# Patient Record
Sex: FEMALE | Race: WHITE | NOT HISPANIC OR LATINO | Employment: UNEMPLOYED | URBAN - METROPOLITAN AREA
[De-identification: names, ages, dates, MRNs, and addresses within clinical notes are randomized per-mention and may not be internally consistent; named-entity substitution may affect disease eponyms.]

---

## 2022-01-05 PROBLEM — E11.9 TYPE 2 DIABETES MELLITUS: Status: ACTIVE | Noted: 2022-01-05

## 2022-01-05 PROBLEM — I10 HYPERTENSION: Status: ACTIVE | Noted: 2022-01-05

## 2022-01-05 PROBLEM — E78.5 HYPERLIPIDEMIA: Status: ACTIVE | Noted: 2022-01-05

## 2022-01-06 ENCOUNTER — OFFICE VISIT (OUTPATIENT)
Dept: PRIMARY CARE CLINIC | Facility: CLINIC | Age: 72
End: 2022-01-06
Payer: MEDICARE

## 2022-01-06 ENCOUNTER — HOSPITAL ENCOUNTER (OUTPATIENT)
Dept: CARDIOLOGY | Facility: HOSPITAL | Age: 72
Discharge: HOME | End: 2022-01-06
Payer: MEDICARE

## 2022-01-06 VITALS
DIASTOLIC BLOOD PRESSURE: 88 MMHG | WEIGHT: 167.75 LBS | OXYGEN SATURATION: 95 % | SYSTOLIC BLOOD PRESSURE: 178 MMHG | HEART RATE: 88 BPM | BODY MASS INDEX: 28.64 KG/M2 | HEIGHT: 64 IN | TEMPERATURE: 98 F

## 2022-01-06 DIAGNOSIS — Z78.0 ASYMPTOMATIC MENOPAUSAL STATE: ICD-10-CM

## 2022-01-06 DIAGNOSIS — Z00.00 ENCOUNTER FOR PREVENTIVE CARE: Primary | ICD-10-CM

## 2022-01-06 DIAGNOSIS — Z13.6 ENCOUNTER FOR SCREENING FOR CARDIOVASCULAR DISORDERS: ICD-10-CM

## 2022-01-06 DIAGNOSIS — E08.37X2: ICD-10-CM

## 2022-01-06 DIAGNOSIS — Z79.4 DIABETES MELLITUS DUE TO UNDERLYING CONDITION WITH RETINOPATHY AND MACULAR EDEMA, WITH LONG-TERM CURRENT USE OF INSULIN, UNSPECIFIED LATERALITY, UNSPECIFIED RETINOPATHY SEVERITY: ICD-10-CM

## 2022-01-06 DIAGNOSIS — H35.30 MACULAR DEGENERATION, UNSPECIFIED LATERALITY, UNSPECIFIED TYPE: ICD-10-CM

## 2022-01-06 DIAGNOSIS — F32.89 OTHER DEPRESSION: ICD-10-CM

## 2022-01-06 DIAGNOSIS — E08.37X3: ICD-10-CM

## 2022-01-06 DIAGNOSIS — F41.9 ANXIETY: ICD-10-CM

## 2022-01-06 DIAGNOSIS — R22.1 NECK MASS: ICD-10-CM

## 2022-01-06 DIAGNOSIS — Z12.31 ENCOUNTER FOR SCREENING MAMMOGRAM FOR MALIGNANT NEOPLASM OF BREAST: ICD-10-CM

## 2022-01-06 DIAGNOSIS — M54.9 ACUTE BILATERAL BACK PAIN, UNSPECIFIED BACK LOCATION: ICD-10-CM

## 2022-01-06 DIAGNOSIS — E08.311 DIABETES MELLITUS DUE TO UNDERLYING CONDITION WITH RETINOPATHY AND MACULAR EDEMA, WITH LONG-TERM CURRENT USE OF INSULIN, UNSPECIFIED LATERALITY, UNSPECIFIED RETINOPATHY SEVERITY: ICD-10-CM

## 2022-01-06 PROCEDURE — 3079F DIAST BP 80-89 MM HG: CPT | Mod: HCNC,CPTII,S$GLB, | Performed by: NURSE PRACTITIONER

## 2022-01-06 PROCEDURE — 3079F PR MOST RECENT DIASTOLIC BLOOD PRESSURE 80-89 MM HG: ICD-10-PCS | Mod: HCNC,CPTII,S$GLB, | Performed by: NURSE PRACTITIONER

## 2022-01-06 PROCEDURE — 93010 ELECTROCARDIOGRAM REPORT: CPT | Mod: HCNC,S$GLB,, | Performed by: INTERNAL MEDICINE

## 2022-01-06 PROCEDURE — 1160F PR REVIEW ALL MEDS BY PRESCRIBER/CLIN PHARMACIST DOCUMENTED: ICD-10-PCS | Mod: HCNC,CPTII,S$GLB, | Performed by: NURSE PRACTITIONER

## 2022-01-06 PROCEDURE — 93010 EKG 12-LEAD: ICD-10-PCS | Mod: HCNC,S$GLB,, | Performed by: INTERNAL MEDICINE

## 2022-01-06 PROCEDURE — 99499 RISK ADDL DX/OHS AUDIT: ICD-10-PCS | Mod: S$GLB,,, | Performed by: NURSE PRACTITIONER

## 2022-01-06 PROCEDURE — 99999 PR PBB SHADOW E&M-NEW PATIENT-LVL IV: CPT | Mod: PBBFAC,HCNC,, | Performed by: NURSE PRACTITIONER

## 2022-01-06 PROCEDURE — 1159F MED LIST DOCD IN RCRD: CPT | Mod: HCNC,CPTII,S$GLB, | Performed by: NURSE PRACTITIONER

## 2022-01-06 PROCEDURE — 1160F RVW MEDS BY RX/DR IN RCRD: CPT | Mod: HCNC,CPTII,S$GLB, | Performed by: NURSE PRACTITIONER

## 2022-01-06 PROCEDURE — 93005 ELECTROCARDIOGRAM TRACING: CPT | Mod: HCNC,S$GLB,, | Performed by: NURSE PRACTITIONER

## 2022-01-06 PROCEDURE — 3288F FALL RISK ASSESSMENT DOCD: CPT | Mod: HCNC,CPTII,S$GLB, | Performed by: NURSE PRACTITIONER

## 2022-01-06 PROCEDURE — 99205 OFFICE O/P NEW HI 60 MIN: CPT | Mod: HCNC,S$GLB,, | Performed by: NURSE PRACTITIONER

## 2022-01-06 PROCEDURE — 3077F PR MOST RECENT SYSTOLIC BLOOD PRESSURE >= 140 MM HG: ICD-10-PCS | Mod: HCNC,CPTII,S$GLB, | Performed by: NURSE PRACTITIONER

## 2022-01-06 PROCEDURE — 99205 PR OFFICE/OUTPT VISIT, NEW, LEVL V, 60-74 MIN: ICD-10-PCS | Mod: HCNC,S$GLB,, | Performed by: NURSE PRACTITIONER

## 2022-01-06 PROCEDURE — 99499 UNLISTED E&M SERVICE: CPT | Mod: S$GLB,,, | Performed by: NURSE PRACTITIONER

## 2022-01-06 PROCEDURE — 3077F SYST BP >= 140 MM HG: CPT | Mod: HCNC,CPTII,S$GLB, | Performed by: NURSE PRACTITIONER

## 2022-01-06 PROCEDURE — 3288F PR FALLS RISK ASSESSMENT DOCUMENTED: ICD-10-PCS | Mod: HCNC,CPTII,S$GLB, | Performed by: NURSE PRACTITIONER

## 2022-01-06 PROCEDURE — 93005 EKG 12-LEAD: ICD-10-PCS | Mod: HCNC,S$GLB,, | Performed by: NURSE PRACTITIONER

## 2022-01-06 PROCEDURE — 1126F PR PAIN SEVERITY QUANTIFIED, NO PAIN PRESENT: ICD-10-PCS | Mod: HCNC,CPTII,S$GLB, | Performed by: NURSE PRACTITIONER

## 2022-01-06 PROCEDURE — 3044F PR MOST RECENT HEMOGLOBIN A1C LEVEL <7.0%: ICD-10-PCS | Mod: HCNC,CPTII,S$GLB, | Performed by: NURSE PRACTITIONER

## 2022-01-06 PROCEDURE — 3008F BODY MASS INDEX DOCD: CPT | Mod: HCNC,CPTII,S$GLB, | Performed by: NURSE PRACTITIONER

## 2022-01-06 PROCEDURE — 1126F AMNT PAIN NOTED NONE PRSNT: CPT | Mod: HCNC,CPTII,S$GLB, | Performed by: NURSE PRACTITIONER

## 2022-01-06 PROCEDURE — 1159F PR MEDICATION LIST DOCUMENTED IN MEDICAL RECORD: ICD-10-PCS | Mod: HCNC,CPTII,S$GLB, | Performed by: NURSE PRACTITIONER

## 2022-01-06 PROCEDURE — 1101F PT FALLS ASSESS-DOCD LE1/YR: CPT | Mod: HCNC,CPTII,S$GLB, | Performed by: NURSE PRACTITIONER

## 2022-01-06 PROCEDURE — 99999 PR PBB SHADOW E&M-NEW PATIENT-LVL IV: ICD-10-PCS | Mod: PBBFAC,HCNC,, | Performed by: NURSE PRACTITIONER

## 2022-01-06 PROCEDURE — 3008F PR BODY MASS INDEX (BMI) DOCUMENTED: ICD-10-PCS | Mod: HCNC,CPTII,S$GLB, | Performed by: NURSE PRACTITIONER

## 2022-01-06 PROCEDURE — 3044F HG A1C LEVEL LT 7.0%: CPT | Mod: HCNC,CPTII,S$GLB, | Performed by: NURSE PRACTITIONER

## 2022-01-06 PROCEDURE — 1101F PR PT FALLS ASSESS DOC 0-1 FALLS W/OUT INJ PAST YR: ICD-10-PCS | Mod: HCNC,CPTII,S$GLB, | Performed by: NURSE PRACTITIONER

## 2022-01-06 RX ORDER — INSULIN GLARGINE 300 U/ML
55 INJECTION, SOLUTION SUBCUTANEOUS DAILY
Qty: 2 PEN | Refills: 11 | Status: SHIPPED | OUTPATIENT
Start: 2022-01-06 | End: 2022-09-22 | Stop reason: SDUPTHER

## 2022-01-06 RX ORDER — ESCITALOPRAM OXALATE 10 MG/1
10 TABLET ORAL DAILY
Qty: 30 TABLET | Refills: 11 | Status: SHIPPED | OUTPATIENT
Start: 2022-01-06 | End: 2022-07-11

## 2022-01-06 NOTE — PROGRESS NOTES
Answers for HPI/ROS submitted by the patient on 1/6/2022  Diabetes type: type 2  MedicAlert ID: No  Disease duration: 20 years  blurred vision: No  chest pain: No  fatigue: Yes  foot paresthesias: No  foot ulcerations: No  polydipsia: Yes  polyphagia: No  polyuria: No  visual change: Yes  weakness: Yes  weight loss: No  Symptom course: stable  confusion: No  dizziness: Yes  headaches: No  hunger: No  mood changes: No  nervous/anxious: Yes  pallor: No  seizures: No  sleepiness: Yes  speech difficulty: No  sweats: Yes  tremors: Yes  blackouts: No  hospitalization: No  nocturnal hypoglycemia: No  required assistance: No  required glucagon: Yes  CVA: No  heart disease: No  nephropathy: No  Rosana Aguiar  01/06/2022  [id number]    Anamaria Sawyer NP  Patient Care Team:  Anamaria Sawyer NP as PCP - General (Family Medicine)        [facility] Primary Care Note      Chief Complaint:  Chief Complaint   Patient presents with    Establish Care       History of Present Illness:  Diabetes  She has type 2 diabetes mellitus. No MedicAlert identification noted. The initial diagnosis of diabetes was made 20 years ago. Hypoglycemia symptoms include dizziness, nervousness/anxiousness, sleepiness, sweats and tremors. Pertinent negatives for hypoglycemia include no confusion, headaches, hunger, mood changes, pallor, seizures or speech difficulty. Associated symptoms include fatigue, polydipsia, visual change and weakness. Pertinent negatives for diabetes include no blurred vision, no chest pain, no foot paresthesias, no foot ulcerations, no polyphagia, no polyuria and no weight loss. Hypoglycemia complications include required glucagon injection. Pertinent negatives for hypoglycemia complications include no blackouts, no hospitalization, no nocturnal hypoglycemia and no required assistance. Symptoms are stable. Diabetic complications include retinopathy. Pertinent negatives for diabetic complications include no autonomic  "neuropathy, CVA, heart disease, nephropathy, peripheral neuropathy or PVD. Risk factors for coronary artery disease include dyslipidemia, hypertension, obesity, sedentary lifestyle, stress and tobacco exposure. Current diabetic treatment includes insulin injections. She is compliant with treatment most of the time. She is currently taking insulin pre-breakfast. Insulin injections are given by patient. Rotation sites for injection include the abdominal wall. Her weight is stable. She is following a generally healthy diet. When asked about meal planning, she reported none. She has not had a previous visit with a dietitian. She never participates in exercise. She monitors blood glucose at home 1-2 x per day. Blood glucose monitoring compliance is good. There is no change in her home blood glucose trend. She does not see a podiatrist.Eye exam is not current.       Est care. New to  and [facility]. Previous PCP Dr Kolby Sr  Very stressed, caring for spouse with stage IV colon cancer, mets to liver, colostomy.     Needs refill of insulin. BP typically low, has been elevated past 1-2 months.     Cardiology -[facility]?? Normal stress test. Advised to take ASA, tx HTN.     Dizziness upon rising in AM, onset 1 year ago. Uncertain of cause, describes as "off balance."    BLE pain "a long time."   Not associated with activity/sleep. Decreased with APAP.     Smokes 1 PPD.   Doesn't sleep at night. Cannot tolerate alprazolam.     Never had colonoscopy.  Due mammogram does not have time with spouse's illness.     Previous A1C 6.4 - June 21        [facility] Dr Kolby Sr - last 3 years progress notes, results, consult notes. [facility] - all notes.     Review of Systems   Constitutional: Positive for fatigue. Negative for weight loss.   Eyes: Negative for blurred vision.   Respiratory: Negative for cough, shortness of breath and wheezing.    Cardiovascular: Negative for chest pain, palpitations and leg swelling.   Musculoskeletal: Negative " for myalgias.   Skin: Negative for pallor.   Neurological: Positive for dizziness, tremors and weakness. Negative for seizures, speech difficulty and headaches.   Endo/Heme/Allergies: Positive for polydipsia. Negative for polyphagia.   Psychiatric/Behavioral: Negative for confusion. The patient is nervous/anxious and has insomnia.          The following were reviewed: Active problem list, medication list, allergies, family history, social history, and Health Maintenance.     History:  Past Medical History:   Diagnosis Date    Diabetes mellitus, type 2     High cholesterol     Hypertension      Past Surgical History:   Procedure Laterality Date    APPENDECTOMY      cyst removal from breast (Left)      HYSTERECTOMY      TONSILLECTOMY       Family History   Problem Relation Age of Onset    Diabetes Mother     Alcohol abuse Father     Diabetes Paternal Uncle      Social History     Socioeconomic History    Marital status:    Tobacco Use    Smoking status: Current Every Day Smoker     Types: Cigarettes    Smokeless tobacco: Never Used   Substance and Sexual Activity    Alcohol use: Not Currently    Drug use: Never    Sexual activity: Yes     Partners: Male     Patient Active Problem List   Diagnosis    Hyperlipidemia    Hypertension    Type 2 diabetes mellitus     Review of patient's allergies indicates:   Allergen Reactions    Neomycin-bacitracin-polymyxin Itching       Medications:  Current Outpatient Medications on File Prior to Visit   Medication Sig Dispense Refill    losartan-hydrochlorothiazide 100-12.5 mg (HYZAAR) 100-12.5 mg Tab losartan-hydrochlorothiazide Take tablet (oral) 1 time per day No date recorded tablet 1 time per day oral No set duration recorded No set duration amount recorded active 100-12.5 mg      [DISCONTINUED] aspirin (ECOTRIN) 325 MG EC tablet aspirin Take 1 tablet (oral) 1 time per day No date recorded tablet 1 time per day oral No set duration recorded No set  duration amount recorded active 325 MG      [DISCONTINUED] rosuvastatin calcium (ROSUVASTATIN ORAL) rosuvastatin Take (oral) 1 time per day No date recorded No form recorded 1 time per day oral No set duration recorded No set duration amount recorded active No dosage strength recorded No dosage strength units of measure recorded       No current facility-administered medications on file prior to visit.       Medications have been reviewed and reconciled with patient at visit today.    Barriers to medications present (no )    Adverse reactions to current medications (no)      Exam:  Vitals:    01/06/22 0848   BP: (!) 178/88   Pulse: 88   Temp: 98.2 °F (36.8 °C)     Weight: 76.1 kg (167 lb 12.3 oz)   Body mass index is 28.8 kg/m².      BP Readings from Last 3 Encounters:   01/06/22 (!) 178/88     Wt Readings from Last 3 Encounters:   01/06/22 0848 76.1 kg (167 lb 12.3 oz)            Physical Exam  Constitutional:       Appearance: She is ill-appearing.   HENT:      Nose: Nose normal.      Mouth/Throat:      Mouth: Mucous membranes are moist.      Pharynx: No oropharyngeal exudate or posterior oropharyngeal erythema.   Eyes:      General: No scleral icterus.  Cardiovascular:      Rate and Rhythm: Normal rate and regular rhythm.      Heart sounds: Normal heart sounds.   Pulmonary:      Effort: Pulmonary effort is normal.      Breath sounds: Normal breath sounds.   Abdominal:      General: Bowel sounds are normal. There is no distension.      Palpations: Abdomen is soft.      Tenderness: There is no abdominal tenderness.   Musculoskeletal:         General: No swelling.      Cervical back: Neck supple.   Skin:     General: Skin is warm and dry.   Neurological:      Mental Status: She is alert. Mental status is at baseline.   Psychiatric:         Mood and Affect: Mood normal.         Behavior: Behavior normal.         Laboratory Reviewed: (N/A)  No results found for: WBC, HGB, HCT, PLT, CHOL, TRIG, HDL, LDLDIRECT, ALT,  AST, NA, K, CL, CREATININE, BUN, CO2, TSH, PSA, INR, GLUF, HGBA1C, MICROALBUR        Health Maintenance  The patient has no Health Maintenance topics of status Not Due  Health Maintenance Due   Topic Date Due    Hepatitis C Screening  Never done    Lipid Panel  Never done    Pneumococcal Vaccines (Age 65+) (1 of 2 - PPSV23) Never done    TETANUS VACCINE  Never done    Mammogram  Never done    DEXA SCAN  Never done    Colorectal Cancer Screening  Never done    Shingles Vaccine (1 of 2) Never done    Influenza Vaccine (1) Never done    COVID-19 Vaccine (3 - Booster for Pfizer series) 10/03/2021       Needs booster for COVID.  Not interested in PPSV23 right now.   Low back pain - CVAT UA and CMP. If not revealing then renal CT??      Assessment:  Problem List Items Addressed This Visit    None     Visit Diagnoses     Encounter for preventive care    -  Primary    Relevant Orders    Lipid Panel    Hepatitis C Antibody    Mammo Digital Screening Bilat w/ Juan    DXA Bone Density Spine And Hip    (In Office Administered) Pneumococcal Polysaccharide Vaccine (23 Valent) (SQ/IM)    Case Request Endoscopy: COLONOSCOPY (Completed)    Fecal Immunochemical Test (iFOBT)    Encounter for screening for cardiovascular disorders         Relevant Orders    Lipid Panel    Encounter for screening mammogram for malignant neoplasm of breast         Relevant Orders    Mammo Digital Screening Bilat w/ Juan    Asymptomatic menopausal state         Relevant Orders    DXA Bone Density Spine And Hip    Diabetes mellitus due to underlying condition with retinopathy and macular edema, with long-term current use of insulin, unspecified laterality, unspecified retinopathy severity        Relevant Medications    insulin glargine U-300 conc (TOUJEO MAX U-300 SOLOSTAR) 300 unit/mL (3 mL) insulin pen    Other Relevant Orders    Hemoglobin A1C    CBC Auto Differential    Comprehensive Metabolic Panel    Neck mass        Relevant Orders    US  Soft Tissue Head Neck Thyroid    TSH    T4, Free    T3    Anxiety        Relevant Orders    IN OFFICE EKG 12-LEAD (to Muse)    Macular degeneration, unspecified laterality, unspecified type        Relevant Orders    Ambulatory referral/consult to Optometry    Diabetes mellitus due to underlying condition with diabetic macular edema, resolved following treatment, bilateral         Relevant Medications    insulin glargine U-300 conc (TOUJEO MAX U-300 SOLOSTAR) 300 unit/mL (3 mL) insulin pen    Other Relevant Orders    TSH    Diabetes mellitus due to underlying condition with diabetic macular edema, resolved following treatment, left eye         Relevant Medications    insulin glargine U-300 conc (TOUJEO MAX U-300 SOLOSTAR) 300 unit/mL (3 mL) insulin pen    Other Relevant Orders    T4, Free    Acute bilateral back pain, unspecified back location        Relevant Orders    Urinalysis, Reflex to Urine Culture Urine, Clean Catch            Plan:  Encounter for preventive care  -     Lipid Panel; Future; Expected date: 01/06/2022  -     Hepatitis C Antibody; Future; Expected date: 01/06/2022  -     Mammo Digital Screening Bilat w/ Juan; Future; Expected date: 01/06/2022  -     DXA Bone Density Spine And Hip; Future; Expected date: 01/06/2022  -     (In Office Administered) Pneumococcal Polysaccharide Vaccine (23 Valent) (SQ/IM)  -     Case Request Endoscopy: COLONOSCOPY  -     Fecal Immunochemical Test (iFOBT); Future; Expected date: 01/06/2022    Encounter for screening for cardiovascular disorders   -     Lipid Panel; Future; Expected date: 01/06/2022    Encounter for screening mammogram for malignant neoplasm of breast   -     Mammo Digital Screening Bilat w/ Juan; Future; Expected date: 01/06/2022    Asymptomatic menopausal state   -     DXA Bone Density Spine And Hip; Future; Expected date: 01/06/2022    Diabetes mellitus due to underlying condition with retinopathy and macular edema, with long-term current use of  insulin, unspecified laterality, unspecified retinopathy severity  -     Hemoglobin A1C; Future; Expected date: 01/06/2022  -     CBC Auto Differential; Future; Expected date: 01/06/2022  -     Comprehensive Metabolic Panel; Future; Expected date: 01/06/2022  -     Cancel: Lipid Panel; Future; Expected date: 01/06/2022    Neck mass  -     US Soft Tissue Head Neck Thyroid; Future; Expected date: 01/06/2022  -     TSH; Future; Expected date: 01/06/2022  -     T4, Free; Future; Expected date: 01/06/2022  -     T3; Future; Expected date: 01/06/2022    Anxiety  -     IN OFFICE EKG 12-LEAD (to Muse)    Macular degeneration, unspecified laterality, unspecified type  -     Ambulatory referral/consult to Optometry; Future; Expected date: 01/13/2022    Diabetes mellitus due to underlying condition with diabetic macular edema, resolved following treatment, bilateral   -     TSH; Future; Expected date: 01/06/2022    Diabetes mellitus due to underlying condition with diabetic macular edema, resolved following treatment, left eye   -     T4, Free; Future; Expected date: 01/06/2022    Acute bilateral back pain, unspecified back location  -     Urinalysis, Reflex to Urine Culture Urine, Clean Catch; Future; Expected date: 01/06/2022    Other orders  -     insulin glargine U-300 conc (TOUJEO MAX U-300 SOLOSTAR) 300 unit/mL (3 mL) insulin pen; Inject 56 Units into the skin once daily.  Dispense: 2 pen; Refill: 11      -Patient's lab results were reviewed and discussed with patient  -Treatment options and alternatives were discussed with the patient. Patient expressed understanding. Patient was given the opportunity to ask questions and be an active participant in their medical care. Patient had no further questions or concerns at this time.   -Documentation of patient's health and condition was obtained from family member who was present during visit.  -Patient is an overall moderate risk for health complications from their medical  conditions.       Follow up: Follow up for HTN management, Diabetes management, Medication Change.      After visit summary printed and given to patient upon discharge.  Patient goals and care plan are included in After visit summary.    Total medical decision making time was 62 min.  The following issues were discussed: The primary encounter diagnosis was Encounter for preventive care. Diagnoses of Encounter for screening for cardiovascular disorders , Encounter for screening mammogram for malignant neoplasm of breast , Asymptomatic menopausal state , Diabetes mellitus due to underlying condition with retinopathy and macular edema, with long-term current use of insulin, unspecified laterality, unspecified retinopathy severity, Neck mass, Anxiety, Macular degeneration, unspecified laterality, unspecified type, Diabetes mellitus due to underlying condition with diabetic macular edema, resolved following treatment, bilateral , Diabetes mellitus due to underlying condition with diabetic macular edema, resolved following treatment, left eye , and Acute bilateral back pain, unspecified back location were also pertinent to this visit.    Health maintenance needs, recent test results and goals of care discussed with pt and questions answered.     peripheral neuropathy: No  PVD: No  retinopathy: Yes  autonomic neuropathy: No  CAD risks: dyslipidemia, hypertension, obesity, sedentary lifestyle, stress, tobacco exposure  Current treatments: insulin injections  Treatment compliance: most of the time  Dose schedule: pre-breakfast  Given by: patient  Injection sites: abdominal wall  Home blood tests: 1-2 x per day  Monitoring compliance: good  Blood glucose trend: no change  Weight trend: stable  Current diet: generally healthy  Meal planning: none  Exercise: never  Dietitian visit: No  Eye exam current: No  Sees podiatrist: No

## 2022-01-07 ENCOUNTER — PATIENT MESSAGE (OUTPATIENT)
Dept: PRIMARY CARE CLINIC | Facility: CLINIC | Age: 72
End: 2022-01-07
Payer: MEDICARE

## 2022-01-11 ENCOUNTER — HOSPITAL ENCOUNTER (OUTPATIENT)
Dept: RADIOLOGY | Facility: HOSPITAL | Age: 72
Discharge: HOME | End: 2022-01-11
Attending: NURSE PRACTITIONER
Payer: MEDICARE

## 2022-01-11 DIAGNOSIS — R22.1 NECK MASS: ICD-10-CM

## 2022-01-11 PROCEDURE — 76536 US EXAM OF HEAD AND NECK: CPT | Mod: TC,HCNC

## 2022-01-11 PROCEDURE — 76536 US SOFT TISSUE HEAD NECK THYROID: ICD-10-PCS | Mod: 26,HCNC,, | Performed by: RADIOLOGY

## 2022-01-11 PROCEDURE — 76536 US EXAM OF HEAD AND NECK: CPT | Mod: 26,HCNC,, | Performed by: RADIOLOGY

## 2022-01-13 ENCOUNTER — TELEPHONE (OUTPATIENT)
Dept: PRIMARY CARE CLINIC | Facility: CLINIC | Age: 72
End: 2022-01-13
Payer: MEDICARE

## 2022-01-13 DIAGNOSIS — R31.29 OTHER MICROSCOPIC HEMATURIA: Primary | ICD-10-CM

## 2022-01-13 DIAGNOSIS — R10.9 ABDOMINAL PAIN, UNSPECIFIED ABDOMINAL LOCATION: ICD-10-CM

## 2022-01-13 DIAGNOSIS — M54.40 ACUTE BILATERAL LOW BACK PAIN WITH SCIATICA, SCIATICA LATERALITY UNSPECIFIED: ICD-10-CM

## 2022-01-13 NOTE — TELEPHONE ENCOUNTER
Patient was contacted in regards to scheduling her CT. First available wasn't until 01/25/22 @ 5pm. Patient stated that she will call back to schedule because her  has a few appointments that's coming up. They were here earlier this morning, by the time I called, they were already back home.

## 2022-01-13 NOTE — PROGRESS NOTES
Called pt to discuss urinalysis. She reports having increased LBP the past few days. Will get CT .

## 2022-03-25 ENCOUNTER — TELEPHONE (OUTPATIENT)
Dept: PRIMARY CARE CLINIC | Facility: CLINIC | Age: 72
End: 2022-03-25
Payer: MEDICARE

## 2022-03-25 NOTE — TELEPHONE ENCOUNTER
Patient was contacted regarding her needing to schedule an OV. She says that she will have to call back to schedule because her  is now on hospice (at home) and it's just a real difficult time. She says she will call back when she can schedule.

## 2022-03-31 ENCOUNTER — TELEPHONE (OUTPATIENT)
Dept: ADMINISTRATIVE | Facility: HOSPITAL | Age: 72
End: 2022-03-31
Payer: MEDICARE

## 2022-03-31 DIAGNOSIS — Z00.00 ENCOUNTER FOR PREVENTIVE CARE: ICD-10-CM

## 2022-03-31 NOTE — TELEPHONE ENCOUNTER
Case Request for Endo has been canceled and new Referral for Endo Procedure  has been entered for Screening Colonoscopy

## 2022-04-21 ENCOUNTER — TELEPHONE (OUTPATIENT)
Dept: PRIMARY CARE CLINIC | Facility: CLINIC | Age: 72
End: 2022-04-21
Payer: MEDICARE

## 2022-04-21 DIAGNOSIS — E11.9 TYPE 2 DIABETES MELLITUS WITHOUT COMPLICATION, WITH LONG-TERM CURRENT USE OF INSULIN: Primary | ICD-10-CM

## 2022-04-21 DIAGNOSIS — Z79.4 TYPE 2 DIABETES MELLITUS WITHOUT COMPLICATION, WITH LONG-TERM CURRENT USE OF INSULIN: Primary | ICD-10-CM

## 2022-04-21 RX ORDER — PEN NEEDLE, DIABETIC 30 GX3/16"
1 NEEDLE, DISPOSABLE MISCELLANEOUS DAILY
Qty: 100 EACH | Refills: 1 | Status: SHIPPED | OUTPATIENT
Start: 2022-04-21 | End: 2022-11-17 | Stop reason: SDUPTHER

## 2022-04-21 NOTE — TELEPHONE ENCOUNTER
----- Message from Elehanna Helm sent at 4/20/2022  3:37 PM CDT -----  Contact: PT  .Type:  RX Refill Request    Who Called:  Rosana    Refill or New Rx:  refill   RX Name and Strength: diabetic needles  BD Mirna 2 Gen Pen   How is the patient currently taking it? (ex. 1XDay):   Is this a 30 day or 90 day RX: 90   Preferred Pharmacy with phone number: .  [facility]  [id number] [place] [address] Dr Kaplan [address] [place] [id number]  Phone: [phone] Fax: [phone]       Local or Mail Order:  local   Ordering Provider:  Digna     Would the patient rather a call back or a response via [facility]?  Callback    Best Call Back Number:.[phone] (home)        Additional Information:

## 2022-04-21 NOTE — TELEPHONE ENCOUNTER
Patient requesting a refill on: diabetic needles  BD Mirna 2 Gen Pen. I do not see this prescription on her medication list.

## 2022-07-11 ENCOUNTER — HOSPITAL ENCOUNTER (OUTPATIENT)
Dept: RADIOLOGY | Facility: HOSPITAL | Age: 72
Discharge: HOME | End: 2022-07-11
Attending: NURSE PRACTITIONER
Payer: MEDICARE

## 2022-07-11 ENCOUNTER — OFFICE VISIT (OUTPATIENT)
Dept: PRIMARY CARE CLINIC | Facility: CLINIC | Age: 72
End: 2022-07-11
Payer: MEDICARE

## 2022-07-11 ENCOUNTER — HOSPITAL ENCOUNTER (OUTPATIENT)
Dept: CARDIOLOGY | Facility: HOSPITAL | Age: 72
Discharge: HOME | End: 2022-07-11
Payer: MEDICARE

## 2022-07-11 VITALS
HEART RATE: 83 BPM | TEMPERATURE: 98 F | WEIGHT: 168.88 LBS | OXYGEN SATURATION: 98 % | DIASTOLIC BLOOD PRESSURE: 80 MMHG | BODY MASS INDEX: 28.83 KG/M2 | SYSTOLIC BLOOD PRESSURE: 160 MMHG | HEIGHT: 64 IN

## 2022-07-11 DIAGNOSIS — R53.1 WEAKNESS: ICD-10-CM

## 2022-07-11 DIAGNOSIS — R09.89 BRUIT OF LEFT CAROTID ARTERY: ICD-10-CM

## 2022-07-11 DIAGNOSIS — R31.21 ASYMPTOMATIC MICROSCOPIC HEMATURIA: ICD-10-CM

## 2022-07-11 DIAGNOSIS — E78.5 HYPERLIPIDEMIA, UNSPECIFIED HYPERLIPIDEMIA TYPE: ICD-10-CM

## 2022-07-11 DIAGNOSIS — G72.0 STATIN MYOPATHY: ICD-10-CM

## 2022-07-11 DIAGNOSIS — F41.9 ANXIETY: ICD-10-CM

## 2022-07-11 DIAGNOSIS — T46.6X5A STATIN MYOPATHY: ICD-10-CM

## 2022-07-11 DIAGNOSIS — H35.30 MACULAR DEGENERATION, UNSPECIFIED LATERALITY, UNSPECIFIED TYPE: ICD-10-CM

## 2022-07-11 DIAGNOSIS — I70.0 ATHEROSCLEROSIS OF ABDOMINAL AORTA: Primary | ICD-10-CM

## 2022-07-11 DIAGNOSIS — E11.9 TYPE 2 DIABETES MELLITUS WITHOUT COMPLICATION, WITH LONG-TERM CURRENT USE OF INSULIN: ICD-10-CM

## 2022-07-11 DIAGNOSIS — Z79.4 TYPE 2 DIABETES MELLITUS WITH DIABETIC PERIPHERAL ANGIOPATHY WITHOUT GANGRENE, WITH LONG-TERM CURRENT USE OF INSULIN: ICD-10-CM

## 2022-07-11 DIAGNOSIS — Z79.4 TYPE 2 DIABETES MELLITUS WITHOUT COMPLICATION, WITH LONG-TERM CURRENT USE OF INSULIN: ICD-10-CM

## 2022-07-11 DIAGNOSIS — I70.0 ABDOMINAL AORTIC ATHEROSCLEROSIS: ICD-10-CM

## 2022-07-11 DIAGNOSIS — I10 HYPERTENSION, UNSPECIFIED TYPE: ICD-10-CM

## 2022-07-11 DIAGNOSIS — F43.21 GRIEF: ICD-10-CM

## 2022-07-11 DIAGNOSIS — E11.51 TYPE 2 DIABETES MELLITUS WITH DIABETIC PERIPHERAL ANGIOPATHY WITHOUT GANGRENE, WITH LONG-TERM CURRENT USE OF INSULIN: ICD-10-CM

## 2022-07-11 DIAGNOSIS — E04.1 THYROID NODULE: Primary | ICD-10-CM

## 2022-07-11 PROCEDURE — 1101F PR PT FALLS ASSESS DOC 0-1 FALLS W/OUT INJ PAST YR: ICD-10-PCS | Mod: CPTII,S$GLB,, | Performed by: NURSE PRACTITIONER

## 2022-07-11 PROCEDURE — 93010 ELECTROCARDIOGRAM REPORT: CPT | Mod: S$GLB,,, | Performed by: INTERNAL MEDICINE

## 2022-07-11 PROCEDURE — 3288F FALL RISK ASSESSMENT DOCD: CPT | Mod: CPTII,S$GLB,, | Performed by: NURSE PRACTITIONER

## 2022-07-11 PROCEDURE — 1126F AMNT PAIN NOTED NONE PRSNT: CPT | Mod: CPTII,S$GLB,, | Performed by: NURSE PRACTITIONER

## 2022-07-11 PROCEDURE — 3008F BODY MASS INDEX DOCD: CPT | Mod: CPTII,S$GLB,, | Performed by: NURSE PRACTITIONER

## 2022-07-11 PROCEDURE — 73502 X-RAY EXAM HIP UNI 2-3 VIEWS: CPT | Mod: 26,RT,, | Performed by: RADIOLOGY

## 2022-07-11 PROCEDURE — 3077F PR MOST RECENT SYSTOLIC BLOOD PRESSURE >= 140 MM HG: ICD-10-PCS | Mod: CPTII,S$GLB,, | Performed by: NURSE PRACTITIONER

## 2022-07-11 PROCEDURE — 72100 X-RAY EXAM L-S SPINE 2/3 VWS: CPT | Mod: 26,,, | Performed by: RADIOLOGY

## 2022-07-11 PROCEDURE — 1160F RVW MEDS BY RX/DR IN RCRD: CPT | Mod: CPTII,S$GLB,, | Performed by: NURSE PRACTITIONER

## 2022-07-11 PROCEDURE — 72100 XR LUMBAR SPINE AP AND LATERAL: ICD-10-PCS | Mod: 26,,, | Performed by: RADIOLOGY

## 2022-07-11 PROCEDURE — 1126F PR PAIN SEVERITY QUANTIFIED, NO PAIN PRESENT: ICD-10-PCS | Mod: CPTII,S$GLB,, | Performed by: NURSE PRACTITIONER

## 2022-07-11 PROCEDURE — 1159F PR MEDICATION LIST DOCUMENTED IN MEDICAL RECORD: ICD-10-PCS | Mod: CPTII,S$GLB,, | Performed by: NURSE PRACTITIONER

## 2022-07-11 PROCEDURE — 3077F SYST BP >= 140 MM HG: CPT | Mod: CPTII,S$GLB,, | Performed by: NURSE PRACTITIONER

## 2022-07-11 PROCEDURE — 72100 X-RAY EXAM L-S SPINE 2/3 VWS: CPT | Mod: TC

## 2022-07-11 PROCEDURE — 3079F DIAST BP 80-89 MM HG: CPT | Mod: CPTII,S$GLB,, | Performed by: NURSE PRACTITIONER

## 2022-07-11 PROCEDURE — 3288F PR FALLS RISK ASSESSMENT DOCUMENTED: ICD-10-PCS | Mod: CPTII,S$GLB,, | Performed by: NURSE PRACTITIONER

## 2022-07-11 PROCEDURE — 3079F PR MOST RECENT DIASTOLIC BLOOD PRESSURE 80-89 MM HG: ICD-10-PCS | Mod: CPTII,S$GLB,, | Performed by: NURSE PRACTITIONER

## 2022-07-11 PROCEDURE — 73502 X-RAY EXAM HIP UNI 2-3 VIEWS: CPT | Mod: TC,RT

## 2022-07-11 PROCEDURE — 93005 ELECTROCARDIOGRAM TRACING: CPT | Mod: S$GLB,,, | Performed by: NURSE PRACTITIONER

## 2022-07-11 PROCEDURE — 73502 XR HIP WITH PELVIS WHEN PERFORMED, 2 OR 3  VIEWS RIGHT: ICD-10-PCS | Mod: 26,RT,, | Performed by: RADIOLOGY

## 2022-07-11 PROCEDURE — 1159F MED LIST DOCD IN RCRD: CPT | Mod: CPTII,S$GLB,, | Performed by: NURSE PRACTITIONER

## 2022-07-11 PROCEDURE — 99215 PR OFFICE/OUTPT VISIT, EST, LEVL V, 40-54 MIN: ICD-10-PCS | Mod: S$GLB,,, | Performed by: NURSE PRACTITIONER

## 2022-07-11 PROCEDURE — 93010 EKG 12-LEAD: ICD-10-PCS | Mod: S$GLB,,, | Performed by: INTERNAL MEDICINE

## 2022-07-11 PROCEDURE — 3044F PR MOST RECENT HEMOGLOBIN A1C LEVEL <7.0%: ICD-10-PCS | Mod: CPTII,S$GLB,, | Performed by: NURSE PRACTITIONER

## 2022-07-11 PROCEDURE — 99215 OFFICE O/P EST HI 40 MIN: CPT | Mod: S$GLB,,, | Performed by: NURSE PRACTITIONER

## 2022-07-11 PROCEDURE — 3008F PR BODY MASS INDEX (BMI) DOCUMENTED: ICD-10-PCS | Mod: CPTII,S$GLB,, | Performed by: NURSE PRACTITIONER

## 2022-07-11 PROCEDURE — 99999 PR PBB SHADOW E&M-EST. PATIENT-LVL IV: ICD-10-PCS | Mod: PBBFAC,,, | Performed by: NURSE PRACTITIONER

## 2022-07-11 PROCEDURE — 99999 PR PBB SHADOW E&M-EST. PATIENT-LVL IV: CPT | Mod: PBBFAC,,, | Performed by: NURSE PRACTITIONER

## 2022-07-11 PROCEDURE — 93005 EKG 12-LEAD: ICD-10-PCS | Mod: S$GLB,,, | Performed by: NURSE PRACTITIONER

## 2022-07-11 PROCEDURE — 1160F PR REVIEW ALL MEDS BY PRESCRIBER/CLIN PHARMACIST DOCUMENTED: ICD-10-PCS | Mod: CPTII,S$GLB,, | Performed by: NURSE PRACTITIONER

## 2022-07-11 PROCEDURE — 1101F PT FALLS ASSESS-DOCD LE1/YR: CPT | Mod: CPTII,S$GLB,, | Performed by: NURSE PRACTITIONER

## 2022-07-11 PROCEDURE — 3044F HG A1C LEVEL LT 7.0%: CPT | Mod: CPTII,S$GLB,, | Performed by: NURSE PRACTITIONER

## 2022-07-11 RX ORDER — AMLODIPINE BESYLATE 5 MG/1
5 TABLET ORAL DAILY
Qty: 90 TABLET | Refills: 3 | Status: SHIPPED | OUTPATIENT
Start: 2022-07-11 | End: 2022-08-25 | Stop reason: SDUPTHER

## 2022-07-11 RX ORDER — LOSARTAN POTASSIUM AND HYDROCHLOROTHIAZIDE 25; 100 MG/1; MG/1
1 TABLET ORAL DAILY
Qty: 90 TABLET | Refills: 3 | Status: SHIPPED | OUTPATIENT
Start: 2022-07-11 | End: 2023-06-26

## 2022-07-11 RX ORDER — ALPRAZOLAM 0.25 MG/1
0.25 TABLET ORAL 2 TIMES DAILY PRN
Qty: 30 TABLET | Refills: 0 | Status: SHIPPED | OUTPATIENT
Start: 2022-07-11 | End: 2023-01-19

## 2022-07-11 NOTE — PROGRESS NOTES
"Rosana Aguiar  07/12/2022  [id number]    Anamaria Sawyer NP  Patient Care Team:  Anamaria Sawyer NP as PCP - General (Family Medicine)        [facility] Primary Care Note      Chief Complaint:  Chief Complaint   Patient presents with    Diabetes check     HTN Check        History of Present Illness:  HPI    F/U HTN, DMII.   passed in March.  Started escitalopram in January.   Crying daily.     Rt side problems. RUE pain, RLE swelling, Rt hip pain intermittently. Difficulty climbing stairs. Legs "wobble." Difficulty stepping down this weekend. Onset 2 months ago. Going to beach in 2 weeks. Intermittent but difficulty climbing stairs constant. Onset of Right hip pain Saturday. Decreased with ibuprofen.    Right upper arm pain with shoulder abduction.     Continues lantus for DMII. Hasn't been checking CBGs.   Thyroid U/S for neck mass showed multiple thyroid nodules not meeting FNA criteria.     Needs eye exam, mammo and DXA.      BP Readings from Last 3 Encounters:   07/11/22 (!) 160/80   01/06/22 (!) 178/88     Lab Results   Component Value Date    HGBA1C 6.0 (H) 07/11/2022     BLE pain - resolved after stopping statin.     Took escitalopram earlier for one month.   Did not get any relief so she did not refill.          Review of Systems   Constitutional: Negative for chills, fever, malaise/fatigue and weight loss.   HENT: Negative for congestion.    Respiratory: Negative for cough, shortness of breath and wheezing.    Cardiovascular: Negative for chest pain and leg swelling.   Gastrointestinal: Negative for abdominal pain, constipation, diarrhea, heartburn, nausea and vomiting.   Genitourinary: Negative for dysuria.   Musculoskeletal: Positive for joint pain (right hip) and myalgias. Negative for falls.   Neurological: Positive for focal weakness. Negative for dizziness and headaches.   Psychiatric/Behavioral: Positive for depression. Negative for substance abuse and suicidal ideas. The patient is " "nervous/anxious and has insomnia.          The following were reviewed: Active problem list, medication list, allergies, family history, social history, and Health Maintenance.     History:  Past Medical History:   Diagnosis Date    Diabetes mellitus, type 2     High cholesterol     Hypertension      Past Surgical History:   Procedure Laterality Date    APPENDECTOMY      cyst removal from breast (Left)      HYSTERECTOMY      TONSILLECTOMY       Family History   Problem Relation Age of Onset    Diabetes Mother     Alcohol abuse Father     Diabetes Paternal Uncle      Social History     Socioeconomic History    Marital status:    Tobacco Use    Smoking status: Current Every Day Smoker     Types: Cigarettes    Smokeless tobacco: Never Used   Substance and Sexual Activity    Alcohol use: Not Currently    Drug use: Never    Sexual activity: Yes     Partners: Male     Patient Active Problem List   Diagnosis    Hyperlipidemia    Hypertension    Type 2 diabetes mellitus with diabetic peripheral angiopathy without gangrene, with long-term current use of insulin    Thyroid nodule    Asymptomatic microscopic hematuria    Statin myopathy    Bruit of left carotid artery    Abdominal aortic atherosclerosis    Grief     Review of patient's allergies indicates:   Allergen Reactions    Neomycin-bacitracin-polymyxin Itching       Medications:  Current Outpatient Medications on File Prior to Visit   Medication Sig Dispense Refill    insulin glargine U-300 conc (TOUJEO MAX U-300 SOLOSTAR) 300 unit/mL (3 mL) insulin pen Inject 56 Units into the skin once daily. 2 pen 11    pen needle, diabetic (BD CAROL 2ND GEN PEN NEEDLE) 32 gauge x 5/32" Ndle 1 each by Misc.(Non-Drug; Combo Route) route once daily at 6am. 100 each 1     No current facility-administered medications on file prior to visit.       Medications have been reviewed and reconciled with patient at visit today.    Barriers to medications present " (no )    Adverse reactions to current medications (no)      Exam:  Vitals:    07/11/22 1027   BP: (!) 160/80   Pulse: 83   Temp: 97.8 °F (36.6 °C)     Weight: 76.6 kg (168 lb 14 oz)   Body mass index is 28.99 kg/m².      BP Readings from Last 3 Encounters:   07/11/22 (!) 160/80   01/06/22 (!) 178/88     Wt Readings from Last 3 Encounters:   07/11/22 1027 76.6 kg (168 lb 14 oz)   01/06/22 0848 76.1 kg (167 lb 12.3 oz)            Physical Exam  Neck:      Vascular: Carotid bruit (left) present.         Laboratory Reviewed: (Yes)  Lab Results   Component Value Date    WBC 7.26 07/11/2022    HGB 13.1 07/11/2022    HCT 41.3 07/11/2022     07/11/2022    CHOL 258 (H) 07/11/2022    TRIG 222 (H) 07/11/2022    HDL 45 07/11/2022    ALT 16 07/11/2022    AST 17 07/11/2022     07/11/2022    K 3.8 07/11/2022    CL 98 07/11/2022    CREATININE 0.9 07/11/2022    BUN 12 07/11/2022    CO2 25 07/11/2022    TSH 1.285 01/06/2022    HGBA1C 6.0 (H) 07/11/2022           Health Maintenance  Health Maintenance Topics with due status: Not Due       Topic Last Completion Date    Lipid Panel 07/11/2022    Influenza Vaccine Not Due     Health Maintenance Due   Topic Date Due    Pneumococcal Vaccines (Age 65+) (1 - PCV) Never done    TETANUS VACCINE  Never done    Mammogram  Never done    DEXA Scan  Never done    Colorectal Cancer Screening  Never done    Shingles Vaccine (1 of 2) Never done    COVID-19 Vaccine (3 - Booster for Pfizer series) 09/03/2021       Assessment:  Problem List Items Addressed This Visit        Psychiatric    Grief     Has strong support system. Doesn't want antidepressant or LCSW referral.               Cardiac/Vascular    Hypertension     Need improved control. Add amlodipine, increase HCTZ dose.            Relevant Medications    amLODIPine (NORVASC) 5 MG tablet    losartan-hydrochlorothiazide 100-25 mg (HYZAAR) 100-25 mg per tablet    Other Relevant Orders    IN OFFICE EKG 12-LEAD (to [place])  (Completed)    Bruit of left carotid artery     Vascular U/S. Improve BP control.   Would benefit from resuming statin, likely pravastatin d/t myalgias with rosuvastatin.            Relevant Orders    US Carotid Bilateral    Abdominal aortic atherosclerosis     Abd aortic ectasia on x-ray. Get abd aorta U/S.  Resume statin, improve BP control.            Hyperlipidemia       Renal/    Asymptomatic microscopic hematuria     Repeat UA.            Relevant Orders    Urinalysis, Reflex to Urine Culture Urine, Clean Catch       Endocrine    Type 2 diabetes mellitus with diabetic peripheral angiopathy without gangrene, with long-term current use of insulin     On insulin. Repeat A1C. Check CBGs.            Relevant Medications    losartan-hydrochlorothiazide 100-25 mg (HYZAAR) 100-25 mg per tablet    Thyroid nodule - Primary     Repeat U/S in January.           Relevant Orders    US Soft Tissue Head Neck Thyroid       Orthopedic    Statin myopathy     Consider pravastatin.              Other Visit Diagnoses     Weakness        Relevant Orders    Ambulatory referral/consult to Neurology    Sedimentation rate (Completed)    C-REACTIVE PROTEIN (Completed)    CK (Completed)    X-Ray Hip 2 or 3 views Right (with Pelvis when performed) (Completed)    X-Ray Lumbar Spine AP And Lateral (Completed)    Macular degeneration, unspecified laterality, unspecified type        Relevant Orders    Ambulatory referral/consult to Ophthalmology    Anxiety        Relevant Medications    ALPRAZolam (XANAX) 0.25 MG tablet            Plan:  Thyroid nodule  -     US Soft Tissue Head Neck Thyroid; Future; Expected date: 01/11/2023    Asymptomatic microscopic hematuria  -     Urinalysis, Reflex to Urine Culture Urine, Clean Catch; Future; Expected date: 07/11/2022    Hyperlipidemia, unspecified hyperlipidemia type    Type 2 diabetes mellitus without complication, with long-term current use of insulin  -     CBC Auto Differential; Future; Expected  date: 07/11/2022  -     Comprehensive Metabolic Panel; Future; Expected date: 07/11/2022  -     Lipid Panel; Future; Expected date: 07/11/2022  -     Hemoglobin A1C; Future; Expected date: 07/11/2022  -     losartan-hydrochlorothiazide 100-25 mg (HYZAAR) 100-25 mg per tablet; Take 1 tablet by mouth once daily.  Dispense: 90 tablet; Refill: 3    Weakness  -     Ambulatory referral/consult to Neurology; Future; Expected date: 07/12/2022  -     Sedimentation rate; Future; Expected date: 07/11/2022  -     C-REACTIVE PROTEIN; Future; Expected date: 07/11/2022  -     CK; Future; Expected date: 07/11/2022  -     X-Ray Hip 2 or 3 views Right (with Pelvis when performed); Future; Expected date: 07/11/2022  -     X-Ray Lumbar Spine AP And Lateral; Future; Expected date: 07/11/2022    Statin myopathy    Macular degeneration, unspecified laterality, unspecified type  -     Ambulatory referral/consult to Ophthalmology; Future; Expected date: 07/12/2022    Hypertension, unspecified type  -     IN OFFICE EKG 12-LEAD (to [place])  -     amLODIPine (NORVASC) 5 MG tablet; Take 1 tablet (5 mg total) by mouth once daily.  Dispense: 90 tablet; Refill: 3  -     losartan-hydrochlorothiazide 100-25 mg (HYZAAR) 100-25 mg per tablet; Take 1 tablet by mouth once daily.  Dispense: 90 tablet; Refill: 3    Anxiety  -     ALPRAZolam (XANAX) 0.25 MG tablet; Take 1 tablet (0.25 mg total) by mouth 2 (two) times daily as needed for Anxiety.  Dispense: 30 tablet; Refill: 0    Bruit of left carotid artery  -     US Carotid Bilateral; Future; Expected date: 07/12/2022    Abdominal aortic atherosclerosis    Type 2 diabetes mellitus with diabetic peripheral angiopathy without gangrene, with long-term current use of insulin    Grief      -Patient's lab results were reviewed and discussed with patient  -Treatment options and alternatives were discussed with the patient. Patient expressed understanding. Patient was given the opportunity to ask questions and be  an active participant in their medical care. Patient had no further questions or concerns at this time.   -Documentation of patient's health and condition was obtained from family member who was present during visit.  -Patient is an overall moderate risk for health complications from their medical conditions.       Follow up: Follow up in about 1 month (around 8/11/2022).      After visit summary printed and given to patient upon discharge.  Patient goals and care plan are included in After visit summary.    Total medical decision making time was 59 min.  The following issues were discussed: The primary encounter diagnosis was Thyroid nodule. Diagnoses of Asymptomatic microscopic hematuria, Hyperlipidemia, unspecified hyperlipidemia type, Type 2 diabetes mellitus without complication, with long-term current use of insulin, Weakness, Statin myopathy, Macular degeneration, unspecified laterality, unspecified type, Hypertension, unspecified type, Anxiety, Bruit of left carotid artery, Abdominal aortic atherosclerosis, Type 2 diabetes mellitus with diabetic peripheral angiopathy without gangrene, with long-term current use of insulin, and Grief were also pertinent to this visit.    Health maintenance needs, recent test results and goals of care discussed with pt and questions answered.

## 2022-07-11 NOTE — Clinical Note
Ms Aguiar doesn't need a thyroid u/s until January.  Also, she needs abd U/S vascular U/S and carotid U/S now d/t abnormal x-ray. Thanks

## 2022-07-12 ENCOUNTER — TELEPHONE (OUTPATIENT)
Dept: PRIMARY CARE CLINIC | Facility: CLINIC | Age: 72
End: 2022-07-12

## 2022-07-12 ENCOUNTER — PATIENT MESSAGE (OUTPATIENT)
Dept: PRIMARY CARE CLINIC | Facility: CLINIC | Age: 72
End: 2022-07-12
Payer: MEDICARE

## 2022-07-12 DIAGNOSIS — E78.5 HYPERLIPIDEMIA, UNSPECIFIED HYPERLIPIDEMIA TYPE: Primary | ICD-10-CM

## 2022-07-12 PROBLEM — R09.89 BRUIT OF LEFT CAROTID ARTERY: Status: ACTIVE | Noted: 2022-07-12

## 2022-07-12 PROBLEM — E11.51 TYPE 2 DIABETES MELLITUS WITH DIABETIC PERIPHERAL ANGIOPATHY WITHOUT GANGRENE, WITH LONG-TERM CURRENT USE OF INSULIN: Status: ACTIVE | Noted: 2022-01-05

## 2022-07-12 PROBLEM — Z79.4 TYPE 2 DIABETES MELLITUS WITH DIABETIC PERIPHERAL ANGIOPATHY WITHOUT GANGRENE, WITH LONG-TERM CURRENT USE OF INSULIN: Status: ACTIVE | Noted: 2022-01-05

## 2022-07-12 PROBLEM — I70.0 ABDOMINAL AORTIC ATHEROSCLEROSIS: Status: ACTIVE | Noted: 2022-07-12

## 2022-07-12 PROBLEM — F43.21 GRIEF: Status: ACTIVE | Noted: 2022-07-12

## 2022-07-12 RX ORDER — PRAVASTATIN SODIUM 40 MG/1
40 TABLET ORAL DAILY
Qty: 90 TABLET | Refills: 3 | Status: SHIPPED | OUTPATIENT
Start: 2022-07-12 | End: 2022-10-19 | Stop reason: ALTCHOICE

## 2022-07-12 NOTE — ASSESSMENT & PLAN NOTE
Vascular U/S. Improve BP control.   Would benefit from resuming statin, likely pravastatin d/t myalgias with rosuvastatin.

## 2022-07-12 NOTE — TELEPHONE ENCOUNTER
----- Message from Anamaria Sawyer NP sent at 7/12/2022  3:02 PM CDT -----  Ms Aguiar doesn't need a thyroid u/s until January.   Also, she needs abd U/S vascular U/S and carotid U/S now d/t abnormal x-ray. Thanks

## 2022-07-18 ENCOUNTER — HOSPITAL ENCOUNTER (OUTPATIENT)
Dept: RADIOLOGY | Facility: HOSPITAL | Age: 72
Discharge: HOME | End: 2022-07-18
Attending: NURSE PRACTITIONER
Payer: MEDICARE

## 2022-07-18 ENCOUNTER — TELEPHONE (OUTPATIENT)
Dept: PRIMARY CARE CLINIC | Facility: CLINIC | Age: 72
End: 2022-07-18
Payer: MEDICARE

## 2022-07-18 DIAGNOSIS — R09.89 BRUIT OF LEFT CAROTID ARTERY: ICD-10-CM

## 2022-07-18 DIAGNOSIS — I70.0 ABDOMINAL AORTIC ATHEROSCLEROSIS: Primary | ICD-10-CM

## 2022-07-18 PROCEDURE — 93880 EXTRACRANIAL BILAT STUDY: CPT | Mod: 26,,, | Performed by: RADIOLOGY

## 2022-07-18 PROCEDURE — 93880 EXTRACRANIAL BILAT STUDY: CPT | Mod: TC

## 2022-07-18 PROCEDURE — 93880 US CAROTID BILATERAL: ICD-10-PCS | Mod: 26,,, | Performed by: RADIOLOGY

## 2022-07-19 ENCOUNTER — TELEPHONE (OUTPATIENT)
Dept: PRIMARY CARE CLINIC | Facility: CLINIC | Age: 72
End: 2022-07-19
Payer: MEDICARE

## 2022-07-19 DIAGNOSIS — R10.9 ABDOMINAL PAIN, UNSPECIFIED ABDOMINAL LOCATION: ICD-10-CM

## 2022-07-19 DIAGNOSIS — I70.0 ABDOMINAL AORTIC ATHEROSCLEROSIS: Primary | ICD-10-CM

## 2022-07-19 NOTE — TELEPHONE ENCOUNTER
----- Message from Linda Johnson MD sent at 7/19/2022  2:59 PM CDT -----  Regarding: US AAA  Ok so for future - should be ordered to radiology, not CV    But not sure if should be US AAA screening (IMG 3155) or UA Abdominal aorta (TIM3730)    I've ordered both - can you cancel whichever is incorrect or let me know which one I should cancel?

## 2022-07-25 DIAGNOSIS — I65.29 STENOSIS OF CAROTID ARTERY, UNSPECIFIED LATERALITY: ICD-10-CM

## 2022-07-25 DIAGNOSIS — I65.29 STENOSIS OF CAROTID ARTERY, UNSPECIFIED LATERALITY: Primary | ICD-10-CM

## 2022-07-25 DIAGNOSIS — I65.22 CAROTID STENOSIS, LEFT: ICD-10-CM

## 2022-07-25 DIAGNOSIS — I70.0 ABDOMINAL AORTIC ATHEROSCLEROSIS: Primary | ICD-10-CM

## 2022-07-25 RX ORDER — ASPIRIN 81 MG/1
81 TABLET ORAL DAILY
Qty: 90 TABLET | Refills: 3 | Status: SHIPPED | OUTPATIENT
Start: 2022-07-25 | End: 2024-01-22 | Stop reason: SDUPTHER

## 2022-07-26 ENCOUNTER — HOSPITAL ENCOUNTER (OUTPATIENT)
Dept: RADIOLOGY | Facility: HOSPITAL | Age: 72
Discharge: HOME | End: 2022-07-26
Attending: INTERNAL MEDICINE
Payer: MEDICARE

## 2022-07-26 ENCOUNTER — OFFICE VISIT (OUTPATIENT)
Dept: OPHTHALMOLOGY | Facility: CLINIC | Age: 72
End: 2022-07-26
Payer: MEDICARE

## 2022-07-26 DIAGNOSIS — H35.30 MACULAR DEGENERATION, UNSPECIFIED LATERALITY, UNSPECIFIED TYPE: ICD-10-CM

## 2022-07-26 DIAGNOSIS — R10.9 ABDOMINAL PAIN, UNSPECIFIED ABDOMINAL LOCATION: ICD-10-CM

## 2022-07-26 DIAGNOSIS — Z96.1 PSEUDOPHAKIA OF BOTH EYES: ICD-10-CM

## 2022-07-26 DIAGNOSIS — H35.3131 NONEXUDATIVE AGE-RELATED MACULAR DEGENERATION, BILATERAL, EARLY DRY STAGE: ICD-10-CM

## 2022-07-26 DIAGNOSIS — I70.0 ABDOMINAL AORTIC ATHEROSCLEROSIS: ICD-10-CM

## 2022-07-26 DIAGNOSIS — E11.9 DIABETES MELLITUS TYPE 2 WITHOUT RETINOPATHY: Primary | ICD-10-CM

## 2022-07-26 DIAGNOSIS — H52.7 REFRACTIVE ERRORS: ICD-10-CM

## 2022-07-26 PROCEDURE — 92015 PR REFRACTION: ICD-10-PCS | Mod: S$GLB,,, | Performed by: OPTOMETRIST

## 2022-07-26 PROCEDURE — 99999 PR PBB SHADOW E&M-EST. PATIENT-LVL II: CPT | Mod: PBBFAC,,, | Performed by: OPTOMETRIST

## 2022-07-26 PROCEDURE — 2023F PR DILATED RETINAL EXAM W/O EVID OF RETINOPATHY: ICD-10-PCS | Mod: CPTII,S$GLB,, | Performed by: OPTOMETRIST

## 2022-07-26 PROCEDURE — 2023F DILAT RTA XM W/O RTNOPTHY: CPT | Mod: CPTII,S$GLB,, | Performed by: OPTOMETRIST

## 2022-07-26 PROCEDURE — 92134 CPTRZ OPH DX IMG PST SGM RTA: CPT | Mod: S$GLB,,, | Performed by: OPTOMETRIST

## 2022-07-26 PROCEDURE — 1159F PR MEDICATION LIST DOCUMENTED IN MEDICAL RECORD: ICD-10-PCS | Mod: CPTII,S$GLB,, | Performed by: OPTOMETRIST

## 2022-07-26 PROCEDURE — 1159F MED LIST DOCD IN RCRD: CPT | Mod: CPTII,S$GLB,, | Performed by: OPTOMETRIST

## 2022-07-26 PROCEDURE — 92004 PR EYE EXAM, NEW PATIENT,COMPREHESV: ICD-10-PCS | Mod: S$GLB,,, | Performed by: OPTOMETRIST

## 2022-07-26 PROCEDURE — 3044F PR MOST RECENT HEMOGLOBIN A1C LEVEL <7.0%: ICD-10-PCS | Mod: CPTII,S$GLB,, | Performed by: OPTOMETRIST

## 2022-07-26 PROCEDURE — 92134 POSTERIOR SEGMENT OCT RETINA (OCULAR COHERENCE TOMOGRAPHY)-BOTH EYES: ICD-10-PCS | Mod: S$GLB,,, | Performed by: OPTOMETRIST

## 2022-07-26 PROCEDURE — 92004 COMPRE OPH EXAM NEW PT 1/>: CPT | Mod: S$GLB,,, | Performed by: OPTOMETRIST

## 2022-07-26 PROCEDURE — 3044F HG A1C LEVEL LT 7.0%: CPT | Mod: CPTII,S$GLB,, | Performed by: OPTOMETRIST

## 2022-07-26 PROCEDURE — 99999 PR PBB SHADOW E&M-EST. PATIENT-LVL II: ICD-10-PCS | Mod: PBBFAC,,, | Performed by: OPTOMETRIST

## 2022-07-26 PROCEDURE — 92015 DETERMINE REFRACTIVE STATE: CPT | Mod: S$GLB,,, | Performed by: OPTOMETRIST

## 2022-07-26 PROCEDURE — 76775 US EXAM ABDO BACK WALL LIM: CPT | Mod: TC

## 2022-07-26 NOTE — PROGRESS NOTES
HPI     NIDDM exam  Blurred vision at near and distance  New patient last eye exam 3 years.  Update glasses and contact lenses RX.  Patient wear monovision contact lenses.  Lab Results       Component                Value               Date                       HGBA1C                   6.0 (H)             07/11/2022                Last edited by Hortencia Kendrick MA on 7/26/2022  1:13 PM. (History)            Assessment /Plan     For exam results, see Encounter Report.    Diabetes mellitus type 2 without retinopathy    Macular degeneration, unspecified laterality, unspecified type  -     Ambulatory referral/consult to Ophthalmology    Nonexudative age-related macular degeneration, bilateral, early dry stage  -     Posterior Segment OCT Retina-Both eyes    Pseudophakia of both eyes    Refractive errors      No Background Diabetic Retinopathy    Stable IOL OU.    Dispense Final Rx for glasses.  RTC 1 year  Discussed above and answered questions.

## 2022-08-03 ENCOUNTER — OFFICE VISIT (OUTPATIENT)
Dept: OPHTHALMOLOGY | Facility: CLINIC | Age: 72
End: 2022-08-03
Payer: MEDICARE

## 2022-08-03 ENCOUNTER — PATIENT MESSAGE (OUTPATIENT)
Dept: OPHTHALMOLOGY | Facility: CLINIC | Age: 72
End: 2022-08-03

## 2022-08-03 DIAGNOSIS — Z46.0 ENCOUNTER FOR FITTING OR ADJUSTMENT OF SPECTACLES OR CONTACT LENSES: Primary | ICD-10-CM

## 2022-08-03 DIAGNOSIS — E11.9 TYPE 2 DIABETES MELLITUS WITHOUT COMPLICATION: ICD-10-CM

## 2022-08-03 PROCEDURE — 1159F PR MEDICATION LIST DOCUMENTED IN MEDICAL RECORD: ICD-10-PCS | Mod: CPTII,S$GLB,, | Performed by: OPTOMETRIST

## 2022-08-03 PROCEDURE — 99499 NO LOS: ICD-10-PCS | Mod: S$GLB,,, | Performed by: OPTOMETRIST

## 2022-08-03 PROCEDURE — 1159F MED LIST DOCD IN RCRD: CPT | Mod: CPTII,S$GLB,, | Performed by: OPTOMETRIST

## 2022-08-03 PROCEDURE — 3044F HG A1C LEVEL LT 7.0%: CPT | Mod: CPTII,S$GLB,, | Performed by: OPTOMETRIST

## 2022-08-03 PROCEDURE — 92310 CONTACT LENS FITTING OU: CPT | Mod: CSM,,, | Performed by: OPTOMETRIST

## 2022-08-03 PROCEDURE — 3044F PR MOST RECENT HEMOGLOBIN A1C LEVEL <7.0%: ICD-10-PCS | Mod: CPTII,S$GLB,, | Performed by: OPTOMETRIST

## 2022-08-03 PROCEDURE — 92310 PR CONTACT LENS FITTING (NO CHANGE): ICD-10-PCS | Mod: CSM,,, | Performed by: OPTOMETRIST

## 2022-08-03 PROCEDURE — 99499 UNLISTED E&M SERVICE: CPT | Mod: S$GLB,,, | Performed by: OPTOMETRIST

## 2022-08-03 NOTE — PROGRESS NOTES
HPI     See at dispense of contact lenses.  Contact lenses are comfortable with good fit.  Patient not wearing contact lenses took them out last night.  Last eye visit 07/26/2022 TRF.    Last edited by Hortencia Kendrick MA on 8/3/2022  2:06 PM. (History)            Assessment /Plan     For exam results, see Encounter Report.    Encounter for fitting or adjustment of spectacles or contact lenses      Great comfort and VA    Slight sensation OS, should improve with less EW and proper disposable of lenses.    RTC 1 year

## 2022-08-25 ENCOUNTER — OFFICE VISIT (OUTPATIENT)
Dept: PRIMARY CARE CLINIC | Facility: CLINIC | Age: 72
End: 2022-08-25
Payer: MEDICARE

## 2022-08-25 ENCOUNTER — HOSPITAL ENCOUNTER (OUTPATIENT)
Dept: RADIOLOGY | Facility: HOSPITAL | Age: 72
Discharge: HOME | End: 2022-08-25
Attending: NURSE PRACTITIONER
Payer: MEDICARE

## 2022-08-25 VITALS
HEART RATE: 90 BPM | WEIGHT: 167.31 LBS | OXYGEN SATURATION: 97 % | HEIGHT: 64 IN | BODY MASS INDEX: 28.56 KG/M2 | TEMPERATURE: 98 F | SYSTOLIC BLOOD PRESSURE: 160 MMHG | DIASTOLIC BLOOD PRESSURE: 62 MMHG

## 2022-08-25 DIAGNOSIS — T46.6X5A STATIN MYOPATHY: ICD-10-CM

## 2022-08-25 DIAGNOSIS — Z12.31 ENCOUNTER FOR SCREENING MAMMOGRAM FOR MALIGNANT NEOPLASM OF BREAST: ICD-10-CM

## 2022-08-25 DIAGNOSIS — M25.511 ACUTE PAIN OF RIGHT SHOULDER: ICD-10-CM

## 2022-08-25 DIAGNOSIS — Z79.4 TYPE 2 DIABETES MELLITUS WITH DIABETIC PERIPHERAL ANGIOPATHY WITHOUT GANGRENE, WITH LONG-TERM CURRENT USE OF INSULIN: ICD-10-CM

## 2022-08-25 DIAGNOSIS — G72.0 STATIN MYOPATHY: ICD-10-CM

## 2022-08-25 DIAGNOSIS — E78.5 HYPERLIPIDEMIA, UNSPECIFIED HYPERLIPIDEMIA TYPE: ICD-10-CM

## 2022-08-25 DIAGNOSIS — Z00.00 ENCOUNTER FOR PREVENTIVE CARE: ICD-10-CM

## 2022-08-25 DIAGNOSIS — Z12.11 SCREEN FOR COLON CANCER: ICD-10-CM

## 2022-08-25 DIAGNOSIS — F43.21 GRIEF: ICD-10-CM

## 2022-08-25 DIAGNOSIS — E11.51 TYPE 2 DIABETES MELLITUS WITH DIABETIC PERIPHERAL ANGIOPATHY WITHOUT GANGRENE, WITH LONG-TERM CURRENT USE OF INSULIN: ICD-10-CM

## 2022-08-25 DIAGNOSIS — I70.0 ABDOMINAL AORTIC ATHEROSCLEROSIS: ICD-10-CM

## 2022-08-25 DIAGNOSIS — I10 HYPERTENSION, UNSPECIFIED TYPE: Primary | ICD-10-CM

## 2022-08-25 DIAGNOSIS — Z23 ENCOUNTER FOR IMMUNIZATION: ICD-10-CM

## 2022-08-25 PROCEDURE — 3078F PR MOST RECENT DIASTOLIC BLOOD PRESSURE < 80 MM HG: ICD-10-PCS | Mod: CPTII,S$GLB,, | Performed by: NURSE PRACTITIONER

## 2022-08-25 PROCEDURE — 77067 SCR MAMMO BI INCL CAD: CPT | Mod: 26,,, | Performed by: RADIOLOGY

## 2022-08-25 PROCEDURE — 1126F AMNT PAIN NOTED NONE PRSNT: CPT | Mod: CPTII,S$GLB,, | Performed by: NURSE PRACTITIONER

## 2022-08-25 PROCEDURE — 73030 X-RAY EXAM OF SHOULDER: CPT | Mod: TC,RT

## 2022-08-25 PROCEDURE — 99215 OFFICE O/P EST HI 40 MIN: CPT | Mod: 25,S$GLB,, | Performed by: NURSE PRACTITIONER

## 2022-08-25 PROCEDURE — 3008F BODY MASS INDEX DOCD: CPT | Mod: CPTII,S$GLB,, | Performed by: NURSE PRACTITIONER

## 2022-08-25 PROCEDURE — 3077F SYST BP >= 140 MM HG: CPT | Mod: CPTII,S$GLB,, | Performed by: NURSE PRACTITIONER

## 2022-08-25 PROCEDURE — 3077F PR MOST RECENT SYSTOLIC BLOOD PRESSURE >= 140 MM HG: ICD-10-PCS | Mod: CPTII,S$GLB,, | Performed by: NURSE PRACTITIONER

## 2022-08-25 PROCEDURE — 77063 MAMMO DIGITAL SCREENING BILAT WITH TOMO: ICD-10-PCS | Mod: 26,,, | Performed by: RADIOLOGY

## 2022-08-25 PROCEDURE — 1159F MED LIST DOCD IN RCRD: CPT | Mod: CPTII,S$GLB,, | Performed by: NURSE PRACTITIONER

## 2022-08-25 PROCEDURE — 1101F PR PT FALLS ASSESS DOC 0-1 FALLS W/OUT INJ PAST YR: ICD-10-PCS | Mod: CPTII,S$GLB,, | Performed by: NURSE PRACTITIONER

## 2022-08-25 PROCEDURE — G0009 ADMIN PNEUMOCOCCAL VACCINE: HCPCS | Mod: S$GLB,,, | Performed by: NURSE PRACTITIONER

## 2022-08-25 PROCEDURE — G0009 PNEUMOCOCCAL CONJUGATE VACCINE 20-VALENT: ICD-10-PCS | Mod: S$GLB,,, | Performed by: NURSE PRACTITIONER

## 2022-08-25 PROCEDURE — 3044F HG A1C LEVEL LT 7.0%: CPT | Mod: CPTII,S$GLB,, | Performed by: NURSE PRACTITIONER

## 2022-08-25 PROCEDURE — 3044F PR MOST RECENT HEMOGLOBIN A1C LEVEL <7.0%: ICD-10-PCS | Mod: CPTII,S$GLB,, | Performed by: NURSE PRACTITIONER

## 2022-08-25 PROCEDURE — 3288F FALL RISK ASSESSMENT DOCD: CPT | Mod: CPTII,S$GLB,, | Performed by: NURSE PRACTITIONER

## 2022-08-25 PROCEDURE — 77063 BREAST TOMOSYNTHESIS BI: CPT | Mod: TC

## 2022-08-25 PROCEDURE — 99999 PR PBB SHADOW E&M-EST. PATIENT-LVL IV: CPT | Mod: PBBFAC,,, | Performed by: NURSE PRACTITIONER

## 2022-08-25 PROCEDURE — 90677 PNEUMOCOCCAL CONJUGATE VACCINE 20-VALENT: ICD-10-PCS | Mod: S$GLB,,, | Performed by: NURSE PRACTITIONER

## 2022-08-25 PROCEDURE — 73030 X-RAY EXAM OF SHOULDER: CPT | Mod: 26,RT,, | Performed by: RADIOLOGY

## 2022-08-25 PROCEDURE — 3008F PR BODY MASS INDEX (BMI) DOCUMENTED: ICD-10-PCS | Mod: CPTII,S$GLB,, | Performed by: NURSE PRACTITIONER

## 2022-08-25 PROCEDURE — 99999 PR PBB SHADOW E&M-EST. PATIENT-LVL IV: ICD-10-PCS | Mod: PBBFAC,,, | Performed by: NURSE PRACTITIONER

## 2022-08-25 PROCEDURE — 1160F PR REVIEW ALL MEDS BY PRESCRIBER/CLIN PHARMACIST DOCUMENTED: ICD-10-PCS | Mod: CPTII,S$GLB,, | Performed by: NURSE PRACTITIONER

## 2022-08-25 PROCEDURE — 3078F DIAST BP <80 MM HG: CPT | Mod: CPTII,S$GLB,, | Performed by: NURSE PRACTITIONER

## 2022-08-25 PROCEDURE — 77067 MAMMO DIGITAL SCREENING BILAT WITH TOMO: ICD-10-PCS | Mod: 26,,, | Performed by: RADIOLOGY

## 2022-08-25 PROCEDURE — 1126F PR PAIN SEVERITY QUANTIFIED, NO PAIN PRESENT: ICD-10-PCS | Mod: CPTII,S$GLB,, | Performed by: NURSE PRACTITIONER

## 2022-08-25 PROCEDURE — 99215 PR OFFICE/OUTPT VISIT, EST, LEVL V, 40-54 MIN: ICD-10-PCS | Mod: 25,S$GLB,, | Performed by: NURSE PRACTITIONER

## 2022-08-25 PROCEDURE — 73030 XR SHOULDER COMPLETE 2 OR MORE VIEWS RIGHT: ICD-10-PCS | Mod: 26,RT,, | Performed by: RADIOLOGY

## 2022-08-25 PROCEDURE — 1160F RVW MEDS BY RX/DR IN RCRD: CPT | Mod: CPTII,S$GLB,, | Performed by: NURSE PRACTITIONER

## 2022-08-25 PROCEDURE — 77063 BREAST TOMOSYNTHESIS BI: CPT | Mod: 26,,, | Performed by: RADIOLOGY

## 2022-08-25 PROCEDURE — 3288F PR FALLS RISK ASSESSMENT DOCUMENTED: ICD-10-PCS | Mod: CPTII,S$GLB,, | Performed by: NURSE PRACTITIONER

## 2022-08-25 PROCEDURE — 1159F PR MEDICATION LIST DOCUMENTED IN MEDICAL RECORD: ICD-10-PCS | Mod: CPTII,S$GLB,, | Performed by: NURSE PRACTITIONER

## 2022-08-25 PROCEDURE — 1101F PT FALLS ASSESS-DOCD LE1/YR: CPT | Mod: CPTII,S$GLB,, | Performed by: NURSE PRACTITIONER

## 2022-08-25 PROCEDURE — 90677 PCV20 VACCINE IM: CPT | Mod: S$GLB,,, | Performed by: NURSE PRACTITIONER

## 2022-08-25 RX ORDER — AMLODIPINE BESYLATE 10 MG/1
10 TABLET ORAL DAILY
Qty: 90 TABLET | Refills: 3 | Status: SHIPPED | OUTPATIENT
Start: 2022-08-25 | End: 2023-06-14

## 2022-08-25 RX ORDER — AMLODIPINE BESYLATE 5 MG/1
10 TABLET ORAL DAILY
Qty: 180 TABLET | Refills: 3 | Status: SHIPPED | OUTPATIENT
Start: 2022-08-25 | End: 2022-08-25

## 2022-08-25 RX ORDER — AMLODIPINE BESYLATE 10 MG/1
10 TABLET ORAL DAILY
Qty: 30 TABLET | Refills: 11 | Status: SHIPPED | OUTPATIENT
Start: 2022-08-25 | End: 2022-08-25

## 2022-08-25 NOTE — ASSESSMENT & PLAN NOTE
Has multiple family members nearby.  Socializes. Not interested in LCSW referral.  Takes alprazolam sparingly.   Doesn't want other med at this time.

## 2022-08-25 NOTE — PROGRESS NOTES
Rosana Aguiar  08/25/2022  [id number]    Anamaria Sawyer NP  Patient Care Team:  Anamaria Sawyer NP as PCP - General (Family Medicine)        [facility] Primary Care Note      Chief Complaint:  Chief Complaint   Patient presents with    HTN f/u        History of Present Illness:  HPI    F/U HTN, DMII.    Schedule DXA, needs FIT    Doesn't want to do digital medicine at this time. May consider.    CBGs at home typically in 90s, this .  Mammo scheduled today.   HCTZ dose increased in July. Amlodipine added.    Balance problems, walking down steps, improving some.   Has neuro appt in February.  Remote onset onset, about a year ago.     Becomes dizzy occasionally, when standing on toes to look out of window.     Xanax causes drowsiness.     Foot exam done        BP Readings from Last 3 Encounters:   08/25/22 (!) 160/62   07/11/22 (!) 160/80   01/06/22 (!) 178/88     Lab Results   Component Value Date    HGBA1C 6.0 (H) 07/11/2022           Review of Systems   Constitutional: Negative for fever and malaise/fatigue.   Respiratory: Negative for cough and shortness of breath.    Cardiovascular: Positive for leg swelling. Negative for chest pain and palpitations.   Gastrointestinal: Negative for nausea and vomiting.   Genitourinary: Negative for dysuria.   Musculoskeletal: Negative for myalgias.         The following were reviewed: Active problem list, medication list, allergies, family history, social history, and Health Maintenance.     History:  Past Medical History:   Diagnosis Date    Diabetes mellitus, type 2 20 years    BS didn't check 07/26/2022    High cholesterol     Hypertension     Macular degeneration      Past Surgical History:   Procedure Laterality Date    APPENDECTOMY      CATARACT EXTRACTION Bilateral     cyst removal from breast (Left)      HYSTERECTOMY      TONSILLECTOMY       Family History   Problem Relation Age of Onset    Diabetes Mother     Macular degeneration Mother      "Alcohol abuse Father     Diabetes Paternal Uncle      Social History     Socioeconomic History    Marital status:    Tobacco Use    Smoking status: Current Every Day Smoker     Types: Cigarettes    Smokeless tobacco: Never Used   Substance and Sexual Activity    Alcohol use: Not Currently    Drug use: Never    Sexual activity: Yes     Partners: Male     Patient Active Problem List   Diagnosis    Hyperlipidemia    Hypertension    Type 2 diabetes mellitus with diabetic peripheral angiopathy without gangrene, with long-term current use of insulin    Thyroid nodule    Asymptomatic microscopic hematuria    Statin myopathy    Bruit of left carotid artery    Abdominal aortic atherosclerosis    Grief     Review of patient's allergies indicates:   Allergen Reactions    Neomycin-bacitracin-polymyxin Itching       Medications:  Current Outpatient Medications on File Prior to Visit   Medication Sig Dispense Refill    ALPRAZolam (XANAX) 0.25 MG tablet Take 1 tablet (0.25 mg total) by mouth 2 (two) times daily as needed for Anxiety. 30 tablet 0    aspirin (ECOTRIN) 81 MG EC tablet Take 1 tablet (81 mg total) by mouth once daily. 90 tablet 3    insulin glargine U-300 conc (TOUJEO MAX U-300 SOLOSTAR) 300 unit/mL (3 mL) insulin pen Inject 56 Units into the skin once daily. 2 pen 11    losartan-hydrochlorothiazide 100-25 mg (HYZAAR) 100-25 mg per tablet Take 1 tablet by mouth once daily. 90 tablet 3    pen needle, diabetic (BD CAROL 2ND GEN PEN NEEDLE) 32 gauge x 5/32" Ndle 1 each by Misc.(Non-Drug; Combo Route) route once daily at 6am. 100 each 1    pravastatin (PRAVACHOL) 40 MG tablet Take 1 tablet (40 mg total) by mouth once daily. 90 tablet 3    [DISCONTINUED] amLODIPine (NORVASC) 5 MG tablet Take 1 tablet (5 mg total) by mouth once daily. 90 tablet 3     No current facility-administered medications on file prior to visit.       Medications have been reviewed and reconciled with patient at visit " today.    Barriers to medications present (no )    Adverse reactions to current medications (no)      Exam:  Vitals:    08/25/22 1021   BP: (!) 160/62   Pulse: 90   Temp: 98.2 °F (36.8 °C)     Weight: 75.9 kg (167 lb 5.3 oz)   Body mass index is 28.72 kg/m².      BP Readings from Last 3 Encounters:   08/25/22 (!) 160/62   07/11/22 (!) 160/80   01/06/22 (!) 178/88     Wt Readings from Last 3 Encounters:   08/25/22 1021 75.9 kg (167 lb 5.3 oz)   07/11/22 1027 76.6 kg (168 lb 14 oz)   01/06/22 0848 76.1 kg (167 lb 12.3 oz)            Physical Exam  Constitutional:       General: She is not in acute distress.     Appearance: Normal appearance.   HENT:      Mouth/Throat:      Mouth: Mucous membranes are moist.   Eyes:      General: No scleral icterus.  Cardiovascular:      Rate and Rhythm: Normal rate and regular rhythm.      Pulses:           Dorsalis pedis pulses are 2+ on the right side and 2+ on the left side.        Posterior tibial pulses are 1+ on the right side and 1+ on the left side.      Heart sounds: Normal heart sounds.   Pulmonary:      Effort: No respiratory distress.      Breath sounds: Normal breath sounds.   Abdominal:      Palpations: Abdomen is soft.      Tenderness: There is no abdominal tenderness.   Musculoskeletal:         General: No swelling.      Cervical back: Neck supple.      Right foot: Normal range of motion. No deformity.      Left foot: Normal range of motion. No deformity.   Feet:      Right foot:      Protective Sensation: 10 sites tested. 10 sites sensed.      Skin integrity: Skin integrity normal.      Toenail Condition: Right toenails are normal.      Left foot:      Protective Sensation: 10 sites tested. 9 sites sensed.      Skin integrity: Skin integrity normal.      Toenail Condition: Left toenails are normal.   Skin:     General: Skin is warm and dry.   Neurological:      Mental Status: She is alert. Mental status is at baseline.   Psychiatric:         Mood and Affect: Mood  normal.         Behavior: Behavior normal.         Thought Content: Thought content normal.         Laboratory Reviewed: (Yes)  Lab Results   Component Value Date    WBC 7.26 07/11/2022    HGB 13.1 07/11/2022    HCT 41.3 07/11/2022     07/11/2022    CHOL 258 (H) 07/11/2022    TRIG 222 (H) 07/11/2022    HDL 45 07/11/2022    ALT 16 07/11/2022    AST 17 07/11/2022     07/11/2022    K 3.8 07/11/2022    CL 98 07/11/2022    CREATININE 0.9 07/11/2022    BUN 12 07/11/2022    CO2 25 07/11/2022    TSH 1.285 01/06/2022    HGBA1C 6.0 (H) 07/11/2022           Health Maintenance  Health Maintenance Topics with due status: Not Due       Topic Last Completion Date    Lipid Panel 07/11/2022    Hemoglobin A1c 07/11/2022    Eye Exam 07/26/2022    Influenza Vaccine Not Due     Health Maintenance Due   Topic Date Due    Diabetes Urine Screening  Never done    Pneumococcal Vaccines (Age 65+) (1 - PCV) Never done    Foot Exam  Never done    TETANUS VACCINE  Never done    Mammogram  Never done    DEXA Scan  Never done    Colorectal Cancer Screening  Never done    Shingles Vaccine (1 of 2) Never done    COVID-19 Vaccine (3 - Booster for Pfizer series) 09/03/2021       Assessment:  Problem List Items Addressed This Visit        Cardiac/Vascular    Hypertension - Primary    Relevant Medications    amLODIPine (NORVASC) 5 MG tablet    Hyperlipidemia       Endocrine    Type 2 diabetes mellitus with diabetic peripheral angiopathy without gangrene, with long-term current use of insulin      Other Visit Diagnoses     Encounter for immunization        Screen for colon cancer        Relevant Orders    Fecal Immunochemical Test (iFOBT)    Acute pain of right shoulder        Relevant Orders    X-ray Shoulder 2 or More Views Right            Plan:  Hypertension, unspecified type  -     Cancel: Hypertension Digital Medicine (HDMP) Enrollment Order  -     Cancel: Hypertension Digital Medicine (HDMP): Assign Onboarding  Questionnaires  -     amLODIPine (NORVASC) 5 MG tablet; Take 2 tablets (10 mg total) by mouth once daily.  Dispense: 180 tablet; Refill: 3    Hyperlipidemia, unspecified hyperlipidemia type  -     Cancel: Lipids Digital Medicine (LDMP) Enrollment Order  -     Cancel: Lipids Digital Medicine (LDMP): Assign Onboarding Questionnaires    Type 2 diabetes mellitus with diabetic peripheral angiopathy without gangrene, with long-term current use of insulin  -     Cancel: Diabetes Digital Medicine (DDMP) Enrollment Order  -     Cancel: Diabetes Digital Medicine (DDMP): Assign Onboarding Questionnaires    Encounter for immunization    Screen for colon cancer  -     Fecal Immunochemical Test (iFOBT); Future; Expected date: 08/25/2022    Acute pain of right shoulder  -     X-ray Shoulder 2 or More Views Right; Future; Expected date: 08/25/2022      -Patient's lab results were reviewed and discussed with patient  -Treatment options and alternatives were discussed with the patient. Patient expressed understanding. Patient was given the opportunity to ask questions and be an active participant in their medical care. Patient had no further questions or concerns at this time.   -Documentation of patient's health and condition was obtained from family member who was present during visit.  -Patient is an overall moderate risk for health complications from their medical conditions.       Follow up: Follow up in about 4 weeks (around 9/22/2022) for HTN management, Medication Change.      After visit summary printed and given to patient upon discharge.  Patient goals and care plan are included in After visit summary.    Total medical decision making time was 63 min.  The following issues were discussed: The primary encounter diagnosis was Hypertension, unspecified type. Diagnoses of Hyperlipidemia, unspecified hyperlipidemia type, Type 2 diabetes mellitus with diabetic peripheral angiopathy without gangrene, with long-term current use of  insulin, Encounter for immunization, Screen for colon cancer, and Acute pain of right shoulder were also pertinent to this visit.    Health maintenance needs, recent test results and goals of care discussed with pt and questions answered.

## 2022-08-25 NOTE — PATIENT INSTRUCTIONS
Increase amlodipine to 10 mg daily - take two 5 mg tablets until your new supply comes in.

## 2022-08-29 ENCOUNTER — PATIENT MESSAGE (OUTPATIENT)
Dept: PRIMARY CARE CLINIC | Facility: CLINIC | Age: 72
End: 2022-08-29
Payer: MEDICARE

## 2022-08-29 ENCOUNTER — TELEPHONE (OUTPATIENT)
Dept: PRIMARY CARE CLINIC | Facility: CLINIC | Age: 72
End: 2022-08-29

## 2022-08-29 DIAGNOSIS — R92.8 ABNORMAL MAMMOGRAM: Primary | ICD-10-CM

## 2022-08-29 NOTE — TELEPHONE ENCOUNTER
----- Message from Maite Nair sent at 8/29/2022 10:36 AM CDT -----  Regarding: appt  Contact: patioent  Patient needs to speak to Amparo about an appt, please call her back at [phone]

## 2022-08-31 DIAGNOSIS — G89.29 CHRONIC RIGHT SHOULDER PAIN: Primary | ICD-10-CM

## 2022-08-31 DIAGNOSIS — M25.511 CHRONIC RIGHT SHOULDER PAIN: Primary | ICD-10-CM

## 2022-09-02 ENCOUNTER — HOSPITAL ENCOUNTER (OUTPATIENT)
Dept: RADIOLOGY | Facility: HOSPITAL | Age: 72
Discharge: HOME | End: 2022-09-02
Attending: NURSE PRACTITIONER
Payer: MEDICARE

## 2022-09-02 DIAGNOSIS — R92.8 ABNORMAL MAMMOGRAM: ICD-10-CM

## 2022-09-02 PROCEDURE — 77065 DX MAMMO INCL CAD UNI: CPT | Mod: TC,RT

## 2022-09-02 PROCEDURE — 76642 US BREAST RIGHT LIMITED: ICD-10-PCS | Mod: 26,RT,, | Performed by: RADIOLOGY

## 2022-09-02 PROCEDURE — 77061 BREAST TOMOSYNTHESIS UNI: CPT | Mod: 26,RT,, | Performed by: RADIOLOGY

## 2022-09-02 PROCEDURE — 76642 ULTRASOUND BREAST LIMITED: CPT | Mod: TC,RT

## 2022-09-02 PROCEDURE — 77065 DX MAMMO INCL CAD UNI: CPT | Mod: 26,RT,, | Performed by: RADIOLOGY

## 2022-09-02 PROCEDURE — 76642 ULTRASOUND BREAST LIMITED: CPT | Mod: 26,RT,, | Performed by: RADIOLOGY

## 2022-09-02 PROCEDURE — 77061 MAMMO DIGITAL DIAGNOSTIC RIGHT WITH TOMO: ICD-10-PCS | Mod: 26,RT,, | Performed by: RADIOLOGY

## 2022-09-02 PROCEDURE — 77065 MAMMO DIGITAL DIAGNOSTIC RIGHT WITH TOMO: ICD-10-PCS | Mod: 26,RT,, | Performed by: RADIOLOGY

## 2022-09-13 ENCOUNTER — TELEPHONE (OUTPATIENT)
Dept: FAMILY MEDICINE | Facility: CLINIC | Age: 72
End: 2022-09-13
Payer: MEDICARE

## 2022-09-15 ENCOUNTER — OFFICE VISIT (OUTPATIENT)
Dept: SPORTS MEDICINE | Facility: CLINIC | Age: 72
End: 2022-09-15
Payer: MEDICARE

## 2022-09-15 DIAGNOSIS — G89.29 CHRONIC RIGHT SHOULDER PAIN: ICD-10-CM

## 2022-09-15 DIAGNOSIS — M75.31 CALCIFIC TENDINITIS OF RIGHT SHOULDER: Primary | ICD-10-CM

## 2022-09-15 DIAGNOSIS — M25.511 CHRONIC RIGHT SHOULDER PAIN: ICD-10-CM

## 2022-09-15 PROCEDURE — 3288F PR FALLS RISK ASSESSMENT DOCUMENTED: ICD-10-PCS | Mod: CPTII,S$GLB,, | Performed by: STUDENT IN AN ORGANIZED HEALTH CARE EDUCATION/TRAINING PROGRAM

## 2022-09-15 PROCEDURE — 20611 DRAIN/INJ JOINT/BURSA W/US: CPT | Mod: RT,S$GLB,, | Performed by: STUDENT IN AN ORGANIZED HEALTH CARE EDUCATION/TRAINING PROGRAM

## 2022-09-15 PROCEDURE — 3044F PR MOST RECENT HEMOGLOBIN A1C LEVEL <7.0%: ICD-10-PCS | Mod: CPTII,S$GLB,, | Performed by: STUDENT IN AN ORGANIZED HEALTH CARE EDUCATION/TRAINING PROGRAM

## 2022-09-15 PROCEDURE — 1160F RVW MEDS BY RX/DR IN RCRD: CPT | Mod: CPTII,S$GLB,, | Performed by: STUDENT IN AN ORGANIZED HEALTH CARE EDUCATION/TRAINING PROGRAM

## 2022-09-15 PROCEDURE — 3066F PR DOCUMENTATION OF TREATMENT FOR NEPHROPATHY: ICD-10-PCS | Mod: CPTII,S$GLB,, | Performed by: STUDENT IN AN ORGANIZED HEALTH CARE EDUCATION/TRAINING PROGRAM

## 2022-09-15 PROCEDURE — 1159F PR MEDICATION LIST DOCUMENTED IN MEDICAL RECORD: ICD-10-PCS | Mod: CPTII,S$GLB,, | Performed by: STUDENT IN AN ORGANIZED HEALTH CARE EDUCATION/TRAINING PROGRAM

## 2022-09-15 PROCEDURE — 1125F PR PAIN SEVERITY QUANTIFIED, PAIN PRESENT: ICD-10-PCS | Mod: CPTII,S$GLB,, | Performed by: STUDENT IN AN ORGANIZED HEALTH CARE EDUCATION/TRAINING PROGRAM

## 2022-09-15 PROCEDURE — 99213 OFFICE O/P EST LOW 20 MIN: CPT | Mod: 25,S$GLB,, | Performed by: STUDENT IN AN ORGANIZED HEALTH CARE EDUCATION/TRAINING PROGRAM

## 2022-09-15 PROCEDURE — 1160F PR REVIEW ALL MEDS BY PRESCRIBER/CLIN PHARMACIST DOCUMENTED: ICD-10-PCS | Mod: CPTII,S$GLB,, | Performed by: STUDENT IN AN ORGANIZED HEALTH CARE EDUCATION/TRAINING PROGRAM

## 2022-09-15 PROCEDURE — 1101F PR PT FALLS ASSESS DOC 0-1 FALLS W/OUT INJ PAST YR: ICD-10-PCS | Mod: CPTII,S$GLB,, | Performed by: STUDENT IN AN ORGANIZED HEALTH CARE EDUCATION/TRAINING PROGRAM

## 2022-09-15 PROCEDURE — 3061F NEG MICROALBUMINURIA REV: CPT | Mod: CPTII,S$GLB,, | Performed by: STUDENT IN AN ORGANIZED HEALTH CARE EDUCATION/TRAINING PROGRAM

## 2022-09-15 PROCEDURE — 1125F AMNT PAIN NOTED PAIN PRSNT: CPT | Mod: CPTII,S$GLB,, | Performed by: STUDENT IN AN ORGANIZED HEALTH CARE EDUCATION/TRAINING PROGRAM

## 2022-09-15 PROCEDURE — 1101F PT FALLS ASSESS-DOCD LE1/YR: CPT | Mod: CPTII,S$GLB,, | Performed by: STUDENT IN AN ORGANIZED HEALTH CARE EDUCATION/TRAINING PROGRAM

## 2022-09-15 PROCEDURE — 3066F NEPHROPATHY DOC TX: CPT | Mod: CPTII,S$GLB,, | Performed by: STUDENT IN AN ORGANIZED HEALTH CARE EDUCATION/TRAINING PROGRAM

## 2022-09-15 PROCEDURE — 99999 PR PBB SHADOW E&M-EST. PATIENT-LVL IV: CPT | Mod: PBBFAC,,, | Performed by: STUDENT IN AN ORGANIZED HEALTH CARE EDUCATION/TRAINING PROGRAM

## 2022-09-15 PROCEDURE — 99213 PR OFFICE/OUTPT VISIT, EST, LEVL III, 20-29 MIN: ICD-10-PCS | Mod: 25,S$GLB,, | Performed by: STUDENT IN AN ORGANIZED HEALTH CARE EDUCATION/TRAINING PROGRAM

## 2022-09-15 PROCEDURE — 99999 PR PBB SHADOW E&M-EST. PATIENT-LVL IV: ICD-10-PCS | Mod: PBBFAC,,, | Performed by: STUDENT IN AN ORGANIZED HEALTH CARE EDUCATION/TRAINING PROGRAM

## 2022-09-15 PROCEDURE — 3288F FALL RISK ASSESSMENT DOCD: CPT | Mod: CPTII,S$GLB,, | Performed by: STUDENT IN AN ORGANIZED HEALTH CARE EDUCATION/TRAINING PROGRAM

## 2022-09-15 PROCEDURE — 3044F HG A1C LEVEL LT 7.0%: CPT | Mod: CPTII,S$GLB,, | Performed by: STUDENT IN AN ORGANIZED HEALTH CARE EDUCATION/TRAINING PROGRAM

## 2022-09-15 PROCEDURE — 3061F PR NEG MICROALBUMINURIA RESULT DOCUMENTED/REVIEW: ICD-10-PCS | Mod: CPTII,S$GLB,, | Performed by: STUDENT IN AN ORGANIZED HEALTH CARE EDUCATION/TRAINING PROGRAM

## 2022-09-15 PROCEDURE — 1159F MED LIST DOCD IN RCRD: CPT | Mod: CPTII,S$GLB,, | Performed by: STUDENT IN AN ORGANIZED HEALTH CARE EDUCATION/TRAINING PROGRAM

## 2022-09-15 PROCEDURE — 20611 LARGE JOINT ASPIRATION/INJECTION: R SUBACROMIAL BURSA: ICD-10-PCS | Mod: RT,S$GLB,, | Performed by: STUDENT IN AN ORGANIZED HEALTH CARE EDUCATION/TRAINING PROGRAM

## 2022-09-15 RX ORDER — TRIAMCINOLONE ACETONIDE 40 MG/ML
40 INJECTION, SUSPENSION INTRA-ARTICULAR; INTRAMUSCULAR
Status: DISCONTINUED | OUTPATIENT
Start: 2022-09-15 | End: 2022-09-15 | Stop reason: HOSPADM

## 2022-09-15 RX ADMIN — TRIAMCINOLONE ACETONIDE 40 MG: 40 INJECTION, SUSPENSION INTRA-ARTICULAR; INTRAMUSCULAR at 10:09

## 2022-09-15 NOTE — PROGRESS NOTES
Patient ID: Rosana Aguiar  YOB: 1950  MRN: [id number]    Chief Complaint: Pain of the Right Shoulder      Referred By: Self    Occupation: Retired      History of Present Illness: Rosana Aguiar is a left-hand dominant 72 y.o. female who presents today with 7/10 pain c/o right shoulder pain. Patient states that it began hurting last year in 2021 when supporting  when walking, lifting, transferring, etc. She describes the pain as and intermittent pain with soreness. She uses ibuprofen for relief. She states movement and lifting makes the pain worse. Her  passed away 6 months ago, and it has been a difficult period for her. Her pain is now significantly limiting her ADLs and performing simple self care & housework. She is apprehensive about PT, as she witnessed the amount of PT her  had to do and the pain/discomfort he experienced. She would like to avoid surgery.      The patient is active in none.        Past Medical History:   Past Medical History:   Diagnosis Date    Diabetes mellitus, type 2 20 years    BS didn't check 07/26/2022    High cholesterol     Hypertension     Macular degeneration      Past Surgical History:   Procedure Laterality Date    APPENDECTOMY      CATARACT EXTRACTION Bilateral     cyst removal from breast (Left)      HYSTERECTOMY      TONSILLECTOMY       Family History   Problem Relation Age of Onset    Diabetes Mother     Macular degeneration Mother     Alcohol abuse Father     Diabetes Paternal Uncle      Social History     Socioeconomic History    Marital status:    Tobacco Use    Smoking status: Every Day     Types: Cigarettes    Smokeless tobacco: Never   Substance and Sexual Activity    Alcohol use: Not Currently    Drug use: Never    Sexual activity: Yes     Partners: Male     Medication List with Changes/Refills   Current Medications    ALPRAZOLAM (XANAX) 0.25 MG TABLET    Take 1 tablet (0.25 mg total) by mouth 2 (two) times daily as  "needed for Anxiety.    AMLODIPINE (NORVASC) 10 MG TABLET    Take 1 tablet (10 mg total) by mouth once daily.    ASPIRIN (ECOTRIN) 81 MG EC TABLET    Take 1 tablet (81 mg total) by mouth once daily.    INSULIN GLARGINE U-300 CONC (TOUJEO MAX U-300 SOLOSTAR) 300 UNIT/ML (3 ML) INSULIN PEN    Inject 56 Units into the skin once daily.    LOSARTAN-HYDROCHLOROTHIAZIDE 100-25 MG (HYZAAR) 100-25 MG PER TABLET    Take 1 tablet by mouth once daily.    PEN NEEDLE, DIABETIC (BD CAROL 2ND GEN PEN NEEDLE) 32 GAUGE X 5/32" NDLE    1 each by Misc.(Non-Drug; Combo Route) route once daily at 6am.    PRAVASTATIN (PRAVACHOL) 40 MG TABLET    Take 1 tablet (40 mg total) by mouth once daily.     Review of patient's allergies indicates:   Allergen Reactions    Neomycin-bacitracin-polymyxin Itching       Physical Exam:   There is no height or weight on file to calculate BMI.    Physical Exam  Constitutional:       General: She is not in acute distress.     Appearance: Normal appearance.   HENT:      Head: Normocephalic and atraumatic.   Eyes:      Extraocular Movements: Extraocular movements intact.      Conjunctiva/sclera: Conjunctivae normal.   Pulmonary:      Effort: Pulmonary effort is normal. No respiratory distress.   Musculoskeletal:      Right shoulder: No swelling or deformity.   Skin:     General: Skin is warm and dry.   Neurological:      General: No focal deficit present.      Mental Status: She is alert and oriented to person, place, and time.   Psychiatric:         Mood and Affect: Mood normal.         Detailed MSK exam:   General    Constitutional: She is oriented to person, place, and time. No distress.   HENT:   Head: Normocephalic and atraumatic.   Eyes: Conjunctivae are normal.   Pulmonary/Chest: Effort normal. No respiratory distress.   Abdominal: Normal appearance.   Neurological: She is alert and oriented to person, place, and time.   Psychiatric: Mood normal.         Back (L-Spine & T-Spine) / Neck (C-Spine) Exam "     Tenderness   The patient is tender to palpation of the right trapezial.   Right Shoulder Exam     Inspection/Observation   Swelling: absent  Bruising: absent  Deformity: absent    Tenderness   The patient is tender to palpation of the acromion and supraspinatus.    Range of Motion   Active abduction:  40 abnormal   Forward Flexion:  60 abnormal   External Rotation 0 degrees:  40   Right shoulder internal rotation 0 degrees: sacrum.     Tests & Signs   Lisa test: positive  Impingement: positive  Rotator Cuff Painful Arc/Range: moderate    Other   Sensation: normal    Muscle Strength   Right Upper Extremity   Shoulder Abduction: 4/5 (limted by pain)   Shoulder Internal Rotation: 4/5 (limted w pain)   Shoulder External Rotation: 4/5 (limted w pain)   Supraspinatus: 4/5   Right shoulder subscapularis strength: limted w pain.        Imaging:    XR Results:  Results for orders placed during the hospital encounter of 08/25/22    X-ray Shoulder 2 or More Views Right    Narrative  EXAMINATION:  XR SHOULDER COMPLETE 2 OR MORE VIEWS RIGHT    CLINICAL HISTORY:  Pain in right shoulder    TECHNIQUE:  Two or three views of the right shoulder were preformed.    COMPARISON:  None    FINDINGS:  8 mm calcific density seen adjacent to the margins of the greater tuberosity which is highly concerning for calcific tendinosis.  No acute fractures or dislocations visualized.  Joint spaces are preserved.    Impression  As above      Electronically signed by: Carlos Hill MD  Date:    08/25/2022  Time:    12:30        Relevant imaging results were reviewed and interpreted by me and per my read: there is an 8 mm calcification of the distal supraspinatus tendon near its insertion in the greater tuberosity. Normal relation of the humeral head w the glenoid. No fractures or other abnormalities noted.    This was discussed with the patient and / or family today.       Patient Instructions   Assessment:  Rosana Aguiar is a 72 y.o.  female with a chief complaint of Pain of the Right Shoulder      Encounter Diagnoses   Name Primary?    Calcific tendinitis of right shoulder Yes    Chronic right shoulder pain       Plan:  The patient's history, clinical exam, and imaging findings are consistent with calcific tendinopathy.  POC Diagnostic US performed today to confirm diagnosis.  US guided eden performed, calcific deposit has hardened and not amenable to aspiration  Cortisone injection administered under US guidance to subacromial space  Order PT at [facility] - 2-3x per week for 6-8 weeks   We discussed the proper protocols after the injection such as no submerging pools, baths tubs, or hot tubs for 48 hr.  Showering is okay today.  We also discussed that blood sugars can be elevated after an injection and asked patient to properly checked her sugars over the next few days and contact their PCP if there are any concerns.  We discussed red flags such as fevers, chills, red, warm, tender joint at the area of injection to please seek medical care immediately.        Follow-up: 6 weeks or sooner if there are any problems between now and then.    Thank you for choosing [facility] and Dr. Reginaldo Conde for your orthopedic & sports medicine care. It is our goal to provide you with exceptional care that will help keep you healthy, active, and get you back in the game.    Please do not hesitate to reach out to us via email, phone, or MyChart with any questions, concerns, or feedback.    If you are experiencing pain/discomfort ,or have questions after 5pm and would like to be connected to the [facility] on-call team, please call this number and specify which Sports Medicine provider is treating you: [phone]          A copy of today's visit note has been sent to the referring provider.       Reginaldo Conde MD  Primary Care Sports Medicine        Disclaimer: This note was prepared  using a voice recognition system and is likely to have sound alike errors within the text.     I spent a total of 30 minutes on the day of the visit.This includes face to face time and non-face to face time preparing to see the patient (eg, review of tests), obtaining and/or reviewing separately obtained history, documenting clinical information in the electronic or other health record, independently interpreting results and communicating results to the patient/family/caregiver, or care coordinator.

## 2022-09-15 NOTE — PROCEDURES
Large Joint Aspiration/Injection: R subacromial bursa    Date/Time: 9/15/2022 10:00 AM  Performed by: Reginaldo Conde MD  Authorized by: Reginaldo Conde MD     Consent Done?:  Yes (Verbal)  Indications:  Pain and diagnostic evaluation  Site marked: the procedure site was marked    Timeout: prior to procedure the correct patient, procedure, and site was verified    Prep: patient was prepped and draped in usual sterile fashion      Local anesthesia used?: Yes    Anesthesia:  Local infiltration  Local anesthetic:  Topical anesthetic, bupivacaine 0.5% without epinephrine and lidocaine 1% without epinephrine  Anesthetic total (ml):  4      Details:  Needle Size:  21 G  Ultrasonic Guidance for needle placement?: Yes    Images are saved and documented.  Approach:  Lateral  Location:  Shoulder  Site:  R subacromial bursa  Medications:  40 mg triamcinolone acetonide 40 mg/mL  Patient tolerance:  Patient tolerated the procedure well with no immediate complications     Ultrasound guidance was used for needle localization. Images were saved and stored for documentation. The appropriate structures were visualized. Dynamic visualization of the needle was continuous throughout the procedures and maintained good position.     We discussed the proper protocols after the injection such as no submerging pools, baths tubs, or hot tubs for 48 hr.  Showering is okay today.  We also discussed that blood sugars can be elevated after an injection and asked patient to properly checked her sugars over the next few days and contact their PCP if there are any concerns.  We discussed red flags such as fevers, chills, red, warm, tender joint at the area of injection to please seek medical care immediately.

## 2022-09-15 NOTE — PATIENT INSTRUCTIONS
Assessment:  Rosana Aguiar is a 72 y.o. female with a chief complaint of Pain of the Right Shoulder      Encounter Diagnoses   Name Primary?    Calcific tendinitis of right shoulder Yes    Chronic right shoulder pain       Plan:  The patient's history, clinical exam, and imaging findings are consistent with calcific tendinopathy.  POC Diagnostic US performed today to confirm diagnosis.  US guided barbitage performed, calcific deposit has hardened and not amenable to aspiration  Cortisone injection administered under US guidance to subacromial space  Order PT at [facility] - 2-3x per week for 6-8 weeks   We discussed the proper protocols after the injection such as no submerging pools, baths tubs, or hot tubs for 48 hr.  Showering is okay today.  We also discussed that blood sugars can be elevated after an injection and asked patient to properly checked her sugars over the next few days and contact their PCP if there are any concerns.  We discussed red flags such as fevers, chills, red, warm, tender joint at the area of injection to please seek medical care immediately.        Follow-up: 6 weeks or sooner if there are any problems between now and then.    Thank you for choosing [facility] and Dr. Reginaldo Conde for your orthopedic & sports medicine care. It is our goal to provide you with exceptional care that will help keep you healthy, active, and get you back in the game.    Please do not hesitate to reach out to us via email, phone, or MyChart with any questions, concerns, or feedback.    If you are experiencing pain/discomfort ,or have questions after 5pm and would like to be connected to the [facility] on-call team, please call this number and specify which Sports Medicine provider is treating you: [phone]

## 2022-09-15 NOTE — PROGRESS NOTES
See plan of care for initial evaluation.        Ascencion Ordonez, PT, DPT  Physical Therapist  Board-Certified Specialist in Orthopaedic and Sports Physical Therapy  9/21/2022

## 2022-09-21 ENCOUNTER — CLINICAL SUPPORT (OUTPATIENT)
Dept: REHABILITATION | Facility: HOSPITAL | Age: 72
End: 2022-09-21
Payer: MEDICARE

## 2022-09-21 DIAGNOSIS — M75.31 CALCIFIC TENDINITIS OF RIGHT SHOULDER: ICD-10-CM

## 2022-09-21 DIAGNOSIS — R29.898 SHOULDER WEAKNESS: ICD-10-CM

## 2022-09-21 DIAGNOSIS — M25.611 DECREASED ROM OF RIGHT SHOULDER: ICD-10-CM

## 2022-09-21 DIAGNOSIS — M25.511 CHRONIC RIGHT SHOULDER PAIN: ICD-10-CM

## 2022-09-21 DIAGNOSIS — G89.29 CHRONIC RIGHT SHOULDER PAIN: ICD-10-CM

## 2022-09-21 PROCEDURE — 97110 THERAPEUTIC EXERCISES: CPT

## 2022-09-21 PROCEDURE — 97161 PT EVAL LOW COMPLEX 20 MIN: CPT

## 2022-09-21 NOTE — PLAN OF CARE
[facility]  Physical Therapy Initial Evaluation    Name: Rosana Aguiar  Clinic Number: [id number]    Therapy Diagnosis:   Encounter Diagnoses   Name Primary?    Calcific tendinitis of right shoulder     Chronic right shoulder pain     Decreased ROM of right shoulder      Physician: Reginaldo Conde MD    Physician Orders: PT Eval and Treat  Medical Diagnosis from Referral: Calcific tendinitis of right shoulder [M75.31], Chronic right shoulder pain [M25.511, G89.29]  Evaluation Date: 9/21/2022  Authorization Period Expiration: 9/15/23  Plan of Care Expiration: 11/21/22  Progress Note Due: 10/21/22  Visit # / Visits authorized: 1/ 1   FOTO: 1/3    Time In: 10:50 AM  Time Out: 11:35 AM  Total Billable Time: 45 minutes    Precautions: Standard    Subjective   Date of onset: been occurring for about 1 year  History of current condition - Rosana reports: she had been caring for her late  until he passed about 6 months ago. She reports she had to use her shoulder quite a bit to help him. She reports she has been having pain for the past year until her recent appointment with Dr. Conde. She reports the injection received has almost completely eliminated her shoulder pain. She also reports some posterior shoulder pain.     Medical History:   Past Medical History:   Diagnosis Date    Diabetes mellitus, type 2 20 years    BS didn't check 07/26/2022    High cholesterol     Hypertension     Macular degeneration        Surgical History:   Rosana Aguiar  has a past surgical history that includes Hysterectomy; Appendectomy; Tonsillectomy; cyst removal from breast (Left); and Cataract extraction (Bilateral).    Medications:   Rosana has a current medication list which includes the following prescription(s): alprazolam, amlodipine, aspirin, toujeo max u-300 solostar, losartan-hydrochlorothiazide 100-25 mg, pen needle, diabetic, and pravastatin.    Allergies:   Review of patient's allergies  indicates:   Allergen Reactions    Neomycin-bacitracin-polymyxin Itching        Prior Therapy: None for this issue  Social History: lives with her daughter  Occupation: retired from graphic designer  Prior Level of Function: no limitations  Current Level of Function: prior to injection, very limited ; since injection, minimal limitation    Pain:  Current 0/10, worst 2/10, best 0/10   Location: right shoulder, R upper trap  Description: Aching  Aggravating Factors: lifting, carrying  Easing Factors: rest, ibuprofen    Pts goals: Be able to use arm for ADLs without pain or concern for pain.    Objective     Active Range of Motion: Measured in degrees    Elbow range of motion WNL    Shoulder Left Right   Flexion 165* 155*   Abduction 165* 155*   ER at 0 50* 45*   IR NT NT   *= pain at end range of motion           Upper Extremity Strength  Elbow Left Right   Biceps 4+/5 4+/5   Triceps 4+/5 4+/5     Shoulder Left Right   Shoulder flexion: 4+/5 4/5*   Shoulder Abduction: 4+/5 4/5*   Shoulder ER 4+/5 4/5*   Shoulder IR 4+/5 4/5   *= pain with movement        Special Tests:     Impingement Left Right   Neer Positive Positive   Empty Can Test Negative Positive for pain, not weakness   Lisa Milad Positive Positive       Palpation Elbow:  No elbow TTP    Palpation Shoulder:  TTP at R upper trap, TTP at R greater tuberosity      CMS Impairment/Limitation/Restriction for FOTO Shoulder Survey    Therapist reviewed FOTO scores for Rosana Aguiar on 9/21/2022.   FOTO documents entered into [employer] - see Media section.    Limitation Score: 46%       TREATMENT   Treatment Time In: 11:20  Treatment Time Out: 11:35  Total Treatment time separate from Evaluation: 15 minutes    Rosana received therapeutic exercises to develop strength, endurance, ROM, flexibility, and posture for 15 minutes including:   - Wand Flexion   - Isometrics R:    - Flexion    - ER    - IR   - HEP instruction of all of the above    Rosana received the  following manual therapy techniques:  for 0 minutes, including:   - none performed today    Rosana participated in neuromuscular re-education activities to improve: Coordination, Kinesthetic, Sense, Proprioception, and Posture for 0 minutes. The following activities were included:   - none performed today    Rosana participated in dynamic functional therapeutic activities to improve functional performance for 0  minutes, including:   - none performed today    Home Exercises and Patient Education Provided    Education provided:   - Role of PT, PT POC    Written Home Exercises Provided: yes.  Exercises were reviewed and Rosana was able to demonstrate them prior to the end of the session.  Rosana demonstrated good  understanding of the education provided.     See EMR under Patient Instructions for exercises provided 9/21/2022.    Assessment   Patient presents with signs and symptoms consistent with a diagnosis of R shoulder RC tendinopathy including all above listed objective impairments. It appears that the patients most significant impairments contributing to their diagnosis are R shoulder range of motion and strength deficits. This pt is a good candidate for skilled PT treatment and stands to benefit from a combination of manual therapy, therapeutic exercise/actvities, neuromuscular re-education, and modalities Prn. The pt has been educated on their dx/POC and consents to further PT treatment.    Pt prognosis is Good.   Pt will benefit from skilled outpatient Physical Therapy to address the deficits stated above and in the chart below, provide pt/family education, and to maximize pt's level of independence.     Plan of care discussed with patient: Yes  Pt's spiritual, cultural and educational needs considered and patient is agreeable to the plan of care and goals as stated below:     Anticipated Barriers for therapy: Chronicity of condition    Medical Necessity is demonstrated by the  following  History  Co-morbidities and personal factors that may impact the plan of care Co-morbidities:   See medical history    Personal Factors:   age     moderate   Examination  Body Structures and Functions, activity limitations and participation restrictions that may impact the plan of care Body Regions:   upper extremities    Body Systems:    gross symmetry  ROM  strength  motor control    Participation Restrictions:   none    Activity limitations:   Learning and applying knowledge  no deficits    General Tasks and Commands  no deficits    Communication  no deficits    Mobility  lifting and carrying objects    Self care  no deficits    Domestic Life  doing house work (cleaning house, washing dishes, laundry)    Interactions/Relationships  no deficits    Life Areas  no deficits    Community and Social Life  no deficits         low   Clinical Presentation stable and uncomplicated low   Decision Making/ Complexity Score: low     Goals:  Short Term Goals (4 Weeks):   1. Pt to increase strength to at least 4+/5 of muscles tested to allow for improvement in functional activities and remediate dysfunctional movement patterns.  2. Pt to improve gross shoulder motion by by 25% to allow for improved functional mobility.     Long Term Goals (6 Weeks):   1. Pt to achieve <40% disability as measured by the FOTO to demonstrate decreased disability with functional activities.   2. Pt to increase strength to at least 5/5 of muscles tested to allow for improvement in functional activities including.   3. Pt to improve gross shoulder motion to <10% limitations to allow for improved functional mobility with performing ADL's   4. Pt to report compliance with HEP 3x/week and demonstrate proper exercise technique to PT to show independence with self management of condition.      Plan   Plan of care Certification: 9/21/2022 to 11/21/22.    Outpatient Physical Therapy 2 times weekly for 6 weeks to include the following  interventions: Electrical Stimulation as needed, Manual Therapy, Moist Heat/ Ice, Neuromuscular Re-ed, Patient Education, Self Care, Therapeutic Activities, and Therapeutic Exercise.       Ascencion Ordonez, PT, DPT  Physical Therapist  Board-Certified Specialist in Orthopaedic and Sports Physical Therapy  9/21/2022      I CERTIFY THE NEED FOR THESE SERVICES FURNISHED UNDER THIS PLAN OF TREATMENT AND WHILE UNDER MY CARE   Physician's comments:     Physician's Signature: ___________________________________________________

## 2022-09-22 ENCOUNTER — OFFICE VISIT (OUTPATIENT)
Dept: INTERNAL MEDICINE | Facility: CLINIC | Age: 72
End: 2022-09-22
Payer: MEDICARE

## 2022-09-22 VITALS
DIASTOLIC BLOOD PRESSURE: 88 MMHG | RESPIRATION RATE: 18 BRPM | TEMPERATURE: 98 F | HEIGHT: 64 IN | SYSTOLIC BLOOD PRESSURE: 150 MMHG | BODY MASS INDEX: 27.95 KG/M2 | WEIGHT: 163.69 LBS | OXYGEN SATURATION: 96 % | HEART RATE: 95 BPM

## 2022-09-22 DIAGNOSIS — I70.0 ABDOMINAL AORTIC ATHEROSCLEROSIS: ICD-10-CM

## 2022-09-22 DIAGNOSIS — Z79.4 TYPE 2 DIABETES MELLITUS WITH DIABETIC PERIPHERAL ANGIOPATHY WITHOUT GANGRENE, WITH LONG-TERM CURRENT USE OF INSULIN: ICD-10-CM

## 2022-09-22 DIAGNOSIS — T46.6X5A STATIN MYOPATHY: ICD-10-CM

## 2022-09-22 DIAGNOSIS — E78.5 HYPERLIPIDEMIA, UNSPECIFIED HYPERLIPIDEMIA TYPE: Primary | ICD-10-CM

## 2022-09-22 DIAGNOSIS — E11.51 TYPE 2 DIABETES MELLITUS WITH DIABETIC PERIPHERAL ANGIOPATHY WITHOUT GANGRENE, WITH LONG-TERM CURRENT USE OF INSULIN: ICD-10-CM

## 2022-09-22 DIAGNOSIS — Z12.11 SCREEN FOR COLON CANCER: ICD-10-CM

## 2022-09-22 DIAGNOSIS — G72.0 STATIN MYOPATHY: ICD-10-CM

## 2022-09-22 DIAGNOSIS — I10 HYPERTENSION, UNSPECIFIED TYPE: ICD-10-CM

## 2022-09-22 DIAGNOSIS — Z78.0 POSTMENOPAUSAL: ICD-10-CM

## 2022-09-22 DIAGNOSIS — R09.89 BRUIT OF LEFT CAROTID ARTERY: ICD-10-CM

## 2022-09-22 DIAGNOSIS — M25.611 DECREASED ROM OF RIGHT SHOULDER: ICD-10-CM

## 2022-09-22 PROCEDURE — 1101F PR PT FALLS ASSESS DOC 0-1 FALLS W/OUT INJ PAST YR: ICD-10-PCS | Mod: CPTII,S$GLB,, | Performed by: FAMILY MEDICINE

## 2022-09-22 PROCEDURE — 1159F MED LIST DOCD IN RCRD: CPT | Mod: CPTII,S$GLB,, | Performed by: FAMILY MEDICINE

## 2022-09-22 PROCEDURE — 3066F PR DOCUMENTATION OF TREATMENT FOR NEPHROPATHY: ICD-10-PCS | Mod: CPTII,S$GLB,, | Performed by: FAMILY MEDICINE

## 2022-09-22 PROCEDURE — 3288F FALL RISK ASSESSMENT DOCD: CPT | Mod: CPTII,S$GLB,, | Performed by: FAMILY MEDICINE

## 2022-09-22 PROCEDURE — 3044F PR MOST RECENT HEMOGLOBIN A1C LEVEL <7.0%: ICD-10-PCS | Mod: CPTII,S$GLB,, | Performed by: FAMILY MEDICINE

## 2022-09-22 PROCEDURE — 1125F AMNT PAIN NOTED PAIN PRSNT: CPT | Mod: CPTII,S$GLB,, | Performed by: FAMILY MEDICINE

## 2022-09-22 PROCEDURE — 99214 OFFICE O/P EST MOD 30 MIN: CPT | Mod: S$GLB,,, | Performed by: FAMILY MEDICINE

## 2022-09-22 PROCEDURE — 3079F PR MOST RECENT DIASTOLIC BLOOD PRESSURE 80-89 MM HG: ICD-10-PCS | Mod: CPTII,S$GLB,, | Performed by: FAMILY MEDICINE

## 2022-09-22 PROCEDURE — 3079F DIAST BP 80-89 MM HG: CPT | Mod: CPTII,S$GLB,, | Performed by: FAMILY MEDICINE

## 2022-09-22 PROCEDURE — 3077F SYST BP >= 140 MM HG: CPT | Mod: CPTII,S$GLB,, | Performed by: FAMILY MEDICINE

## 2022-09-22 PROCEDURE — 99499 RISK ADDL DX/OHS AUDIT: ICD-10-PCS | Mod: HCNC,S$GLB,, | Performed by: FAMILY MEDICINE

## 2022-09-22 PROCEDURE — 3008F BODY MASS INDEX DOCD: CPT | Mod: CPTII,S$GLB,, | Performed by: FAMILY MEDICINE

## 2022-09-22 PROCEDURE — 99499 UNLISTED E&M SERVICE: CPT | Mod: HCNC,S$GLB,, | Performed by: FAMILY MEDICINE

## 2022-09-22 PROCEDURE — 3288F PR FALLS RISK ASSESSMENT DOCUMENTED: ICD-10-PCS | Mod: CPTII,S$GLB,, | Performed by: FAMILY MEDICINE

## 2022-09-22 PROCEDURE — 3044F HG A1C LEVEL LT 7.0%: CPT | Mod: CPTII,S$GLB,, | Performed by: FAMILY MEDICINE

## 2022-09-22 PROCEDURE — 99214 PR OFFICE/OUTPT VISIT, EST, LEVL IV, 30-39 MIN: ICD-10-PCS | Mod: S$GLB,,, | Performed by: FAMILY MEDICINE

## 2022-09-22 PROCEDURE — 3008F PR BODY MASS INDEX (BMI) DOCUMENTED: ICD-10-PCS | Mod: CPTII,S$GLB,, | Performed by: FAMILY MEDICINE

## 2022-09-22 PROCEDURE — 1101F PT FALLS ASSESS-DOCD LE1/YR: CPT | Mod: CPTII,S$GLB,, | Performed by: FAMILY MEDICINE

## 2022-09-22 PROCEDURE — 3066F NEPHROPATHY DOC TX: CPT | Mod: CPTII,S$GLB,, | Performed by: FAMILY MEDICINE

## 2022-09-22 PROCEDURE — 99999 PR PBB SHADOW E&M-EST. PATIENT-LVL IV: ICD-10-PCS | Mod: PBBFAC,,, | Performed by: FAMILY MEDICINE

## 2022-09-22 PROCEDURE — 1125F PR PAIN SEVERITY QUANTIFIED, PAIN PRESENT: ICD-10-PCS | Mod: CPTII,S$GLB,, | Performed by: FAMILY MEDICINE

## 2022-09-22 PROCEDURE — 3077F PR MOST RECENT SYSTOLIC BLOOD PRESSURE >= 140 MM HG: ICD-10-PCS | Mod: CPTII,S$GLB,, | Performed by: FAMILY MEDICINE

## 2022-09-22 PROCEDURE — 3061F NEG MICROALBUMINURIA REV: CPT | Mod: CPTII,S$GLB,, | Performed by: FAMILY MEDICINE

## 2022-09-22 PROCEDURE — 3061F PR NEG MICROALBUMINURIA RESULT DOCUMENTED/REVIEW: ICD-10-PCS | Mod: CPTII,S$GLB,, | Performed by: FAMILY MEDICINE

## 2022-09-22 PROCEDURE — 99999 PR PBB SHADOW E&M-EST. PATIENT-LVL IV: CPT | Mod: PBBFAC,,, | Performed by: FAMILY MEDICINE

## 2022-09-22 PROCEDURE — 1159F PR MEDICATION LIST DOCUMENTED IN MEDICAL RECORD: ICD-10-PCS | Mod: CPTII,S$GLB,, | Performed by: FAMILY MEDICINE

## 2022-09-22 RX ORDER — CARVEDILOL 6.25 MG/1
6.25 TABLET ORAL 2 TIMES DAILY WITH MEALS
Qty: 60 TABLET | Refills: 11 | Status: SHIPPED | OUTPATIENT
Start: 2022-09-22 | End: 2023-04-10 | Stop reason: SDUPTHER

## 2022-09-22 RX ORDER — INSULIN GLARGINE 300 U/ML
60 INJECTION, SOLUTION SUBCUTANEOUS DAILY
Qty: 3 PEN | Refills: 11 | Status: SHIPPED | OUTPATIENT
Start: 2022-09-22 | End: 2023-07-20 | Stop reason: SDUPTHER

## 2022-09-22 NOTE — PROGRESS NOTES
Rosana Aguiar  09/22/2022  [id number]    Anamaria Sawyer NP  Patient Care Team:  Anamaria Sawyer NP as PCP - General (Family Medicine)          Visit Type:a scheduled routine follow-up visit    Chief Complaint:  Chief Complaint   Patient presents with    Follow-up       History of Present Illness:  72 year old  Saw RACHAEL Anamaria Sawyer    Here for followup  DM, HTN, HLD  Lab Results   Component Value Date    HGBA1C 6.0 (H) 07/11/2022       Health Maintenance Due   Topic Date Due    TETANUS VACCINE  Never done    DEXA Scan  Never done    Colorectal Cancer Screening  Never done    Shingles Vaccine (1 of 2) Never done    COVID-19 Vaccine (3 - Booster for Pfizer series) 09/03/2021       Started PT for Right Shoulder, calcific tendonitis    CBGs at home typically in 90s, this .    Balance problems, walking down steps, improving some.   Has neuro appt in February.  Remote onset onset, about a year ago.     Carotid Aa stenosis, on left  50-69%  Started Statin, ASA  Counsleing on smoking cessation    She was stopped on Crestor due to muscle aches, and then tried Pravastatin, and that caused aches again.  She reports that off chol meds her legs doing hurt.      History:  Past Medical History:   Diagnosis Date    Diabetes mellitus, type 2 20 years    BS didn't check 07/26/2022    High cholesterol     Hypertension     Macular degeneration      Past Surgical History:   Procedure Laterality Date    APPENDECTOMY      CATARACT EXTRACTION Bilateral     cyst removal from breast (Left)      HYSTERECTOMY      TONSILLECTOMY       Family History   Problem Relation Age of Onset    Diabetes Mother     Macular degeneration Mother     Alcohol abuse Father     Diabetes Paternal Uncle      Social History     Socioeconomic History    Marital status:    Tobacco Use    Smoking status: Every Day     Types: Cigarettes    Smokeless tobacco: Never   Substance and Sexual Activity    Alcohol use: Not Currently    Drug use: Never     "Sexual activity: Yes     Partners: Male     Patient Active Problem List   Diagnosis    Hyperlipidemia    Hypertension    Type 2 diabetes mellitus with diabetic peripheral angiopathy without gangrene, with long-term current use of insulin    Thyroid nodule    Asymptomatic microscopic hematuria    Statin myopathy    Bruit of left carotid artery    Abdominal aortic atherosclerosis    Grief    Decreased ROM of right shoulder    Shoulder weakness     Review of patient's allergies indicates:   Allergen Reactions    Neomycin-bacitracin-polymyxin Itching       The following were reviewed at this visit: active problem list, medication list, allergies, family history, social history, and health maintenance.    Medications:  Current Outpatient Medications on File Prior to Visit   Medication Sig Dispense Refill    amLODIPine (NORVASC) 10 MG tablet Take 1 tablet (10 mg total) by mouth once daily. 90 tablet 3    aspirin (ECOTRIN) 81 MG EC tablet Take 1 tablet (81 mg total) by mouth once daily. 90 tablet 3    losartan-hydrochlorothiazide 100-25 mg (HYZAAR) 100-25 mg per tablet Take 1 tablet by mouth once daily. 90 tablet 3    pen needle, diabetic (BD CAROL 2ND GEN PEN NEEDLE) 32 gauge x 5/32" Ndle 1 each by Misc.(Non-Drug; Combo Route) route once daily at 6am. 100 each 1    pravastatin (PRAVACHOL) 40 MG tablet Take 1 tablet (40 mg total) by mouth once daily. 90 tablet 3    [DISCONTINUED] insulin glargine U-300 conc (TOUJEO MAX U-300 SOLOSTAR) 300 unit/mL (3 mL) insulin pen Inject 56 Units into the skin once daily. 2 pen 11    ALPRAZolam (XANAX) 0.25 MG tablet Take 1 tablet (0.25 mg total) by mouth 2 (two) times daily as needed for Anxiety. 30 tablet 0     No current facility-administered medications on file prior to visit.       Medications have been reviewed and reconciled with patient at this visit.  Barriers to medications reviewed with patient.    Adverse reactions to current medications reviewed with patient..    Over the " counter medications reviewed and reconciled with patient.    Exam:  Wt Readings from Last 3 Encounters:   09/22/22 74.2 kg (163 lb 11.1 oz)   08/25/22 75.9 kg (167 lb 5.3 oz)   07/11/22 76.6 kg (168 lb 14 oz)     Temp Readings from Last 3 Encounters:   09/22/22 98.1 °F (36.7 °C) (Tympanic)   08/25/22 98.2 °F (36.8 °C) (Tympanic)   07/11/22 97.8 °F (36.6 °C) (Tympanic)     BP Readings from Last 3 Encounters:   09/22/22 (!) 150/88   08/25/22 (!) 160/62   07/11/22 (!) 160/80     Pulse Readings from Last 3 Encounters:   09/22/22 95   08/25/22 90   07/11/22 83     Body mass index is 28.1 kg/m².      Review of Systems   Constitutional: Negative.  Negative for chills and fever.   HENT: Negative.  Negative for congestion, sinus pain and sore throat.    Eyes:  Negative for blurred vision and double vision.   Respiratory:  Negative for cough, sputum production, shortness of breath and wheezing.    Cardiovascular:  Negative for chest pain, palpitations and leg swelling.   Gastrointestinal:  Negative for abdominal pain, constipation, diarrhea, heartburn, nausea and vomiting.   Genitourinary: Negative.    Musculoskeletal:  Positive for joint pain.   Skin: Negative.  Negative for rash.   Neurological: Negative.    Endo/Heme/Allergies: Negative.  Negative for polydipsia. Does not bruise/bleed easily.   Psychiatric/Behavioral:  Negative for depression and substance abuse.    Physical Exam  Nursing note reviewed.   Cardiovascular:      Rate and Rhythm: Normal rate and regular rhythm.   Pulmonary:      Effort: Pulmonary effort is normal. No respiratory distress.   Neurological:      Mental Status: She is alert and oriented to person, place, and time.   Psychiatric:         Mood and Affect: Mood normal.         Behavior: Behavior normal.         Thought Content: Thought content normal.         Judgment: Judgment normal.       Laboratory Reviewed ({Yes)  Lab Results   Component Value Date    WBC 7.26 07/11/2022    HGB 13.1 07/11/2022     HCT 41.3 07/11/2022     07/11/2022    CHOL 258 (H) 07/11/2022    TRIG 222 (H) 07/11/2022    HDL 45 07/11/2022    ALT 16 07/11/2022    AST 17 07/11/2022     07/11/2022    K 3.8 07/11/2022    CL 98 07/11/2022    CREATININE 0.9 07/11/2022    BUN 12 07/11/2022    CO2 25 07/11/2022    TSH 1.285 01/06/2022    HGBA1C 6.0 (H) 07/11/2022       Rosana was seen today for follow-up.    Diagnoses and all orders for this visit:    Hyperlipidemia, unspecified hyperlipidemia type  Trial of Reptha if covered    Hypertension, unspecified type  BP not at goal  Coreg .25 BID with other meds.    Type 2 diabetes mellitus with diabetic peripheral angiopathy without gangrene, with long-term current use of insulin  Nov HgA1c  Stay on meds    Statin myopathy  Holding pravachol.    Decreased ROM of right shoulder  PT    Screen for colon cancer  -     Ambulatory referral/consult to Endo Procedure ; Future    Postmenopausal  -     DXA Bone Density Spine And Hip; Future        Stop the Pravachol  Seems to be giving same side effects.  We can try repatha  If not covered, can try the Pravachol every other day    Shot in the shoulder helped.        Care Plan/Goals: Reviewed    Goals    None         Follow up: No follow-ups on file.    After visit summary was printed and given to patient upon discharge today.  Patient goals and care plan are included in After Visit Summary.

## 2022-09-23 ENCOUNTER — TELEPHONE (OUTPATIENT)
Dept: PREADMISSION TESTING | Facility: HOSPITAL | Age: 72
End: 2022-09-23
Payer: MEDICARE

## 2022-09-23 ENCOUNTER — PATIENT MESSAGE (OUTPATIENT)
Dept: PREADMISSION TESTING | Facility: HOSPITAL | Age: 72
End: 2022-09-23

## 2022-09-23 ENCOUNTER — HOSPITAL ENCOUNTER (OUTPATIENT)
Dept: PREADMISSION TESTING | Facility: HOSPITAL | Age: 72
Discharge: HOME | End: 2022-09-23
Attending: FAMILY MEDICINE
Payer: MEDICARE

## 2022-09-23 DIAGNOSIS — Z12.11 SCREEN FOR COLON CANCER: ICD-10-CM

## 2022-09-23 NOTE — TELEPHONE ENCOUNTER
Called patient at PAT appointment to schedule colonoscopy, she declined.  Stated she was not ready to schedule procedure at this time and would let her PCP know when she is ready.  Portal reminder sent to reschedule PAT appointment when ready.

## 2022-09-27 ENCOUNTER — SPECIALTY PHARMACY (OUTPATIENT)
Dept: PHARMACY | Facility: CLINIC | Age: 72
End: 2022-09-27

## 2022-09-27 NOTE — TELEPHONE ENCOUNTER
Bishnu, this is Abida Cottrell with [facility].  We are working on your prescription that your doctor has sent us. We will be working with your insurance to get this approved for you. We will be calling you along the way with updates on your medication.  If you have any questions, you can reach us at [phone].    Welcome call outcome: Patient/caregiver reached

## 2022-09-27 NOTE — TELEPHONE ENCOUNTER
Amy GARZA approved from 9/27/22 - 3/26/23. PA Case: [id number]    [employer]  Copay $139.13  Coverage gap  No LIS  In network    Forward to FA

## 2022-09-30 NOTE — TELEPHONE ENCOUNTER
Patient wants to proceed with Repatha PAP. Patient portion mailed to address on file. Provider portion faxed to [phone].

## 2022-10-06 ENCOUNTER — TELEPHONE (OUTPATIENT)
Dept: CARDIOLOGY | Facility: HOSPITAL | Age: 72
End: 2022-10-06
Payer: MEDICARE

## 2022-10-06 NOTE — TELEPHONE ENCOUNTER
----- Message from Clara Orozco sent at 10/6/2022  2:11 PM CDT -----  Contact: Rosana Wayne is calling to cancel her appointment and will call back to reschedule . Please call her at [phone].        Thanks  CF

## 2022-10-06 NOTE — TELEPHONE ENCOUNTER
I returned call. Patient requested to cancel appt scheduled for10/14/2022, due to ill daughter. Patient stated she will call to rescheduled after she knows how her daughter is doing.

## 2022-10-12 ENCOUNTER — TELEPHONE (OUTPATIENT)
Dept: INTERNAL MEDICINE | Facility: CLINIC | Age: 72
End: 2022-10-12
Payer: MEDICARE

## 2022-10-12 NOTE — TELEPHONE ENCOUNTER
----- Message from Alida Varela sent at 10/12/2022  9:13 AM CDT -----  Patient is calling in regards to having CV US ANKLE BRACHIAL REST scheduled,Please call patient back at [phone].Thanks

## 2022-10-13 ENCOUNTER — HOSPITAL ENCOUNTER (OUTPATIENT)
Dept: CARDIOLOGY | Facility: HOSPITAL | Age: 72
Discharge: HOME | End: 2022-10-13
Attending: FAMILY MEDICINE
Payer: MEDICARE

## 2022-10-13 VITALS
SYSTOLIC BLOOD PRESSURE: 139 MMHG | BODY MASS INDEX: 27.83 KG/M2 | HEIGHT: 64 IN | DIASTOLIC BLOOD PRESSURE: 73 MMHG | WEIGHT: 163 LBS

## 2022-10-13 DIAGNOSIS — E11.51 TYPE 2 DIABETES MELLITUS WITH DIABETIC PERIPHERAL ANGIOPATHY WITHOUT GANGRENE, WITH LONG-TERM CURRENT USE OF INSULIN: ICD-10-CM

## 2022-10-13 DIAGNOSIS — G72.0 STATIN MYOPATHY: ICD-10-CM

## 2022-10-13 DIAGNOSIS — I70.0 ABDOMINAL AORTIC ATHEROSCLEROSIS: ICD-10-CM

## 2022-10-13 DIAGNOSIS — Z79.4 TYPE 2 DIABETES MELLITUS WITH DIABETIC PERIPHERAL ANGIOPATHY WITHOUT GANGRENE, WITH LONG-TERM CURRENT USE OF INSULIN: Primary | ICD-10-CM

## 2022-10-13 DIAGNOSIS — I10 HYPERTENSION, UNSPECIFIED TYPE: ICD-10-CM

## 2022-10-13 DIAGNOSIS — I73.9 PVD (PERIPHERAL VASCULAR DISEASE): ICD-10-CM

## 2022-10-13 DIAGNOSIS — E11.51 TYPE 2 DIABETES MELLITUS WITH DIABETIC PERIPHERAL ANGIOPATHY WITHOUT GANGRENE, WITH LONG-TERM CURRENT USE OF INSULIN: Primary | ICD-10-CM

## 2022-10-13 DIAGNOSIS — T46.6X5A STATIN MYOPATHY: ICD-10-CM

## 2022-10-13 DIAGNOSIS — Z79.4 TYPE 2 DIABETES MELLITUS WITH DIABETIC PERIPHERAL ANGIOPATHY WITHOUT GANGRENE, WITH LONG-TERM CURRENT USE OF INSULIN: ICD-10-CM

## 2022-10-13 LAB
LEFT ABI: 0.68
LEFT ARM BP: 145 MMHG
LEFT DORSALIS PEDIS: 93 MMHG
LEFT POSTERIOR TIBIAL: 98 MMHG
LEFT TBI: 0.41
LEFT TOE PRESSURE: 60 MMHG
RIGHT ABI: 0.6
RIGHT ARM BP: 139 MMHG
RIGHT DORSALIS PEDIS: 79 MMHG
RIGHT POSTERIOR TIBIAL: 87 MMHG
RIGHT TBI: 0.39
RIGHT TOE PRESSURE: 57 MMHG

## 2022-10-13 PROCEDURE — 93922 UPR/L XTREMITY ART 2 LEVELS: CPT | Mod: 26,,, | Performed by: INTERNAL MEDICINE

## 2022-10-13 PROCEDURE — 93922 ANKLE BRACHIAL INDICES (ABI): ICD-10-PCS | Mod: 26,,, | Performed by: INTERNAL MEDICINE

## 2022-10-13 PROCEDURE — 93922 UPR/L XTREMITY ART 2 LEVELS: CPT

## 2022-10-14 ENCOUNTER — HOSPITAL ENCOUNTER (OUTPATIENT)
Dept: CARDIOLOGY | Facility: HOSPITAL | Age: 72
Discharge: HOME | End: 2022-10-14
Attending: INTERNAL MEDICINE
Payer: MEDICARE

## 2022-10-14 VITALS
HEART RATE: 80 BPM | WEIGHT: 163 LBS | DIASTOLIC BLOOD PRESSURE: 73 MMHG | BODY MASS INDEX: 27.83 KG/M2 | HEIGHT: 64 IN | SYSTOLIC BLOOD PRESSURE: 139 MMHG

## 2022-10-14 DIAGNOSIS — I10 HYPERTENSION, UNSPECIFIED TYPE: ICD-10-CM

## 2022-10-14 DIAGNOSIS — I73.9 PVD (PERIPHERAL VASCULAR DISEASE): ICD-10-CM

## 2022-10-14 DIAGNOSIS — Z79.4 TYPE 2 DIABETES MELLITUS WITH DIABETIC PERIPHERAL ANGIOPATHY WITHOUT GANGRENE, WITH LONG-TERM CURRENT USE OF INSULIN: ICD-10-CM

## 2022-10-14 DIAGNOSIS — E11.51 TYPE 2 DIABETES MELLITUS WITH DIABETIC PERIPHERAL ANGIOPATHY WITHOUT GANGRENE, WITH LONG-TERM CURRENT USE OF INSULIN: ICD-10-CM

## 2022-10-14 DIAGNOSIS — I70.0 ABDOMINAL AORTIC ATHEROSCLEROSIS: ICD-10-CM

## 2022-10-14 LAB
LEFT ANT TIBIAL SYS PSV: 65 CM/S
LEFT CFA PSV: 217 CM/S
LEFT PERONEAL SYS PSV: 40 CM/S
LEFT POPLITEAL PSV: 48 CM/S
LEFT POST TIBIAL SYS PSV: 95 CM/S
LEFT PROFUNDA SYS PSV: 159 CM/S
LEFT SUPER FEMORAL DIST SYS PSV: 124 CM/S
LEFT SUPER FEMORAL MID SYS PSV: 0 CM/S
LEFT SUPER FEMORAL OSTIAL SYS PSV: 297 CM/S
LEFT SUPER FEMORAL PROX SYS PSV: 0 CM/S
LEFT TIB/PER TRUNK SYS PSV: 55 CM/S
OHS CV LEFT LOWER EXTREMITY ABI (NO CALC): 0.68
OHS CV RIGHT ABI LOWER EXTREMITY (NO CALC): 0.6
RIGHT ANT TIBIAL SYS PSV: 83 CM/S
RIGHT CFA PSV: 179 CM/S
RIGHT PERONEAL SYS PSV: 65 CM/S
RIGHT POPLITEAL PSV: 45 CM/S
RIGHT POST TIBIAL SYS PSV: 32 CM/S
RIGHT PROFUNDA SYS PSV: 296 CM/S
RIGHT SUPER FEMORAL DIST SYS PSV: 50 CM/S
RIGHT SUPER FEMORAL MID SYS PSV: 21 CM/S
RIGHT SUPER FEMORAL OSTIAL SYS PSV: 202 CM/S
RIGHT SUPER FEMORAL PROX SYS PSV: 28 CM/S
RIGHT TIB/PER TRUNK SYS PSV: 75 CM/S

## 2022-10-14 PROCEDURE — 93925 LOWER EXTREMITY STUDY: CPT | Mod: 26,,, | Performed by: INTERNAL MEDICINE

## 2022-10-14 PROCEDURE — 93925 CV US DOPPLER ARTERIAL LEGS BILATERAL (CUPID ONLY): ICD-10-PCS | Mod: 26,,, | Performed by: INTERNAL MEDICINE

## 2022-10-14 PROCEDURE — 93925 LOWER EXTREMITY STUDY: CPT

## 2022-10-19 ENCOUNTER — OFFICE VISIT (OUTPATIENT)
Dept: CARDIOLOGY | Facility: CLINIC | Age: 72
End: 2022-10-19
Payer: MEDICARE

## 2022-10-19 ENCOUNTER — HOSPITAL ENCOUNTER (OUTPATIENT)
Dept: RADIOLOGY | Facility: HOSPITAL | Age: 72
Discharge: HOME | End: 2022-10-19
Attending: INTERNAL MEDICINE
Payer: MEDICARE

## 2022-10-19 VITALS
BODY MASS INDEX: 28 KG/M2 | DIASTOLIC BLOOD PRESSURE: 50 MMHG | HEIGHT: 64 IN | HEART RATE: 60 BPM | WEIGHT: 164 LBS | SYSTOLIC BLOOD PRESSURE: 130 MMHG

## 2022-10-19 DIAGNOSIS — E78.5 HYPERLIPIDEMIA, UNSPECIFIED HYPERLIPIDEMIA TYPE: ICD-10-CM

## 2022-10-19 DIAGNOSIS — Z72.0 TOBACCO USE: ICD-10-CM

## 2022-10-19 DIAGNOSIS — I10 HYPERTENSION, UNSPECIFIED TYPE: ICD-10-CM

## 2022-10-19 DIAGNOSIS — R06.02 SHORTNESS OF BREATH: ICD-10-CM

## 2022-10-19 DIAGNOSIS — I70.0 ABDOMINAL AORTIC ATHEROSCLEROSIS: ICD-10-CM

## 2022-10-19 DIAGNOSIS — G72.0 STATIN MYOPATHY: ICD-10-CM

## 2022-10-19 DIAGNOSIS — R31.21 ASYMPTOMATIC MICROSCOPIC HEMATURIA: ICD-10-CM

## 2022-10-19 DIAGNOSIS — Z79.4 TYPE 2 DIABETES MELLITUS WITH DIABETIC PERIPHERAL ANGIOPATHY WITHOUT GANGRENE, WITH LONG-TERM CURRENT USE OF INSULIN: ICD-10-CM

## 2022-10-19 DIAGNOSIS — E11.51 TYPE 2 DIABETES MELLITUS WITH DIABETIC PERIPHERAL ANGIOPATHY WITHOUT GANGRENE, WITH LONG-TERM CURRENT USE OF INSULIN: ICD-10-CM

## 2022-10-19 DIAGNOSIS — T46.6X5A STATIN MYOPATHY: ICD-10-CM

## 2022-10-19 DIAGNOSIS — R06.83 SNORING: ICD-10-CM

## 2022-10-19 DIAGNOSIS — E04.1 THYROID NODULE: ICD-10-CM

## 2022-10-19 DIAGNOSIS — R09.89 BRUIT OF LEFT CAROTID ARTERY: ICD-10-CM

## 2022-10-19 DIAGNOSIS — I70.219 ATHEROSCLEROTIC PVD WITH INTERMITTENT CLAUDICATION: Primary | ICD-10-CM

## 2022-10-19 PROCEDURE — 99499 UNLISTED E&M SERVICE: CPT | Mod: HCNC,S$GLB,, | Performed by: INTERNAL MEDICINE

## 2022-10-19 PROCEDURE — 99999 PR PBB SHADOW E&M-EST. PATIENT-LVL III: ICD-10-PCS | Mod: PBBFAC,,, | Performed by: INTERNAL MEDICINE

## 2022-10-19 PROCEDURE — 71046 X-RAY EXAM CHEST 2 VIEWS: CPT | Mod: 26,,, | Performed by: RADIOLOGY

## 2022-10-19 PROCEDURE — 99205 OFFICE O/P NEW HI 60 MIN: CPT | Mod: S$GLB,,, | Performed by: INTERNAL MEDICINE

## 2022-10-19 PROCEDURE — 1125F AMNT PAIN NOTED PAIN PRSNT: CPT | Mod: CPTII,S$GLB,, | Performed by: INTERNAL MEDICINE

## 2022-10-19 PROCEDURE — 1159F PR MEDICATION LIST DOCUMENTED IN MEDICAL RECORD: ICD-10-PCS | Mod: CPTII,S$GLB,, | Performed by: INTERNAL MEDICINE

## 2022-10-19 PROCEDURE — 71046 X-RAY EXAM CHEST 2 VIEWS: CPT | Mod: TC

## 2022-10-19 PROCEDURE — 3061F PR NEG MICROALBUMINURIA RESULT DOCUMENTED/REVIEW: ICD-10-PCS | Mod: CPTII,S$GLB,, | Performed by: INTERNAL MEDICINE

## 2022-10-19 PROCEDURE — 3066F NEPHROPATHY DOC TX: CPT | Mod: CPTII,S$GLB,, | Performed by: INTERNAL MEDICINE

## 2022-10-19 PROCEDURE — 3288F PR FALLS RISK ASSESSMENT DOCUMENTED: ICD-10-PCS | Mod: CPTII,S$GLB,, | Performed by: INTERNAL MEDICINE

## 2022-10-19 PROCEDURE — 3066F PR DOCUMENTATION OF TREATMENT FOR NEPHROPATHY: ICD-10-PCS | Mod: CPTII,S$GLB,, | Performed by: INTERNAL MEDICINE

## 2022-10-19 PROCEDURE — 1101F PT FALLS ASSESS-DOCD LE1/YR: CPT | Mod: CPTII,S$GLB,, | Performed by: INTERNAL MEDICINE

## 2022-10-19 PROCEDURE — 1101F PR PT FALLS ASSESS DOC 0-1 FALLS W/OUT INJ PAST YR: ICD-10-PCS | Mod: CPTII,S$GLB,, | Performed by: INTERNAL MEDICINE

## 2022-10-19 PROCEDURE — 3061F NEG MICROALBUMINURIA REV: CPT | Mod: CPTII,S$GLB,, | Performed by: INTERNAL MEDICINE

## 2022-10-19 PROCEDURE — 1125F PR PAIN SEVERITY QUANTIFIED, PAIN PRESENT: ICD-10-PCS | Mod: CPTII,S$GLB,, | Performed by: INTERNAL MEDICINE

## 2022-10-19 PROCEDURE — 3044F PR MOST RECENT HEMOGLOBIN A1C LEVEL <7.0%: ICD-10-PCS | Mod: CPTII,S$GLB,, | Performed by: INTERNAL MEDICINE

## 2022-10-19 PROCEDURE — 3288F FALL RISK ASSESSMENT DOCD: CPT | Mod: CPTII,S$GLB,, | Performed by: INTERNAL MEDICINE

## 2022-10-19 PROCEDURE — 99999 PR PBB SHADOW E&M-EST. PATIENT-LVL III: CPT | Mod: PBBFAC,,, | Performed by: INTERNAL MEDICINE

## 2022-10-19 PROCEDURE — 99499 RISK ADDL DX/OHS AUDIT: ICD-10-PCS | Mod: HCNC,S$GLB,, | Performed by: INTERNAL MEDICINE

## 2022-10-19 PROCEDURE — 3075F PR MOST RECENT SYSTOLIC BLOOD PRESS GE 130-139MM HG: ICD-10-PCS | Mod: CPTII,S$GLB,, | Performed by: INTERNAL MEDICINE

## 2022-10-19 PROCEDURE — 3075F SYST BP GE 130 - 139MM HG: CPT | Mod: CPTII,S$GLB,, | Performed by: INTERNAL MEDICINE

## 2022-10-19 PROCEDURE — 3044F HG A1C LEVEL LT 7.0%: CPT | Mod: CPTII,S$GLB,, | Performed by: INTERNAL MEDICINE

## 2022-10-19 PROCEDURE — 71046 XR CHEST PA AND LATERAL: ICD-10-PCS | Mod: 26,,, | Performed by: RADIOLOGY

## 2022-10-19 PROCEDURE — 99205 PR OFFICE/OUTPT VISIT, NEW, LEVL V, 60-74 MIN: ICD-10-PCS | Mod: S$GLB,,, | Performed by: INTERNAL MEDICINE

## 2022-10-19 PROCEDURE — 1159F MED LIST DOCD IN RCRD: CPT | Mod: CPTII,S$GLB,, | Performed by: INTERNAL MEDICINE

## 2022-10-19 PROCEDURE — 3078F DIAST BP <80 MM HG: CPT | Mod: CPTII,S$GLB,, | Performed by: INTERNAL MEDICINE

## 2022-10-19 PROCEDURE — 3078F PR MOST RECENT DIASTOLIC BLOOD PRESSURE < 80 MM HG: ICD-10-PCS | Mod: CPTII,S$GLB,, | Performed by: INTERNAL MEDICINE

## 2022-10-19 RX ORDER — ROSUVASTATIN CALCIUM 20 MG/1
20 TABLET, COATED ORAL DAILY
Qty: 30 TABLET | Refills: 6 | Status: SHIPPED | OUTPATIENT
Start: 2022-10-19 | End: 2023-05-18 | Stop reason: SDUPTHER

## 2022-10-19 RX ORDER — CILOSTAZOL 50 MG/1
50 TABLET ORAL 2 TIMES DAILY
Qty: 60 TABLET | Refills: 11 | Status: SHIPPED | OUTPATIENT
Start: 2022-10-19 | End: 2023-10-17 | Stop reason: SDUPTHER

## 2022-10-19 NOTE — PROGRESS NOTES
Subjective:   Patient ID:  Rosana Aguiar is a 72 y.o. female who presents for evaluation of pvd    HPI  A 71 yo female with htn hlp diabetes ? Allergy to statins is here for evaluation of pvd. She is compliant with salt her diabetes is controlled . Has known h/o carotid disease aSYMPTOMATIC. SHE IS HERE FRO EVALUATION OF PVD SHE HAS LIMITING CLAUDICATION OF BOTH LOWER EXTREMITIES WORSE ON THE RIGHT SIDE. SHE IS AVOIDING WALKING TO AVOID PAIN. HAS NO NOCTURNAL SYMPTOMS. SHE CONTINUES TO SMOKE HAS NO ULCERATION OR DISCOLORATION IN FEET. HAS NO ULCERATION. HAS NO NUMBNESS OR TINGLING./ HE RLEG PAIN DID NOT IMPROVE WITH STOPPING STATINS.  HAS BALANCE ISSUE AND FEELS WOBBLY AT TIMES ? NEUROPATHY. HAS NO FALLS. DISCUSSED SMOKING CESSATION NOT INTERESTED IN GETTING HELP FROM OUR TEAm. Not sure she wants to quit smoking still dealing with death of  few months back.  She also was offered pcsk9 inhibitors was too costly for her to try.  Past Medical History:   Diagnosis Date    Atherosclerotic PVD with intermittent claudication 10/19/2022    Diabetes mellitus, type 2 20 years    BS didn't check 07/26/2022    High cholesterol     Hypertension     Macular degeneration        Past Surgical History:   Procedure Laterality Date    APPENDECTOMY      CATARACT EXTRACTION Bilateral     cyst removal from breast (Left)      HYSTERECTOMY      TONSILLECTOMY         Social History     Tobacco Use    Smoking status: Every Day     Types: Cigarettes    Smokeless tobacco: Never   Substance Use Topics    Alcohol use: Not Currently    Drug use: Never       Family History   Problem Relation Age of Onset    Diabetes Mother     Macular degeneration Mother     Alcohol abuse Father     Diabetes Paternal Uncle        Current Outpatient Medications   Medication Sig    amLODIPine (NORVASC) 10 MG tablet Take 1 tablet (10 mg total) by mouth once daily.    aspirin (ECOTRIN) 81 MG EC tablet Take 1 tablet (81 mg total) by mouth once daily.     "carvediloL (COREG) 6.25 MG tablet Take 1 tablet (6.25 mg total) by mouth 2 (two) times daily with meals.    insulin glargine U-300 conc (TOUJEO MAX U-300 SOLOSTAR) 300 unit/mL (3 mL) insulin pen Inject 60 Units into the skin once daily.    losartan-hydrochlorothiazide 100-25 mg (HYZAAR) 100-25 mg per tablet Take 1 tablet by mouth once daily.    ALPRAZolam (XANAX) 0.25 MG tablet Take 1 tablet (0.25 mg total) by mouth 2 (two) times daily as needed for Anxiety.    evolocumab (REPATHA SURECLICK) 140 mg/mL PnIj Inject 1 mL (140 mg total) into the skin every 14 (fourteen) days. (Patient not taking: Reported on 10/19/2022)    pen needle, diabetic (BD CAROL 2ND GEN PEN NEEDLE) 32 gauge x 5/32" Ndle 1 each by Misc.(Non-Drug; Combo Route) route once daily at 6am.    pravastatin (PRAVACHOL) 40 MG tablet Take 1 tablet (40 mg total) by mouth once daily. (Patient not taking: Reported on 10/19/2022)     No current facility-administered medications for this visit.     Current Outpatient Medications on File Prior to Visit   Medication Sig    amLODIPine (NORVASC) 10 MG tablet Take 1 tablet (10 mg total) by mouth once daily.    aspirin (ECOTRIN) 81 MG EC tablet Take 1 tablet (81 mg total) by mouth once daily.    carvediloL (COREG) 6.25 MG tablet Take 1 tablet (6.25 mg total) by mouth 2 (two) times daily with meals.    insulin glargine U-300 conc (TOUJEO MAX U-300 SOLOSTAR) 300 unit/mL (3 mL) insulin pen Inject 60 Units into the skin once daily.    losartan-hydrochlorothiazide 100-25 mg (HYZAAR) 100-25 mg per tablet Take 1 tablet by mouth once daily.    ALPRAZolam (XANAX) 0.25 MG tablet Take 1 tablet (0.25 mg total) by mouth 2 (two) times daily as needed for Anxiety.    evolocumab (REPATHA SURECLICK) 140 mg/mL PnIj Inject 1 mL (140 mg total) into the skin every 14 (fourteen) days. (Patient not taking: Reported on 10/19/2022)    pen needle, diabetic (BD CAROL 2ND GEN PEN NEEDLE) 32 gauge x 5/32" Ndle 1 each by Misc.(Non-Drug; Combo Route) " route once daily at 6am.    pravastatin (PRAVACHOL) 40 MG tablet Take 1 tablet (40 mg total) by mouth once daily. (Patient not taking: Reported on 10/19/2022)     No current facility-administered medications on file prior to visit.       Review of patient's allergies indicates:   Allergen Reactions    Neomycin-bacitracin-polymyxin Itching       Review of Systems   Constitutional: Negative for diaphoresis, malaise/fatigue and weight gain.   HENT:  Negative for hoarse voice.    Eyes:  Negative for double vision and visual disturbance.   Cardiovascular:  Positive for claudication. Negative for chest pain, cyanosis, dyspnea on exertion, irregular heartbeat, leg swelling, near-syncope, orthopnea, palpitations, paroxysmal nocturnal dyspnea and syncope.   Respiratory:  Positive for snoring. Negative for cough, hemoptysis and shortness of breath.    Hematologic/Lymphatic: Negative for bleeding problem. Does not bruise/bleed easily.   Skin:  Negative for color change and poor wound healing.   Musculoskeletal:  Positive for arthritis, joint pain, myalgias and stiffness. Negative for muscle cramps and muscle weakness.   Gastrointestinal:  Negative for bloating, abdominal pain, change in bowel habit, diarrhea, heartburn, hematemesis, hematochezia, melena and nausea.   Neurological:  Positive for loss of balance. Negative for excessive daytime sleepiness, dizziness, headaches, light-headedness, numbness and weakness.   Psychiatric/Behavioral:  Negative for memory loss. The patient does not have insomnia.    Allergic/Immunologic: Negative for hives.     Objective:   Physical Exam  Vitals and nursing note reviewed.   Constitutional:       General: She is not in acute distress.     Appearance: Normal appearance. She is well-developed. She is not ill-appearing.   HENT:      Head: Normocephalic and atraumatic.   Eyes:      General: No scleral icterus.     Pupils: Pupils are equal, round, and reactive to light.   Neck:      Thyroid:  No thyromegaly.      Vascular: Normal carotid pulses. Carotid bruit present. No hepatojugular reflux or JVD.      Trachea: No tracheal deviation.   Cardiovascular:      Rate and Rhythm: Normal rate and regular rhythm.      Pulses:           Carotid pulses are 2+ on the right side and 2+ on the left side with bruit.       Radial pulses are 2+ on the right side.        Femoral pulses are 2+ on the right side and 2+ on the left side.       Popliteal pulses are 1+ on the right side and 1+ on the left side.        Dorsalis pedis pulses are 1+ on the right side and 1+ on the left side.        Posterior tibial pulses are 0 on the right side and 0 on the left side.      Heart sounds: Normal heart sounds. No murmur heard.    No friction rub. No gallop.      Comments: No subclavian femoral or abdominal bruits.   Pulmonary:      Effort: Pulmonary effort is normal. No respiratory distress.      Breath sounds: Normal breath sounds. No wheezing, rhonchi or rales.   Chest:      Chest wall: No tenderness.   Abdominal:      General: Bowel sounds are normal. There is no abdominal bruit.      Palpations: Abdomen is soft. There is no hepatomegaly or pulsatile mass.      Tenderness: There is no abdominal tenderness.   Musculoskeletal:      Right shoulder: No deformity.      Cervical back: Normal range of motion and neck supple.      Right lower leg: No edema.      Left lower leg: No edema.      Comments: Spider veins noted.   Skin:     General: Skin is warm and dry.      Findings: No erythema or rash.      Nails: There is no clubbing.   Neurological:      Mental Status: She is alert and oriented to person, place, and time.      Cranial Nerves: No cranial nerve deficit.      Coordination: Coordination normal.   Psychiatric:         Speech: Speech normal.         Behavior: Behavior normal.         Thought Content: Thought content normal.     Vitals:    10/19/22 0803   BP: (!) 130/50   BP Location: Left arm   Patient Position: Sitting  "  Pulse: 60   Weight: 74.4 kg (164 lb 0.4 oz)   Height: 5' 4" (1.626 m)     Lab Results   Component Value Date    CHOL 258 (H) 07/11/2022    CHOL 141 01/06/2022     Lab Results   Component Value Date    HDL 45 07/11/2022    HDL 54 01/06/2022     Lab Results   Component Value Date    LDLCALC 168.6 (H) 07/11/2022    LDLCALC 64.8 01/06/2022     Lab Results   Component Value Date    TRIG 222 (H) 07/11/2022    TRIG 111 01/06/2022     Lab Results   Component Value Date    CHOLHDL 17.4 (L) 07/11/2022    CHOLHDL 38.3 01/06/2022       Chemistry        Component Value Date/Time     07/11/2022 1201    K 3.8 07/11/2022 1201    CL 98 07/11/2022 1201    CO2 25 07/11/2022 1201    BUN 12 07/11/2022 1201    CREATININE 0.9 07/11/2022 1201     07/11/2022 1201        Component Value Date/Time    CALCIUM 9.7 07/11/2022 1201    ALKPHOS 83 07/11/2022 1201    AST 17 07/11/2022 1201    ALT 16 07/11/2022 1201    BILITOT 0.3 07/11/2022 1201    ESTGFRAFRICA >60.0 07/11/2022 1201    EGFRNONAA >60.0 07/11/2022 1201          Lab Results   Component Value Date    TSH 1.285 01/06/2022     No results found for: INR, PROTIME  Lab Results   Component Value Date    WBC 7.26 07/11/2022    HGB 13.1 07/11/2022    HCT 41.3 07/11/2022    MCV 99 (H) 07/11/2022     07/11/2022     BNP  @LABRCNTIP(BNP,BNPTRIAGEBLO)@  CrCl cannot be calculated (Patient's most recent lab result is older than the maximum 7 days allowed.).    Lab Results   Component Value Date    HGBA1C 6.0 (H) 07/11/2022     EXAMINATION:  US ABDOMINAL AORTA     CLINICAL HISTORY:  Atherosclerosis of aorta     COMPARISON:  None     FINDINGS:  Findings: Ultrasound of the abdominal aorta obtained. The proximal abdominal aorta measures 1.9 x 2 cm in transverse dimensions.  The mid abdominal aorta measures 2 x 2.3 cm in transverse dimensions.  The distal abdominal aorta measures 2.2 x 2.5 cm in transverse dimensions.  Mild wall irregularity noted, consistent with atherosclerotic " change.  The aortic bifurcation is unremarkable.     Impression:     No evidence of abdominal aortic aneurysm.        Electronically signed by: Danish Kingsley MD  Date:                                            07/26/2022  Time:                                           10:35    Narrative & Impression  EXAMINATION:  US CAROTID BILATERAL     CLINICAL HISTORY:  Other specified symptoms and signs involving the circulatory and respiratory systems     TECHNIQUE:  Grayscale and color Doppler ultrasound examination of the carotid and vertebral artery systems bilaterally.  Stenosis estimates are per NASCET measurement criteria.     COMPARISON:  None.     FINDINGS:  Right:     Peak systolic velocity ICA: 124 cm/sec     ICA/CCA velocity ratio: 1.8.     Plaque formation: Mild-to-moderate plaque at bifurcation.     Vertebral artery: Antegrade flow and normal waveform.     Left:     Peak systolic velocity ICA: 205 cm/sec     ICA/CCA velocity ratio: 3.2.     Plaque formation: Mild-to-moderate plaque at bifurcation.  Visible luminal narrowing on grayscale imaging of the carotid bulb.     Vertebral artery: Antegrade flow and normal waveform.     Impression:     Elevated velocity in the left ICA consistent with 50-69% stenosis.        Electronically signed by: JF Corbin MD  Date:                                            07/18/2022  Time:                                           15:45    Findings    ROBINA The right resting ROBINA reduction is decreased.   The right ankle [DT] artery has monophasic flow.   The right ankle [DP] artery has monophasic flow.   The left resting ROBINA reduction is moderate decreased.     Medication Changes      None     Medication List     US Measurements - ROBINA    Left   Left arm  mmHg         Left posterior tibial 98 mmHg         Left dorsalis pedis 93 mmHg         Left ROBINA 0.68          Left toe pressure 60 mmHg         Left TBI 0.41           Right   Right arm  mmHg         Right  posterior tibial 87 mmHg         Right dorsalis pedis 79 mmHg         Right ROBINA 0.6          Right toe pressure 57 mmHg         Right TBI 0.39            Conclusion    Moderate to severe bilateral PAD, L >R  50-99% stenosis R profunda, SFA with biphasic and monophasic waveforms with moderate to severe plaque  Occluded L SFA with biphasic and monophasic waveforms distally    Right Lower Arterial CFA has biphasic flow.     PFA has biphasic flow.     SFAo has biphasic flow.     SFAp has biphasic flow.     SFAm has monophasic flow.     SFAd has monophasic flow.     POP has monophasic flow.     AT has monophasic flow.     TIB TRNK has monophasic flow.     PT has monophasic flow.     PER has monophasic flow.   Left Lower Arterial CFA has biphasic flow.     PFA has biphasic flow.     SFAo has biphasic flow.   SFAp is occluded.   SFAm is occluded.     SFAd has biphasic flow.     POP has biphasic flow.     AT has monophasic flow.     TIB TRNK has biphasic flow.     PT has monophasic flow.     PER has biphasic flow.   Ck normal   Esr elevated at 33.  Assessment:     1. Atherosclerotic PVD with intermittent claudication    2. Abdominal aortic atherosclerosis    3. Bruit of left carotid artery    4. Hyperlipidemia, unspecified hyperlipidemia type    5. Hypertension, unspecified type    6. Asymptomatic microscopic hematuria    7. Type 2 diabetes mellitus with diabetic peripheral angiopathy without gangrene, with long-term current use of insulin    8. Thyroid nodule    9. Statin myopathy    Has pvd involving the sfa and infrapopliteal vessels with limiting claudication. Discussed with the patient rf modification diet exercise smoking cessation. She is under a lot of stress in her family and sickness that she feels she can coop with and quit smoking . We had long discussion she understands will try her best. Not interested in smoking cessation referral.will continue antiplatelet add pletal walk exercise program and reassess  progress.  Diabetes well controlled continue same.  Hlp not on target cannot afford pcsk9 inhibitors her generalized symptoms did not resolve with stopping statins had good control with crestor will resume.   Carotid stenosis asymptomatic continue current therapy serial ultrasound.   Htn controlled low salt diet discussed.  Abdominal atherosclerosis asymptomatic no aneurysm continue current therapy and risk factor modification.  Snoring at nite with smoking and has some shortness of breath her ekg is normal. No previous pulmonary eval. Will get cxr and echo and get pulmonary consult. Since she has high pretest probability of atherosclerosis of the coronary tree will get cardiolite       Plan:   Echo    Asa    Pletal    Crestor    Cxr   Pulmonary eval  Cardiolite.   F/u in 3 month assess progress    Phone review results.

## 2022-10-20 ENCOUNTER — CLINICAL SUPPORT (OUTPATIENT)
Dept: INTERNAL MEDICINE | Facility: CLINIC | Age: 72
End: 2022-10-20
Payer: MEDICARE

## 2022-10-20 ENCOUNTER — CLINICAL SUPPORT (OUTPATIENT)
Dept: REHABILITATION | Facility: HOSPITAL | Age: 72
End: 2022-10-20
Payer: MEDICARE

## 2022-10-20 VITALS — DIASTOLIC BLOOD PRESSURE: 50 MMHG | SYSTOLIC BLOOD PRESSURE: 130 MMHG

## 2022-10-20 DIAGNOSIS — R29.898 SHOULDER WEAKNESS: ICD-10-CM

## 2022-10-20 DIAGNOSIS — M25.611 DECREASED ROM OF RIGHT SHOULDER: Primary | ICD-10-CM

## 2022-10-20 PROCEDURE — 97140 MANUAL THERAPY 1/> REGIONS: CPT

## 2022-10-20 PROCEDURE — 97110 THERAPEUTIC EXERCISES: CPT

## 2022-10-20 NOTE — PROGRESS NOTES
Patient came in for nurse visit today.  First check 142/50  Second 130/50.    Information sent to pcp

## 2022-10-20 NOTE — PROGRESS NOTES
[facility]   Physical Therapy Treatment Note     Name: Rosana Aguiar  Clinic Number: [id number]    Therapy Diagnosis:   Encounter Diagnoses   Name Primary?    Decreased ROM of right shoulder Yes    Shoulder weakness      Physician: Reginaldo Conde MD    Visit Date: 10/20/2022    Physician Orders: PT Eval and Treat  Medical Diagnosis from Referral: Calcific tendinitis of right shoulder [M75.31], Chronic right shoulder pain [M25.511, G89.29]  Evaluation Date: 9/21/2022  Authorization Period Expiration: 9/15/23  Plan of Care Expiration: 11/21/22  Progress Note Due: 10/21/22  Visit # / Visits authorized: 1/ 12  FOTO: 1/3    PTA Visit #: -/5     Time In: 11:45  Time Out: 12:30  Total Billable Time: 38 minutes    SUBJECTIVE     Pt reports: having lots of pain in R shoulder.  Pt. Reports not attending PT secondary her daughter being in hospital.    She was compliant with home exercise program.  Response to previous treatment: no adverse affects  Functional change: decreased R shoulder AROM    Pain: 8/10, worse  Location: right shoulder      OBJECTIVE     Objective Measures updated at progress report unless specified.     Treatment     Rosana received the treatments listed below:      therapeutic exercises to develop strength and ROM for 23 minutes including:   Nu Step x 5 min.  R prone lower trapezius 2 x 10 with assist  R prone middle trapezius 2 x 10 with assist  R prone rows 2 x 10 with assist  DNF 2 x 5  Shoulder shrugs 2 x 10    Reviewed for HEP-   Seated scap retraction      manual therapy techniques: Joint mobilizations were applied to the: 15 for  minutes, including: R GH joint mobs A-P, P-A, Post.- Inf. X 6 min., R AC joint mob x 2 min., R ST - scapulothoracic mobs x 4 min., B cervical side glides C4-7 x 3 min.          hot pack for 10 minutes to cervical and R shoulder.          Patient Education and Home Exercises     Home Exercises Provided and Patient Education Provided      Education provided:   - HEP, condition, activity    Written Home Exercises Provided: yes. Exercises were reviewed and Rosana was able to demonstrate them prior to the end of the session.  Rosana demonstrated good  understanding of the education provided. See EMR under Patient Instructions for exercises provided during therapy sessions    ASSESSMENT     Pt. Presents with pinpointing most discomfort R superior shoulder region.  Pt. Noted to have minimum to moderate R shoulder ROM limitations.  Pt. Has pain at end range of R shoulder flexion, abduction, and external rotation functional reach.  Pt. Educated on importance of increasing scapular strength to reach overhead.  Pt. Noted to have discomfort with lower cervical side glides with hypomobility.  Pt. Has moderate tension and tenderness B cervical paraspinals.  Pt. Educated on cervical strengthening as well to improve upright posture.      Rosana Is progressing well towards her goals.   Pt prognosis is Excellent.     Pt will continue to benefit from skilled outpatient physical therapy to address the deficits listed in the problem list box on initial evaluation, provide pt/family education and to maximize pt's level of independence in the home and community environment.     Pt's spiritual, cultural and educational needs considered and pt agreeable to plan of care and goals.     Anticipated barriers to physical therapy: none    Goals:     Short Term Goals (4 Weeks):   1. Pt to increase strength to at least 4+/5 of muscles tested to allow for improvement in functional activities and remediate dysfunctional movement patterns.  2. Pt to improve gross shoulder motion by by 25% to allow for improved functional mobility.      Long Term Goals (6 Weeks):   1. Pt to achieve <40% disability as measured by the FOTO to demonstrate decreased disability with functional activities.   2. Pt to increase strength to at least 5/5 of muscles tested to allow for improvement in  functional activities including.   3. Pt to improve gross shoulder motion to <10% limitations to allow for improved functional mobility with performing ADL's   4. Pt to report compliance with HEP 3x/week and demonstrate proper exercise technique to PT to show independence with self management of condition.  PLAN     Plan of care Certification: 9/21/2022 to 11/21/22.     Outpatient Physical Therapy 2 times weekly for 6 weeks to include the following interventions: Electrical Stimulation as needed, Manual Therapy, Moist Heat/ Ice, Neuromuscular Re-ed, Patient Education, Self Care, Therapeutic Activities, and Therapeutic Exercise.     Chasidy Kendrick, PT

## 2022-10-24 ENCOUNTER — CLINICAL SUPPORT (OUTPATIENT)
Dept: REHABILITATION | Facility: HOSPITAL | Age: 72
End: 2022-10-24
Payer: MEDICARE

## 2022-10-24 DIAGNOSIS — M25.611 DECREASED ROM OF RIGHT SHOULDER: Primary | ICD-10-CM

## 2022-10-24 DIAGNOSIS — R29.898 SHOULDER WEAKNESS: ICD-10-CM

## 2022-10-24 PROCEDURE — 97110 THERAPEUTIC EXERCISES: CPT

## 2022-10-24 NOTE — PROGRESS NOTES
[facility]   Physical Therapy Treatment Note     Name: Rosana Aguiar  Clinic Number: [id number]    Therapy Diagnosis:   Encounter Diagnoses   Name Primary?    Decreased ROM of right shoulder Yes    Shoulder weakness        Physician: Reginaldo Conde MD    Visit Date: 10/24/2022    Physician Orders: PT Eval and Treat  Medical Diagnosis from Referral: Calcific tendinitis of right shoulder [M75.31], Chronic right shoulder pain [M25.511, G89.29]  Evaluation Date: 9/21/2022  Authorization Period Expiration: 9/15/23  Plan of Care Expiration: 11/21/22  Progress Note Due: 10/21/22  Visit # / Visits authorized: 2/ 12  FOTO: 1/3    PTA Visit #: -/5     Time In: 12:07  Time Out: 12:40  Total Billable Time: 19 minutes    SUBJECTIVE     Pt reports: still having tenderness in R shoulder and pain.    She was compliant with home exercise program.  Response to previous treatment: no adverse affects  Functional change: decreased R shoulder AROM    Pain: not reported today  Location: right shoulder      OBJECTIVE     Objective Measures updated at progress report unless specified.   R shoulder Flexion - 75 degrees  R shoulder abd- 90 deg.  Treatment     Rosana received the treatments listed below:      therapeutic exercises to develop strength and ROM for 4 minutes including:   UBE x 4 min.          manual therapy techniques: Joint mobilizations were applied to the: 15 for  minutes, including: R GH joint mobs A-P, P-A, Post.- Inf. X 3 min., R AC joint mob x 2 min., FDN x 10 min.      hot pack for 10 minutes to R shoulder.          Patient Education and Home Exercises     Home Exercises Provided and Patient Education Provided     Education provided:   - HEP, condition, activity    Written Home Exercises Provided: yes. Exercises were reviewed and Rosana was able to demonstrate them prior to the end of the session.  Rosana demonstrated good  understanding of the education provided. See EMR under Patient  Instructions for exercises provided during therapy sessions    ASSESSMENT     Limited with treatment secondary unaware pt. Was present for treatment.  Pt.'s R shoulder flexion has improved.  Pt. Still moderately limited with R shoulder abduction.  Pt. Has most tenderness R supraspinatus and some tenderness R UT and R infraspinatus fossa greater than R anterolateral shoulder- pt. Agreeable with FDN.      Rosana Is progressing well towards her goals.   Pt prognosis is Excellent.     Pt will continue to benefit from skilled outpatient physical therapy to address the deficits listed in the problem list box on initial evaluation, provide pt/family education and to maximize pt's level of independence in the home and community environment.     Pt's spiritual, cultural and educational needs considered and pt agreeable to plan of care and goals.     Anticipated barriers to physical therapy: none    Goals:     Short Term Goals (4 Weeks):   1. Pt to increase strength to at least 4+/5 of muscles tested to allow for improvement in functional activities and remediate dysfunctional movement patterns.  2. Pt to improve gross shoulder motion by by 25% to allow for improved functional mobility.      Long Term Goals (6 Weeks):   1. Pt to achieve <40% disability as measured by the FOTO to demonstrate decreased disability with functional activities.   2. Pt to increase strength to at least 5/5 of muscles tested to allow for improvement in functional activities including.   3. Pt to improve gross shoulder motion to <10% limitations to allow for improved functional mobility with performing ADL's   4. Pt to report compliance with HEP 3x/week and demonstrate proper exercise technique to PT to show independence with self management of condition.  PLAN     Plan of care Certification: 9/21/2022 to 11/21/22.     Outpatient Physical Therapy 2 times weekly for 6 weeks to include the following interventions: Electrical Stimulation as needed,  Manual Therapy, Moist Heat/ Ice, Neuromuscular Re-ed, Patient Education, Self Care, Therapeutic Activities, and Therapeutic Exercise.     Chasidy Kendrick, PT

## 2022-10-27 ENCOUNTER — OFFICE VISIT (OUTPATIENT)
Dept: SPORTS MEDICINE | Facility: CLINIC | Age: 72
End: 2022-10-27
Payer: MEDICARE

## 2022-10-27 DIAGNOSIS — M25.511 CHRONIC RIGHT SHOULDER PAIN: ICD-10-CM

## 2022-10-27 DIAGNOSIS — M75.31 CALCIFIC TENDINITIS OF RIGHT SHOULDER: Primary | ICD-10-CM

## 2022-10-27 DIAGNOSIS — G89.29 CHRONIC RIGHT SHOULDER PAIN: ICD-10-CM

## 2022-10-27 PROCEDURE — 1159F PR MEDICATION LIST DOCUMENTED IN MEDICAL RECORD: ICD-10-PCS | Mod: CPTII,S$GLB,, | Performed by: STUDENT IN AN ORGANIZED HEALTH CARE EDUCATION/TRAINING PROGRAM

## 2022-10-27 PROCEDURE — 99213 OFFICE O/P EST LOW 20 MIN: CPT | Mod: S$GLB,,, | Performed by: STUDENT IN AN ORGANIZED HEALTH CARE EDUCATION/TRAINING PROGRAM

## 2022-10-27 PROCEDURE — 1160F PR REVIEW ALL MEDS BY PRESCRIBER/CLIN PHARMACIST DOCUMENTED: ICD-10-PCS | Mod: CPTII,S$GLB,, | Performed by: STUDENT IN AN ORGANIZED HEALTH CARE EDUCATION/TRAINING PROGRAM

## 2022-10-27 PROCEDURE — 1101F PR PT FALLS ASSESS DOC 0-1 FALLS W/OUT INJ PAST YR: ICD-10-PCS | Mod: CPTII,S$GLB,, | Performed by: STUDENT IN AN ORGANIZED HEALTH CARE EDUCATION/TRAINING PROGRAM

## 2022-10-27 PROCEDURE — 1159F MED LIST DOCD IN RCRD: CPT | Mod: CPTII,S$GLB,, | Performed by: STUDENT IN AN ORGANIZED HEALTH CARE EDUCATION/TRAINING PROGRAM

## 2022-10-27 PROCEDURE — 3066F PR DOCUMENTATION OF TREATMENT FOR NEPHROPATHY: ICD-10-PCS | Mod: CPTII,S$GLB,, | Performed by: STUDENT IN AN ORGANIZED HEALTH CARE EDUCATION/TRAINING PROGRAM

## 2022-10-27 PROCEDURE — 1101F PT FALLS ASSESS-DOCD LE1/YR: CPT | Mod: CPTII,S$GLB,, | Performed by: STUDENT IN AN ORGANIZED HEALTH CARE EDUCATION/TRAINING PROGRAM

## 2022-10-27 PROCEDURE — 99999 PR PBB SHADOW E&M-EST. PATIENT-LVL III: ICD-10-PCS | Mod: PBBFAC,,, | Performed by: STUDENT IN AN ORGANIZED HEALTH CARE EDUCATION/TRAINING PROGRAM

## 2022-10-27 PROCEDURE — 3061F NEG MICROALBUMINURIA REV: CPT | Mod: CPTII,S$GLB,, | Performed by: STUDENT IN AN ORGANIZED HEALTH CARE EDUCATION/TRAINING PROGRAM

## 2022-10-27 PROCEDURE — 3066F NEPHROPATHY DOC TX: CPT | Mod: CPTII,S$GLB,, | Performed by: STUDENT IN AN ORGANIZED HEALTH CARE EDUCATION/TRAINING PROGRAM

## 2022-10-27 PROCEDURE — 3061F PR NEG MICROALBUMINURIA RESULT DOCUMENTED/REVIEW: ICD-10-PCS | Mod: CPTII,S$GLB,, | Performed by: STUDENT IN AN ORGANIZED HEALTH CARE EDUCATION/TRAINING PROGRAM

## 2022-10-27 PROCEDURE — 3288F PR FALLS RISK ASSESSMENT DOCUMENTED: ICD-10-PCS | Mod: CPTII,S$GLB,, | Performed by: STUDENT IN AN ORGANIZED HEALTH CARE EDUCATION/TRAINING PROGRAM

## 2022-10-27 PROCEDURE — 3044F HG A1C LEVEL LT 7.0%: CPT | Mod: CPTII,S$GLB,, | Performed by: STUDENT IN AN ORGANIZED HEALTH CARE EDUCATION/TRAINING PROGRAM

## 2022-10-27 PROCEDURE — 3288F FALL RISK ASSESSMENT DOCD: CPT | Mod: CPTII,S$GLB,, | Performed by: STUDENT IN AN ORGANIZED HEALTH CARE EDUCATION/TRAINING PROGRAM

## 2022-10-27 PROCEDURE — 99213 PR OFFICE/OUTPT VISIT, EST, LEVL III, 20-29 MIN: ICD-10-PCS | Mod: S$GLB,,, | Performed by: STUDENT IN AN ORGANIZED HEALTH CARE EDUCATION/TRAINING PROGRAM

## 2022-10-27 PROCEDURE — 99999 PR PBB SHADOW E&M-EST. PATIENT-LVL III: CPT | Mod: PBBFAC,,, | Performed by: STUDENT IN AN ORGANIZED HEALTH CARE EDUCATION/TRAINING PROGRAM

## 2022-10-27 PROCEDURE — 1125F AMNT PAIN NOTED PAIN PRSNT: CPT | Mod: CPTII,S$GLB,, | Performed by: STUDENT IN AN ORGANIZED HEALTH CARE EDUCATION/TRAINING PROGRAM

## 2022-10-27 PROCEDURE — 3044F PR MOST RECENT HEMOGLOBIN A1C LEVEL <7.0%: ICD-10-PCS | Mod: CPTII,S$GLB,, | Performed by: STUDENT IN AN ORGANIZED HEALTH CARE EDUCATION/TRAINING PROGRAM

## 2022-10-27 PROCEDURE — 1125F PR PAIN SEVERITY QUANTIFIED, PAIN PRESENT: ICD-10-PCS | Mod: CPTII,S$GLB,, | Performed by: STUDENT IN AN ORGANIZED HEALTH CARE EDUCATION/TRAINING PROGRAM

## 2022-10-27 PROCEDURE — 1160F RVW MEDS BY RX/DR IN RCRD: CPT | Mod: CPTII,S$GLB,, | Performed by: STUDENT IN AN ORGANIZED HEALTH CARE EDUCATION/TRAINING PROGRAM

## 2022-10-27 NOTE — PATIENT INSTRUCTIONS
Assessment:  Rosana Aguiar is a 72 y.o. female with a chief complaint of Follow-up (R shoulder CSI 9/15/22)    Encounter Diagnoses   Name Primary?    Calcific tendinitis of right shoulder Yes    Chronic right shoulder pain       Plan:  Patient has made progress with injection and physical therapy.  Symptoms are returning somewhat.  Will continue to monitor.   No CSI today, as only 6 wks out from last inj  Plan right shoulder barbitage procedure with cortisone injection at next visit if symptoms do not improve  If calcification is softening, may be candidate for TenJet    Follow-up: 3 months (around 12/20) or sooner if there are any problems between now and then.    Thank you for choosing [facility] and Dr. Reginaldo Conde for your orthopedic & sports medicine care. It is our goal to provide you with exceptional care that will help keep you healthy, active, and get you back in the game.    Please do not hesitate to reach out to us via email, phone, or MyChart with any questions, concerns, or feedback.    If you are experiencing pain/discomfort ,or have questions after 5pm and would like to be connected to the [facility] on-call team, please call this number and specify which Sports Medicine provider is treating you: [phone]

## 2022-10-27 NOTE — PROGRESS NOTES
Patient ID: Rosana Aguiar  YOB: 1950  MRN: [id number]    Chief Complaint: Follow-up (R shoulder CSI 9/15/22)    Referred By: Self    Occupation: Retired    History of Present Illness: Rosana Aguira is a left-hand dominant 72 y.o. female who presents today with 5/10 pain at her follow up for right shoulder pain.    She was initially seen on 9/15/22.  Patient states that it began hurting last year in 2021 when supporting  when walking, lifting, transferring, etc. She describes the pain as and intermittent pain with soreness. She uses ibuprofen for relief. She states movement and lifting makes the pain worse. Her  passed away 6 months ago, and it has been a difficult period for her. Her pain is now significantly limiting her ADLs and performing simple self care & housework. She is apprehensive about PT, as she witnessed the amount of PT her  had to do and the pain/discomfort he experienced. She would like to avoid surgery.    Right shoulder CSI administered at the appointment on 9/15/22. Today, she reports improvement with the pain. She states the CSI gave her relief and she is also seeing improvement with therapy. She reports soreness and aching today but she went to physical therapy on Monday where they performed dry needling. She also uses ibuprofen for relief. She states the pain is worsened at night, when lifting, and after an active day.    The patient is active in none.    ***    Past Medical History:   Past Medical History:   Diagnosis Date    Atherosclerotic PVD with intermittent claudication 10/19/2022    Diabetes mellitus, type 2 20 years    BS didn't check 07/26/2022    High cholesterol     Hypertension     Macular degeneration      Past Surgical History:   Procedure Laterality Date    APPENDECTOMY      CATARACT EXTRACTION Bilateral     cyst removal from breast (Left)      HYSTERECTOMY      TONSILLECTOMY       Family History   Problem Relation Age of Onset     "Diabetes Mother     Macular degeneration Mother     Alcohol abuse Father     Diabetes Paternal Uncle      Social History     Socioeconomic History    Marital status:    Tobacco Use    Smoking status: Every Day     Types: Cigarettes    Smokeless tobacco: Never   Substance and Sexual Activity    Alcohol use: Not Currently    Drug use: Never    Sexual activity: Yes     Partners: Male     Medication List with Changes/Refills   Current Medications    ALPRAZOLAM (XANAX) 0.25 MG TABLET    Take 1 tablet (0.25 mg total) by mouth 2 (two) times daily as needed for Anxiety.    AMLODIPINE (NORVASC) 10 MG TABLET    Take 1 tablet (10 mg total) by mouth once daily.    ASPIRIN (ECOTRIN) 81 MG EC TABLET    Take 1 tablet (81 mg total) by mouth once daily.    CARVEDILOL (COREG) 6.25 MG TABLET    Take 1 tablet (6.25 mg total) by mouth 2 (two) times daily with meals.    CILOSTAZOL (PLETAL) 50 MG TAB    Take 1 tablet (50 mg total) by mouth 2 (two) times daily.    INSULIN GLARGINE U-300 CONC (TOUJEO MAX U-300 SOLOSTAR) 300 UNIT/ML (3 ML) INSULIN PEN    Inject 60 Units into the skin once daily.    LOSARTAN-HYDROCHLOROTHIAZIDE 100-25 MG (HYZAAR) 100-25 MG PER TABLET    Take 1 tablet by mouth once daily.    PEN NEEDLE, DIABETIC (BD CAROL 2ND GEN PEN NEEDLE) 32 GAUGE X 5/32" NDLE    1 each by Misc.(Non-Drug; Combo Route) route once daily at 6am.    ROSUVASTATIN (CRESTOR) 20 MG TABLET    Take 1 tablet (20 mg total) by mouth once daily.     Review of patient's allergies indicates:   Allergen Reactions    Neomycin-bacitracin-polymyxin Itching       Physical Exam:   There is no height or weight on file to calculate BMI.    Physical Exam      Detailed MSK exam:   Ortho/SPM Exam     ***    Imaging:  X-Ray Chest PA And Lateral  Narrative: EXAMINATION:  XR CHEST PA AND LATERAL    CLINICAL HISTORY:  Snoring    TECHNIQUE:  PA and lateral views of the chest were performed.    COMPARISON:  None    FINDINGS:  The lungs are clear and free of " infiltrate.  No pleural effusion or pneumothorax. The heart is mildly enlarged.  There is tortuosity of the descending thoracic aorta.  Possible calcified granuloma seen projecting over the left lower lung zone but since there are no priors CT of the chest is recommended for further evaluation.  This density measures approximately 4 mm in size..  Impression: As above    This report was flagged in Epic as abnormal.    Electronically signed by: Tobi Mcdaniel DO  Date:    10/19/2022  Time:    09:40    ***    Relevant imaging results were reviewed and interpreted by me and per my read ***.      This was discussed with the patient and / or family today.       There are no Patient Instructions on file for this visit.      Reginaldo Conde MD  Primary Care Sports Medicine      Disclaimer: This note was prepared using a voice recognition system and is likely to have sound alike errors within the text.     I spent a total of *** minutes on the day of the visit.This includes face to face time and non-face to face time preparing to see the patient (eg, review of tests), obtaining and/or reviewing separately obtained history, documenting clinical information in the electronic or other health record, independently interpreting results and communicating results to the patient/family/caregiver, or care coordinator.

## 2022-10-27 NOTE — PROGRESS NOTES
Patient ID: Rosana Aguiar  YOB: 1950  MRN: [id number]    Chief Complaint: Follow-up (R shoulder CSI 9/15/22)    Referred By: Self    Occupation: Retired    History of Present Illness: Rosana Aguiar is a left-hand dominant 72 y.o. female who presents today with 5/10 pain at her follow up for right shoulder pain.    She was initially seen on 9/15/22.  Patient states that it began hurting last year in 2021 when supporting  when walking, lifting, transferring, etc. She describes the pain as and intermittent pain with soreness. She uses ibuprofen for relief. She states movement and lifting makes the pain worse. Her  passed away 6 months ago, and it has been a difficult period for her. Her pain is now significantly limiting her ADLs and performing simple self care & housework. She is apprehensive about PT, as she witnessed the amount of PT her  had to do and the pain/discomfort he experienced. She would like to avoid surgery.    Right shoulder CSI administered at the appointment on 9/15/22. Today, she reports improvement with the pain. She states the CSI gave her relief and she is also seeing improvement with therapy. She reports soreness and aching today but she went to physical therapy on Monday where they performed dry needling. She also uses ibuprofen for relief. She states the pain is worsened at night, when lifting, and after an active day.      Past Medical History:   Past Medical History:   Diagnosis Date    Atherosclerotic PVD with intermittent claudication 10/19/2022    Diabetes mellitus, type 2 20 years    BS didn't check 07/26/2022    High cholesterol     Hypertension     Macular degeneration      Past Surgical History:   Procedure Laterality Date    APPENDECTOMY      CATARACT EXTRACTION Bilateral     cyst removal from breast (Left)      HYSTERECTOMY      TONSILLECTOMY       Family History   Problem Relation Age of Onset    Diabetes Mother     Macular degeneration  "Mother     Alcohol abuse Father     Diabetes Paternal Uncle      Social History     Socioeconomic History    Marital status:    Tobacco Use    Smoking status: Every Day     Types: Cigarettes    Smokeless tobacco: Never   Substance and Sexual Activity    Alcohol use: Not Currently    Drug use: Never    Sexual activity: Yes     Partners: Male     Medication List with Changes/Refills   Current Medications    ALPRAZOLAM (XANAX) 0.25 MG TABLET    Take 1 tablet (0.25 mg total) by mouth 2 (two) times daily as needed for Anxiety.    AMLODIPINE (NORVASC) 10 MG TABLET    Take 1 tablet (10 mg total) by mouth once daily.    ASPIRIN (ECOTRIN) 81 MG EC TABLET    Take 1 tablet (81 mg total) by mouth once daily.    CARVEDILOL (COREG) 6.25 MG TABLET    Take 1 tablet (6.25 mg total) by mouth 2 (two) times daily with meals.    CILOSTAZOL (PLETAL) 50 MG TAB    Take 1 tablet (50 mg total) by mouth 2 (two) times daily.    INSULIN GLARGINE U-300 CONC (TOUJEO MAX U-300 SOLOSTAR) 300 UNIT/ML (3 ML) INSULIN PEN    Inject 60 Units into the skin once daily.    LOSARTAN-HYDROCHLOROTHIAZIDE 100-25 MG (HYZAAR) 100-25 MG PER TABLET    Take 1 tablet by mouth once daily.    PEN NEEDLE, DIABETIC (BD CAROL 2ND GEN PEN NEEDLE) 32 GAUGE X 5/32" NDLE    1 each by Misc.(Non-Drug; Combo Route) route once daily at 6am.    ROSUVASTATIN (CRESTOR) 20 MG TABLET    Take 1 tablet (20 mg total) by mouth once daily.     Review of patient's allergies indicates:   Allergen Reactions    Neomycin-bacitracin-polymyxin Itching       Physical Exam:   There is no height or weight on file to calculate BMI.    Physical Exam  Constitutional:       General: She is not in acute distress.     Appearance: Normal appearance.   HENT:      Head: Normocephalic and atraumatic.   Eyes:      Extraocular Movements: Extraocular movements intact.      Conjunctiva/sclera: Conjunctivae normal.   Pulmonary:      Effort: Pulmonary effort is normal. No respiratory distress.   Skin:     " General: Skin is warm and dry.   Neurological:      General: No focal deficit present.      Mental Status: She is alert and oriented to person, place, and time.   Psychiatric:         Mood and Affect: Mood normal.       Detailed MSK exam:   Right Shoulder:  Inspection: No effusion, erythema, or ecchymosis   Palpation tenderness: Bicipital Groove  Range of motion: 165 deg Flexion         75 deg External Rotation in Adduction         IR to Lumbar  Strength:  4/5 Abduction    4/5 External Rotation in Adduction    5/5 Internal Rotation   Tests:  + Lisa, Neer's    + Empty Can  N/V Exam:  Radial: Normal motor (EPL/thumbs up)              Normal sensory (dorsal hand)   Median: Normal motor (FPL/A-OK)      Normal sensory (thumb)   Ulnar:  Normal motor (Interossei/scissors-spread)     Normal sensory (5th finger)   LABC: Normal sensory (lateral forearm)   MABC: Normal sensory (medial forearm)   MC: Normal motor (elbow flexion)   Axillary: Normal motor/sensory (deltoid)  Normal radial and ulnar pulses, warm and well perfused with capillary refill < 2 sec        Imaging:  X-Ray Chest PA And Lateral  Narrative: EXAMINATION:  XR CHEST PA AND LATERAL    CLINICAL HISTORY:  Snoring    TECHNIQUE:  PA and lateral views of the chest were performed.    COMPARISON:  None    FINDINGS:  The lungs are clear and free of infiltrate.  No pleural effusion or pneumothorax. The heart is mildly enlarged.  There is tortuosity of the descending thoracic aorta.  Possible calcified granuloma seen projecting over the left lower lung zone but since there are no priors CT of the chest is recommended for further evaluation.  This density measures approximately 4 mm in size..  Impression: As above    This report was flagged in Epic as abnormal.    Electronically signed by: Tobi Mcdaniel DO  Date:    10/19/2022  Time:    09:40      Relevant imaging results were reviewed and interpreted by me.      This was discussed with the patient and / or family  today.       Patient Instructions   Assessment:  Rosana Aguiar is a 72 y.o. female with a chief complaint of Follow-up (R shoulder CSI 9/15/22)    Encounter Diagnoses   Name Primary?    Calcific tendinitis of right shoulder Yes    Chronic right shoulder pain       Plan:  Patient has made progress with injection and physical therapy.  Symptoms are returning somewhat.  Will continue to monitor.   No CSI today, as only 6 wks out from last inj  Plan right shoulder barbitage procedure with cortisone injection at next visit if symptoms do not improve  If calcification is softening, may be candidate for TenJet    Follow-up: 3 months (around 12/20) or sooner if there are any problems between now and then.    Thank you for choosing [facility] and Dr. Reginaldo Conde for your orthopedic & sports medicine care. It is our goal to provide you with exceptional care that will help keep you healthy, active, and get you back in the game.    Please do not hesitate to reach out to us via email, phone, or MyChart with any questions, concerns, or feedback.    If you are experiencing pain/discomfort ,or have questions after 5pm and would like to be connected to the [facility] on-call team, please call this number and specify which Sports Medicine provider is treating you: [phone]        Reginaldo Conde MD  Primary Care Sports Medicine      Disclaimer: This note was prepared using a voice recognition system and is likely to have sound alike errors within the text.

## 2022-11-01 ENCOUNTER — CLINICAL SUPPORT (OUTPATIENT)
Dept: REHABILITATION | Facility: HOSPITAL | Age: 72
End: 2022-11-01
Payer: MEDICARE

## 2022-11-01 DIAGNOSIS — M25.611 DECREASED ROM OF RIGHT SHOULDER: Primary | ICD-10-CM

## 2022-11-01 DIAGNOSIS — R29.898 SHOULDER WEAKNESS: ICD-10-CM

## 2022-11-01 PROCEDURE — 97140 MANUAL THERAPY 1/> REGIONS: CPT

## 2022-11-01 PROCEDURE — 97110 THERAPEUTIC EXERCISES: CPT

## 2022-11-01 NOTE — PROGRESS NOTES
[facility]   Physical Therapy Treatment Note     Name: Rosana Aguiar  Clinic Number: [id number]    Therapy Diagnosis:   Encounter Diagnoses   Name Primary?    Decreased ROM of right shoulder Yes    Shoulder weakness          Physician: Reginaldo Conde MD    Visit Date: 11/1/2022    Physician Orders: PT Eval and Treat  Medical Diagnosis from Referral: Calcific tendinitis of right shoulder [M75.31], Chronic right shoulder pain [M25.511, G89.29]  Evaluation Date: 9/21/2022  Authorization Period Expiration: 9/15/23  Plan of Care Expiration: 11/21/22  Progress Note Due: 10/21/22  Visit # / Visits authorized: 3/ 12  FOTO: 1/3    PTA Visit #: -/5     Time In: 1:00  Time Out: 1:45  Total Billable Time: 38 minutes    SUBJECTIVE     Pt reports: still having tenderness in R shoulder and pain.    She was compliant with home exercise program.  Response to previous treatment: no adverse affects  Functional change: decreased R shoulder AROM    Pain: not reported today  Location: right shoulder      OBJECTIVE     Objective Measures updated at progress report unless specified.   R shoulder Flexion - 75 degrees  R shoulder abd- 90 deg.  Treatment     Rosana received the treatments listed below:      therapeutic exercises to develop strength and ROM for 13 minutes including:   UBE x 4 min. Forward/backward  SL ext. Rot with 1#  Shoulder shrugs  B ext. Rot with scap retraction        manual therapy techniques: Joint mobilizations were applied to the: 25 for  minutes, including: R GH joint mobs A-P, P-A, Post.- Inf. X 6 min., R AC joint mob x 2 min., R ST mobs x 2 min., FDN x 15 min.    Palpation Assessment to determine the necessity for Functional Dry Needling  Yes.     Patient provided written and verbal consent to Functional Dry Needling yes    Written Handout on response to FDN provided: yes    Rosana demonstrated good  understanding of the education provided.     Patient demonstrated appropriate  response to Functional Dry Needling. Yes.   estim with treated muscle groups- R supraspinatus fossa and R anterolateral shoulder protocol- 2 mhz         hot pack for 10 minutes to R shoulder.          Patient Education and Home Exercises     Home Exercises Provided and Patient Education Provided     Education provided:   - HEP, condition, activity    Written Home Exercises Provided: yes. Exercises were reviewed and Rosana was able to demonstrate them prior to the end of the session.  Rosana demonstrated good  understanding of the education provided. See EMR under Patient Instructions for exercises provided during therapy sessions    ASSESSMENT     No increase in R shoulder AROM noted today.   Pt. Cont. To have moderate to significant pain with reaching R arm overhead.   Pt. Has most tenderness R supraspinatus and some tenderness R lateral shoulder- pt. Agreeable with FDN.  Cont. Therex to improve rotator cuff strengthening and lengthening.  Pt. Educated to monitor symptoms for reduction in pain.        Rosana Is progressing well towards her goals.   Pt prognosis is Excellent.     Pt will continue to benefit from skilled outpatient physical therapy to address the deficits listed in the problem list box on initial evaluation, provide pt/family education and to maximize pt's level of independence in the home and community environment.     Pt's spiritual, cultural and educational needs considered and pt agreeable to plan of care and goals.     Anticipated barriers to physical therapy: none    Goals:     Short Term Goals (4 Weeks):   1. Pt to increase strength to at least 4+/5 of muscles tested to allow for improvement in functional activities and remediate dysfunctional movement patterns.  2. Pt to improve gross shoulder motion by by 25% to allow for improved functional mobility.      Long Term Goals (6 Weeks):   1. Pt to achieve <40% disability as measured by the FOTO to demonstrate decreased disability with  functional activities.   2. Pt to increase strength to at least 5/5 of muscles tested to allow for improvement in functional activities including.   3. Pt to improve gross shoulder motion to <10% limitations to allow for improved functional mobility with performing ADL's   4. Pt to report compliance with HEP 3x/week and demonstrate proper exercise technique to PT to show independence with self management of condition.  PLAN     Plan of care Certification: 9/21/2022 to 11/21/22.     Outpatient Physical Therapy 2 times weekly for 6 weeks to include the following interventions: Electrical Stimulation as needed, Manual Therapy, Moist Heat/ Ice, Neuromuscular Re-ed, Patient Education, Self Care, Therapeutic Activities, and Therapeutic Exercise.     Chasidy Kendrick, PT

## 2022-11-01 NOTE — TELEPHONE ENCOUNTER
Provider dc'd med due to access. Attempted to contact patient to enroll in [facility]. Need SSN for enroll patient. Unable to lvm.

## 2022-11-11 ENCOUNTER — LAB VISIT (OUTPATIENT)
Dept: LAB | Facility: HOSPITAL | Age: 72
End: 2022-11-11
Attending: FAMILY MEDICINE
Payer: MEDICARE

## 2022-11-11 DIAGNOSIS — Z79.4 TYPE 2 DIABETES MELLITUS WITH DIABETIC PERIPHERAL ANGIOPATHY WITHOUT GANGRENE, WITH LONG-TERM CURRENT USE OF INSULIN: ICD-10-CM

## 2022-11-11 DIAGNOSIS — E11.51 TYPE 2 DIABETES MELLITUS WITH DIABETIC PERIPHERAL ANGIOPATHY WITHOUT GANGRENE, WITH LONG-TERM CURRENT USE OF INSULIN: ICD-10-CM

## 2022-11-11 LAB
ESTIMATED AVG GLUCOSE: 137 MG/DL (ref 68–131)
HBA1C MFR BLD: 6.4 % (ref 4–5.6)

## 2022-11-11 PROCEDURE — 83036 HEMOGLOBIN GLYCOSYLATED A1C: CPT | Performed by: FAMILY MEDICINE

## 2022-11-11 PROCEDURE — 36415 COLL VENOUS BLD VENIPUNCTURE: CPT | Performed by: FAMILY MEDICINE

## 2022-11-14 ENCOUNTER — CLINICAL SUPPORT (OUTPATIENT)
Dept: REHABILITATION | Facility: HOSPITAL | Age: 72
End: 2022-11-14
Payer: MEDICARE

## 2022-11-14 DIAGNOSIS — M25.611 DECREASED ROM OF RIGHT SHOULDER: Primary | ICD-10-CM

## 2022-11-14 DIAGNOSIS — R29.898 SHOULDER WEAKNESS: ICD-10-CM

## 2022-11-14 PROCEDURE — 97110 THERAPEUTIC EXERCISES: CPT | Mod: PN

## 2022-11-14 PROCEDURE — 97140 MANUAL THERAPY 1/> REGIONS: CPT | Mod: PN

## 2022-11-14 NOTE — PROGRESS NOTES
[facility]   Physical Therapy Treatment Note     Name: Rosana Aguiar  Clinic Number: [id number]    Therapy Diagnosis:   Encounter Diagnoses   Name Primary?    Decreased ROM of right shoulder Yes    Shoulder weakness            Physician: Reginaldo Conde MD    Visit Date: 11/14/2022    Physician Orders: PT Eval and Treat  Medical Diagnosis from Referral: Calcific tendinitis of right shoulder [M75.31], Chronic right shoulder pain [M25.511, G89.29]  Evaluation Date: 9/21/2022  Authorization Period Expiration: 9/15/23  Plan of Care Expiration: 11/21/22  Progress Note Due: 10/21/22  Visit # / Visits authorized: 4/ 12  FOTO: 1/3    PTA Visit #: -/5     Time In: 1:00  Time Out: 1:45  Total Billable Time: 30 minutes    SUBJECTIVE     Pt reports: that her R shoulder feels a little better but still has soreness. Pt. Reports difficulty blow drying hair.       She was compliant with home exercise program.  Response to previous treatment: no adverse affects  Functional change: decreased R shoulder AROM    Pain: 6-7/10   Location: right shoulder      OBJECTIVE     Objective Measures updated at progress report unless specified.   R shoulder Flexion - 115 degrees  R shoulder abd- 90 deg.  Treatment     Rosana received the treatments listed below:      therapeutic exercises to develop strength and ROM for 15 minutes including:   UBE x 4 min. Forward/backward  SL ext. Rot with 1#- NT  Standing Shoulder shrugs  B ext. Rot with scap retraction with back at wall  R prone lower trapezius 2 x 10 with assist- discomfort  R prone middle trapezius 2 x 10 with assist- NT  Rows 2 x 10 with 10#  DNF 2 x 5- NT      Palpation Assessment to determine the necessity for Functional Dry Needling  Yes.     Patient provided written and verbal consent to Functional Dry Needling yes    Written Handout on response to FDN provided: yes    Rosana demonstrated good  understanding of the education provided.     Patient  demonstrated appropriate response to Functional Dry Needling. Yes.   estim with treated muscle groups- R supraspinatus fossa and R anterolateral shoulder protocol- 2 mhz         hot pack for 10 minutes to R shoulder.          Patient Education and Home Exercises     Home Exercises Provided and Patient Education Provided     Education provided:   - HEP, condition, activity    Written Home Exercises Provided: yes. Exercises were reviewed and Rosana was able to demonstrate them prior to the end of the session.  Rosana demonstrated good  understanding of the education provided. See EMR under Patient Instructions for exercises provided during therapy sessions    ASSESSMENT     Pt. Has increase in R shoulder active flexion today with less discomfort.  Pt. Limited with lying on mat secondary cooler temperature today and discomfort lying in prone.   Pt. Has most tenderness R supraspinatus and some tenderness R lateral shoulder- pt. Agreeable with FDN.  Cont. Therex to improve rotator cuff strengthening and lengthening.  Pt. Cont. To have discomfort with reaching overhead. Pt. Educated to monitor symptoms for reduction in pain.        Rosana Is progressing well towards her goals.   Pt prognosis is Excellent.     Pt will continue to benefit from skilled outpatient physical therapy to address the deficits listed in the problem list box on initial evaluation, provide pt/family education and to maximize pt's level of independence in the home and community environment.     Pt's spiritual, cultural and educational needs considered and pt agreeable to plan of care and goals.     Anticipated barriers to physical therapy: none    Goals:     Short Term Goals (4 Weeks):   1. Pt to increase strength to at least 4+/5 of muscles tested to allow for improvement in functional activities and remediate dysfunctional movement patterns.  2. Pt to improve gross shoulder motion by by 25% to allow for improved functional mobility.      Long  Term Goals (6 Weeks):   1. Pt to achieve <40% disability as measured by the FOTO to demonstrate decreased disability with functional activities.   2. Pt to increase strength to at least 5/5 of muscles tested to allow for improvement in functional activities including.   3. Pt to improve gross shoulder motion to <10% limitations to allow for improved functional mobility with performing ADL's   4. Pt to report compliance with HEP 3x/week and demonstrate proper exercise technique to PT to show independence with self management of condition.  PLAN     Plan of care Certification: 9/21/2022 to 11/21/22.     Outpatient Physical Therapy 2 times weekly for 6 weeks to include the following interventions: Electrical Stimulation as needed, Manual Therapy, Moist Heat/ Ice, Neuromuscular Re-ed, Patient Education, Self Care, Therapeutic Activities, and Therapeutic Exercise.     Chasidy Kendrick, PT

## 2022-11-16 ENCOUNTER — TELEPHONE (OUTPATIENT)
Dept: ADMINISTRATIVE | Facility: HOSPITAL | Age: 72
End: 2022-11-16
Payer: MEDICARE

## 2022-11-16 DIAGNOSIS — Z79.4 TYPE 2 DIABETES MELLITUS WITHOUT COMPLICATION, WITH LONG-TERM CURRENT USE OF INSULIN: ICD-10-CM

## 2022-11-16 DIAGNOSIS — E11.9 TYPE 2 DIABETES MELLITUS WITHOUT COMPLICATION, WITH LONG-TERM CURRENT USE OF INSULIN: ICD-10-CM

## 2022-11-16 RX ORDER — PEN NEEDLE, DIABETIC 30 GX3/16"
1 NEEDLE, DISPOSABLE MISCELLANEOUS
Qty: 100 EACH | Refills: 1 | Status: CANCELLED | OUTPATIENT
Start: 2022-11-16 | End: 2023-11-16

## 2022-11-17 DIAGNOSIS — Z79.4 TYPE 2 DIABETES MELLITUS WITHOUT COMPLICATION, WITH LONG-TERM CURRENT USE OF INSULIN: ICD-10-CM

## 2022-11-17 DIAGNOSIS — E11.9 TYPE 2 DIABETES MELLITUS WITHOUT COMPLICATION, WITH LONG-TERM CURRENT USE OF INSULIN: ICD-10-CM

## 2022-11-17 RX ORDER — PEN NEEDLE, DIABETIC 30 GX3/16"
1 NEEDLE, DISPOSABLE MISCELLANEOUS DAILY
Qty: 100 EACH | Refills: 1 | Status: SHIPPED | OUTPATIENT
Start: 2022-11-17 | End: 2023-06-09 | Stop reason: SDUPTHER

## 2022-11-17 NOTE — TELEPHONE ENCOUNTER
----- Message from Debbie Burrows sent at 11/17/2022  9:20 AM CST -----  Contact: Pt  Type:  Diabetic/Medical Supplies Request    Name of Caller: pt   What supplies are needed: needles   What is the brand of the supplies: tujeo insulin pin   Refill or New Rx: refill   If checking glucose, how many times do they check it?: once daily   Pharmacy/Company Name, Phone #, Location  [facility]   Requesting a Call Back?: no   Would the patient rather a call back or a response via [facility]? phone  Best Call Back Number: [phone]  Additional Information: needs to have them called in before Monday       [facility] [address] [facility] [place] [address]  Phone: [phone] Fax: [phone]

## 2022-11-17 NOTE — TELEPHONE ENCOUNTER
No new care gaps identified.  [facility] Embedded Care Gaps. Reference number: [id number]. 11/17/2022   10:06:20 AM CST

## 2022-11-18 ENCOUNTER — OFFICE VISIT (OUTPATIENT)
Dept: PULMONOLOGY | Facility: CLINIC | Age: 72
End: 2022-11-18
Payer: MEDICARE

## 2022-11-18 VITALS
WEIGHT: 165.13 LBS | OXYGEN SATURATION: 97 % | HEIGHT: 64 IN | RESPIRATION RATE: 17 BRPM | SYSTOLIC BLOOD PRESSURE: 140 MMHG | DIASTOLIC BLOOD PRESSURE: 82 MMHG | HEART RATE: 73 BPM | BODY MASS INDEX: 28.19 KG/M2

## 2022-11-18 DIAGNOSIS — F17.210 SMOKING GREATER THAN 30 PACK YEARS: ICD-10-CM

## 2022-11-18 DIAGNOSIS — R93.89 ABNORMAL CHEST X-RAY: ICD-10-CM

## 2022-11-18 DIAGNOSIS — J44.9 COPD SUGGESTED BY INITIAL EVALUATION: Primary | ICD-10-CM

## 2022-11-18 DIAGNOSIS — R06.02 SHORTNESS OF BREATH: ICD-10-CM

## 2022-11-18 PROCEDURE — 3288F PR FALLS RISK ASSESSMENT DOCUMENTED: ICD-10-PCS | Mod: CPTII,S$GLB,, | Performed by: INTERNAL MEDICINE

## 2022-11-18 PROCEDURE — 3008F PR BODY MASS INDEX (BMI) DOCUMENTED: ICD-10-PCS | Mod: CPTII,S$GLB,, | Performed by: INTERNAL MEDICINE

## 2022-11-18 PROCEDURE — 99204 PR OFFICE/OUTPT VISIT, NEW, LEVL IV, 45-59 MIN: ICD-10-PCS | Mod: S$GLB,,, | Performed by: INTERNAL MEDICINE

## 2022-11-18 PROCEDURE — 99499 RISK ADDL DX/OHS AUDIT: ICD-10-PCS | Mod: HCNC,S$GLB,, | Performed by: INTERNAL MEDICINE

## 2022-11-18 PROCEDURE — 1159F MED LIST DOCD IN RCRD: CPT | Mod: CPTII,S$GLB,, | Performed by: INTERNAL MEDICINE

## 2022-11-18 PROCEDURE — 99204 OFFICE O/P NEW MOD 45 MIN: CPT | Mod: S$GLB,,, | Performed by: INTERNAL MEDICINE

## 2022-11-18 PROCEDURE — 3066F PR DOCUMENTATION OF TREATMENT FOR NEPHROPATHY: ICD-10-PCS | Mod: CPTII,S$GLB,, | Performed by: INTERNAL MEDICINE

## 2022-11-18 PROCEDURE — 1101F PR PT FALLS ASSESS DOC 0-1 FALLS W/OUT INJ PAST YR: ICD-10-PCS | Mod: CPTII,S$GLB,, | Performed by: INTERNAL MEDICINE

## 2022-11-18 PROCEDURE — 1101F PT FALLS ASSESS-DOCD LE1/YR: CPT | Mod: CPTII,S$GLB,, | Performed by: INTERNAL MEDICINE

## 2022-11-18 PROCEDURE — 3066F NEPHROPATHY DOC TX: CPT | Mod: CPTII,S$GLB,, | Performed by: INTERNAL MEDICINE

## 2022-11-18 PROCEDURE — 99999 PR PBB SHADOW E&M-EST. PATIENT-LVL IV: CPT | Mod: PBBFAC,,, | Performed by: INTERNAL MEDICINE

## 2022-11-18 PROCEDURE — 1160F RVW MEDS BY RX/DR IN RCRD: CPT | Mod: CPTII,S$GLB,, | Performed by: INTERNAL MEDICINE

## 2022-11-18 PROCEDURE — 1160F PR REVIEW ALL MEDS BY PRESCRIBER/CLIN PHARMACIST DOCUMENTED: ICD-10-PCS | Mod: CPTII,S$GLB,, | Performed by: INTERNAL MEDICINE

## 2022-11-18 PROCEDURE — 3044F PR MOST RECENT HEMOGLOBIN A1C LEVEL <7.0%: ICD-10-PCS | Mod: CPTII,S$GLB,, | Performed by: INTERNAL MEDICINE

## 2022-11-18 PROCEDURE — 3061F NEG MICROALBUMINURIA REV: CPT | Mod: CPTII,S$GLB,, | Performed by: INTERNAL MEDICINE

## 2022-11-18 PROCEDURE — 1159F PR MEDICATION LIST DOCUMENTED IN MEDICAL RECORD: ICD-10-PCS | Mod: CPTII,S$GLB,, | Performed by: INTERNAL MEDICINE

## 2022-11-18 PROCEDURE — 3061F PR NEG MICROALBUMINURIA RESULT DOCUMENTED/REVIEW: ICD-10-PCS | Mod: CPTII,S$GLB,, | Performed by: INTERNAL MEDICINE

## 2022-11-18 PROCEDURE — 3288F FALL RISK ASSESSMENT DOCD: CPT | Mod: CPTII,S$GLB,, | Performed by: INTERNAL MEDICINE

## 2022-11-18 PROCEDURE — 3077F SYST BP >= 140 MM HG: CPT | Mod: CPTII,S$GLB,, | Performed by: INTERNAL MEDICINE

## 2022-11-18 PROCEDURE — 3079F DIAST BP 80-89 MM HG: CPT | Mod: CPTII,S$GLB,, | Performed by: INTERNAL MEDICINE

## 2022-11-18 PROCEDURE — 99999 PR PBB SHADOW E&M-EST. PATIENT-LVL IV: ICD-10-PCS | Mod: PBBFAC,,, | Performed by: INTERNAL MEDICINE

## 2022-11-18 PROCEDURE — 3008F BODY MASS INDEX DOCD: CPT | Mod: CPTII,S$GLB,, | Performed by: INTERNAL MEDICINE

## 2022-11-18 PROCEDURE — 3077F PR MOST RECENT SYSTOLIC BLOOD PRESSURE >= 140 MM HG: ICD-10-PCS | Mod: CPTII,S$GLB,, | Performed by: INTERNAL MEDICINE

## 2022-11-18 PROCEDURE — 99499 UNLISTED E&M SERVICE: CPT | Mod: HCNC,S$GLB,, | Performed by: INTERNAL MEDICINE

## 2022-11-18 PROCEDURE — 3079F PR MOST RECENT DIASTOLIC BLOOD PRESSURE 80-89 MM HG: ICD-10-PCS | Mod: CPTII,S$GLB,, | Performed by: INTERNAL MEDICINE

## 2022-11-18 PROCEDURE — 3044F HG A1C LEVEL LT 7.0%: CPT | Mod: CPTII,S$GLB,, | Performed by: INTERNAL MEDICINE

## 2022-11-18 RX ORDER — ALBUTEROL SULFATE 0.83 MG/ML
2.5 SOLUTION RESPIRATORY (INHALATION) EVERY 6 HOURS PRN
Qty: 360 ML | Refills: 5 | Status: SHIPPED | OUTPATIENT
Start: 2022-11-18 | End: 2023-11-18

## 2022-11-18 NOTE — PROGRESS NOTES
Initial Outpatient Pulmonary Evaluation       SUBJECTIVE:     Chief Complaint   Patient presents with    Shortness of Breath       History of Present Illness:    Patient is a 72 y.o. female presenting for evaluation of abnormal chest x-ray.      Chest x-ray showed small left lung nodule/calcified granuloma.      More than 30 pack-year smoking history.      Complains of coughing and shortness on exertion.  Not able to use albuterol inhaler properly.  Chronic cough with sputum production    Reports snoring but not agreeable to proceed with sleep study.      Peripheral vascular disease followed by cardiology.    Review of Systems   Constitutional:  Positive for fatigue. Negative for fever and chills.   HENT:  Negative for nosebleeds.    Eyes:  Negative for redness.   Respiratory:  Positive for snoring, cough, shortness of breath, dyspnea on extertion and use of rescue inhaler. Negative for choking.    Genitourinary:  Negative for hematuria.   Endocrine:  Negative for cold intolerance.    Musculoskeletal:  Positive for arthralgias and back pain.   Skin:  Negative for rash.   Gastrointestinal:  Negative for vomiting.   Neurological:  Negative for syncope.   Hematological:  Negative for adenopathy.   Psychiatric/Behavioral:  Positive for sleep disturbance. Negative for confusion.      Review of patient's allergies indicates:   Allergen Reactions    Neomycin-bacitracin-polymyxin Itching       Current Outpatient Medications   Medication Sig Dispense Refill    amLODIPine (NORVASC) 10 MG tablet Take 1 tablet (10 mg total) by mouth once daily. 90 tablet 3    aspirin (ECOTRIN) 81 MG EC tablet Take 1 tablet (81 mg total) by mouth once daily. 90 tablet 3    carvediloL (COREG) 6.25 MG tablet Take 1 tablet (6.25 mg total) by mouth 2 (two) times daily with meals. 60 tablet 11    cilostazoL (PLETAL) 50 MG Tab Take 1 tablet (50 mg total) by mouth 2 (two) times daily. 60 tablet 11    insulin  "glargine U-300 conc (TOUJEO MAX U-300 SOLOSTAR) 300 unit/mL (3 mL) insulin pen Inject 60 Units into the skin once daily. 3 pen 11    losartan-hydrochlorothiazide 100-25 mg (HYZAAR) 100-25 mg per tablet Take 1 tablet by mouth once daily. 90 tablet 3    pen needle, diabetic (BD CAROL 2ND GEN PEN NEEDLE) 32 gauge x 5/32" Ndle 1 each by Misc.(Non-Drug; Combo Route) route once daily. 100 each 1    rosuvastatin (CRESTOR) 20 MG tablet Take 1 tablet (20 mg total) by mouth once daily. 30 tablet 6    albuterol (PROVENTIL) 2.5 mg /3 mL (0.083 %) nebulizer solution Take 3 mLs (2.5 mg total) by nebulization every 6 (six) hours as needed for Wheezing. Rescue 360 mL 5    ALPRAZolam (XANAX) 0.25 MG tablet Take 1 tablet (0.25 mg total) by mouth 2 (two) times daily as needed for Anxiety. 30 tablet 0     No current facility-administered medications for this visit.       Past Medical History:   Diagnosis Date    Atherosclerotic PVD with intermittent claudication 10/19/2022    Diabetes mellitus, type 2 20 years    BS didn't check 07/26/2022    High cholesterol     Hypertension     Macular degeneration      Past Surgical History:   Procedure Laterality Date    APPENDECTOMY      CATARACT EXTRACTION Bilateral     cyst removal from breast (Left)      HYSTERECTOMY      TONSILLECTOMY       Family History   Problem Relation Age of Onset    Diabetes Mother     Macular degeneration Mother     Alcohol abuse Father     Diabetes Paternal Uncle      Social History     Tobacco Use    Smoking status: Every Day     Types: Cigarettes    Smokeless tobacco: Never   Substance Use Topics    Alcohol use: Not Currently    Drug use: Never          OBJECTIVE:     Vital Signs (Most Recent)  Vital Signs  Pulse: 73  Resp: 17  SpO2: 97 %  BP: (!) 140/82  Height and Weight  Height: 5' 4" (162.6 cm)  Weight: 74.9 kg (165 lb 2 oz)  BSA (Calculated - sq m): 1.84 sq meters  BMI (Calculated): 28.3  Weight in (lb) to have BMI = 25: 145.3]  Wt Readings from Last 2 " Encounters:   11/18/22 74.9 kg (165 lb 2 oz)   10/19/22 74.4 kg (164 lb 0.4 oz)         Physical Exam:  Physical Exam   Constitutional: She is oriented to person, place, and time. She appears well-developed and well-nourished. No distress.   HENT:   Head: Normocephalic.   Cardiovascular: Normal rate and regular rhythm.   Pulmonary/Chest: Normal expansion and effort normal. No stridor. No respiratory distress. She has decreased breath sounds. She exhibits no tenderness.   Abdominal: She exhibits no distension.   Musculoskeletal:         General: No tenderness.      Cervical back: Neck supple.   Lymphadenopathy:     She has no cervical adenopathy.   Neurological: She is alert and oriented to person, place, and time. Gait normal.   Skin: Skin is warm. No cyanosis. Nails show no clubbing.   Psychiatric: She has a normal mood and affect. Her behavior is normal. Judgment and thought content normal.   Nursing note and vitals reviewed.    Laboratory  Lab Results   Component Value Date    WBC 7.26 07/11/2022    RBC 4.17 07/11/2022    HGB 13.1 07/11/2022    HCT 41.3 07/11/2022    MCV 99 (H) 07/11/2022    MCH 31.4 (H) 07/11/2022    MCHC 31.7 (L) 07/11/2022    RDW 13.2 07/11/2022     07/11/2022    MPV 9.7 07/11/2022    GRAN 4.2 07/11/2022    GRAN 57.3 07/11/2022    LYMPH 1.8 07/11/2022    LYMPH 24.9 07/11/2022    MONO 0.5 07/11/2022    MONO 7.4 07/11/2022    EOS 0.7 (H) 07/11/2022    BASO 0.08 07/11/2022    EOSINOPHIL 9.0 (H) 07/11/2022    BASOPHIL 1.1 07/11/2022       BMP  Lab Results   Component Value Date     07/11/2022    K 3.8 07/11/2022    CL 98 07/11/2022    CO2 25 07/11/2022    BUN 12 07/11/2022    CREATININE 0.9 07/11/2022    CALCIUM 9.7 07/11/2022    ANIONGAP 13 07/11/2022    ESTGFRAFRICA >60.0 07/11/2022    EGFRNONAA >60.0 07/11/2022    AST 17 07/11/2022    ALT 16 07/11/2022    PROT 7.0 07/11/2022       No results found for: BNP    Lab Results   Component Value Date    TSH 1.285 01/06/2022       Lab  Results   Component Value Date    SEDRATE 33 (H) 07/11/2022       Lab Results   Component Value Date    CRP 1.4 07/11/2022       No results found for: IGE    No results found for: ASPERGILLUS  No results found for: AFUMIGATUSCL     No results found for: ACE    Diagnostic Results:  I have personally reviewed today the following studies :  CXR 10/2022        FINDINGS:  The lungs are clear and free of infiltrate.  No pleural effusion or pneumothorax. The heart is mildly enlarged.  There is tortuosity of the descending thoracic aorta.  Possible calcified granuloma seen projecting over the left lower lung zone but since there are no priors CT of the chest is recommended for further evaluation.  This density measures approximately 4 mm in size.        ASSESSMENT/PLAN:     COPD suggested by initial evaluation  -     Complete PFT without bronchodilator; Future  -     Stress test, pulmonary; Future  -     NEBULIZER KIT (SUPPLIES) FOR HOME USE  -     NEBULIZER FOR HOME USE  -     albuterol (PROVENTIL) 2.5 mg /3 mL (0.083 %) nebulizer solution; Take 3 mLs (2.5 mg total) by nebulization every 6 (six) hours as needed for Wheezing. Rescue  Dispense: 360 mL; Refill: 5    Shortness of breath  -     Ambulatory referral/consult to Pulmonology  -     Complete PFT without bronchodilator; Future  -     Stress test, pulmonary; Future    Smoking greater than 30 pack years  -     CT Chest Lung Screening Low Dose; Future; Expected date: 11/18/2022    Abnormal chest x-ray  -     CT Chest Lung Screening Low Dose; Future; Expected date: 11/18/2022  Check PFT.  Check 6 minute walk test.      Albuterol by nebulizer PRN.      Counseled patient on smoking cessation.  Not agreeable to be referred to a smoking cessation clinic.      Shared medical decision with the patient regarding low-dose screening CT chest agreeable to proceed with low-dose screening CT chest to detect early cancer and to assess left lung nodule.    Further recommendation to  follow up of workup    Follow-up with cardiology    Follow up in about 3 months (around 2/18/2023).    This note was prepared using voice recognition system and is likely to have sound alike errors that may have been overlooked even after proof reading.  Please call me with any questions    Discussed diagnosis, its evaluation, treatment and usual course. All questions answered.    Thank you for the courtesy of participating in the care of this patient    Jonatan Santana MD

## 2022-11-28 ENCOUNTER — HOSPITAL ENCOUNTER (OUTPATIENT)
Dept: CARDIOLOGY | Facility: HOSPITAL | Age: 72
Discharge: HOME | End: 2022-11-28
Attending: INTERNAL MEDICINE
Payer: MEDICARE

## 2022-11-28 ENCOUNTER — HOSPITAL ENCOUNTER (OUTPATIENT)
Dept: RADIOLOGY | Facility: HOSPITAL | Age: 72
Discharge: HOME | End: 2022-11-28
Attending: INTERNAL MEDICINE
Payer: MEDICARE

## 2022-11-28 VITALS — BODY MASS INDEX: 28.17 KG/M2 | WEIGHT: 165 LBS | HEIGHT: 64 IN

## 2022-11-28 DIAGNOSIS — Z72.0 TOBACCO USE: ICD-10-CM

## 2022-11-28 DIAGNOSIS — R06.83 SNORING: ICD-10-CM

## 2022-11-28 DIAGNOSIS — R06.02 SHORTNESS OF BREATH: ICD-10-CM

## 2022-11-28 LAB
AORTIC ROOT ANNULUS: 2.85 CM
AV INDEX (PROSTH): 0.73
AV MEAN GRADIENT: 8 MMHG
AV PEAK GRADIENT: 15 MMHG
AV REGURGITATION PRESSURE HALF TIME: 559.96 MS
AV VALVE AREA: 2.29 CM2
AV VELOCITY RATIO: 0.73
BSA FOR ECHO PROCEDURE: 1.84 M2
CV ECHO LV RWT: 0.46 CM
CV STRESS BASE HR: 74 BPM
DIASTOLIC BLOOD PRESSURE: 48 MMHG
DOP CALC AO PEAK VEL: 1.91 M/S
DOP CALC AO VTI: 46 CM
DOP CALC LVOT AREA: 3.1 CM2
DOP CALC LVOT DIAMETER: 2 CM
DOP CALC LVOT PEAK VEL: 1.39 M/S
DOP CALC LVOT STROKE VOLUME: 105.19 CM3
DOP CALC RVOT PEAK VEL: 0.9 M/S
DOP CALC RVOT VTI: 19.2 CM
DOP CALCLVOT PEAK VEL VTI: 33.5 CM
E WAVE DECELERATION TIME: 235.72 MSEC
E/A RATIO: 0.74
E/E' RATIO: 18.36 M/S
ECHO LV POSTERIOR WALL: 1.03 CM (ref 0.6–1.1)
EJECTION FRACTION: 60 %
FRACTIONAL SHORTENING: 34 % (ref 28–44)
INTERVENTRICULAR SEPTUM: 1.33 CM (ref 0.6–1.1)
IVRT: 97.05 MSEC
LA MAJOR: 5.42 CM
LA MINOR: 4.94 CM
LA WIDTH: 3.3 CM
LEFT ATRIUM SIZE: 3.54 CM
LEFT ATRIUM VOLUME INDEX MOD: 26.7 ML/M2
LEFT ATRIUM VOLUME INDEX: 28.5 ML/M2
LEFT ATRIUM VOLUME MOD: 48.06 CM3
LEFT ATRIUM VOLUME: 51.33 CM3
LEFT INTERNAL DIMENSION IN SYSTOLE: 2.94 CM (ref 2.1–4)
LEFT VENTRICLE DIASTOLIC VOLUME INDEX: 50.8 ML/M2
LEFT VENTRICLE DIASTOLIC VOLUME: 91.44 ML
LEFT VENTRICLE MASS INDEX: 107 G/M2
LEFT VENTRICLE SYSTOLIC VOLUME INDEX: 18.5 ML/M2
LEFT VENTRICLE SYSTOLIC VOLUME: 33.28 ML
LEFT VENTRICULAR INTERNAL DIMENSION IN DIASTOLE: 4.48 CM (ref 3.5–6)
LEFT VENTRICULAR MASS: 192.04 G
LV LATERAL E/E' RATIO: 20.2 M/S
LV SEPTAL E/E' RATIO: 16.83 M/S
LVOT MG: 4.23 MMHG
LVOT MV: 0.96 CM/S
MV PEAK A VEL: 1.37 M/S
MV PEAK E VEL: 1.01 M/S
MV STENOSIS PRESSURE HALF TIME: 68.36 MS
MV VALVE AREA P 1/2 METHOD: 3.22 CM2
NUC REST EJECTION FRACTION: 73
NUC STRESS EJECTION FRACTION: 71 %
OHS CV CPX 85 PERCENT MAX PREDICTED HEART RATE MALE: 120
OHS CV CPX MAX PREDICTED HEART RATE: 141
OHS CV CPX PATIENT IS FEMALE: 1
OHS CV CPX PATIENT IS MALE: 0
OHS CV CPX PEAK DIASTOLIC BLOOD PRESSURE: 43 MMHG
OHS CV CPX PEAK HEAR RATE: 91 BPM
OHS CV CPX PEAK RATE PRESSURE PRODUCT: NORMAL
OHS CV CPX PEAK SYSTOLIC BLOOD PRESSURE: 153 MMHG
OHS CV CPX PERCENT MAX PREDICTED HEART RATE ACHIEVED: 65
OHS CV CPX RATE PRESSURE PRODUCT PRESENTING: NORMAL
PISA AR MAX VEL: 4.1 M/S
PISA TR MAX VEL: 2.54 M/S
PULM VEIN S/D RATIO: 1.57
PV MEAN GRADIENT: 1.49 MMHG
PV MV: 0.83 M/S
PV PEAK D VEL: 0.47 M/S
PV PEAK S VEL: 0.74 M/S
PV PEAK VELOCITY: 1.1 CM/S
RA MAJOR: 4.7 CM
RA PRESSURE: 3 MMHG
RA WIDTH: 3.14 CM
RIGHT VENTRICULAR END-DIASTOLIC DIMENSION: 2.41 CM
SINUS: 2.37 CM
STJ: 2.26 CM
STRESS ECHO POST EXERCISE DUR SEC: 58 SECONDS
SYSTOLIC BLOOD PRESSURE: 149 MMHG
TDI LATERAL: 0.05 M/S
TDI SEPTAL: 0.06 M/S
TDI: 0.06 M/S
TR MAX PG: 26 MMHG
TRICUSPID ANNULAR PLANE SYSTOLIC EXCURSION: 2.24 CM
TV REST PULMONARY ARTERY PRESSURE: 29 MMHG

## 2022-11-28 PROCEDURE — 78452 HT MUSCLE IMAGE SPECT MULT: CPT | Mod: 26,,, | Performed by: INTERNAL MEDICINE

## 2022-11-28 PROCEDURE — 93306 TTE W/DOPPLER COMPLETE: CPT

## 2022-11-28 PROCEDURE — 93016 CV STRESS TEST SUPVJ ONLY: CPT | Mod: ,,, | Performed by: INTERNAL MEDICINE

## 2022-11-28 PROCEDURE — 93018 CV STRESS TEST I&R ONLY: CPT | Mod: ,,, | Performed by: INTERNAL MEDICINE

## 2022-11-28 PROCEDURE — 93016 NUCLEAR STRESS - CARDIOLOGY INTERPRETED (CUPID ONLY): ICD-10-PCS | Mod: ,,, | Performed by: INTERNAL MEDICINE

## 2022-11-28 PROCEDURE — 93017 CV STRESS TEST TRACING ONLY: CPT

## 2022-11-28 PROCEDURE — 93306 TTE W/DOPPLER COMPLETE: CPT | Mod: 26,,, | Performed by: INTERNAL MEDICINE

## 2022-11-28 PROCEDURE — 93018 NUCLEAR STRESS - CARDIOLOGY INTERPRETED (CUPID ONLY): ICD-10-PCS | Mod: ,,, | Performed by: INTERNAL MEDICINE

## 2022-11-28 PROCEDURE — 63600175 PHARM REV CODE 636 W HCPCS: Performed by: INTERNAL MEDICINE

## 2022-11-28 PROCEDURE — 93306 ECHO (CUPID ONLY): ICD-10-PCS | Mod: 26,,, | Performed by: INTERNAL MEDICINE

## 2022-11-28 PROCEDURE — 78452 HT MUSCLE IMAGE SPECT MULT: CPT

## 2022-11-28 PROCEDURE — 78452 NUCLEAR STRESS - CARDIOLOGY INTERPRETED (CUPID ONLY): ICD-10-PCS | Mod: 26,,, | Performed by: INTERNAL MEDICINE

## 2022-11-28 RX ORDER — REGADENOSON 0.08 MG/ML
0.4 INJECTION, SOLUTION INTRAVENOUS ONCE
Status: COMPLETED | OUTPATIENT
Start: 2022-11-28 | End: 2022-11-28

## 2022-11-28 RX ADMIN — REGADENOSON 0.4 MG: 0.08 INJECTION, SOLUTION INTRAVENOUS at 12:11

## 2022-11-29 ENCOUNTER — CLINICAL SUPPORT (OUTPATIENT)
Dept: REHABILITATION | Facility: HOSPITAL | Age: 72
End: 2022-11-29
Payer: MEDICARE

## 2022-11-29 ENCOUNTER — TELEPHONE (OUTPATIENT)
Dept: CARDIOLOGY | Facility: CLINIC | Age: 72
End: 2022-11-29
Payer: MEDICARE

## 2022-11-29 DIAGNOSIS — M25.611 DECREASED ROM OF RIGHT SHOULDER: Primary | ICD-10-CM

## 2022-11-29 DIAGNOSIS — R29.898 SHOULDER WEAKNESS: ICD-10-CM

## 2022-11-29 PROCEDURE — 97140 MANUAL THERAPY 1/> REGIONS: CPT | Mod: PN

## 2022-11-29 NOTE — TELEPHONE ENCOUNTER
Patient was notified of results. All questions were answered. Pt verbalized understanding. Pt will call back with any other questions or concerns.      ----- Message from Jonathan Coburn MD sent at 11/28/2022  6:55 PM CST -----  Stress test looks good  Hert function is normal by echo  Has moderate leak in heArt valve need to keep blood pressure controlled low salt AND  take her meds.

## 2022-11-29 NOTE — PROGRESS NOTES
[facility]   Physical Therapy Assistant Treatment Note     Name: Rosana Aguiar  Clinic Number: [id number]    Therapy Diagnosis:   No diagnosis found.      Physician: Reginaldo Conde MD    Visit Date: 11/29/2022    Physician Orders: PT Eval and Treat  Medical Diagnosis from Referral: Calcific tendinitis of right shoulder [M75.31], Chronic right shoulder pain [M25.511, G89.29]  Evaluation Date: 9/21/2022  Authorization Period Expiration: 9/15/23  Plan of Care Expiration: 11/21/22  Progress Note Due: 10/21/22  Visit # / Visits authorized: 4/ 12  FOTO: 1/3    PTA Visit #: -/5     Time In: 1:00  Time Out: 1:45  Total Billable Time: 30 minutes    SUBJECTIVE     Pt reports: that her R shoulder feels a little better but still has soreness. Pt. Reports difficulty blow drying hair.       She was compliant with home exercise program.  Response to previous treatment: no adverse affects  Functional change: decreased R shoulder AROM    Pain: 6-7/10   Location: right shoulder      OBJECTIVE     Objective Measures updated at progress report unless specified.   R shoulder Flexion - 115 degrees  R shoulder abd- 90 deg.  Treatment     Rosana received the treatments listed below:      therapeutic exercises to develop strength and ROM for 15 minutes including:   UBE x 4 min. Forward/backward  SL ext. Rot with 1#- NT  Standing Shoulder shrugs  B ext. Rot with scap retraction with back at wall  R prone lower trapezius 2 x 10 with assist- discomfort  R prone middle trapezius 2 x 10 with assist- NT  Rows 2 x 10 with 10#  DNF 2 x 5- NT      Palpation Assessment to determine the necessity for Functional Dry Needling  Yes.     Patient provided written and verbal consent to Functional Dry Needling yes    Written Handout on response to FDN provided: yes    Rosana demonstrated good  understanding of the education provided.     Patient demonstrated appropriate response to Functional Dry Needling. Yes.   estim  with treated muscle groups- R supraspinatus fossa and R anterolateral shoulder protocol- 2 mhz         hot pack for 10 minutes to R shoulder.          Patient Education and Home Exercises     Home Exercises Provided and Patient Education Provided     Education provided:   - HEP, condition, activity    Written Home Exercises Provided: yes. Exercises were reviewed and Rosana was able to demonstrate them prior to the end of the session.  Rosana demonstrated good  understanding of the education provided. See EMR under Patient Instructions for exercises provided during therapy sessions    ASSESSMENT     Pt. Has increase in R shoulder active flexion today with less discomfort.  Pt. Limited with lying on mat secondary cooler temperature today and discomfort lying in prone.   Pt. Has most tenderness R supraspinatus and some tenderness R lateral shoulder- pt. Agreeable with FDN.  Cont. Therex to improve rotator cuff strengthening and lengthening.  Pt. Cont. To have discomfort with reaching overhead. Pt. Educated to monitor symptoms for reduction in pain.        Rosana Is progressing well towards her goals.   Pt prognosis is Excellent.     Pt will continue to benefit from skilled outpatient physical therapy to address the deficits listed in the problem list box on initial evaluation, provide pt/family education and to maximize pt's level of independence in the home and community environment.     Pt's spiritual, cultural and educational needs considered and pt agreeable to plan of care and goals.     Anticipated barriers to physical therapy: none    Goals:     Short Term Goals (4 Weeks):   1. Pt to increase strength to at least 4+/5 of muscles tested to allow for improvement in functional activities and remediate dysfunctional movement patterns.  2. Pt to improve gross shoulder motion by by 25% to allow for improved functional mobility.      Long Term Goals (6 Weeks):   1. Pt to achieve <40% disability as measured by the  FOTO to demonstrate decreased disability with functional activities.   2. Pt to increase strength to at least 5/5 of muscles tested to allow for improvement in functional activities including.   3. Pt to improve gross shoulder motion to <10% limitations to allow for improved functional mobility with performing ADL's   4. Pt to report compliance with HEP 3x/week and demonstrate proper exercise technique to PT to show independence with self management of condition.  PLAN     Plan of care Certification: 9/21/2022 to 11/21/22.     Outpatient Physical Therapy 2 times weekly for 6 weeks to include the following interventions: Electrical Stimulation as needed, Manual Therapy, Moist Heat/ Ice, Neuromuscular Re-ed, Patient Education, Self Care, Therapeutic Activities, and Therapeutic Exercise.     Ariana Russo, PTA

## 2022-11-29 NOTE — PROGRESS NOTES
[facility]   Physical Therapy Treatment Note     Name: Rsoana Aguiar  Clinic Number: [id number]    Therapy Diagnosis:   Encounter Diagnoses   Name Primary?    Decreased ROM of right shoulder Yes    Shoulder weakness        Physician: Reginaldo Conde MD    Visit Date: 11/29/2022    Physician Orders: PT Eval and Treat  Medical Diagnosis from Referral: Calcific tendinitis of right shoulder [M75.31], Chronic right shoulder pain [M25.511, G89.29]  Evaluation Date: 9/21/2022  Authorization Period Expiration: 9/15/23  Plan of Care Expiration: 2/1/23  Progress Note Due: 12/29/22  Visit # / Visits authorized: 4/ 12  FOTO: 1/3    PTA Visit #: -/5     Time In: 1:00 PM  Time Out: 1:20 PM  Total Billable Time: 20 minutes    SUBJECTIVE     Pt reports: she has not been very consistent with her exercises at home and has continued to have pain with her shoulder. She reports she feels like physical therapy has been helping but that she still has a good amount of pain. She also reports she has had other concerns recently with her health that have taken more of her attention than her shoulder.    She was compliant with home exercise program.  Response to previous treatment: no adverse affects  Functional change: decreased R shoulder AROM    Pain: 6-7/10   Location: right shoulder      OBJECTIVE     Active Range of Motion: Measured in degrees     Elbow range of motion WNL     Shoulder Left Right   Flexion 165* 155*   Abduction 165* 155*   ER at 0 50* 45*   IR NT NT   *= pain at end range of motion               Upper Extremity Strength  Elbow Left Right   Biceps 4+/5 4+/5   Triceps 4+/5 4+/5      Shoulder Left Right   Shoulder flexion: 4+/5 4/5*   Shoulder Abduction: 4+/5 4/5*   Shoulder ER 4+/5 4+/5*   Shoulder IR 4+/5 4+/5   *= pain with movement           Special Tests:      Impingement Left Right   Neer Positive Positive   Empty Can Test Negative Positive for pain, not weakness   Sloan Stinson  Positive Positive         Palpation Elbow:  No elbow TTP     Palpation Shoulder:  TTP at R upper trap, TTP at R greater tuberosity       Treatment     Rosana received the treatments listed below:      therapeutic exercises to develop strength and ROM for 5 minutes including:   - UBE - 5 minutes         Rosana received the following manual therapy techniques:  for 10 minutes, including:              - Shoulder PROM   - posterior and inferior joint mobilizations - grades 2,3,4          Patient Education and Home Exercises     Home Exercises Provided and Patient Education Provided     Education provided:   - HEP, condition, activity    Written Home Exercises Provided: yes. Exercises were reviewed and Rosana was able to demonstrate them prior to the end of the session.  Rosana demonstrated good  understanding of the education provided. See EMR under Patient Instructions for exercises provided during therapy sessions    ASSESSMENT     Patient with minor strength improvement compared to her initial evaluation. Her progress up to this point has been limited. We discussed improving therapy and HEP compliance to improve her outcomes. She would like to continue physical therapy as she reports it has helped some. She will benefit from continued physical therapy care focused on strengthening and restoration of range of motion with improved HEP complaince.      Rosana Is progressing well towards her goals.   Pt prognosis is Excellent.     Pt will continue to benefit from skilled outpatient physical therapy to address the deficits listed in the problem list box on initial evaluation, provide pt/family education and to maximize pt's level of independence in the home and community environment.     Pt's spiritual, cultural and educational needs considered and pt agreeable to plan of care and goals.     Anticipated barriers to physical therapy: none    Goals:     Short Term Goals (4 Weeks):   1. Pt to increase strength to at  least 4+/5 of muscles tested to allow for improvement in functional activities and remediate dysfunctional movement patterns.  2. Pt to improve gross shoulder motion by by 25% to allow for improved functional mobility.      Long Term Goals (6 Weeks):   1. Pt to achieve <40% disability as measured by the FOTO to demonstrate decreased disability with functional activities.   2. Pt to increase strength to at least 5/5 of muscles tested to allow for improvement in functional activities including.   3. Pt to improve gross shoulder motion to <10% limitations to allow for improved functional mobility with performing ADL's   4. Pt to report compliance with HEP 3x/week and demonstrate proper exercise technique to PT to show independence with self management of condition.  PLAN     Plan of care Certification: 9/21/2022 to 2/1/23.     Outpatient Physical Therapy 1 times weekly for 6 weeks to include the following interventions: Electrical Stimulation as needed, Manual Therapy, Moist Heat/ Ice, Neuromuscular Re-ed, Patient Education, Self Care, Therapeutic Activities, and Therapeutic Exercise.     Ascencion Ordonez, PT

## 2022-11-30 ENCOUNTER — HOSPITAL ENCOUNTER (OUTPATIENT)
Dept: RADIOLOGY | Facility: HOSPITAL | Age: 72
Discharge: HOME | End: 2022-11-30
Attending: INTERNAL MEDICINE
Payer: MEDICARE

## 2022-11-30 DIAGNOSIS — F17.210 SMOKING GREATER THAN 30 PACK YEARS: ICD-10-CM

## 2022-11-30 DIAGNOSIS — R93.89 ABNORMAL CHEST X-RAY: ICD-10-CM

## 2022-11-30 PROCEDURE — 71271 CT THORAX LUNG CANCER SCR C-: CPT | Mod: 26,,, | Performed by: RADIOLOGY

## 2022-11-30 PROCEDURE — 71271 CT THORAX LUNG CANCER SCR C-: CPT | Mod: TC

## 2022-11-30 PROCEDURE — 71271 CT CHEST LUNG SCREENING LOW DOSE: ICD-10-PCS | Mod: 26,,, | Performed by: RADIOLOGY

## 2022-12-01 ENCOUNTER — CLINICAL SUPPORT (OUTPATIENT)
Dept: PULMONOLOGY | Facility: CLINIC | Age: 72
End: 2022-12-01
Payer: MEDICARE

## 2022-12-01 VITALS — BODY MASS INDEX: 27.96 KG/M2 | HEIGHT: 64 IN | WEIGHT: 163.81 LBS

## 2022-12-01 DIAGNOSIS — J44.9 COPD SUGGESTED BY INITIAL EVALUATION: ICD-10-CM

## 2022-12-01 DIAGNOSIS — R06.02 SHORTNESS OF BREATH: ICD-10-CM

## 2022-12-01 PROCEDURE — 94618 PULMONARY STRESS TESTING: ICD-10-PCS | Mod: S$GLB,,, | Performed by: INTERNAL MEDICINE

## 2022-12-01 PROCEDURE — 94010 BREATHING CAPACITY TEST: ICD-10-PCS | Mod: S$GLB,,, | Performed by: INTERNAL MEDICINE

## 2022-12-01 PROCEDURE — 94726 PULM FUNCT TST PLETHYSMOGRAP: ICD-10-PCS | Mod: S$GLB,,, | Performed by: INTERNAL MEDICINE

## 2022-12-01 PROCEDURE — 94618 PULMONARY STRESS TESTING: CPT | Mod: S$GLB,,, | Performed by: INTERNAL MEDICINE

## 2022-12-01 PROCEDURE — 94729 PR C02/MEMBANE DIFFUSE CAPACITY: ICD-10-PCS | Mod: S$GLB,,, | Performed by: INTERNAL MEDICINE

## 2022-12-01 PROCEDURE — 94729 DIFFUSING CAPACITY: CPT | Mod: S$GLB,,, | Performed by: INTERNAL MEDICINE

## 2022-12-01 PROCEDURE — 94726 PLETHYSMOGRAPHY LUNG VOLUMES: CPT | Mod: S$GLB,,, | Performed by: INTERNAL MEDICINE

## 2022-12-01 PROCEDURE — 94010 BREATHING CAPACITY TEST: CPT | Mod: S$GLB,,, | Performed by: INTERNAL MEDICINE

## 2022-12-01 NOTE — PROCEDURES
"[facility] - Pulmonary Function  Six Minute Walk     SUMMARY     Ordering Provider: Dr. Santana   Interpreting Provider: Dr. Santana  Performing nurse/tech/RT: YINA Jenkins, RRT  Diagnosis: Shortness of Breath  Height: 5' 4" (162.6 cm)  Weight: 74.3 kg (163 lb 12.8 oz)  BMI (Calculated): 28.1   Patient Race:             Phase Oxygen Assessment Supplemental O2 Heart   Rate Blood Pressure Jose Armando Dyspnea Scale Rating   Resting 100 % Room Air 78 bpm 174/73 3   Exercise        Minute        1 100 % Room Air 87 bpm     2 99 % Room Air 91 bpm     3 98 % Room Air 93 bpm     4 98 % Room Air 88 bpm     5 98 % Room Air 90 bpm     6  98 % Room Air 89 bpm 175/73 3   Recovery        Minute        1 99 % Room Air 76 bpm     2 99 % Room Air 77 bpm     3 99 % Room Air 74 bpm     4 99 % Room Air 70 bpm 148/65 3     Six Minute Walk Summary  6MWT Status: completed with stops  Patient Reported: Leg pain, Lightheadedness     Interpretation:  Did the patient stop or pause?: Yes  How many times did the patient stop or pause?: 1  Stop Time 1: 184  Restart Time 1: 226  Pause Time 1: 42 seconds                             Total Time Walked (Calculated): 318 seconds  Final Partial Lap Distance (feet): 50 feet  Total Distance Meters (Calculated): 259.08 meters  Predicted Distance Meters (Calculated): 412.22 meters  Percentage of Predicted (Calculated): 62.85  Peak VO2 (Calculated): 11.75  Mets: 3.36  Has The Patient Had a Previous Six Minute Walk Test?: No       Previous 6MWT Results  Has The Patient Had a Previous Six Minute Walk Test?: No    "

## 2022-12-02 DIAGNOSIS — R91.1 NODULE OF RIGHT LUNG: Primary | ICD-10-CM

## 2022-12-02 LAB
BRPFT: NORMAL
DLCO ADJ PRE: 10.99 ML/(MIN*MMHG)
DLCO SINGLE BREATH LLN: 15.07
DLCO SINGLE BREATH PRE REF: 52.8 %
DLCO SINGLE BREATH REF: 20.8
DLCOC SBVA LLN: 2.76
DLCOC SBVA PRE REF: 83.9 %
DLCOC SBVA REF: 4.19
DLCOC SINGLE BREATH LLN: 15.07
DLCOC SINGLE BREATH PRE REF: 52.8 %
DLCOC SINGLE BREATH REF: 20.8
DLCOVA LLN: 2.76
DLCOVA PRE REF: 83.9 %
DLCOVA PRE: 3.51 ML/(MIN*MMHG*L)
DLCOVA REF: 4.19
DLVAADJ PRE: 3.51 ML/(MIN*MMHG*L)
ERV LLN: -16449.41
ERV PRE REF: 112.5 %
ERV REF: 0.59
FEF 25 75 LLN: 0.76
FEF 25 75 PRE REF: 88.9 %
FEF 25 75 REF: 1.72
FEV1 FVC LLN: 64
FEV1 FVC PRE REF: 96.2 %
FEV1 FVC REF: 78
FEV1 LLN: 1.49
FEV1 PRE REF: 87.8 %
FEV1 REF: 2.08
FRCPLETH LLN: 1.9
FRCPLETH PREREF: 104.2 %
FRCPLETH REF: 2.73
FVC LLN: 1.94
FVC PRE REF: 90.3 %
FVC REF: 2.71
IVC PRE: 2.04 L
IVC SINGLE BREATH LLN: 1.94
IVC SINGLE BREATH PRE REF: 75.2 %
IVC SINGLE BREATH REF: 2.71
MVV LLN: 68
MVV PRE REF: 57.8 %
MVV REF: 80
PEF LLN: 3.64
PEF PRE REF: 56.4 %
PEF REF: 5.36
PRE DLCO: 10.99 ML/(MIN*MMHG)
PRE ERV: 0.67 L
PRE FEF 25 75: 1.52 L/S
PRE FET 100: 7.42 SEC
PRE FEV1 FVC: 74.59 %
PRE FEV1: 1.83 L
PRE FRC PL: 2.84 L
PRE FVC: 2.45 L
PRE MVV: 46 L/MIN
PRE PEF: 3.02 L/S
PRE RV: 1.86 L
PRE TLC: 4.34 L
RAW LLN: 3.06
RAW PRE REF: 143.4 %
RAW PRE: 4.39 CMH2O*S/L
RAW REF: 3.06
RV LLN: 1.56
RV PRE REF: 87.2 %
RV REF: 2.13
RVTLC LLN: 35
RVTLC PRE REF: 97.2 %
RVTLC PRE: 42.87 %
RVTLC REF: 44
TLC LLN: 3.98
TLC PRE REF: 87.4 %
TLC REF: 4.97
VA PRE: 3.13 L
VA SINGLE BREATH LLN: 4.82
VA SINGLE BREATH PRE REF: 65 %
VA SINGLE BREATH REF: 4.82
VC LLN: 1.94
VC PRE REF: 91.4 %
VC PRE: 2.48 L
VC REF: 2.71
VTGRAWPRE: 2.77 L

## 2022-12-05 ENCOUNTER — CLINICAL SUPPORT (OUTPATIENT)
Dept: INTERNAL MEDICINE | Facility: CLINIC | Age: 72
End: 2022-12-05
Payer: MEDICARE

## 2022-12-05 ENCOUNTER — OFFICE VISIT (OUTPATIENT)
Dept: INTERNAL MEDICINE | Facility: CLINIC | Age: 72
End: 2022-12-05
Payer: MEDICARE

## 2022-12-05 VITALS
SYSTOLIC BLOOD PRESSURE: 130 MMHG | OXYGEN SATURATION: 20 % | HEART RATE: 72 BPM | DIASTOLIC BLOOD PRESSURE: 52 MMHG | RESPIRATION RATE: 98 BRPM

## 2022-12-05 VITALS
BODY MASS INDEX: 27.74 KG/M2 | OXYGEN SATURATION: 97 % | HEART RATE: 73 BPM | DIASTOLIC BLOOD PRESSURE: 74 MMHG | HEIGHT: 64 IN | SYSTOLIC BLOOD PRESSURE: 164 MMHG | WEIGHT: 162.5 LBS

## 2022-12-05 DIAGNOSIS — R26.9 ABNORMALITY OF GAIT AND MOBILITY: ICD-10-CM

## 2022-12-05 DIAGNOSIS — Z91.89 POTENTIAL FOR COGNITIVE IMPAIRMENT: ICD-10-CM

## 2022-12-05 DIAGNOSIS — E78.5 HYPERLIPIDEMIA ASSOCIATED WITH TYPE 2 DIABETES MELLITUS: ICD-10-CM

## 2022-12-05 DIAGNOSIS — M85.80 OSTEOPENIA, UNSPECIFIED LOCATION: ICD-10-CM

## 2022-12-05 DIAGNOSIS — I73.9 PVD (PERIPHERAL VASCULAR DISEASE): ICD-10-CM

## 2022-12-05 DIAGNOSIS — Z72.0 TOBACCO USE: ICD-10-CM

## 2022-12-05 DIAGNOSIS — Z74.09 OTHER REDUCED MOBILITY: ICD-10-CM

## 2022-12-05 DIAGNOSIS — I25.84 CORONARY ARTERY CALCIFICATION: ICD-10-CM

## 2022-12-05 DIAGNOSIS — I70.0 ATHEROSCLEROSIS OF AORTA: ICD-10-CM

## 2022-12-05 DIAGNOSIS — Z13.31 POSITIVE DEPRESSION SCREENING: ICD-10-CM

## 2022-12-05 DIAGNOSIS — R93.89 ABNORMAL CT SCAN: ICD-10-CM

## 2022-12-05 DIAGNOSIS — E04.2 MULTIPLE THYROID NODULES: ICD-10-CM

## 2022-12-05 DIAGNOSIS — I25.10 CORONARY ARTERY CALCIFICATION: ICD-10-CM

## 2022-12-05 DIAGNOSIS — J43.9 PULMONARY EMPHYSEMA, UNSPECIFIED EMPHYSEMA TYPE: ICD-10-CM

## 2022-12-05 DIAGNOSIS — Z59.9 FINANCIAL DIFFICULTIES: ICD-10-CM

## 2022-12-05 DIAGNOSIS — Z00.00 ENCOUNTER FOR PREVENTIVE HEALTH EXAMINATION: Primary | ICD-10-CM

## 2022-12-05 DIAGNOSIS — I10 HYPERTENSION, UNSPECIFIED TYPE: Primary | ICD-10-CM

## 2022-12-05 DIAGNOSIS — I65.29 STENOSIS OF CAROTID ARTERY, UNSPECIFIED LATERALITY: ICD-10-CM

## 2022-12-05 DIAGNOSIS — I10 HYPERTENSION, UNSPECIFIED TYPE: ICD-10-CM

## 2022-12-05 DIAGNOSIS — E11.69 HYPERLIPIDEMIA ASSOCIATED WITH TYPE 2 DIABETES MELLITUS: ICD-10-CM

## 2022-12-05 PROCEDURE — 1101F PT FALLS ASSESS-DOCD LE1/YR: CPT | Mod: CPTII,S$GLB,, | Performed by: NURSE PRACTITIONER

## 2022-12-05 PROCEDURE — 99999 PR PBB SHADOW E&M-EST. PATIENT-LVL V: ICD-10-PCS | Mod: PBBFAC,,, | Performed by: NURSE PRACTITIONER

## 2022-12-05 PROCEDURE — 3078F DIAST BP <80 MM HG: CPT | Mod: CPTII,S$GLB,, | Performed by: NURSE PRACTITIONER

## 2022-12-05 PROCEDURE — 1126F PR PAIN SEVERITY QUANTIFIED, NO PAIN PRESENT: ICD-10-PCS | Mod: CPTII,S$GLB,, | Performed by: NURSE PRACTITIONER

## 2022-12-05 PROCEDURE — 3066F NEPHROPATHY DOC TX: CPT | Mod: CPTII,S$GLB,, | Performed by: NURSE PRACTITIONER

## 2022-12-05 PROCEDURE — 3008F PR BODY MASS INDEX (BMI) DOCUMENTED: ICD-10-PCS | Mod: CPTII,S$GLB,, | Performed by: NURSE PRACTITIONER

## 2022-12-05 PROCEDURE — 99999 PR PBB SHADOW E&M-EST. PATIENT-LVL III: ICD-10-PCS | Mod: PBBFAC,,,

## 2022-12-05 PROCEDURE — 3288F FALL RISK ASSESSMENT DOCD: CPT | Mod: CPTII,S$GLB,, | Performed by: NURSE PRACTITIONER

## 2022-12-05 PROCEDURE — 3288F PR FALLS RISK ASSESSMENT DOCUMENTED: ICD-10-PCS | Mod: CPTII,S$GLB,, | Performed by: NURSE PRACTITIONER

## 2022-12-05 PROCEDURE — 99999 PR PBB SHADOW E&M-EST. PATIENT-LVL V: CPT | Mod: PBBFAC,,, | Performed by: NURSE PRACTITIONER

## 2022-12-05 PROCEDURE — 1170F FXNL STATUS ASSESSED: CPT | Mod: CPTII,S$GLB,, | Performed by: NURSE PRACTITIONER

## 2022-12-05 PROCEDURE — 1170F PR FUNCTIONAL STATUS ASSESSED: ICD-10-PCS | Mod: CPTII,S$GLB,, | Performed by: NURSE PRACTITIONER

## 2022-12-05 PROCEDURE — 3066F PR DOCUMENTATION OF TREATMENT FOR NEPHROPATHY: ICD-10-PCS | Mod: CPTII,S$GLB,, | Performed by: NURSE PRACTITIONER

## 2022-12-05 PROCEDURE — G0439 PPPS, SUBSEQ VISIT: HCPCS | Mod: S$GLB,,, | Performed by: NURSE PRACTITIONER

## 2022-12-05 PROCEDURE — 99999 PR PBB SHADOW E&M-EST. PATIENT-LVL III: CPT | Mod: PBBFAC,,,

## 2022-12-05 PROCEDURE — 99499 RISK ADDL DX/OHS AUDIT: ICD-10-PCS | Mod: HCNC,S$GLB,, | Performed by: NURSE PRACTITIONER

## 2022-12-05 PROCEDURE — 3061F PR NEG MICROALBUMINURIA RESULT DOCUMENTED/REVIEW: ICD-10-PCS | Mod: CPTII,S$GLB,, | Performed by: NURSE PRACTITIONER

## 2022-12-05 PROCEDURE — 3078F PR MOST RECENT DIASTOLIC BLOOD PRESSURE < 80 MM HG: ICD-10-PCS | Mod: CPTII,S$GLB,, | Performed by: NURSE PRACTITIONER

## 2022-12-05 PROCEDURE — 3044F HG A1C LEVEL LT 7.0%: CPT | Mod: CPTII,S$GLB,, | Performed by: NURSE PRACTITIONER

## 2022-12-05 PROCEDURE — 3044F PR MOST RECENT HEMOGLOBIN A1C LEVEL <7.0%: ICD-10-PCS | Mod: CPTII,S$GLB,, | Performed by: NURSE PRACTITIONER

## 2022-12-05 PROCEDURE — 1126F AMNT PAIN NOTED NONE PRSNT: CPT | Mod: CPTII,S$GLB,, | Performed by: NURSE PRACTITIONER

## 2022-12-05 PROCEDURE — 3061F NEG MICROALBUMINURIA REV: CPT | Mod: CPTII,S$GLB,, | Performed by: NURSE PRACTITIONER

## 2022-12-05 PROCEDURE — G0439 PR MEDICARE ANNUAL WELLNESS SUBSEQUENT VISIT: ICD-10-PCS | Mod: S$GLB,,, | Performed by: NURSE PRACTITIONER

## 2022-12-05 PROCEDURE — 99499 UNLISTED E&M SERVICE: CPT | Mod: HCNC,S$GLB,, | Performed by: NURSE PRACTITIONER

## 2022-12-05 PROCEDURE — 3077F SYST BP >= 140 MM HG: CPT | Mod: CPTII,S$GLB,, | Performed by: NURSE PRACTITIONER

## 2022-12-05 PROCEDURE — 3008F BODY MASS INDEX DOCD: CPT | Mod: CPTII,S$GLB,, | Performed by: NURSE PRACTITIONER

## 2022-12-05 PROCEDURE — 3077F PR MOST RECENT SYSTOLIC BLOOD PRESSURE >= 140 MM HG: ICD-10-PCS | Mod: CPTII,S$GLB,, | Performed by: NURSE PRACTITIONER

## 2022-12-05 PROCEDURE — 1101F PR PT FALLS ASSESS DOC 0-1 FALLS W/OUT INJ PAST YR: ICD-10-PCS | Mod: CPTII,S$GLB,, | Performed by: NURSE PRACTITIONER

## 2022-12-05 SDOH — SOCIAL DETERMINANTS OF HEALTH (SDOH): PROBLEM RELATED TO HOUSING AND ECONOMIC CIRCUMSTANCES, UNSPECIFIED: Z59.9

## 2022-12-05 NOTE — PROGRESS NOTES
"  Rosana Aguiar presented for a  Medicare AWV and comprehensive Health Risk Assessment today. The following components were reviewed and updated:    Medical history  Family History  Social history  Allergies and Current Medications  Health Risk Assessment  Health Maintenance  Care Team         ** See Completed Assessments for Annual Wellness Visit within the encounter summary.**         The following assessments were completed:  Living Situation  CAGE  Depression Screening  Timed Get Up and Go  Whisper Test  Cognitive Function Screening  Nutrition Screening  ADL Screening  PAQ Screening        Vitals:    22 1245   BP: (!) 164/74   Pulse: 73   SpO2: 97%   Weight: 73.7 kg (162 lb 7.7 oz)   Height: 5' 3.58" (1.615 m)     Body mass index is 28.26 kg/m².  Physical Exam  Vitals and nursing note reviewed.   Constitutional:       Appearance: She is well-developed.   HENT:      Head: Normocephalic.   Cardiovascular:      Rate and Rhythm: Normal rate and regular rhythm.      Pulses:           Dorsalis pedis pulses are 1+ on the right side and 1+ on the left side.      Heart sounds: Normal heart sounds.      Comments: Reports normal finding for RLE edema  No erythema, increased warmth, or tenderness noted to either calf.      Pulmonary:      Effort: Pulmonary effort is normal. No respiratory distress.      Breath sounds: Normal breath sounds.   Abdominal:      Palpations: Abdomen is soft. There is no mass.      Tenderness: There is no abdominal tenderness.   Musculoskeletal:         General: Normal range of motion.      Right lower le+ Edema present.      Left lower leg: No edema.   Skin:     General: Skin is warm and dry.   Neurological:      Mental Status: She is alert and oriented to person, place, and time.      Motor: No abnormal muscle tone.   Psychiatric:         Speech: Speech normal.         Behavior: Behavior normal.             Diagnoses and health risks identified today and associated " recommendations/orders:    1. Encounter for preventive health examination  Scheduled  PCP (per pt request after Nina)  Nurse visit today, per pt request for BP check and machine correlation.        Review for Opioid Screening: Pt does not have Rx for Opioids      Review for Substance Use Disorders: Alcohol use, see flow sheet for detail No drug use per chart     Reports she is at her respiratory baseline.   Discussed receiving Shingrix and Tetanus vaccine at pharmacy.    Discussed colonoscopy, she is going to complete just not at this time. Will discuss with PCP when ready.   Declines COVID booster  Encouraged healthy diet and exercise as tolerated     2. Pulmonary emphysema, unspecified emphysema type  Ct 11/22  Stable. Continue current treatment plan as previously prescribed with your  pulmonologist.     3. Atherosclerosis of aorta  CT 11/22  Advised to follow up with PCP/cardiologist for further evaluation and recommendations. Patient expressed understanding.      4. PVD (peripheral vascular disease)  Reports bilateral leg pain on exertion, improving with medication.   US 10/22  Stable. Continue current treatment plan as previously prescribed with your  cardiologist.     5. Hyperlipidemia associated with type 2 diabetes mellitus  A1c 6.4  Lipid-not at goal  Continue current treatment plan as previously prescribed with your  pcp and cardiologist.     6. Abnormal CT scan  Ct 11/22  Continue current treatment plan as previously prescribed with your  pulmonologist.     7. Coronary artery calcification  Ct 11/22  Discussed diagnosis and risk reduction.   Advised to follow up with cardiologist for further recommendations. Patient expressed understanding.       8. Stenosis of carotid artery, unspecified laterality  US 7/22  Advised to follow up with cardiologist for further evaluation and recommendations. Patient expressed understanding.      9. Hypertension, unspecified type  BP elevated at today's visit.   Discussed s/s of MI and stroke (patient denies any s/s) and advised to go to the ER/911 if occur. Advised patient to monitor BP (keep a log) and if continues to stay elevated (greater than 130/80) to follow up with PCP for further evaluation and treatment. She will also come in for a nurse visit and bring in machine to correlate.      10. Multiple thyroid nodules  US 1/22  Advised to follow up with PCP for further evaluation and recommendations. Patient expressed understanding.      11. Osteopenia, unspecified location  Dexa 10/22  Continue current treatment plan as previously prescribed with your  pcp     12. Financial difficulties  [facility] financial resources information page given to patient.    - Ambulatory referral/consult to Outpatient Case Management    13. Tobacco use  Discussed the importance of smoking cessation and advised to quit smoking. Patient expressed understanding.   - Ambulatory referral/consult to Smoking Cessation Program; Future    14. Positive depression screening  PHQ 9-9  Reports stress  Denies SI  Tearful during discussion.   Discussed counseling. Psychiatry department contact information given to patient.   Advised to follow up with PCP for further evaluation and recommendations. Patient expressed understanding.      15. Potential for cognitive impairment  Abnormal cognitive function screening.    Reports memory difficulties.   Advised to follow up with PCP for further evaluation and recommendations. Patient expressed understanding.      16. Abnormality of gait and mobility  Abnormal timed get up and go test. Denies any falls in the last 12 months.   Fall precautions reviewed with patient. Advised to follow up with PCP for further recommendations. Patient expressed understanding.       17. Other reduced mobility  Abnormal timed get up and go test. Denies any falls in the last 12 months.   Fall precautions reviewed with patient. Advised to follow up with PCP for further recommendations.  Patient expressed understanding.         Provided Rosana with a 5-10 year written screening schedule and personal prevention plan. Recommendations were developed using the USPSTF age appropriate recommendations. Education, counseling, and referrals were provided as needed. After Visit Summary printed and given to patient which includes a list of additional screenings\tests needed.    Follow up in about 1 year (around 12/5/2023) for awv.    Marina Modi, NP  I offered to discuss advanced care planning, including how to pick a person who would make decisions for you if you were unable to make them for yourself, called a health care power of , and what kind of decisions you might make such as use of life sustaining treatments such as ventilators and tube feeding when faced with a life limiting illness recorded on a living will that they will need to know. (How you want to be cared for as you near the end of your natural life)     X Patient is interested in learning more about how to make advanced directives.  I provided them paperwork and offered to discuss this with them.

## 2022-12-05 NOTE — PATIENT INSTRUCTIONS
Counseling and Referral of Other Preventative  (Italic type indicates deductible and co-insurance are waived)    Patient Name: Rosana Aguiar  Today's Date: 12/5/2022    Health Maintenance       Date Due Completion Date    TETANUS VACCINE Never done ---    Colorectal Cancer Screening Never done ---    Shingles Vaccine (1 of 2) Never done ---    Influenza Vaccine (1) 06/30/2023 (Originally 9/1/2022) ---    COVID-19 Vaccine (3 - Booster for Pfizer series) 12/05/2023 (Originally 5/29/2021) 4/3/2021    Hemoglobin A1c 05/11/2023 11/11/2022    Lipid Panel 07/11/2023 7/11/2022    Eye Exam 07/26/2023 7/26/2022    Diabetes Urine Screening 08/25/2023 8/25/2022    Foot Exam 08/25/2023 8/25/2022    Override on 8/25/2022: Done    Mammogram 09/02/2023 9/2/2022    LDCT Lung Screen 11/30/2023 11/30/2022    Aspirin/Antiplatelet Therapy 12/05/2023 12/5/2022    DEXA Scan 10/13/2025 10/13/2022        Orders Placed This Encounter   Procedures    Ambulatory referral/consult to Outpatient Case Management    Ambulatory referral/consult to Smoking Cessation Program     The following information is provided to all patients.  This information is to help you find resources for any of the problems found today that may be affecting your health:                Living healthy guide: www.[facility].[place].gov      Understanding Diabetes: www.diabetes.org      Eating healthy: www.cdc.gov/healthyweight      CDC home safety checklist: www.cdc.gov/steadi/patient.html      Agency on Aging: www.goea.[place].[facility]      Alcoholics anonymous (AA): www.aa.org      Physical Activity: www.star.nih.gov/uw4kwsm      Tobacco use: www.quitwithusla.org

## 2022-12-05 NOTE — PROGRESS NOTES
Patient took BP with her machine she got 160/58, 109/60, 118/49 her machine was way different each time she took it

## 2022-12-08 NOTE — PROGRESS NOTES
Called and spoke with patient. She said her bp machine is broken. She plans to get another one soon.

## 2022-12-19 ENCOUNTER — CLINICAL SUPPORT (OUTPATIENT)
Dept: SMOKING CESSATION | Facility: CLINIC | Age: 72
End: 2022-12-19

## 2022-12-19 ENCOUNTER — OUTPATIENT CASE MANAGEMENT (OUTPATIENT)
Dept: ADMINISTRATIVE | Facility: OTHER | Age: 72
End: 2022-12-19
Payer: MEDICARE

## 2022-12-19 DIAGNOSIS — F17.200 NICOTINE DEPENDENCE: Primary | ICD-10-CM

## 2022-12-19 PROCEDURE — 99404 PR PREVENT COUNSEL,INDIV,60 MIN: ICD-10-PCS | Mod: S$GLB,,, | Performed by: GENERAL PRACTICE

## 2022-12-19 PROCEDURE — 99404 PREV MED CNSL INDIV APPRX 60: CPT | Mod: S$GLB,,, | Performed by: GENERAL PRACTICE

## 2022-12-19 PROCEDURE — 99999 PR PBB SHADOW E&M-EST. PATIENT-LVL II: ICD-10-PCS | Mod: PBBFAC,,,

## 2022-12-19 PROCEDURE — 99999 PR PBB SHADOW E&M-EST. PATIENT-LVL II: CPT | Mod: PBBFAC,,,

## 2022-12-19 RX ORDER — IBUPROFEN 200 MG
1 TABLET ORAL DAILY
Qty: 28 PATCH | Refills: 0 | Status: SHIPPED | OUTPATIENT
Start: 2022-12-19 | End: 2022-12-19 | Stop reason: SDUPTHER

## 2022-12-19 RX ORDER — IBUPROFEN 200 MG
1 TABLET ORAL DAILY
Qty: 28 PATCH | Refills: 0 | Status: SHIPPED | OUTPATIENT
Start: 2022-12-19 | End: 2023-01-31 | Stop reason: SDUPTHER

## 2022-12-19 RX ORDER — BUPROPION HYDROCHLORIDE 150 MG/1
150 TABLET, EXTENDED RELEASE ORAL 2 TIMES DAILY
Qty: 60 TABLET | Refills: 0 | Status: SHIPPED | OUTPATIENT
Start: 2022-12-19 | End: 2023-01-19

## 2022-12-19 NOTE — Clinical Note
Patient intake for Quit 1 Episode.  Patient will be participating in bi-weekly tobacco cessation meetings and will begin the prescribed tobacco cessation medication regimen of 150 mg Wellbutrin BID and 21 mg nicotine patch QD. FTND score of 5 indicates a moderate to high dependence on nicotine. DIPIKA-D score of 37 is perceived as significant distress / probable depression at this time. Pt sees PCP to manage and will follow up if needed. Will monitor.

## 2022-12-19 NOTE — PROGRESS NOTES
Outpatient Care Management   - Low Risk Patient Assessment    Patient: Rosana Aguiar  MRN:  [id number]  Date of Service:  12/19/2022  Completed by:  Ijeoma Galindo LCSW  Referral Date: 12/05/2022    Reason for Visit   Patient presents with    OPCM Chart Review    Social Work Assessment - Low/Mod Risk    Case Closure     Brief Summary: Sw received referral for patient from Marina Modi NP. Sw completed assessment with Pt via telephone. Pt reported daughter and grandchild currently living with her. Pt reported being independent with ADLs/IADLs. Pt reported she continues to drive herself to appointments. Pt reported having some difficulties with affording food, housing, medical and other bills. Pt declined  assistance to evaluate her for SNAP, Medicaid, and financial assistance. Pt reported previously attempting to get assistance from [facility] and being declined (due to being over income). Pt believes she also does not qualify for SNAP or Medicaid. Pt reported that her three children help her with her financial issues. Pt believes she has a good support system. Pt stated no needs or concerns to be addressed by Sw at this time. Pt advised to communicate with her PCP if needs arise in the future and Pt verbalized agreement.

## 2022-12-20 ENCOUNTER — OFFICE VISIT (OUTPATIENT)
Dept: SPORTS MEDICINE | Facility: CLINIC | Age: 72
End: 2022-12-20
Payer: MEDICARE

## 2022-12-20 DIAGNOSIS — G89.29 CHRONIC RIGHT SHOULDER PAIN: ICD-10-CM

## 2022-12-20 DIAGNOSIS — M75.31 CALCIFIC TENDINITIS OF RIGHT SHOULDER: Primary | ICD-10-CM

## 2022-12-20 DIAGNOSIS — M25.511 CHRONIC RIGHT SHOULDER PAIN: ICD-10-CM

## 2022-12-20 PROCEDURE — 3288F FALL RISK ASSESSMENT DOCD: CPT | Mod: CPTII,S$GLB,, | Performed by: STUDENT IN AN ORGANIZED HEALTH CARE EDUCATION/TRAINING PROGRAM

## 2022-12-20 PROCEDURE — 1159F MED LIST DOCD IN RCRD: CPT | Mod: CPTII,S$GLB,, | Performed by: STUDENT IN AN ORGANIZED HEALTH CARE EDUCATION/TRAINING PROGRAM

## 2022-12-20 PROCEDURE — 99213 OFFICE O/P EST LOW 20 MIN: CPT | Mod: 25,S$GLB,, | Performed by: STUDENT IN AN ORGANIZED HEALTH CARE EDUCATION/TRAINING PROGRAM

## 2022-12-20 PROCEDURE — 20611 DRAIN/INJ JOINT/BURSA W/US: CPT | Mod: RT,S$GLB,, | Performed by: STUDENT IN AN ORGANIZED HEALTH CARE EDUCATION/TRAINING PROGRAM

## 2022-12-20 PROCEDURE — 99999 PR PBB SHADOW E&M-EST. PATIENT-LVL III: CPT | Mod: PBBFAC,,, | Performed by: STUDENT IN AN ORGANIZED HEALTH CARE EDUCATION/TRAINING PROGRAM

## 2022-12-20 PROCEDURE — 3044F PR MOST RECENT HEMOGLOBIN A1C LEVEL <7.0%: ICD-10-PCS | Mod: CPTII,S$GLB,, | Performed by: STUDENT IN AN ORGANIZED HEALTH CARE EDUCATION/TRAINING PROGRAM

## 2022-12-20 PROCEDURE — 3066F NEPHROPATHY DOC TX: CPT | Mod: CPTII,S$GLB,, | Performed by: STUDENT IN AN ORGANIZED HEALTH CARE EDUCATION/TRAINING PROGRAM

## 2022-12-20 PROCEDURE — 3061F PR NEG MICROALBUMINURIA RESULT DOCUMENTED/REVIEW: ICD-10-PCS | Mod: CPTII,S$GLB,, | Performed by: STUDENT IN AN ORGANIZED HEALTH CARE EDUCATION/TRAINING PROGRAM

## 2022-12-20 PROCEDURE — 1159F PR MEDICATION LIST DOCUMENTED IN MEDICAL RECORD: ICD-10-PCS | Mod: CPTII,S$GLB,, | Performed by: STUDENT IN AN ORGANIZED HEALTH CARE EDUCATION/TRAINING PROGRAM

## 2022-12-20 PROCEDURE — 1160F PR REVIEW ALL MEDS BY PRESCRIBER/CLIN PHARMACIST DOCUMENTED: ICD-10-PCS | Mod: CPTII,S$GLB,, | Performed by: STUDENT IN AN ORGANIZED HEALTH CARE EDUCATION/TRAINING PROGRAM

## 2022-12-20 PROCEDURE — 3044F HG A1C LEVEL LT 7.0%: CPT | Mod: CPTII,S$GLB,, | Performed by: STUDENT IN AN ORGANIZED HEALTH CARE EDUCATION/TRAINING PROGRAM

## 2022-12-20 PROCEDURE — 3288F PR FALLS RISK ASSESSMENT DOCUMENTED: ICD-10-PCS | Mod: CPTII,S$GLB,, | Performed by: STUDENT IN AN ORGANIZED HEALTH CARE EDUCATION/TRAINING PROGRAM

## 2022-12-20 PROCEDURE — 1125F AMNT PAIN NOTED PAIN PRSNT: CPT | Mod: CPTII,S$GLB,, | Performed by: STUDENT IN AN ORGANIZED HEALTH CARE EDUCATION/TRAINING PROGRAM

## 2022-12-20 PROCEDURE — 99999 PR PBB SHADOW E&M-EST. PATIENT-LVL III: ICD-10-PCS | Mod: PBBFAC,,, | Performed by: STUDENT IN AN ORGANIZED HEALTH CARE EDUCATION/TRAINING PROGRAM

## 2022-12-20 PROCEDURE — 1125F PR PAIN SEVERITY QUANTIFIED, PAIN PRESENT: ICD-10-PCS | Mod: CPTII,S$GLB,, | Performed by: STUDENT IN AN ORGANIZED HEALTH CARE EDUCATION/TRAINING PROGRAM

## 2022-12-20 PROCEDURE — 1160F RVW MEDS BY RX/DR IN RCRD: CPT | Mod: CPTII,S$GLB,, | Performed by: STUDENT IN AN ORGANIZED HEALTH CARE EDUCATION/TRAINING PROGRAM

## 2022-12-20 PROCEDURE — 1101F PT FALLS ASSESS-DOCD LE1/YR: CPT | Mod: CPTII,S$GLB,, | Performed by: STUDENT IN AN ORGANIZED HEALTH CARE EDUCATION/TRAINING PROGRAM

## 2022-12-20 PROCEDURE — 99213 PR OFFICE/OUTPT VISIT, EST, LEVL III, 20-29 MIN: ICD-10-PCS | Mod: 25,S$GLB,, | Performed by: STUDENT IN AN ORGANIZED HEALTH CARE EDUCATION/TRAINING PROGRAM

## 2022-12-20 PROCEDURE — 3061F NEG MICROALBUMINURIA REV: CPT | Mod: CPTII,S$GLB,, | Performed by: STUDENT IN AN ORGANIZED HEALTH CARE EDUCATION/TRAINING PROGRAM

## 2022-12-20 PROCEDURE — 20611: ICD-10-PCS | Mod: RT,S$GLB,, | Performed by: STUDENT IN AN ORGANIZED HEALTH CARE EDUCATION/TRAINING PROGRAM

## 2022-12-20 PROCEDURE — 3066F PR DOCUMENTATION OF TREATMENT FOR NEPHROPATHY: ICD-10-PCS | Mod: CPTII,S$GLB,, | Performed by: STUDENT IN AN ORGANIZED HEALTH CARE EDUCATION/TRAINING PROGRAM

## 2022-12-20 PROCEDURE — 1101F PR PT FALLS ASSESS DOC 0-1 FALLS W/OUT INJ PAST YR: ICD-10-PCS | Mod: CPTII,S$GLB,, | Performed by: STUDENT IN AN ORGANIZED HEALTH CARE EDUCATION/TRAINING PROGRAM

## 2022-12-20 RX ORDER — LIDOCAINE HYDROCHLORIDE 10 MG/ML
2 INJECTION INFILTRATION; PERINEURAL
Status: DISCONTINUED | OUTPATIENT
Start: 2022-12-20 | End: 2022-12-20 | Stop reason: HOSPADM

## 2022-12-20 RX ORDER — BUPIVACAINE HYDROCHLORIDE 5 MG/ML
2 INJECTION, SOLUTION EPIDURAL; INTRACAUDAL
Status: DISCONTINUED | OUTPATIENT
Start: 2022-12-20 | End: 2022-12-20 | Stop reason: HOSPADM

## 2022-12-20 RX ORDER — TRIAMCINOLONE ACETONIDE 40 MG/ML
40 INJECTION, SUSPENSION INTRA-ARTICULAR; INTRAMUSCULAR
Status: DISCONTINUED | OUTPATIENT
Start: 2022-12-20 | End: 2022-12-20 | Stop reason: HOSPADM

## 2022-12-20 RX ADMIN — TRIAMCINOLONE ACETONIDE 40 MG: 40 INJECTION, SUSPENSION INTRA-ARTICULAR; INTRAMUSCULAR at 11:12

## 2022-12-20 RX ADMIN — LIDOCAINE HYDROCHLORIDE 2 ML: 10 INJECTION INFILTRATION; PERINEURAL at 11:12

## 2022-12-20 RX ADMIN — BUPIVACAINE HYDROCHLORIDE 2 ML: 5 INJECTION, SOLUTION EPIDURAL; INTRACAUDAL at 11:12

## 2022-12-20 NOTE — PROGRESS NOTES
Patient ID: Rosana Aguiar  YOB: 1950  MRN: [id number]    Chief Complaint: Follow-up (R shoulder pain s/p PT and CSI 9/15/22)      Referred By: Self    Occupation: Retired    History of Present Illness: Rosana Aguiar is a left-hand dominant 72 y.o. female who presents today with 5/10 pain at her follow up for right shoulder pain.    She was initially seen on 9/15/22.  Patient states that it began hurting last year in 2021 when supporting  when walking, lifting, transferring, etc. She describes the pain as and intermittent pain with soreness. She uses ibuprofen for relief. She states movement and lifting makes the pain worse. Her  passed away 6 months ago, and it has been a difficult period for her. Her pain is now significantly limiting her ADLs and performing simple self care & housework.     Right shoulder CSI administered at the appointment on 9/15/22. She initially reported excellent pain relief.  At her last visit on 10/27/22, she was doing well and continuing therapy.  Since then, the injection has worn off and her pain has returned.  She had a negative experience at physical therapy and stopped going in November.  She has continued her home exercises but the pain has persisted and has become just as severe as it was prior to her injection.      Past Medical History:   Past Medical History:   Diagnosis Date    Atherosclerotic PVD with intermittent claudication 10/19/2022    Diabetes mellitus, type 2 20 years    BS didn't check 07/26/2022    High cholesterol     Hypertension     Macular degeneration      Past Surgical History:   Procedure Laterality Date    APPENDECTOMY      CATARACT EXTRACTION Bilateral     cyst removal from breast (Left)      HYSTERECTOMY      TONSILLECTOMY       Family History   Problem Relation Age of Onset    Cancer Mother         female    Diabetes Mother     Macular degeneration Mother     Alcohol abuse Father     Cancer Daughter         colon     Diabetes Maternal Uncle     Heart disease Neg Hx     Stroke Neg Hx      Social History     Socioeconomic History    Marital status:    Tobacco Use    Smoking status: Every Day     Packs/day: 1.00     Years: 53.00     Pack years: 53.00     Types: Cigarettes    Smokeless tobacco: Never   Substance and Sexual Activity    Alcohol use: Yes     Comment: rarely    Drug use: Never    Sexual activity: Not Currently     Partners: Male     Social Determinants of Health     Financial Resource Strain: High Risk    Difficulty of Paying Living Expenses: Hard   Food Insecurity: No Food Insecurity    Worried About Running Out of Food in the Last Year: Never true    Ran Out of Food in the Last Year: Never true   Transportation Needs: No Transportation Needs    Lack of Transportation (Medical): No    Lack of Transportation (Non-Medical): No   Physical Activity: Inactive    Days of Exercise per Week: 0 days    Minutes of Exercise per Session: 0 min   Stress: Stress Concern Present    Feeling of Stress : Very much   Social Connections: Socially Isolated    Frequency of Communication with Friends and Family: More than three times a week    Frequency of Social Gatherings with Friends and Family: Once a week    Attends Taoist Services: Never    Active Member of Clubs or Organizations: No    Attends Club or Organization Meetings: Never    Marital Status:    Housing Stability: Low Risk     Unable to Pay for Housing in the Last Year: No    Number of Places Lived in the Last Year: 1    Unstable Housing in the Last Year: No     Medication List with Changes/Refills   Current Medications    ALBUTEROL (PROVENTIL) 2.5 MG /3 ML (0.083 %) NEBULIZER SOLUTION    Take 3 mLs (2.5 mg total) by nebulization every 6 (six) hours as needed for Wheezing. Rescue    ALPRAZOLAM (XANAX) 0.25 MG TABLET    Take 1 tablet (0.25 mg total) by mouth 2 (two) times daily as needed for Anxiety.    AMLODIPINE (NORVASC) 10 MG TABLET    Take 1 tablet (10 mg  "total) by mouth once daily.    ASPIRIN (ECOTRIN) 81 MG EC TABLET    Take 1 tablet (81 mg total) by mouth once daily.    BUPROPION (WELLBUTRIN SR) 150 MG TBSR 12 HR TABLET    Take 1 tablet by mouth once daily for 7 days, THEN take 1 tablet by mouth twice daily.    CARVEDILOL (COREG) 6.25 MG TABLET    Take 1 tablet (6.25 mg total) by mouth 2 (two) times daily with meals.    CILOSTAZOL (PLETAL) 50 MG TAB    Take 1 tablet (50 mg total) by mouth 2 (two) times daily.    INSULIN GLARGINE U-300 CONC (TOUJEO MAX U-300 SOLOSTAR) 300 UNIT/ML (3 ML) INSULIN PEN    Inject 60 Units into the skin once daily.    LOSARTAN-HYDROCHLOROTHIAZIDE 100-25 MG (HYZAAR) 100-25 MG PER TABLET    Take 1 tablet by mouth once daily.    NICOTINE (NICODERM CQ) 21 MG/24 HR    Place 1 patch onto the skin once daily.    PEN NEEDLE, DIABETIC (BD CAROL 2ND GEN PEN NEEDLE) 32 GAUGE X 5/32" NDLE    1 each by Misc.(Non-Drug; Combo Route) route once daily.    ROSUVASTATIN (CRESTOR) 20 MG TABLET    Take 1 tablet (20 mg total) by mouth once daily.     Review of patient's allergies indicates:   Allergen Reactions    Neomycin-bacitracin-polymyxin Itching       Physical Exam:   There is no height or weight on file to calculate BMI.    Physical Exam  Constitutional:       General: She is not in acute distress.     Appearance: Normal appearance.   HENT:      Head: Normocephalic and atraumatic.   Eyes:      Extraocular Movements: Extraocular movements intact.      Conjunctiva/sclera: Conjunctivae normal.   Pulmonary:      Effort: Pulmonary effort is normal. No respiratory distress.   Skin:     General: Skin is warm and dry.   Neurological:      General: No focal deficit present.      Mental Status: She is alert and oriented to person, place, and time.   Psychiatric:         Mood and Affect: Mood normal.     Detailed MSK exam:     Right Shoulder:  Inspection: No effusion, erythema, or ecchymosis   Palpation tenderness: Bicipital Groove, lateral subacromial " space  Range of motion: 165 deg Flexion         75 deg External Rotation in Adduction         IR to Lumbar  Strength:  4/5 Abduction    4/5 External Rotation in Adduction    5/5 Internal Rotation   Tests:  + Lisa, Neer's    + Empty Can  N/V Exam:  Radial: Normal motor (EPL/thumbs up)              Normal sensory (dorsal hand)   Median: Normal motor (FPL/A-OK)      Normal sensory (thumb)   Ulnar:  Normal motor (Interossei/scissors-spread)     Normal sensory (5th finger)   LABC: Normal sensory (lateral forearm)   MABC: Normal sensory (medial forearm)   MC: Normal motor (elbow flexion)   Axillary: Normal motor/sensory (deltoid)  Normal radial and ulnar pulses, warm and well perfused with capillary refill < 2 sec        Imaging:  XR Results:  Results for orders placed during the hospital encounter of 08/25/22    X-ray Shoulder 2 or More Views Right    Narrative  EXAMINATION:  XR SHOULDER COMPLETE 2 OR MORE VIEWS RIGHT    CLINICAL HISTORY:  Pain in right shoulder    TECHNIQUE:  Two or three views of the right shoulder were preformed.    COMPARISON:  None    FINDINGS:  8 mm calcific density seen adjacent to the margins of the greater tuberosity which is highly concerning for calcific tendinosis.  No acute fractures or dislocations visualized.  Joint spaces are preserved.    Impression  As above      Electronically signed by: Carlos Hill MD  Date:    08/25/2022  Time:    12:30      Relevant imaging results were reviewed and interpreted by me.      This was discussed with the patient and / or family today.       Patient Instructions   Assessment:  Rosana Aguiar is a 72 y.o. female with a chief complaint of Follow-up (R shoulder pain s/p PT and CSI 9/15/22)      Encounter Diagnoses   Name Primary?    Calcific tendinitis of right shoulder Yes    Chronic right shoulder pain       Plan:  The positive effect of the initial injection has worn off.  Ultrasound examination today demonstrates softening of the calcific  chely    Marydanni was performed under ultrasound guidance with subsequent subacromial cortisone injection  May be candidate for TenJet if continuing pain & calcification    Follow-up: 6 weeks or sooner if there are any problems between now and then.    Thank you for choosing [facility] and Dr. Reginaldo Conde for your orthopedic & sports medicine care. It is our goal to provide you with exceptional care that will help keep you healthy, active, and get you back in the game.    Please do not hesitate to reach out to us via email, phone, or MyChart with any questions, concerns, or feedback.    If you are experiencing pain/discomfort ,or have questions after 5pm and would like to be connected to the [facility] on-call team, please call this number and specify which Sports Medicine provider is treating you: [phone]        Reginaldo Conde MD  Primary Care Sports Medicine      Disclaimer: This note was prepared using a voice recognition system and is likely to have sound alike errors within the text.

## 2022-12-20 NOTE — PATIENT INSTRUCTIONS
Assessment:  Rosana Aguiar is a 72 y.o. female with a chief complaint of Follow-up (R shoulder pain s/p PT and CSI 9/15/22)      Encounter Diagnoses   Name Primary?    Calcific tendinitis of right shoulder Yes    Chronic right shoulder pain       Plan:  The positive effect of the initial injection has worn off.  Ultrasound examination today demonstrates softening of the calcific deposit.    Barbitage was performed under ultrasound guidance with subsequent subacromial cortisone injection  May be candidate for TenJet if continuing pain & calcification    Follow-up: 6 weeks or sooner if there are any problems between now and then.    Thank you for choosing [facility] and Dr. Reginaldo Conde for your orthopedic & sports medicine care. It is our goal to provide you with exceptional care that will help keep you healthy, active, and get you back in the game.    Please do not hesitate to reach out to us via email, phone, or MyChart with any questions, concerns, or feedback.    If you are experiencing pain/discomfort ,or have questions after 5pm and would like to be connected to the [facility] on-call team, please call this number and specify which Sports Medicine provider is treating you: [phone]

## 2022-12-20 NOTE — PROCEDURES
R supraspinatus calcification tenotomy and barbotage; R subacromial bursa CSI    Date/Time: 12/20/2022 11:30 AM  Performed by: Reginaldo Conde MD  Authorized by: Reginaldo Conde MD     Consent Done?:  Yes (Verbal)  Indications:  Pain and diagnostic evaluation  Site marked: the procedure site was marked    Timeout: prior to procedure the correct patient, procedure, and site was verified    Prep: patient was prepped and draped in usual sterile fashion      Local anesthesia used?: Yes    Anesthesia:  Local infiltration  Local anesthetic:  Topical anesthetic and lidocaine 1% without epinephrine  Anesthetic total (ml):  5      Details:  Needle Size:  21 G  Ultrasonic Guidance for needle placement?: Yes    Images are saved and documented.  Approach:  Lateral  Location:  Shoulder  Site:  R subacromial bursa  Medications:  2 mL BUPivacaine (PF) 0.5% (5 mg/mL) 0.5 % (5 mg/mL); 2 mL LIDOcaine HCL 10 mg/ml (1%) 10 mg/mL (1 %); 40 mg triamcinolone acetonide 40 mg/mL  Patient tolerance:  Patient tolerated the procedure well with no immediate complications     Ultrasound guidance was used for needle localization. Images were saved and stored for documentation. The appropriate structures were visualized. Dynamic visualization of the needle was continuous throughout the procedures and maintained good position.     First, the calcification of the distal supraspinatus tendon was visualized in its entirety. Then, following anesthetization of the superficial tissue, an 18 ga spinal needle was advanced into the calcification and, using multiple passes, was thoroughly fenestrated and injected with 10 cc of sterile water. Aspiration was then attempted, but did not yield any of the calcification. Following tenotomy & barbotage. A 21 ga 2 in needle was used to inject a 5 cc mixture of kenalog/lidocaine/bupivicaine into the subacromial bursa.    We discussed the proper protocols after the injection such as no submerging pools, baths  tubs, or hot tubs for 48 hr.  Showering is okay today.  We also discussed that blood sugars can be elevated after an injection and asked patient to properly check their sugars over the next few days and contact their PCP if there are any concerns.  We discussed red flags such as fevers, chills, red, warm, tender joint at the area of injection to please seek medical care immediately.

## 2023-01-10 ENCOUNTER — HOSPITAL ENCOUNTER (OUTPATIENT)
Dept: RADIOLOGY | Facility: HOSPITAL | Age: 73
Discharge: HOME | End: 2023-01-10
Attending: NURSE PRACTITIONER
Payer: MEDICARE

## 2023-01-10 DIAGNOSIS — E04.1 THYROID NODULE: ICD-10-CM

## 2023-01-10 PROCEDURE — 76536 US EXAM OF HEAD AND NECK: CPT | Mod: 26,HCNC,, | Performed by: RADIOLOGY

## 2023-01-10 PROCEDURE — 76536 US EXAM OF HEAD AND NECK: CPT | Mod: TC,HCNC

## 2023-01-10 PROCEDURE — 76536 US SOFT TISSUE HEAD NECK THYROID: ICD-10-PCS | Mod: 26,HCNC,, | Performed by: RADIOLOGY

## 2023-01-19 ENCOUNTER — OFFICE VISIT (OUTPATIENT)
Dept: CARDIOLOGY | Facility: CLINIC | Age: 73
End: 2023-01-19
Payer: MEDICARE

## 2023-01-19 ENCOUNTER — OFFICE VISIT (OUTPATIENT)
Dept: INTERNAL MEDICINE | Facility: CLINIC | Age: 73
End: 2023-01-19
Payer: MEDICARE

## 2023-01-19 VITALS
HEART RATE: 61 BPM | WEIGHT: 163.13 LBS | BODY MASS INDEX: 28.9 KG/M2 | HEIGHT: 63 IN | DIASTOLIC BLOOD PRESSURE: 68 MMHG | SYSTOLIC BLOOD PRESSURE: 128 MMHG | OXYGEN SATURATION: 98 %

## 2023-01-19 VITALS
BODY MASS INDEX: 28.9 KG/M2 | SYSTOLIC BLOOD PRESSURE: 128 MMHG | TEMPERATURE: 98 F | HEART RATE: 68 BPM | RESPIRATION RATE: 18 BRPM | OXYGEN SATURATION: 99 % | DIASTOLIC BLOOD PRESSURE: 60 MMHG | WEIGHT: 163.13 LBS

## 2023-01-19 DIAGNOSIS — E78.5 HYPERLIPIDEMIA, UNSPECIFIED HYPERLIPIDEMIA TYPE: ICD-10-CM

## 2023-01-19 DIAGNOSIS — F43.21 GRIEF: ICD-10-CM

## 2023-01-19 DIAGNOSIS — I70.0 ABDOMINAL AORTIC ATHEROSCLEROSIS: ICD-10-CM

## 2023-01-19 DIAGNOSIS — R09.89 BRUIT OF LEFT CAROTID ARTERY: ICD-10-CM

## 2023-01-19 DIAGNOSIS — I70.219 ATHEROSCLEROTIC PVD WITH INTERMITTENT CLAUDICATION: Primary | ICD-10-CM

## 2023-01-19 DIAGNOSIS — I25.84 CORONARY ARTERY CALCIFICATION: ICD-10-CM

## 2023-01-19 DIAGNOSIS — F33.9 DEPRESSION, RECURRENT: ICD-10-CM

## 2023-01-19 DIAGNOSIS — I25.10 CORONARY ARTERY CALCIFICATION: ICD-10-CM

## 2023-01-19 DIAGNOSIS — Z79.4 TYPE 2 DIABETES MELLITUS WITH DIABETIC PERIPHERAL ANGIOPATHY WITHOUT GANGRENE, WITH LONG-TERM CURRENT USE OF INSULIN: Primary | ICD-10-CM

## 2023-01-19 DIAGNOSIS — T46.6X5A STATIN MYOPATHY: ICD-10-CM

## 2023-01-19 DIAGNOSIS — G72.0 STATIN MYOPATHY: ICD-10-CM

## 2023-01-19 DIAGNOSIS — I70.219 ATHEROSCLEROTIC PVD WITH INTERMITTENT CLAUDICATION: ICD-10-CM

## 2023-01-19 DIAGNOSIS — Z13.31 POSITIVE DEPRESSION SCREENING: ICD-10-CM

## 2023-01-19 DIAGNOSIS — E11.51 TYPE 2 DIABETES MELLITUS WITH DIABETIC PERIPHERAL ANGIOPATHY WITHOUT GANGRENE, WITH LONG-TERM CURRENT USE OF INSULIN: Primary | ICD-10-CM

## 2023-01-19 DIAGNOSIS — E04.1 THYROID NODULE: ICD-10-CM

## 2023-01-19 DIAGNOSIS — I10 HYPERTENSION, UNSPECIFIED TYPE: ICD-10-CM

## 2023-01-19 DIAGNOSIS — J43.9 PULMONARY EMPHYSEMA, UNSPECIFIED EMPHYSEMA TYPE: ICD-10-CM

## 2023-01-19 DIAGNOSIS — I65.29 STENOSIS OF CAROTID ARTERY, UNSPECIFIED LATERALITY: ICD-10-CM

## 2023-01-19 DIAGNOSIS — Z12.11 SCREEN FOR COLON CANCER: ICD-10-CM

## 2023-01-19 PROCEDURE — 3078F DIAST BP <80 MM HG: CPT | Mod: HCNC,CPTII,S$GLB, | Performed by: INTERNAL MEDICINE

## 2023-01-19 PROCEDURE — 3072F LOW RISK FOR RETINOPATHY: CPT | Mod: HCNC,CPTII,S$GLB, | Performed by: FAMILY MEDICINE

## 2023-01-19 PROCEDURE — 3288F PR FALLS RISK ASSESSMENT DOCUMENTED: ICD-10-PCS | Mod: HCNC,CPTII,S$GLB, | Performed by: INTERNAL MEDICINE

## 2023-01-19 PROCEDURE — 99215 PR OFFICE/OUTPT VISIT, EST, LEVL V, 40-54 MIN: ICD-10-PCS | Mod: HCNC,S$GLB,, | Performed by: FAMILY MEDICINE

## 2023-01-19 PROCEDURE — 1101F PR PT FALLS ASSESS DOC 0-1 FALLS W/OUT INJ PAST YR: ICD-10-PCS | Mod: HCNC,CPTII,S$GLB, | Performed by: FAMILY MEDICINE

## 2023-01-19 PROCEDURE — 1101F PT FALLS ASSESS-DOCD LE1/YR: CPT | Mod: HCNC,CPTII,S$GLB, | Performed by: INTERNAL MEDICINE

## 2023-01-19 PROCEDURE — 1126F AMNT PAIN NOTED NONE PRSNT: CPT | Mod: HCNC,CPTII,S$GLB, | Performed by: FAMILY MEDICINE

## 2023-01-19 PROCEDURE — 3288F PR FALLS RISK ASSESSMENT DOCUMENTED: ICD-10-PCS | Mod: HCNC,CPTII,S$GLB, | Performed by: FAMILY MEDICINE

## 2023-01-19 PROCEDURE — 99999 PR PBB SHADOW E&M-EST. PATIENT-LVL III: ICD-10-PCS | Mod: PBBFAC,HCNC,, | Performed by: INTERNAL MEDICINE

## 2023-01-19 PROCEDURE — 1126F PR PAIN SEVERITY QUANTIFIED, NO PAIN PRESENT: ICD-10-PCS | Mod: HCNC,CPTII,S$GLB, | Performed by: FAMILY MEDICINE

## 2023-01-19 PROCEDURE — 1159F MED LIST DOCD IN RCRD: CPT | Mod: HCNC,CPTII,S$GLB, | Performed by: INTERNAL MEDICINE

## 2023-01-19 PROCEDURE — 1126F AMNT PAIN NOTED NONE PRSNT: CPT | Mod: HCNC,CPTII,S$GLB, | Performed by: INTERNAL MEDICINE

## 2023-01-19 PROCEDURE — 3072F LOW RISK FOR RETINOPATHY: CPT | Mod: HCNC,CPTII,S$GLB, | Performed by: INTERNAL MEDICINE

## 2023-01-19 PROCEDURE — 99999 PR PBB SHADOW E&M-EST. PATIENT-LVL III: CPT | Mod: PBBFAC,HCNC,, | Performed by: INTERNAL MEDICINE

## 2023-01-19 PROCEDURE — 1101F PR PT FALLS ASSESS DOC 0-1 FALLS W/OUT INJ PAST YR: ICD-10-PCS | Mod: HCNC,CPTII,S$GLB, | Performed by: INTERNAL MEDICINE

## 2023-01-19 PROCEDURE — 1159F PR MEDICATION LIST DOCUMENTED IN MEDICAL RECORD: ICD-10-PCS | Mod: HCNC,CPTII,S$GLB, | Performed by: INTERNAL MEDICINE

## 2023-01-19 PROCEDURE — 99999 PR PBB SHADOW E&M-EST. PATIENT-LVL III: ICD-10-PCS | Mod: PBBFAC,HCNC,, | Performed by: FAMILY MEDICINE

## 2023-01-19 PROCEDURE — 3074F PR MOST RECENT SYSTOLIC BLOOD PRESSURE < 130 MM HG: ICD-10-PCS | Mod: HCNC,CPTII,S$GLB, | Performed by: FAMILY MEDICINE

## 2023-01-19 PROCEDURE — 1159F PR MEDICATION LIST DOCUMENTED IN MEDICAL RECORD: ICD-10-PCS | Mod: HCNC,CPTII,S$GLB, | Performed by: FAMILY MEDICINE

## 2023-01-19 PROCEDURE — 3008F BODY MASS INDEX DOCD: CPT | Mod: HCNC,CPTII,S$GLB, | Performed by: FAMILY MEDICINE

## 2023-01-19 PROCEDURE — 3074F SYST BP LT 130 MM HG: CPT | Mod: HCNC,CPTII,S$GLB, | Performed by: INTERNAL MEDICINE

## 2023-01-19 PROCEDURE — 1159F MED LIST DOCD IN RCRD: CPT | Mod: HCNC,CPTII,S$GLB, | Performed by: FAMILY MEDICINE

## 2023-01-19 PROCEDURE — 1160F PR REVIEW ALL MEDS BY PRESCRIBER/CLIN PHARMACIST DOCUMENTED: ICD-10-PCS | Mod: HCNC,CPTII,S$GLB, | Performed by: FAMILY MEDICINE

## 2023-01-19 PROCEDURE — 3008F PR BODY MASS INDEX (BMI) DOCUMENTED: ICD-10-PCS | Mod: HCNC,CPTII,S$GLB, | Performed by: INTERNAL MEDICINE

## 2023-01-19 PROCEDURE — 99215 OFFICE O/P EST HI 40 MIN: CPT | Mod: HCNC,S$GLB,, | Performed by: FAMILY MEDICINE

## 2023-01-19 PROCEDURE — 1126F PR PAIN SEVERITY QUANTIFIED, NO PAIN PRESENT: ICD-10-PCS | Mod: HCNC,CPTII,S$GLB, | Performed by: INTERNAL MEDICINE

## 2023-01-19 PROCEDURE — 3008F BODY MASS INDEX DOCD: CPT | Mod: HCNC,CPTII,S$GLB, | Performed by: INTERNAL MEDICINE

## 2023-01-19 PROCEDURE — 3072F PR LOW RISK FOR RETINOPATHY: ICD-10-PCS | Mod: HCNC,CPTII,S$GLB, | Performed by: FAMILY MEDICINE

## 2023-01-19 PROCEDURE — 3078F PR MOST RECENT DIASTOLIC BLOOD PRESSURE < 80 MM HG: ICD-10-PCS | Mod: HCNC,CPTII,S$GLB, | Performed by: INTERNAL MEDICINE

## 2023-01-19 PROCEDURE — 3074F PR MOST RECENT SYSTOLIC BLOOD PRESSURE < 130 MM HG: ICD-10-PCS | Mod: HCNC,CPTII,S$GLB, | Performed by: INTERNAL MEDICINE

## 2023-01-19 PROCEDURE — 3078F DIAST BP <80 MM HG: CPT | Mod: HCNC,CPTII,S$GLB, | Performed by: FAMILY MEDICINE

## 2023-01-19 PROCEDURE — 3008F PR BODY MASS INDEX (BMI) DOCUMENTED: ICD-10-PCS | Mod: HCNC,CPTII,S$GLB, | Performed by: FAMILY MEDICINE

## 2023-01-19 PROCEDURE — 3288F FALL RISK ASSESSMENT DOCD: CPT | Mod: HCNC,CPTII,S$GLB, | Performed by: FAMILY MEDICINE

## 2023-01-19 PROCEDURE — 99213 OFFICE O/P EST LOW 20 MIN: CPT | Mod: HCNC,S$GLB,, | Performed by: INTERNAL MEDICINE

## 2023-01-19 PROCEDURE — 3288F FALL RISK ASSESSMENT DOCD: CPT | Mod: HCNC,CPTII,S$GLB, | Performed by: INTERNAL MEDICINE

## 2023-01-19 PROCEDURE — 3072F PR LOW RISK FOR RETINOPATHY: ICD-10-PCS | Mod: HCNC,CPTII,S$GLB, | Performed by: INTERNAL MEDICINE

## 2023-01-19 PROCEDURE — 1160F RVW MEDS BY RX/DR IN RCRD: CPT | Mod: HCNC,CPTII,S$GLB, | Performed by: FAMILY MEDICINE

## 2023-01-19 PROCEDURE — 99999 PR PBB SHADOW E&M-EST. PATIENT-LVL III: CPT | Mod: PBBFAC,HCNC,, | Performed by: FAMILY MEDICINE

## 2023-01-19 PROCEDURE — 3074F SYST BP LT 130 MM HG: CPT | Mod: HCNC,CPTII,S$GLB, | Performed by: FAMILY MEDICINE

## 2023-01-19 PROCEDURE — 1101F PT FALLS ASSESS-DOCD LE1/YR: CPT | Mod: HCNC,CPTII,S$GLB, | Performed by: FAMILY MEDICINE

## 2023-01-19 PROCEDURE — 3078F PR MOST RECENT DIASTOLIC BLOOD PRESSURE < 80 MM HG: ICD-10-PCS | Mod: HCNC,CPTII,S$GLB, | Performed by: FAMILY MEDICINE

## 2023-01-19 PROCEDURE — 99213 PR OFFICE/OUTPT VISIT, EST, LEVL III, 20-29 MIN: ICD-10-PCS | Mod: HCNC,S$GLB,, | Performed by: INTERNAL MEDICINE

## 2023-01-19 RX ORDER — FLUOXETINE 10 MG/1
10 CAPSULE ORAL DAILY
Qty: 30 CAPSULE | Refills: 11 | Status: SHIPPED | OUTPATIENT
Start: 2023-01-19 | End: 2023-07-20 | Stop reason: SDUPTHER

## 2023-01-19 NOTE — PROGRESS NOTES
Rosana Aguiar  01/19/2023  [id number]    Carito Kohler MD  Patient Care Team:  Carito Kohler MD as PCP - General (Family Medicine)  Jonathan Coburn MD as Consulting Physician (Interventional Cardiology)  Jonatan Santana MD as Consulting Physician (Pulmonary Disease)  Yifan Murillo OD as Consulting Physician (Optometry)  Reginaldo Conde MD as Consulting Physician (Orthopedic Surgery)          Visit Type: Review AWV    Chief Complaint:  Chief Complaint   Patient presents with    Depression     Patient scored moderate risk on depression screening. She has been dealing with this for 2 years. She lost her  this past March to Cancer; she was his PCG. Her daughter also had colon CA and other medical conditions. She is now PCG for her ailing daughter who is living with her along with her college age grandson.     She was previously on Wellbutrin. The more she took it, the more she cried. She does not want to be on Wellbutrin again.     Edema     RLE pitting edema since before July. At times, it is swollen all the way past her knee. Painful at times, but has recently been less painful.        History of Present Illness:  1. Encounter for preventive health examination  She completed AWV   2. Pulmonary emphysema, unspecified emphysema type  Ct 11/22  Stable.      3. Atherosclerosis of aorta  CT 11/22  Statin  Didn't afford PSK-9     4. PVD (peripheral vascular disease)  Reports bilateral leg pain on exertion, improving with medication.   US 10/22  Stable.   Pletal    5. Hyperlipidemia associated with type 2 diabetes mellitus  A1c 6.4  Lipid-not at goal  Lab Results   Component Value Date    LDLCALC 168.6 (H) 07/11/2022   60 units of lantus  Lab Results   Component Value Date    HGBA1C 6.4 (H) 11/11/2022   Increase crestor to 40       6. Abnormal CT scan  Ct 11/22- followed with pulm    7. Coronary artery calcification  Ct 11/22  Discussed diagnosis and risk reduction.          8. Stenosis of carotid  artery, unspecified laterality  US 7/22  Crestor - try to increase, but had statin myopthay  Pletal     9. Hypertension, unspecified type  BP stable at today exam    10. Multiple thyroid nodules  US 1/22  Lab Results   Component Value Date    TSH 1.285 01/06/2022   Recheck Nodules       11. Osteopenia, unspecified location  Dexa 10/22  Continue current treatment plan as previously prescribed with your  pcp      12. Financial difficulties  [facility] financial resources information page given to patient.    - Ambulatory referral/consult to Outpatient Case Management     13. Tobacco use  Discussed the importance of smoking cessation and advised to quit smoking. Patient expressed understanding.   - Ambulatory referral/consult to Smoking Cessation Program; Future     14. Positive depression screening  PHQ 9-9  Reports stress  Denies SI  Tearful during discussion.   Discussed counseling. 15. Potential for cognitive impairment  Abnormal cognitive function screening.    Reports memory difficulties.   She tried Wellbutrin, for smoking cessation, she cried a lot with it.  She is having active grief. She is reporting nerves and anxiety.    History:  Past Medical History:   Diagnosis Date    Atherosclerotic PVD with intermittent claudication 10/19/2022    Diabetes mellitus, type 2 20 years    BS didn't check 07/26/2022    High cholesterol     Hypertension     Macular degeneration      Past Surgical History:   Procedure Laterality Date    APPENDECTOMY      CATARACT EXTRACTION Bilateral     cyst removal from breast (Left)      EYE SURGERY  Cataract    HYSTERECTOMY      TONSILLECTOMY      TUBAL LIGATION  1975     Family History   Problem Relation Age of Onset    Cancer Mother         female    Diabetes Mother     Macular degeneration Mother     Alcohol abuse Father     Cancer Daughter         colon    Diabetes Maternal Uncle     Heart disease Neg Hx     Stroke Neg Hx      Social History     Socioeconomic History    Marital status:     Tobacco Use    Smoking status: Every Day     Packs/day: 1.00     Years: 55.00     Pack years: 55.00     Types: Cigarettes     Start date: 1962     Passive exposure: Past    Smokeless tobacco: Never   Substance and Sexual Activity    Alcohol use: Yes     Comment: rarely    Drug use: Never    Sexual activity: Not Currently     Partners: Male     Birth control/protection: None     Social Determinants of Health     Financial Resource Strain: High Risk    Difficulty of Paying Living Expenses: Hard   Food Insecurity: No Food Insecurity    Worried About Running Out of Food in the Last Year: Never true    Ran Out of Food in the Last Year: Never true   Transportation Needs: No Transportation Needs    Lack of Transportation (Medical): No    Lack of Transportation (Non-Medical): No   Physical Activity: Inactive    Days of Exercise per Week: 0 days    Minutes of Exercise per Session: 0 min   Stress: Stress Concern Present    Feeling of Stress : Very much   Social Connections: Socially Isolated    Frequency of Communication with Friends and Family: More than three times a week    Frequency of Social Gatherings with Friends and Family: Once a week    Attends [employer] Services: Never    Active Member of Clubs or Organizations: No    Attends Club or Organization Meetings: Never    Marital Status:    Housing Stability: Low Risk     Unable to Pay for Housing in the Last Year: No    Number of Places Lived in the Last Year: 1    Unstable Housing in the Last Year: No     Patient Active Problem List   Diagnosis    Hyperlipidemia    Hypertension    Type 2 diabetes mellitus with diabetic peripheral angiopathy without gangrene, with long-term current use of insulin    Thyroid nodule    Asymptomatic microscopic hematuria    Statin myopathy    Bruit of left carotid artery    Abdominal aortic atherosclerosis    Grief    Decreased ROM of right shoulder    Shoulder weakness    Atherosclerotic PVD with intermittent claudication  "   Coronary artery calcification    Emphysema lung    Carotid stenosis    Osteopenia    Depression, recurrent     Review of patient's allergies indicates:   Allergen Reactions    Neomycin-bacitracin-polymyxin Itching       The following were reviewed at this visit: active problem list, medication list, allergies, family history, social history, and health maintenance.    Medications:  Current Outpatient Medications on File Prior to Visit   Medication Sig Dispense Refill    albuterol (PROVENTIL) 2.5 mg /3 mL (0.083 %) nebulizer solution Take 3 mLs (2.5 mg total) by nebulization every 6 (six) hours as needed for Wheezing. Rescue 360 mL 5    amLODIPine (NORVASC) 10 MG tablet Take 1 tablet (10 mg total) by mouth once daily. 90 tablet 3    aspirin (ECOTRIN) 81 MG EC tablet Take 1 tablet (81 mg total) by mouth once daily. 90 tablet 3    carvediloL (COREG) 6.25 MG tablet Take 1 tablet (6.25 mg total) by mouth 2 (two) times daily with meals. 60 tablet 11    cilostazoL (PLETAL) 50 MG Tab Take 1 tablet (50 mg total) by mouth 2 (two) times daily. 60 tablet 11    insulin glargine U-300 conc (TOUJEO MAX U-300 SOLOSTAR) 300 unit/mL (3 mL) insulin pen Inject 60 Units into the skin once daily. 3 pen 11    losartan-hydrochlorothiazide 100-25 mg (HYZAAR) 100-25 mg per tablet Take 1 tablet by mouth once daily. 90 tablet 3    nicotine (NICODERM CQ) 21 mg/24 hr Place 1 patch onto the skin once daily. 28 patch 0    pen needle, diabetic (BD CAROL 2ND GEN PEN NEEDLE) 32 gauge x 5/32" Ndle 1 each by Misc.(Non-Drug; Combo Route) route once daily. 100 each 1    rosuvastatin (CRESTOR) 20 MG tablet Take 1 tablet (20 mg total) by mouth once daily. 30 tablet 6    [DISCONTINUED] ALPRAZolam (XANAX) 0.25 MG tablet Take 1 tablet (0.25 mg total) by mouth 2 (two) times daily as needed for Anxiety. (Patient not taking: Reported on 12/19/2022) 30 tablet 0    [DISCONTINUED] buPROPion (WELLBUTRIN SR) 150 MG TBSR 12 hr tablet Take 1 tablet by mouth once " daily for 7 days, THEN take 1 tablet by mouth twice daily. (Patient not taking: Reported on 1/19/2023) 60 tablet 0     No current facility-administered medications on file prior to visit.       Medications have been reviewed and reconciled with patient at this visit.  Barriers to medications reviewed with patient.    Adverse reactions to current medications reviewed with patient..    Over the counter medications reviewed and reconciled with patient.    Exam:  Wt Readings from Last 3 Encounters:   01/19/23 74 kg (163 lb 2.3 oz)   01/19/23 74 kg (163 lb 2.3 oz)   12/05/22 73.7 kg (162 lb 7.7 oz)     Temp Readings from Last 3 Encounters:   01/19/23 97.8 °F (36.6 °C)   09/22/22 98.1 °F (36.7 °C) (Tympanic)   08/25/22 98.2 °F (36.8 °C) (Tympanic)     BP Readings from Last 3 Encounters:   01/19/23 128/60   01/19/23 128/68   12/05/22 (!) 130/52     Pulse Readings from Last 3 Encounters:   01/19/23 68   01/19/23 61   12/05/22 72     Body mass index is 28.9 kg/m².      Review of Systems   Constitutional: Negative.  Negative for chills and fever.   HENT: Negative.  Negative for congestion, sinus pain and sore throat.    Eyes:  Negative for blurred vision and double vision.   Respiratory:  Negative for cough, sputum production, shortness of breath and wheezing.    Cardiovascular:  Negative for chest pain, palpitations and leg swelling.   Gastrointestinal:  Negative for abdominal pain, constipation, diarrhea, heartburn, nausea and vomiting.   Genitourinary: Negative.    Musculoskeletal: Negative.    Skin: Negative.  Negative for rash.   Neurological: Negative.    Endo/Heme/Allergies: Negative.  Negative for polydipsia. Does not bruise/bleed easily.   Psychiatric/Behavioral:  Negative for depression and substance abuse.    Physical Exam  Nursing note reviewed.   Pulmonary:      Effort: Pulmonary effort is normal. No respiratory distress.   Neurological:      Mental Status: She is alert and oriented to person, place, and time.    Psychiatric:         Mood and Affect: Mood normal.         Behavior: Behavior normal.         Thought Content: Thought content normal.         Judgment: Judgment normal.       Laboratory Reviewed ({Yes)  Lab Results   Component Value Date    WBC 7.26 07/11/2022    HGB 13.1 07/11/2022    HCT 41.3 07/11/2022     07/11/2022    CHOL 258 (H) 07/11/2022    TRIG 222 (H) 07/11/2022    HDL 45 07/11/2022    ALT 16 07/11/2022    AST 17 07/11/2022     07/11/2022    K 3.8 07/11/2022    CL 98 07/11/2022    CREATININE 0.9 07/11/2022    BUN 12 07/11/2022    CO2 25 07/11/2022    TSH 1.285 01/06/2022    HGBA1C 6.4 (H) 11/11/2022       Rosana was seen today for depression and edema.    Diagnoses and all orders for this visit:    Type 2 diabetes mellitus with diabetic peripheral angiopathy without gangrene, with long-term current use of insulin  -     Hemoglobin A1C; Future  -     Microalbumin/Creatinine Ratio, Urine; Future    Positive depression screening  Comments:  I have reviewed the positive depression score which warrants active treatment with psychotherapy and/or medications.    Hyperlipidemia, unspecified hyperlipidemia type  Shirley crestors for now  Hypertension, unspecified type  BP in goal range  Thyroid nodule  -     US Soft Tissue Head Neck Thyroid; Future    Statin myopathy  Back on Crestor. Want to increase, but worried about myalgia  Abdominal aortic atherosclerosis  Stain  Stop smoking  Grief  Prozac  Atherosclerotic PVD with intermittent claudication  Pletal  Stop smoking  Coronary artery calcification  Statin  Stop smoking    Pulmonary emphysema, unspecified emphysema type  Followed with pulm  CT recheck  PFT  Stop smoking    Depression, recurrent  -     FLUoxetine 10 MG capsule; Take 1 capsule (10 mg total) by mouth once daily.    Screen for colon cancer  -     Fecal Immunochemical Test (iFOBT); Future                Care Plan/Goals: Reviewed    Goals    None         Follow up: No follow-ups on  file.    After visit summary was printed and given to patient upon discharge today.  Patient goals and care plan are included in After Visit Summary.  I have used clinical judgement based on duration and functional status to consider definite necessity for treatment.

## 2023-01-19 NOTE — PROGRESS NOTES
Subjective:   Patient ID:  Rosana Aguiar is a 72 y.o. female who presents for follow up of No chief complaint on file.      HPI  10/19/2022  A 71 yo female with htn hlp diabetes ? Allergy to statins is here for evaluation of pvd. She is compliant with salt her diabetes is controlled . Has known h/o carotid disease aSYMPTOMATIC. SHE IS HERE FRO EVALUATION OF PVD SHE HAS LIMITING CLAUDICATION OF BOTH LOWER EXTREMITIES WORSE ON THE RIGHT SIDE. SHE IS AVOIDING WALKING TO AVOID PAIN. HAS NO NOCTURNAL SYMPTOMS. SHE CONTINUES TO SMOKE HAS NO ULCERATION OR DISCOLORATION IN FEET. HAS NO ULCERATION. HAS NO NUMBNESS OR TINGLING./ HE RLEG PAIN DID NOT IMPROVE WITH STOPPING STATINS.  HAS BALANCE ISSUE AND FEELS WOBBLY AT TIMES ? NEUROPATHY. HAS NO FALLS. DISCUSSED SMOKING CESSATION NOT INTERESTED IN GETTING HELP FROM OUR TEAm. Not sure she wants to quit smoking still dealing with death of  few months back.  She also was offered pcsk9 inhibitors was too costly for her to try.    1/19/2023  Tolerated meds well has no further leg pain or claudication she is still smoking follows with pulmonary . Cardiac w/u negative. Was evaluated by pulmonary  Past Medical History:   Diagnosis Date    Atherosclerotic PVD with intermittent claudication 10/19/2022    Diabetes mellitus, type 2 20 years    BS didn't check 07/26/2022    High cholesterol     Hypertension     Macular degeneration        Past Surgical History:   Procedure Laterality Date    APPENDECTOMY      CATARACT EXTRACTION Bilateral     cyst removal from breast (Left)      HYSTERECTOMY      TONSILLECTOMY         Social History     Tobacco Use    Smoking status: Every Day     Packs/day: 1.00     Years: 53.00     Pack years: 53.00     Types: Cigarettes    Smokeless tobacco: Never   Substance Use Topics    Alcohol use: Yes     Comment: rarely    Drug use: Never       Family History   Problem Relation Age of Onset    Cancer Mother         female    Diabetes Mother     Macular  "degeneration Mother     Alcohol abuse Father     Cancer Daughter         colon    Diabetes Maternal Uncle     Heart disease Neg Hx     Stroke Neg Hx        Current Outpatient Medications   Medication Sig    albuterol (PROVENTIL) 2.5 mg /3 mL (0.083 %) nebulizer solution Take 3 mLs (2.5 mg total) by nebulization every 6 (six) hours as needed for Wheezing. Rescue    amLODIPine (NORVASC) 10 MG tablet Take 1 tablet (10 mg total) by mouth once daily.    aspirin (ECOTRIN) 81 MG EC tablet Take 1 tablet (81 mg total) by mouth once daily.    buPROPion (WELLBUTRIN SR) 150 MG TBSR 12 hr tablet Take 1 tablet by mouth once daily for 7 days, THEN take 1 tablet by mouth twice daily.    carvediloL (COREG) 6.25 MG tablet Take 1 tablet (6.25 mg total) by mouth 2 (two) times daily with meals.    cilostazoL (PLETAL) 50 MG Tab Take 1 tablet (50 mg total) by mouth 2 (two) times daily.    insulin glargine U-300 conc (TOUJEO MAX U-300 SOLOSTAR) 300 unit/mL (3 mL) insulin pen Inject 60 Units into the skin once daily.    losartan-hydrochlorothiazide 100-25 mg (HYZAAR) 100-25 mg per tablet Take 1 tablet by mouth once daily.    nicotine (NICODERM CQ) 21 mg/24 hr Place 1 patch onto the skin once daily.    pen needle, diabetic (BD CAROL 2ND GEN PEN NEEDLE) 32 gauge x 5/32" Ndle 1 each by Misc.(Non-Drug; Combo Route) route once daily.    rosuvastatin (CRESTOR) 20 MG tablet Take 1 tablet (20 mg total) by mouth once daily.    ALPRAZolam (XANAX) 0.25 MG tablet Take 1 tablet (0.25 mg total) by mouth 2 (two) times daily as needed for Anxiety. (Patient not taking: Reported on 12/19/2022)     No current facility-administered medications for this visit.     Current Outpatient Medications on File Prior to Visit   Medication Sig    albuterol (PROVENTIL) 2.5 mg /3 mL (0.083 %) nebulizer solution Take 3 mLs (2.5 mg total) by nebulization every 6 (six) hours as needed for Wheezing. Rescue    amLODIPine (NORVASC) 10 MG tablet Take 1 tablet (10 mg total) by " "mouth once daily.    aspirin (ECOTRIN) 81 MG EC tablet Take 1 tablet (81 mg total) by mouth once daily.    buPROPion (WELLBUTRIN SR) 150 MG TBSR 12 hr tablet Take 1 tablet by mouth once daily for 7 days, THEN take 1 tablet by mouth twice daily.    carvediloL (COREG) 6.25 MG tablet Take 1 tablet (6.25 mg total) by mouth 2 (two) times daily with meals.    cilostazoL (PLETAL) 50 MG Tab Take 1 tablet (50 mg total) by mouth 2 (two) times daily.    insulin glargine U-300 conc (TOUJEO MAX U-300 SOLOSTAR) 300 unit/mL (3 mL) insulin pen Inject 60 Units into the skin once daily.    losartan-hydrochlorothiazide 100-25 mg (HYZAAR) 100-25 mg per tablet Take 1 tablet by mouth once daily.    nicotine (NICODERM CQ) 21 mg/24 hr Place 1 patch onto the skin once daily.    pen needle, diabetic (BD CAROL 2ND GEN PEN NEEDLE) 32 gauge x 5/32" Ndle 1 each by Misc.(Non-Drug; Combo Route) route once daily.    rosuvastatin (CRESTOR) 20 MG tablet Take 1 tablet (20 mg total) by mouth once daily.    ALPRAZolam (XANAX) 0.25 MG tablet Take 1 tablet (0.25 mg total) by mouth 2 (two) times daily as needed for Anxiety. (Patient not taking: Reported on 12/19/2022)     No current facility-administered medications on file prior to visit.     Review of patient's allergies indicates:   Allergen Reactions    Neomycin-bacitracin-polymyxin Itching      Review of Systems   Constitutional: Negative for diaphoresis, malaise/fatigue and weight gain.   HENT:  Negative for hoarse voice.    Eyes:  Negative for double vision and visual disturbance.   Cardiovascular:  Negative for chest pain, claudication, cyanosis, dyspnea on exertion, irregular heartbeat, leg swelling, near-syncope, orthopnea, palpitations, paroxysmal nocturnal dyspnea and syncope.   Respiratory:  Negative for cough, hemoptysis, shortness of breath and snoring.    Hematologic/Lymphatic: Negative for bleeding problem. Does not bruise/bleed easily.   Skin:  Negative for color change and poor wound " "healing.   Musculoskeletal:  Negative for muscle cramps, muscle weakness and myalgias.   Gastrointestinal:  Negative for bloating, abdominal pain, change in bowel habit, diarrhea, heartburn, hematemesis, hematochezia, melena and nausea.   Neurological:  Negative for excessive daytime sleepiness, dizziness, headaches, light-headedness, loss of balance, numbness and weakness.   Psychiatric/Behavioral:  Negative for memory loss. The patient does not have insomnia.    Allergic/Immunologic: Negative for hives.     Objective:   Physical Exam  Vitals:    01/19/23 1443 01/19/23 1444   BP: 126/60 128/68   Pulse: 61 61   SpO2: 98%    Weight: 74 kg (163 lb 2.3 oz)    Height: 5' 3" (1.6 m)      Lab Results   Component Value Date    CHOL 258 (H) 07/11/2022    CHOL 141 01/06/2022     Lab Results   Component Value Date    HDL 45 07/11/2022    HDL 54 01/06/2022     Lab Results   Component Value Date    LDLCALC 168.6 (H) 07/11/2022    LDLCALC 64.8 01/06/2022     Lab Results   Component Value Date    TRIG 222 (H) 07/11/2022    TRIG 111 01/06/2022     Lab Results   Component Value Date    CHOLHDL 17.4 (L) 07/11/2022    CHOLHDL 38.3 01/06/2022       Chemistry        Component Value Date/Time     07/11/2022 1201    K 3.8 07/11/2022 1201    CL 98 07/11/2022 1201    CO2 25 07/11/2022 1201    BUN 12 07/11/2022 1201    CREATININE 0.9 07/11/2022 1201     07/11/2022 1201        Component Value Date/Time    CALCIUM 9.7 07/11/2022 1201    ALKPHOS 83 07/11/2022 1201    AST 17 07/11/2022 1201    ALT 16 07/11/2022 1201    BILITOT 0.3 07/11/2022 1201    ESTGFRAFRICA >60.0 07/11/2022 1201    EGFRNONAA >60.0 07/11/2022 1201          Lab Results   Component Value Date    TSH 1.285 01/06/2022     No results found for: INR, PROTIME  Lab Results   Component Value Date    WBC 7.26 07/11/2022    HGB 13.1 07/11/2022    HCT 41.3 07/11/2022    MCV 99 (H) 07/11/2022     07/11/2022     BMP  Sodium   Date Value Ref Range Status   07/11/2022 " 136 136 - 145 mmol/L Final     Potassium   Date Value Ref Range Status   07/11/2022 3.8 3.5 - 5.1 mmol/L Final     Chloride   Date Value Ref Range Status   07/11/2022 98 95 - 110 mmol/L Final     CO2   Date Value Ref Range Status   07/11/2022 25 23 - 29 mmol/L Final     BUN   Date Value Ref Range Status   07/11/2022 12 8 - 23 mg/dL Final     Creatinine   Date Value Ref Range Status   07/11/2022 0.9 0.5 - 1.4 mg/dL Final     Calcium   Date Value Ref Range Status   07/11/2022 9.7 8.7 - 10.5 mg/dL Final     Anion Gap   Date Value Ref Range Status   07/11/2022 13 8 - 16 mmol/L Final     eGFR if    Date Value Ref Range Status   07/11/2022 >60.0 >60 mL/min/1.73 m^2 Final     eGFR if non    Date Value Ref Range Status   07/11/2022 >60.0 >60 mL/min/1.73 m^2 Final     Comment:     Calculation used to obtain the estimated glomerular filtration  rate (eGFR) is the CKD-EPI equation.        CrCl cannot be calculated (Patient's most recent lab result is older than the maximum 7 days allowed.).  Conclusion         Normal myocardial perfusion scan. There is no evidence of myocardial ischemia or infarction.    There is a mild intensity fixed perfusion abnormality in the lateral and inferobasilar wall of the left ventricle, secondary to soft tissue attenuation.    The gated perfusion images showed an ejection fraction of 73% at rest. The gated perfusion images showed an ejection fraction of 71% post stress.    There is normal wall motion at rest and post stress.    The EKG portion of this study is negative for ischemia.    The patient reported no chest pain during the stress test.    There were no arrhythmias during stress.      Summary    The left ventricle is normal in size with mild concentric hypertrophy and normal systolic function.  The estimated ejection fraction is 60%.  Grade I left ventricular diastolic dysfunction.  Normal right ventricular size with normal right ventricular systolic  function.  Moderate aortic regurgitation.  Mild tricuspid regurgitation.  Normal central venous pressure (3 mmHg).  The estimated PA systolic pressure is 29 mmHg.  Assessment:     1. Atherosclerotic PVD with intermittent claudication    2. Bruit of left carotid artery    3. Stenosis of carotid artery, unspecified laterality    4. Coronary artery calcification    5. Hyperlipidemia, unspecified hyperlipidemia type    6. Hypertension, unspecified type    7. Pulmonary emphysema, unspecified emphysema type    8. Thyroid nodule    9. Statin myopathy      Pvd with significant imporvement with walking capacity responded well to pletal antiplatelets continue the same.    Hlp tolerated statins well will repeat on f/u    Coronary calcification with negative ischemic w/u continue rf modification smoking cessation discussed.   Pulmonary emphysema follow with pulmonary. Continue the same.   Smoking cessation counseling performed.  Plan:   Continue current therapy  Cardiac low salt diet.  Risk factor modification and excercise program.  Smoking cessation counseling  F/u in 6 months with lipid cmp

## 2023-01-25 ENCOUNTER — HOSPITAL ENCOUNTER (OUTPATIENT)
Dept: RADIOLOGY | Facility: HOSPITAL | Age: 73
Discharge: HOME | End: 2023-01-25
Attending: NURSE PRACTITIONER
Payer: MEDICARE

## 2023-01-25 DIAGNOSIS — I65.22 CAROTID STENOSIS, LEFT: ICD-10-CM

## 2023-01-25 PROCEDURE — 93880 EXTRACRANIAL BILAT STUDY: CPT | Mod: 26,HCNC,, | Performed by: RADIOLOGY

## 2023-01-25 PROCEDURE — 93880 US CAROTID BILATERAL: ICD-10-PCS | Mod: 26,HCNC,, | Performed by: RADIOLOGY

## 2023-01-25 PROCEDURE — 93880 EXTRACRANIAL BILAT STUDY: CPT | Mod: TC,HCNC

## 2023-01-31 ENCOUNTER — CLINICAL SUPPORT (OUTPATIENT)
Dept: SMOKING CESSATION | Facility: CLINIC | Age: 73
End: 2023-01-31

## 2023-01-31 ENCOUNTER — OFFICE VISIT (OUTPATIENT)
Dept: SPORTS MEDICINE | Facility: CLINIC | Age: 73
End: 2023-01-31
Payer: MEDICARE

## 2023-01-31 DIAGNOSIS — M25.511 CHRONIC RIGHT SHOULDER PAIN: ICD-10-CM

## 2023-01-31 DIAGNOSIS — M75.31 CALCIFIC TENDINITIS OF RIGHT SHOULDER: Primary | ICD-10-CM

## 2023-01-31 DIAGNOSIS — G89.29 CHRONIC RIGHT SHOULDER PAIN: ICD-10-CM

## 2023-01-31 DIAGNOSIS — F17.200 NICOTINE DEPENDENCE: ICD-10-CM

## 2023-01-31 PROCEDURE — 3072F PR LOW RISK FOR RETINOPATHY: ICD-10-PCS | Mod: HCNC,CPTII,S$GLB, | Performed by: STUDENT IN AN ORGANIZED HEALTH CARE EDUCATION/TRAINING PROGRAM

## 2023-01-31 PROCEDURE — 3072F LOW RISK FOR RETINOPATHY: CPT | Mod: HCNC,CPTII,S$GLB, | Performed by: STUDENT IN AN ORGANIZED HEALTH CARE EDUCATION/TRAINING PROGRAM

## 2023-01-31 PROCEDURE — 99999 PR PBB SHADOW E&M-EST. PATIENT-LVL II: CPT | Mod: PBBFAC,,,

## 2023-01-31 PROCEDURE — 99999 PR PBB SHADOW E&M-EST. PATIENT-LVL II: ICD-10-PCS | Mod: PBBFAC,,,

## 2023-01-31 PROCEDURE — 99403 PREV MED CNSL INDIV APPRX 45: CPT | Mod: S$GLB,,, | Performed by: GENERAL PRACTICE

## 2023-01-31 PROCEDURE — 1126F AMNT PAIN NOTED NONE PRSNT: CPT | Mod: HCNC,CPTII,S$GLB, | Performed by: STUDENT IN AN ORGANIZED HEALTH CARE EDUCATION/TRAINING PROGRAM

## 2023-01-31 PROCEDURE — 99999 PR PBB SHADOW E&M-EST. PATIENT-LVL III: ICD-10-PCS | Mod: PBBFAC,HCNC,, | Performed by: STUDENT IN AN ORGANIZED HEALTH CARE EDUCATION/TRAINING PROGRAM

## 2023-01-31 PROCEDURE — 1101F PR PT FALLS ASSESS DOC 0-1 FALLS W/OUT INJ PAST YR: ICD-10-PCS | Mod: HCNC,CPTII,S$GLB, | Performed by: STUDENT IN AN ORGANIZED HEALTH CARE EDUCATION/TRAINING PROGRAM

## 2023-01-31 PROCEDURE — 1126F PR PAIN SEVERITY QUANTIFIED, NO PAIN PRESENT: ICD-10-PCS | Mod: HCNC,CPTII,S$GLB, | Performed by: STUDENT IN AN ORGANIZED HEALTH CARE EDUCATION/TRAINING PROGRAM

## 2023-01-31 PROCEDURE — 99213 PR OFFICE/OUTPT VISIT, EST, LEVL III, 20-29 MIN: ICD-10-PCS | Mod: HCNC,S$GLB,, | Performed by: STUDENT IN AN ORGANIZED HEALTH CARE EDUCATION/TRAINING PROGRAM

## 2023-01-31 PROCEDURE — 3288F FALL RISK ASSESSMENT DOCD: CPT | Mod: HCNC,CPTII,S$GLB, | Performed by: STUDENT IN AN ORGANIZED HEALTH CARE EDUCATION/TRAINING PROGRAM

## 2023-01-31 PROCEDURE — 1101F PT FALLS ASSESS-DOCD LE1/YR: CPT | Mod: HCNC,CPTII,S$GLB, | Performed by: STUDENT IN AN ORGANIZED HEALTH CARE EDUCATION/TRAINING PROGRAM

## 2023-01-31 PROCEDURE — 99999 PR PBB SHADOW E&M-EST. PATIENT-LVL III: CPT | Mod: PBBFAC,HCNC,, | Performed by: STUDENT IN AN ORGANIZED HEALTH CARE EDUCATION/TRAINING PROGRAM

## 2023-01-31 PROCEDURE — 3288F PR FALLS RISK ASSESSMENT DOCUMENTED: ICD-10-PCS | Mod: HCNC,CPTII,S$GLB, | Performed by: STUDENT IN AN ORGANIZED HEALTH CARE EDUCATION/TRAINING PROGRAM

## 2023-01-31 PROCEDURE — 99403 PR PREVENT COUNSEL,INDIV,45 MIN: ICD-10-PCS | Mod: S$GLB,,, | Performed by: GENERAL PRACTICE

## 2023-01-31 PROCEDURE — 99213 OFFICE O/P EST LOW 20 MIN: CPT | Mod: HCNC,S$GLB,, | Performed by: STUDENT IN AN ORGANIZED HEALTH CARE EDUCATION/TRAINING PROGRAM

## 2023-01-31 RX ORDER — IBUPROFEN 200 MG
1 TABLET ORAL DAILY
Qty: 28 PATCH | Refills: 0 | Status: SHIPPED | OUTPATIENT
Start: 2023-01-31 | End: 2024-01-22

## 2023-01-31 NOTE — Clinical Note
Spoke to patient over the phone in regard to her smoking cessation progress. She is smoking 10 cpd and was smoking 20 cpd. Commended patient on reducing her smoking by 50%. The patient states that she has discontinued taking Wellbutrin due to adverse side effects; she became very emotional after taking for 4 days. She is back to her baseline and feeling better since discontinuing Wellbutrin. She recently saw her PCP and started on a new medication for anxiety; patient states it is working well for her. She continues to use 21 mg nicotine patch QD with no side effects at this time. She has been purchasing nicotine patches online due to her pharmacy not accepting her Good Rx coupon. Called patient's pharmacy today to provide Good Rx coverage information and confirmed discount. Called patient to inform her. She will  nicotine patches today. Quit date discussed, but not set. Patient declined scheduling a follow up clinic visit today, but agreed to a follow up telephone call.

## 2023-01-31 NOTE — PROGRESS NOTES
Individual Follow-Up Form    1/31/2023    Quit Date: TBD    Clinical Status of Patient: Outpatient    Continuing Medication: yes  Patches     Target Symptoms: Withdrawal and medication side effects. The following were  rated moderate (3) to severe (4) on TCRS:  Moderate (3): none  Severe (4): none    Comments: Spoke to patient over the phone in regard to her smoking cessation progress. She is smoking 10 cpd and was smoking 20 cpd. Commended patient on reducing her smoking by 50%. The patient states that she has discontinued taking Wellbutrin due to adverse side effects; she became very emotional after taking for 4 days. She is back to her baseline and feeling better since discontinuing Wellbutrin. She recently saw her PCP and started on a new medication for anxiety; patient states it is working well for her. She continues to use 21 mg nicotine patch QD with no side effects at this time. She has been purchasing nicotine patches online due to her pharmacy not accepting her Good Rx coupon. Called patient's pharmacy today to provide Good Rx coverage information and confirmed discount. Called patient to inform her. She will  nicotine patches today. Quit date discussed, but not set. Patient declined scheduling a follow up clinic visit today, but agreed to a follow up telephone call. The patient denies any abnormal behavioral or mental changes at this time.  Will continue to encourage and monitor her progress.     Diagnosis: F17.200    Next Visit: 4 weeks

## 2023-01-31 NOTE — PATIENT INSTRUCTIONS
Assessment:  Rosana Aguiar is a 72 y.o. female with a chief complaint of Follow-up (R shoulder pain s/p barbitage, CSI 12/20/22)    Encounter Diagnoses   Name Primary?    Calcific tendinitis of right shoulder Yes    Chronic right shoulder pain       Plan:  Rosana has experienced excellent pain relief and functional improvement since tenotomy, barbotage, and SA CSI at last visit  She established with PT and has been active with Home Exercise Program since that time  Recommend she continue current plan, continue home exercise program  OTC NSAIDs p.r.n.  If relief plateaus, or pain recurs, consider reimaging with x-ray or ultrasound to re-evaluate her calcific tendinitis, may be candidate for TenJet      Follow-up: 3 months or sooner if there are any problems between now and then.    Thank you for choosing [facility] and Dr. Reginaldo Conde for your orthopedic & sports medicine care. It is our goal to provide you with exceptional care that will help keep you healthy, active, and get you back in the game.    Please do not hesitate to reach out to us via email, phone, or MyChart with any questions, concerns, or feedback.    If you are experiencing pain/discomfort ,or have questions after 5pm and would like to be connected to the [facility] on-call team, please call this number and specify which Sports Medicine provider is treating you: [phone]

## 2023-01-31 NOTE — PROGRESS NOTES
Patient ID: Rosana Aguiar  YOB: 1950  MRN: [id number]    Chief Complaint: Follow-up (R shoulder pain s/p [place], CSI 12/20/22)    Referred By: Self    Occupation: Retired    History of Present Illness: Rosana Aguiar is a left-hand dominant 72 y.o. female who presents today with 5/10 pain at her follow up for right shoulder pain.    She was initially seen on 9/15/22.  Patient states that it began hurting last year in 2021 when supporting  when walking, lifting, transferring, etc. She describes the pain as and intermittent pain with soreness. She uses ibuprofen for relief. She states movement and lifting makes the pain worse. Her  passed away 6 months ago, and it has been a difficult period for her. Her pain is now significantly limiting her ADLs and performing simple self care & housework.     XR and POCUS demonstrated calcific tendonitis of the rotator cuff.  Right shoulder CSI administered at the appointment on 9/15/22. She was referred to physical therapy.  She initially reported excellent pain relief.  Since then, the injection has worn off and her pain has returned.  She had a negative experience at physical therapy and stopped going in November.  She has continued her home exercises but the pain has persisted and has become just as severe as it was prior to her injection.  At her last visit on 12/20/22, US exam demonstrated softening of the calcific deposit.  Barbitage was performed with subsequent cortisone injection.    Today, she reports significant improvement in her right shoulder symptoms.  She has a home exercise program that she does regularly from when she was in PT in the past.  She is pleased with her progress.      Past Medical History:   Past Medical History:   Diagnosis Date    Atherosclerotic PVD with intermittent claudication 10/19/2022    Diabetes mellitus, type 2 20 years    BS didn't check 07/26/2022    High cholesterol     Hypertension     Macular  degeneration      Past Surgical History:   Procedure Laterality Date    APPENDECTOMY      CATARACT EXTRACTION Bilateral     cyst removal from breast (Left)      EYE SURGERY  Cataract    HYSTERECTOMY      TONSILLECTOMY      TUBAL LIGATION  1975     Family History   Problem Relation Age of Onset    Cancer Mother         female    Diabetes Mother     Macular degeneration Mother     Alcohol abuse Father     Cancer Daughter         colon    Diabetes Maternal Uncle     Heart disease Neg Hx     Stroke Neg Hx      Social History     Socioeconomic History    Marital status:    Tobacco Use    Smoking status: Every Day     Packs/day: 1.00     Years: 55.00     Pack years: 55.00     Types: Cigarettes     Start date: 1962     Passive exposure: Past    Smokeless tobacco: Never   Substance and Sexual Activity    Alcohol use: Yes     Comment: rarely    Drug use: Never    Sexual activity: Not Currently     Partners: Male     Birth control/protection: None     Social Determinants of Health     Financial Resource Strain: High Risk    Difficulty of Paying Living Expenses: Hard   Food Insecurity: No Food Insecurity    Worried About Running Out of Food in the Last Year: Never true    Ran Out of Food in the Last Year: Never true   Transportation Needs: No Transportation Needs    Lack of Transportation (Medical): No    Lack of Transportation (Non-Medical): No   Physical Activity: Inactive    Days of Exercise per Week: 0 days    Minutes of Exercise per Session: 0 min   Stress: Stress Concern Present    Feeling of Stress : Very much   Social Connections: Socially Isolated    Frequency of Communication with Friends and Family: More than three times a week    Frequency of Social Gatherings with Friends and Family: Once a week    Attends [employer] Services: Never    Active Member of Clubs or Organizations: No    Attends Club or Organization Meetings: Never    Marital Status:    Housing Stability: Low Risk     Unable to Pay for  "Housing in the Last Year: No    Number of Places Lived in the Last Year: 1    Unstable Housing in the Last Year: No     Medication List with Changes/Refills   Current Medications    ALBUTEROL (PROVENTIL) 2.5 MG /3 ML (0.083 %) NEBULIZER SOLUTION    Take 3 mLs (2.5 mg total) by nebulization every 6 (six) hours as needed for Wheezing. Rescue    AMLODIPINE (NORVASC) 10 MG TABLET    Take 1 tablet (10 mg total) by mouth once daily.    ASPIRIN (ECOTRIN) 81 MG EC TABLET    Take 1 tablet (81 mg total) by mouth once daily.    CARVEDILOL (COREG) 6.25 MG TABLET    Take 1 tablet (6.25 mg total) by mouth 2 (two) times daily with meals.    CILOSTAZOL (PLETAL) 50 MG TAB    Take 1 tablet (50 mg total) by mouth 2 (two) times daily.    FLUOXETINE 10 MG CAPSULE    Take 1 capsule (10 mg total) by mouth once daily.    INSULIN GLARGINE U-300 CONC (TOUJEO MAX U-300 SOLOSTAR) 300 UNIT/ML (3 ML) INSULIN PEN    Inject 60 Units into the skin once daily.    LOSARTAN-HYDROCHLOROTHIAZIDE 100-25 MG (HYZAAR) 100-25 MG PER TABLET    Take 1 tablet by mouth once daily.    PEN NEEDLE, DIABETIC (BD CAROL 2ND GEN PEN NEEDLE) 32 GAUGE X 5/32" NDLE    1 each by Misc.(Non-Drug; Combo Route) route once daily.    ROSUVASTATIN (CRESTOR) 20 MG TABLET    Take 1 tablet (20 mg total) by mouth once daily.   Changed and/or Refilled Medications    Modified Medication Previous Medication    NICOTINE (NICODERM CQ) 21 MG/24 HR nicotine (NICODERM CQ) 21 mg/24 hr       Place 1 patch onto the skin once daily.    Place 1 patch onto the skin once daily.     Review of patient's allergies indicates:   Allergen Reactions    Neomycin-bacitracin-polymyxin Itching       Physical Exam:   There is no height or weight on file to calculate BMI.    Physical Exam  Constitutional:       General: She is not in acute distress.     Appearance: Normal appearance.   HENT:      Head: Normocephalic and atraumatic.   Eyes:      Extraocular Movements: Extraocular movements intact.      " Conjunctiva/sclera: Conjunctivae normal.   Pulmonary:      Effort: Pulmonary effort is normal. No respiratory distress.   Skin:     General: Skin is warm and dry.   Neurological:      General: No focal deficit present.      Mental Status: She is alert and oriented to person, place, and time.   Psychiatric:         Mood and Affect: Mood normal.     Detailed MSK exam:     Right Shoulder:  Inspection: No effusion, erythema, or ecchymosis   Palpation tenderness: Bicipital Groove, lateral subacromial space  Range of motion: 165 deg Flexion         75 deg External Rotation in Adduction         IR to Lumbar  Strength:  4/5 Abduction    4/5 External Rotation in Adduction    5/5 Internal Rotation   Tests:  + Lisa, Neer's    + Empty Can  N/V Exam:  Radial: Normal motor (EPL/thumbs up)              Normal sensory (dorsal hand)   Median: Normal motor (FPL/A-OK)      Normal sensory (thumb)   Ulnar:  Normal motor (Interossei/scissors-spread)     Normal sensory (5th finger)   LABC: Normal sensory (lateral forearm)   MABC: Normal sensory (medial forearm)   MC: Normal motor (elbow flexion)   Axillary: Normal motor/sensory (deltoid)  Normal radial and ulnar pulses, warm and well perfused with capillary refill < 2 sec        Imaging:  XR Results:  Results for orders placed during the hospital encounter of 08/25/22    X-ray Shoulder 2 or More Views Right    Narrative  EXAMINATION:  XR SHOULDER COMPLETE 2 OR MORE VIEWS RIGHT    CLINICAL HISTORY:  Pain in right shoulder    TECHNIQUE:  Two or three views of the right shoulder were preformed.    COMPARISON:  None    FINDINGS:  8 mm calcific density seen adjacent to the margins of the greater tuberosity which is highly concerning for calcific tendinosis.  No acute fractures or dislocations visualized.  Joint spaces are preserved.    Impression  As above      Electronically signed by: Carlos Hill MD  Date:    08/25/2022  Time:    12:30      Relevant imaging results were reviewed and  interpreted by me.      This was discussed with the patient and / or family today.       Patient Instructions   Assessment:  Rosana Aguiar is a 72 y.o. female with a chief complaint of Follow-up (R shoulder pain s/p barbitage, CSI 12/20/22)    Encounter Diagnoses   Name Primary?    Calcific tendinitis of right shoulder Yes    Chronic right shoulder pain       Plan:  Rosana has experienced excellent pain relief and functional improvement since tenotomy, barbotage, and SA CSI at last visit  She established with PT and has been active with Home Exercise Program since that time  Recommend she continue current plan, continue home exercise program  OTC NSAIDs p.r.n.  If relief plateaus, or pain recurs, consider reimaging with x-ray or ultrasound to re-evaluate her calcific tendinitis, may be candidate for TenJet      Follow-up: 3 months or sooner if there are any problems between now and then.    Thank you for choosing [facility] and Dr. Reginaldo Conde for your orthopedic & sports medicine care. It is our goal to provide you with exceptional care that will help keep you healthy, active, and get you back in the game.    Please do not hesitate to reach out to us via email, phone, or MyChart with any questions, concerns, or feedback.    If you are experiencing pain/discomfort ,or have questions after 5pm and would like to be connected to the [facility] on-call team, please call this number and specify which Sports Medicine provider is treating you: [phone]        Reginaldo Conde MD  Primary Care Sports Medicine      Disclaimer: This note was prepared using a voice recognition system and is likely to have sound alike errors within the text.

## 2023-02-07 DIAGNOSIS — Z00.00 ENCOUNTER FOR MEDICARE ANNUAL WELLNESS EXAM: ICD-10-CM

## 2023-02-09 DIAGNOSIS — Z00.00 ENCOUNTER FOR MEDICARE ANNUAL WELLNESS EXAM: ICD-10-CM

## 2023-02-24 ENCOUNTER — HOSPITAL ENCOUNTER (OUTPATIENT)
Dept: RADIOLOGY | Facility: HOSPITAL | Age: 73
Discharge: HOME | End: 2023-02-24
Attending: INTERNAL MEDICINE
Payer: MEDICARE

## 2023-02-24 DIAGNOSIS — R91.1 NODULE OF RIGHT LUNG: ICD-10-CM

## 2023-02-24 PROCEDURE — 71250 CT THORAX DX C-: CPT | Mod: 26,HCNC,, | Performed by: RADIOLOGY

## 2023-02-24 PROCEDURE — 71250 CT CHEST WITHOUT CONTRAST: ICD-10-PCS | Mod: 26,HCNC,, | Performed by: RADIOLOGY

## 2023-02-24 PROCEDURE — 71250 CT THORAX DX C-: CPT | Mod: TC,HCNC

## 2023-02-27 ENCOUNTER — OFFICE VISIT (OUTPATIENT)
Dept: PULMONOLOGY | Facility: CLINIC | Age: 73
End: 2023-02-27
Payer: MEDICARE

## 2023-02-27 VITALS
BODY MASS INDEX: 29.38 KG/M2 | HEART RATE: 63 BPM | SYSTOLIC BLOOD PRESSURE: 130 MMHG | OXYGEN SATURATION: 96 % | HEIGHT: 63 IN | RESPIRATION RATE: 18 BRPM | DIASTOLIC BLOOD PRESSURE: 58 MMHG | WEIGHT: 165.81 LBS

## 2023-02-27 DIAGNOSIS — F33.9 DEPRESSION, RECURRENT: ICD-10-CM

## 2023-02-27 DIAGNOSIS — J41.0 SIMPLE CHRONIC BRONCHITIS: ICD-10-CM

## 2023-02-27 DIAGNOSIS — R91.1 PULMONARY NODULE: Primary | ICD-10-CM

## 2023-02-27 DIAGNOSIS — I70.0 ABDOMINAL AORTIC ATHEROSCLEROSIS: ICD-10-CM

## 2023-02-27 DIAGNOSIS — I10 PRIMARY HYPERTENSION: ICD-10-CM

## 2023-02-27 DIAGNOSIS — Z79.4 TYPE 2 DIABETES MELLITUS WITH DIABETIC PERIPHERAL ANGIOPATHY WITHOUT GANGRENE, WITH LONG-TERM CURRENT USE OF INSULIN: ICD-10-CM

## 2023-02-27 DIAGNOSIS — E11.51 TYPE 2 DIABETES MELLITUS WITH DIABETIC PERIPHERAL ANGIOPATHY WITHOUT GANGRENE, WITH LONG-TERM CURRENT USE OF INSULIN: ICD-10-CM

## 2023-02-27 PROCEDURE — 1159F PR MEDICATION LIST DOCUMENTED IN MEDICAL RECORD: ICD-10-PCS | Mod: HCNC,CPTII,S$GLB, | Performed by: PHYSICIAN ASSISTANT

## 2023-02-27 PROCEDURE — 3072F PR LOW RISK FOR RETINOPATHY: ICD-10-PCS | Mod: HCNC,CPTII,S$GLB, | Performed by: PHYSICIAN ASSISTANT

## 2023-02-27 PROCEDURE — 3072F LOW RISK FOR RETINOPATHY: CPT | Mod: HCNC,CPTII,S$GLB, | Performed by: PHYSICIAN ASSISTANT

## 2023-02-27 PROCEDURE — 99214 OFFICE O/P EST MOD 30 MIN: CPT | Mod: HCNC,S$GLB,, | Performed by: PHYSICIAN ASSISTANT

## 2023-02-27 PROCEDURE — 3075F SYST BP GE 130 - 139MM HG: CPT | Mod: HCNC,CPTII,S$GLB, | Performed by: PHYSICIAN ASSISTANT

## 2023-02-27 PROCEDURE — 3075F PR MOST RECENT SYSTOLIC BLOOD PRESS GE 130-139MM HG: ICD-10-PCS | Mod: HCNC,CPTII,S$GLB, | Performed by: PHYSICIAN ASSISTANT

## 2023-02-27 PROCEDURE — 1101F PR PT FALLS ASSESS DOC 0-1 FALLS W/OUT INJ PAST YR: ICD-10-PCS | Mod: HCNC,CPTII,S$GLB, | Performed by: PHYSICIAN ASSISTANT

## 2023-02-27 PROCEDURE — 3078F DIAST BP <80 MM HG: CPT | Mod: HCNC,CPTII,S$GLB, | Performed by: PHYSICIAN ASSISTANT

## 2023-02-27 PROCEDURE — 99999 PR PBB SHADOW E&M-EST. PATIENT-LVL V: CPT | Mod: PBBFAC,HCNC,, | Performed by: PHYSICIAN ASSISTANT

## 2023-02-27 PROCEDURE — 99214 PR OFFICE/OUTPT VISIT, EST, LEVL IV, 30-39 MIN: ICD-10-PCS | Mod: HCNC,S$GLB,, | Performed by: PHYSICIAN ASSISTANT

## 2023-02-27 PROCEDURE — 3288F FALL RISK ASSESSMENT DOCD: CPT | Mod: HCNC,CPTII,S$GLB, | Performed by: PHYSICIAN ASSISTANT

## 2023-02-27 PROCEDURE — 1160F RVW MEDS BY RX/DR IN RCRD: CPT | Mod: HCNC,CPTII,S$GLB, | Performed by: PHYSICIAN ASSISTANT

## 2023-02-27 PROCEDURE — 1159F MED LIST DOCD IN RCRD: CPT | Mod: HCNC,CPTII,S$GLB, | Performed by: PHYSICIAN ASSISTANT

## 2023-02-27 PROCEDURE — 3078F PR MOST RECENT DIASTOLIC BLOOD PRESSURE < 80 MM HG: ICD-10-PCS | Mod: HCNC,CPTII,S$GLB, | Performed by: PHYSICIAN ASSISTANT

## 2023-02-27 PROCEDURE — 1101F PT FALLS ASSESS-DOCD LE1/YR: CPT | Mod: HCNC,CPTII,S$GLB, | Performed by: PHYSICIAN ASSISTANT

## 2023-02-27 PROCEDURE — 1160F PR REVIEW ALL MEDS BY PRESCRIBER/CLIN PHARMACIST DOCUMENTED: ICD-10-PCS | Mod: HCNC,CPTII,S$GLB, | Performed by: PHYSICIAN ASSISTANT

## 2023-02-27 PROCEDURE — 3288F PR FALLS RISK ASSESSMENT DOCUMENTED: ICD-10-PCS | Mod: HCNC,CPTII,S$GLB, | Performed by: PHYSICIAN ASSISTANT

## 2023-02-27 PROCEDURE — 99999 PR PBB SHADOW E&M-EST. PATIENT-LVL V: ICD-10-PCS | Mod: PBBFAC,HCNC,, | Performed by: PHYSICIAN ASSISTANT

## 2023-02-27 RX ORDER — TIOTROPIUM BROMIDE AND OLODATEROL 3.124; 2.736 UG/1; UG/1
1 SPRAY, METERED RESPIRATORY (INHALATION) DAILY
Qty: 4 G | Refills: 11 | Status: SHIPPED | OUTPATIENT
Start: 2023-02-27 | End: 2023-05-29 | Stop reason: SDUPTHER

## 2023-02-27 RX ORDER — ALBUTEROL SULFATE 90 UG/1
2 AEROSOL, METERED RESPIRATORY (INHALATION) EVERY 6 HOURS PRN
Qty: 18 G | Refills: 11 | Status: SHIPPED | OUTPATIENT
Start: 2023-02-27

## 2023-02-27 NOTE — ASSESSMENT & PLAN NOTE
Start Stiolto daily  Albuterol hfa prn  Discussed controller vs recuse inhaler  Pulmonary Disease Management referral

## 2023-02-27 NOTE — PROGRESS NOTES
Subjective:       Patient ID: Rosana Aguiar is a 73 y.o. female.    Chief Complaint: COPD      72yo female here for follow up of bronchitis and lung nodule  ~9mm right lower lobe nodule, initially found on Chest X Ray; she had Chest CT 11/2022 and then 3 month follow up Chest CT 2/2023  Here today to review most recent imaging; nodule is currently stable  Since last visit she has completed PFT and 6mwd  Spirometry is normal  She has daily symptoms of cough with clear sputum, SOB on exertion, mrC 2  She has nebulizer but says it makes her cough, she would prefer an inhaler  Down to half a pack a day smoking, she says she knows she needs to quit, has been a stressful year with  passing and she notes this stress contributes to her smoking; taking fluoxetine and using nicotine patches, has support from family to quit smoking      COPD Questionnaire  How often do you cough?: Some of the time  How often do you have phlegm (mucus) in your chest?: Some of the time  How often does your chest feel tight?: Almost never  When you walk up a hill or one flight of stairs, how often are you breathless?: All of the time  How often are you limited doing any activities at home?: Almost never  How often are you confident leaving the house despite your lung condition?: All of the time  How often do you sleep soundly?: Never  How often do you have energy?: Never  Total score: 23      Immunization History   Administered Date(s) Administered    COVID-19, MRNA, LN-S, PF (Pfizer) (Purple Cap) 03/13/2021, 04/03/2021    Pneumococcal Conjugate - 20 Valent 08/25/2022      Tobacco Use: High Risk    Smoking Tobacco Use: Every Day    Smokeless Tobacco Use: Never    Passive Exposure: Past      Past Medical History:   Diagnosis Date    Atherosclerotic PVD with intermittent claudication 10/19/2022    Diabetes mellitus, type 2 20 years    BS didn't check 07/26/2022    High cholesterol     Hypertension     Macular degeneration       Current  "Outpatient Medications on File Prior to Visit   Medication Sig Dispense Refill    albuterol (PROVENTIL) 2.5 mg /3 mL (0.083 %) nebulizer solution Take 3 mLs (2.5 mg total) by nebulization every 6 (six) hours as needed for Wheezing. Rescue 360 mL 5    amLODIPine (NORVASC) 10 MG tablet Take 1 tablet (10 mg total) by mouth once daily. 90 tablet 3    aspirin (ECOTRIN) 81 MG EC tablet Take 1 tablet (81 mg total) by mouth once daily. 90 tablet 3    carvediloL (COREG) 6.25 MG tablet Take 1 tablet (6.25 mg total) by mouth 2 (two) times daily with meals. 60 tablet 11    cilostazoL (PLETAL) 50 MG Tab Take 1 tablet (50 mg total) by mouth 2 (two) times daily. 60 tablet 11    FLUoxetine 10 MG capsule Take 1 capsule (10 mg total) by mouth once daily. 30 capsule 11    insulin glargine U-300 conc (TOUJEO MAX U-300 SOLOSTAR) 300 unit/mL (3 mL) insulin pen Inject 60 Units into the skin once daily. 3 pen 11    losartan-hydrochlorothiazide 100-25 mg (HYZAAR) 100-25 mg per tablet Take 1 tablet by mouth once daily. 90 tablet 3    nicotine (NICODERM CQ) 21 mg/24 hr Place 1 patch onto the skin once daily. 28 patch 0    pen needle, diabetic (BD CAROL 2ND GEN PEN NEEDLE) 32 gauge x 5/32" Ndle 1 each by Misc.(Non-Drug; Combo Route) route once daily. 100 each 1    rosuvastatin (CRESTOR) 20 MG tablet Take 1 tablet (20 mg total) by mouth once daily. 30 tablet 6     No current facility-administered medications on file prior to visit.        Review of Systems   Constitutional:  Positive for fatigue. Negative for fever, weight loss, appetite change and weakness.   HENT:  Negative for postnasal drip, rhinorrhea, sinus pressure, trouble swallowing and congestion.    Respiratory:  Positive for cough, sputum production and dyspnea on extertion. Negative for choking, chest tightness, shortness of breath and wheezing.    Cardiovascular:  Negative for chest pain and leg swelling.   Musculoskeletal:  Negative for gait problem and joint swelling. " "  Gastrointestinal:  Negative for nausea, vomiting and abdominal pain.   Neurological:  Negative for dizziness, weakness and headaches.   All other systems reviewed and are negative.    Objective:       Vitals:    02/27/23 0831   BP: (!) 130/58   Pulse: 63   Resp: 18   SpO2: 96%   Weight: 75.2 kg (165 lb 12.6 oz)   Height: 5' 3" (1.6 m)       Physical Exam   Constitutional: She is oriented to person, place, and time. She appears well-developed and well-nourished. No distress.   HENT:   Head: Normocephalic.   Mouth/Throat: Oropharynx is clear and moist.   Cardiovascular: Normal rate and regular rhythm.   Pulmonary/Chest: Effort normal. No respiratory distress. She has no wheezes. She has no rhonchi. She has no rales.   Musculoskeletal:         General: No edema.      Cervical back: Normal range of motion and neck supple.   Lymphadenopathy: No supraclavicular adenopathy is present.     She has no cervical adenopathy.   Neurological: She is alert and oriented to person, place, and time. Gait normal.   Skin: Skin is warm and dry.   Psychiatric: She has a normal mood and affect.   Vitals reviewed.  Personal Diagnostic Review    CT Chest Without Contrast  Narrative: EXAMINATION:  CT CHEST WITHOUT CONTRAST    CLINICAL HISTORY:  right lung nodule; Solitary pulmonary nodule    TECHNIQUE:  Low dose axial images, sagittal and coronal reformations were obtained from the thoracic inlet to the lung bases. Contrast was not administered.  All CT scans at this location are performed using dose modulation techniques as appropriate to a performed exam including the following: Automated exposure control; adjustment of the mA and/or kV  according to patient size.    COMPARISON:  11/30/2022    FINDINGS:  Base of Neck: No significant abnormality.    Thoracic soft tissues: Normal.    Aorta: Left-sided aortic arch.  No aneurysm.    Heart: Normal size. No effusion. Severe aortic and coronary artery atherosclerotic calcification.    Pulmonary " vasculature: Pulmonary arteries distribute normally.    Sammie/Mediastinum: No pathologic chasity enlargement.    Esophagus: Normal.    Upper Abdomen: No abnormality of the partially imaged upper abdomen.    Airways: Patent.    Lungs/Pleura: Interval minimal enlargement of the right lower lobe nodule now measuring 10 x 9 x 9 mm, previously 9 x 9 x 9 mm as remeasured on prior study of 11/30/2022.  no new nodules.    Bones: No acute fracture. No suspicious lytic or sclerotic lesions.  Impression: Interval minimal enlargement of the right lower lobe nodule now measuring 10 x 9 x 9 mm, previously 9 x 9 x 9 mm as remeasured on prior study of 11/30/2022. Consider PET-CT versus histologic sampling.    Electronically signed by: Jorge Grant  Date:    02/24/2023  Time:    15:03            Assessment/Plan:       Problem List Items Addressed This Visit          Psychiatric    Depression, recurrent     Stable, controlled, managed by PCP  offered psychiatry referral, patient declines             Pulmonary    Simple chronic bronchitis     Start Stiolto daily  Albuterol hfa prn  Discussed controller vs recuse inhaler  Pulmonary Disease Management referral           Relevant Medications    tiotropium-olodateroL (STIOLTO RESPIMAT) 2.5-2.5 mcg/actuation Mist    albuterol (VENTOLIN HFA) 90 mcg/actuation inhaler    Other Relevant Orders    Ambulatory referral/consult to Pulmonary Disease Management w/ Respiratory Therapist    Pulmonary nodule - Primary     Plan per Dr. Santana, repeat Chest CT in 3 months         Relevant Orders    CT Chest Without Contrast       Cardiac/Vascular    Hypertension     130/58; keep daily blood pressure log and follow up with PCP         Abdominal aortic atherosclerosis     Heart healthy diet and exercise encouraged             Endocrine    Type 2 diabetes mellitus with diabetic peripheral angiopathy without gangrene, with long-term current use of insulin     F/u regularly with PCP            Follow up in  about 3 months (around 5/27/2023) for chest ct and f/u Dr. Santana.    Discussed diagnosis, its evaluation, treatment and usual course. All questions answered.    Patient verbalized understanding of plan and left in no acute distress    Thank you for the courtesy of participating in the care of this patient    Fartun Blackman PA-C  [facility]

## 2023-03-02 ENCOUNTER — TELEPHONE (OUTPATIENT)
Dept: PULMONOLOGY | Facility: CLINIC | Age: 73
End: 2023-03-02
Payer: MEDICARE

## 2023-03-09 ENCOUNTER — CLINICAL SUPPORT (OUTPATIENT)
Dept: SMOKING CESSATION | Facility: CLINIC | Age: 73
End: 2023-03-09

## 2023-03-09 DIAGNOSIS — F17.200 NICOTINE DEPENDENCE: Primary | ICD-10-CM

## 2023-03-09 PROCEDURE — 99999 PR PBB SHADOW E&M-EST. PATIENT-LVL I: ICD-10-PCS | Mod: PBBFAC,,,

## 2023-03-09 PROCEDURE — 99999 PR PBB SHADOW E&M-EST. PATIENT-LVL I: CPT | Mod: PBBFAC,,,

## 2023-03-09 PROCEDURE — 99407 PR TOBACCO USE CESSATION INTENSIVE >10 MINUTES: ICD-10-PCS | Mod: S$GLB,,,

## 2023-03-09 PROCEDURE — 99407 BEHAV CHNG SMOKING > 10 MIN: CPT | Mod: S$GLB,,,

## 2023-03-09 NOTE — PROGRESS NOTES
Called pt to f/u on her 3 month smoking cessation quit status. Pt stated she is still smoking, but has cut back and still working on her quit.  Informed her of benefit period, phone follow ups, and contact information. Will complete smart form and will continue to follow up on quit #1 episode.

## 2023-03-15 ENCOUNTER — CLINICAL SUPPORT (OUTPATIENT)
Dept: PULMONOLOGY | Facility: CLINIC | Age: 73
End: 2023-03-15
Payer: MEDICARE

## 2023-03-15 DIAGNOSIS — J41.0 SIMPLE CHRONIC BRONCHITIS: ICD-10-CM

## 2023-03-15 PROCEDURE — 99999 PR PBB SHADOW E&M-EST. PATIENT-LVL III: CPT | Mod: PBBFAC,HCNC,,

## 2023-03-15 PROCEDURE — 99999 PR PBB SHADOW E&M-EST. PATIENT-LVL III: ICD-10-PCS | Mod: PBBFAC,HCNC,,

## 2023-03-15 NOTE — PATIENT INSTRUCTIONS
ACTION PLAN    GREEN ZONE  My sputum is clear/white/usual color and easily cleared.  My breathing is no harder than usual.  I can do my usual activities.  I can think clearly.   Take your usual medicines, including oxygen, as you are told to do so by your health care provider.   YELLOW ZONE  My sputum has change (color, thickness, amount).  I am more short of breath than usual.  I cough or wheeze more.  I weigh more and my legs/feet swell.  I cannot do my usual activities without resting.   Call your health care provider. You will probably be told to begin taking an antibiotic and prednisone. Have your pharmacy phone number available.   RED ZONE  I cannot cough out my sputum.  I am much more short of breath than normal.  I need to sit up to breathe  I cannot do my usual activities.  I am unable to speak more than one or two words at a time.  I am confused.   Call your health care provider. You may be asked to come in to be seen, told to go to the emergency room, or told to call 9-1-1.     HOW TO USE RESPIMAT INHALER

## 2023-03-15 NOTE — PROGRESS NOTES
"Pulmonary Disease Management  [facility]  Chronic Care Management  Initial Visit       Diagnosis: Emphysema of lung  Last Hospital Admission: na  Last provider visit: 2/27/23      Current Outpatient Medications:     albuterol (PROVENTIL) 2.5 mg /3 mL (0.083 %) nebulizer solution, Take 3 mLs (2.5 mg total) by nebulization every 6 (six) hours as needed for Wheezing. Rescue, Disp: 360 mL, Rfl: 5    albuterol (VENTOLIN HFA) 90 mcg/actuation inhaler, Inhale 2 puffs into the lungs every 6 (six) hours as needed for Wheezing. Rescue, Disp: 18 g, Rfl: 11    amLODIPine (NORVASC) 10 MG tablet, Take 1 tablet (10 mg total) by mouth once daily., Disp: 90 tablet, Rfl: 3    aspirin (ECOTRIN) 81 MG EC tablet, Take 1 tablet (81 mg total) by mouth once daily., Disp: 90 tablet, Rfl: 3    carvediloL (COREG) 6.25 MG tablet, Take 1 tablet (6.25 mg total) by mouth 2 (two) times daily with meals., Disp: 60 tablet, Rfl: 11    cilostazoL (PLETAL) 50 MG Tab, Take 1 tablet (50 mg total) by mouth 2 (two) times daily., Disp: 60 tablet, Rfl: 11    FLUoxetine 10 MG capsule, Take 1 capsule (10 mg total) by mouth once daily., Disp: 30 capsule, Rfl: 11    insulin glargine U-300 conc (TOUJEO MAX U-300 SOLOSTAR) 300 unit/mL (3 mL) insulin pen, Inject 60 Units into the skin once daily., Disp: 3 pen, Rfl: 11    losartan-hydrochlorothiazide 100-25 mg (HYZAAR) 100-25 mg per tablet, Take 1 tablet by mouth once daily., Disp: 90 tablet, Rfl: 3    nicotine (NICODERM CQ) 21 mg/24 hr, Place 1 patch onto the skin once daily., Disp: 28 patch, Rfl: 0    pen needle, diabetic (BD CAROL 2ND GEN PEN NEEDLE) 32 gauge x 5/32" Ndle, 1 each by Misc.(Non-Drug; Combo Route) route once daily., Disp: 100 each, Rfl: 1    rosuvastatin (CRESTOR) 20 MG tablet, Take 1 tablet (20 mg total) by mouth once daily., Disp: 30 tablet, Rfl: 6    tiotropium-olodateroL (STIOLTO RESPIMAT) 2.5-2.5 mcg/actuation Mist, Inhale 1 puff into the lungs once daily. Controller, Disp: 4 g, Rfl: " 11    Review of patient's allergies indicates:   Allergen Reactions    Neomycin-bacitracin-polymyxin Itching       Smoking history:   Social History     Tobacco Use   Smoking Status Every Day    Packs/day: 1.00    Years: 55.00    Pack years: 55.00    Types: Cigarettes    Start date: 1962    Passive exposure: Past   Smokeless Tobacco Never                                                                                                                                ACT Questionnaire:       [place] Sleepiness Scale:       COPD Questionnaire:          PFT Results:  Pre FVC   Date/Time Value Ref Range Status   12/02/2022 07:56 AM 2.45 L Final     Pre FEV1   Date/Time Value Ref Range Status   12/02/2022 07:56 AM 1.83 L Final     Pre FEV1 FVC   Date/Time Value Ref Range Status   12/02/2022 07:56 AM 74.59 % Final     Pre TLC   Date/Time Value Ref Range Status   12/02/2022 07:56 AM 4.34 L Final     Pre DLCO   Date/Time Value Ref Range Status   12/02/2022 07:56 AM 10.99 ml/(min*mmHg) Final            Patient Concerns or Expectations:   Lots of mucus in the morning and sinus pressure and post nasal drip. Message sent to pulmonary provider.    Therapist Comments:   Rosana Aguiar  was seen 3/15/2023  1:30 PM in the Pulmonary Disease management clinic for evaluation, education, reinforcement of self management techniques and exacerbation action plan.    Jennifer Sherman    Past Medical History:   Diagnosis Date    Atherosclerotic PVD with intermittent claudication 10/19/2022    Diabetes mellitus, type 2 20 years    BS didn't check 07/26/2022    High cholesterol     Hypertension     Macular degeneration        Patient's Medications   New Prescriptions    No medications on file   Previous Medications    ALBUTEROL (PROVENTIL) 2.5 MG /3 ML (0.083 %) NEBULIZER SOLUTION    Take 3 mLs (2.5 mg total) by nebulization every 6 (six) hours as needed for Wheezing. Rescue    ALBUTEROL (VENTOLIN HFA) 90 MCG/ACTUATION INHALER    Inhale 2  "puffs into the lungs every 6 (six) hours as needed for Wheezing. Rescue    AMLODIPINE (NORVASC) 10 MG TABLET    Take 1 tablet (10 mg total) by mouth once daily.    ASPIRIN (ECOTRIN) 81 MG EC TABLET    Take 1 tablet (81 mg total) by mouth once daily.    CARVEDILOL (COREG) 6.25 MG TABLET    Take 1 tablet (6.25 mg total) by mouth 2 (two) times daily with meals.    CILOSTAZOL (PLETAL) 50 MG TAB    Take 1 tablet (50 mg total) by mouth 2 (two) times daily.    FLUOXETINE 10 MG CAPSULE    Take 1 capsule (10 mg total) by mouth once daily.    INSULIN GLARGINE U-300 CONC (TOUJEO MAX U-300 SOLOSTAR) 300 UNIT/ML (3 ML) INSULIN PEN    Inject 60 Units into the skin once daily.    LOSARTAN-HYDROCHLOROTHIAZIDE 100-25 MG (HYZAAR) 100-25 MG PER TABLET    Take 1 tablet by mouth once daily.    NICOTINE (NICODERM CQ) 21 MG/24 HR    Place 1 patch onto the skin once daily.    PEN NEEDLE, DIABETIC (BD CAROL 2ND GEN PEN NEEDLE) 32 GAUGE X 5/32" NDLE    1 each by Misc.(Non-Drug; Combo Route) route once daily.    ROSUVASTATIN (CRESTOR) 20 MG TABLET    Take 1 tablet (20 mg total) by mouth once daily.    TIOTROPIUM-OLODATEROL (STIOLTO RESPIMAT) 2.5-2.5 MCG/ACTUATION MIST    Inhale 1 puff into the lungs once daily. Controller   Modified Medications    No medications on file   Discontinued Medications    No medications on file             Educational assessment:   [x]            Good  []            Fair  []            Poor    Readiness to learn:   [x]            Good  []            Fair  []            Poor    Vision Status:   [x]            Good  []            Fair  []            Poor    Reading Ability:  [x]            Good  []            Fair  []            Poor    Knowledge of condition:   [x]            Good  []            Fair  []            Poor    Language Barriers:   [x]            Good  []            Fair  []            Poor  []            None    Cognitive/ Physical Barriers:   []            Good  []            Fair  []            " Poor  [x]            None    Learning best by:                       [x]            Seeing  [x]            Hearing  [x]            Reading                         [x]            Doing    Describe any barrier /Limitation or financial implications of care choices identified     []            Financial  []            Emotional  []            Education  []            Vision/Hearing  []            Physical  [x]            None  []                TOPIC /CONTENT FOR TODAY:    [x]            MDI with or without spacer  [x]             Respimat inhaler  []            Acapella   []           Peak Flow meter  [x]            COPD action plan  []            Nebulizer use  []            Oxygen use safety  [x]            Breathing and cough techniques  [x]            Energy conservation  []            Infection prevention  []           OTHER________________________        Learner:    [x]            Patient   []            Caregiver    Method:    [x]            Verbal explanation  []            Audio visual    [x]            Literature  [x]            Teach back      Evaluation:    [x]            Teach back  [x]            Demonstrate  []            Follow up phone call    []            2 weeks     []            4 weeks   []            PRN    Additional comments:    Patient was seen today to review respiratory medication purpose and proper technique for use of inhalers. Patient practiced proper technique using MDI with valved holding chamber (spacer) and DPI inhalers. Patient demonstrated understanding. Literature was given to patient.     Infection prevention was discussed. Patient was reminded to get influenza vaccine. Hand hygiene and cleaning of respiratory equipment was also discussed. Patient verbalized understanding.      COPD action plan was reviewed and literature was given to patient. Patient verbalized understanding.      Reviewed breathing techniques such as pursed-lip breathing, bradley-cough technique, and diaphragmatic  breathing. Patient practiced proper technique and verbalized understanding. Literature given to patient.          Plan:  Monthly Pulmonary Disease Management Questionnaire  Follow-up PDM appointment scheduled for 9/19/23 at 1330 at [facility]    Reinforce education  Meds: Stiolto, Albuterol  DME Needs: na  Action Plan: copd  Immunization: Pneumococcal- current, Flu-current, COVID19-current  Next Provider Visit: 5/29/23  Next Spirometry/CPFT: not scheduled  Approximate time spent with patient: 75 minutes

## 2023-03-16 DIAGNOSIS — R09.82 POST-NASAL DRIP: Primary | ICD-10-CM

## 2023-03-16 RX ORDER — FLUTICASONE PROPIONATE 50 MCG
1 SPRAY, SUSPENSION (ML) NASAL DAILY
Qty: 16 G | Refills: 2 | Status: SHIPPED | OUTPATIENT
Start: 2023-03-16

## 2023-03-16 RX ORDER — LEVOCETIRIZINE DIHYDROCHLORIDE 5 MG/1
5 TABLET, FILM COATED ORAL NIGHTLY
Qty: 30 TABLET | Refills: 11 | Status: SHIPPED | OUTPATIENT
Start: 2023-03-16 | End: 2024-02-12 | Stop reason: SDUPTHER

## 2023-03-30 ENCOUNTER — CLINICAL SUPPORT (OUTPATIENT)
Dept: SMOKING CESSATION | Facility: CLINIC | Age: 73
End: 2023-03-30

## 2023-03-30 DIAGNOSIS — F17.200 NICOTINE DEPENDENCE: Primary | ICD-10-CM

## 2023-03-30 PROCEDURE — 99999 PR PBB SHADOW E&M-EST. PATIENT-LVL I: CPT | Mod: PBBFAC,,,

## 2023-03-30 PROCEDURE — 99999 PR PBB SHADOW E&M-EST. PATIENT-LVL I: ICD-10-PCS | Mod: PBBFAC,,,

## 2023-03-30 PROCEDURE — 99401 PR PREVENT COUNSEL,INDIV,15 MIN: ICD-10-PCS | Mod: S$GLB,,, | Performed by: GENERAL PRACTICE

## 2023-03-30 PROCEDURE — 99401 PREV MED CNSL INDIV APPRX 15: CPT | Mod: S$GLB,,, | Performed by: GENERAL PRACTICE

## 2023-03-30 NOTE — Clinical Note
Spoke to patient over the phone in regard to her smoking cessation progress. She is not tobacco free at this time; smoking 5 cpd and was smoking 20 cpd. Commended patient on her progress toward quitting tobacco use. She continues to use 21 mg nicotine patch QD with no side effects at this time. Discussed lowering nicotine patch dose. Patient declined a prescription for nicotine patches today. She declined scheduling a follow up visit at this time and states she will call if needed.

## 2023-03-30 NOTE — PROGRESS NOTES
Individual Follow-Up Form    3/30/2023    Quit Date: TBD    Clinical Status of Patient: Outpatient    Continuing Medication: yes  Patches     Target Symptoms: Withdrawal and medication side effects. The following were  rated moderate (3) to severe (4) on TCRS:  Moderate (3): none  Severe (4): none    Comments: Spoke to patient over the phone in regard to her smoking cessation progress. She is not tobacco free at this time; smoking 5 cpd and was smoking 20 cpd. Commended patient on her progress toward quitting tobacco use. She continues to use 21 mg nicotine patch QD with no side effects at this time. Discussed lowering nicotine patch dose. Patient declined a prescription for nicotine patches today. She declined scheduling a follow up visit at this time and states she will call if needed.    Diagnosis: F17.200    Next Visit: 2 weeks

## 2023-04-10 RX ORDER — CARVEDILOL 6.25 MG/1
6.25 TABLET ORAL 2 TIMES DAILY WITH MEALS
Qty: 180 TABLET | Refills: 3 | Status: SHIPPED | OUTPATIENT
Start: 2023-04-10 | End: 2024-04-26

## 2023-04-10 NOTE — TELEPHONE ENCOUNTER
----- Message from Lanie Burrows sent at 4/10/2023  9:44 AM CDT -----  Regarding: medication request  Contact: patient  Please refill the medication(s) listed below. Please call the patient when the prescription(s) is ready for  at this phone number     MAHAD ANTHONY [[id number]]  [phone]      Patient is requesting a call back to discuss medication request. Please advise!        Medication #1 carvediloL (COREG) 6.25 MG tablet      Preferred Pharmacy:    [facility] [address] [facility] [place] [id number]  Phone: [phone] Fax: [phone]

## 2023-04-10 NOTE — TELEPHONE ENCOUNTER
LOV:1/19/2023    Pharmacy filled for 90 day supply. We do not have 90 on our side. Can you send in new script for 90 and I will call the pharmacy on this medication

## 2023-04-10 NOTE — TELEPHONE ENCOUNTER
Care Due:                  Date            Visit Type   Department     Provider  --------------------------------------------------------------------------------                                EP -                              PRIMARY      [facility] INTER[facility]  Last Visit: 01-      CARE ([facility])   MEDICINE       Carito Kohler                              SAME DAY -                              ESTABLISHED   [facility]  Next Visit: 07-      PATIENT      MEDICINE       Carito Kohler                                                            Last  Test          Frequency    Reason                     Performed    Due Date  --------------------------------------------------------------------------------    Cr..........  12 months..  insulin..................  07- 07-    HBA1C.......  6 months...  insulin..................  11- 05-    [facility] Embedded Care Gaps. Reference number: [id number]. 4/10/2023   10:05:42 AM CDT

## 2023-05-02 ENCOUNTER — OFFICE VISIT (OUTPATIENT)
Dept: SPORTS MEDICINE | Facility: CLINIC | Age: 73
End: 2023-05-02
Payer: MEDICARE

## 2023-05-02 DIAGNOSIS — M75.31 CALCIFIC TENDINITIS OF RIGHT SHOULDER: Primary | ICD-10-CM

## 2023-05-02 DIAGNOSIS — M25.511 CHRONIC RIGHT SHOULDER PAIN: ICD-10-CM

## 2023-05-02 DIAGNOSIS — M75.51 SUBACROMIAL BURSITIS OF RIGHT SHOULDER JOINT: ICD-10-CM

## 2023-05-02 DIAGNOSIS — G89.29 CHRONIC RIGHT SHOULDER PAIN: ICD-10-CM

## 2023-05-02 PROCEDURE — 3072F PR LOW RISK FOR RETINOPATHY: ICD-10-PCS | Mod: HCNC,CPTII,S$GLB, | Performed by: STUDENT IN AN ORGANIZED HEALTH CARE EDUCATION/TRAINING PROGRAM

## 2023-05-02 PROCEDURE — 1125F PR PAIN SEVERITY QUANTIFIED, PAIN PRESENT: ICD-10-PCS | Mod: HCNC,CPTII,S$GLB, | Performed by: STUDENT IN AN ORGANIZED HEALTH CARE EDUCATION/TRAINING PROGRAM

## 2023-05-02 PROCEDURE — 99999 PR PBB SHADOW E&M-EST. PATIENT-LVL III: CPT | Mod: PBBFAC,HCNC,, | Performed by: STUDENT IN AN ORGANIZED HEALTH CARE EDUCATION/TRAINING PROGRAM

## 2023-05-02 PROCEDURE — 3072F LOW RISK FOR RETINOPATHY: CPT | Mod: HCNC,CPTII,S$GLB, | Performed by: STUDENT IN AN ORGANIZED HEALTH CARE EDUCATION/TRAINING PROGRAM

## 2023-05-02 PROCEDURE — 1160F RVW MEDS BY RX/DR IN RCRD: CPT | Mod: HCNC,CPTII,S$GLB, | Performed by: STUDENT IN AN ORGANIZED HEALTH CARE EDUCATION/TRAINING PROGRAM

## 2023-05-02 PROCEDURE — 20611 LARGE JOINT ASPIRATION/INJECTION: R SUBACROMIAL BURSA: ICD-10-PCS | Mod: HCNC,RT,S$GLB, | Performed by: STUDENT IN AN ORGANIZED HEALTH CARE EDUCATION/TRAINING PROGRAM

## 2023-05-02 PROCEDURE — 1159F PR MEDICATION LIST DOCUMENTED IN MEDICAL RECORD: ICD-10-PCS | Mod: HCNC,CPTII,S$GLB, | Performed by: STUDENT IN AN ORGANIZED HEALTH CARE EDUCATION/TRAINING PROGRAM

## 2023-05-02 PROCEDURE — 99214 PR OFFICE/OUTPT VISIT, EST, LEVL IV, 30-39 MIN: ICD-10-PCS | Mod: 25,HCNC,S$GLB, | Performed by: STUDENT IN AN ORGANIZED HEALTH CARE EDUCATION/TRAINING PROGRAM

## 2023-05-02 PROCEDURE — 1160F PR REVIEW ALL MEDS BY PRESCRIBER/CLIN PHARMACIST DOCUMENTED: ICD-10-PCS | Mod: HCNC,CPTII,S$GLB, | Performed by: STUDENT IN AN ORGANIZED HEALTH CARE EDUCATION/TRAINING PROGRAM

## 2023-05-02 PROCEDURE — 99214 OFFICE O/P EST MOD 30 MIN: CPT | Mod: 25,HCNC,S$GLB, | Performed by: STUDENT IN AN ORGANIZED HEALTH CARE EDUCATION/TRAINING PROGRAM

## 2023-05-02 PROCEDURE — 1159F MED LIST DOCD IN RCRD: CPT | Mod: HCNC,CPTII,S$GLB, | Performed by: STUDENT IN AN ORGANIZED HEALTH CARE EDUCATION/TRAINING PROGRAM

## 2023-05-02 PROCEDURE — 99999 PR PBB SHADOW E&M-EST. PATIENT-LVL III: ICD-10-PCS | Mod: PBBFAC,HCNC,, | Performed by: STUDENT IN AN ORGANIZED HEALTH CARE EDUCATION/TRAINING PROGRAM

## 2023-05-02 PROCEDURE — 20611 DRAIN/INJ JOINT/BURSA W/US: CPT | Mod: HCNC,RT,S$GLB, | Performed by: STUDENT IN AN ORGANIZED HEALTH CARE EDUCATION/TRAINING PROGRAM

## 2023-05-02 PROCEDURE — 1125F AMNT PAIN NOTED PAIN PRSNT: CPT | Mod: HCNC,CPTII,S$GLB, | Performed by: STUDENT IN AN ORGANIZED HEALTH CARE EDUCATION/TRAINING PROGRAM

## 2023-05-02 RX ORDER — BETAMETHASONE SODIUM PHOSPHATE AND BETAMETHASONE ACETATE 3; 3 MG/ML; MG/ML
6 INJECTION, SUSPENSION INTRA-ARTICULAR; INTRALESIONAL; INTRAMUSCULAR; SOFT TISSUE
Status: DISCONTINUED | OUTPATIENT
Start: 2023-05-02 | End: 2023-05-02 | Stop reason: HOSPADM

## 2023-05-02 RX ADMIN — BETAMETHASONE SODIUM PHOSPHATE AND BETAMETHASONE ACETATE 6 MG: 3; 3 INJECTION, SUSPENSION INTRA-ARTICULAR; INTRALESIONAL; INTRAMUSCULAR; SOFT TISSUE at 01:05

## 2023-05-02 NOTE — PATIENT INSTRUCTIONS
Assessment:  Rosana Aguiar is a 73 y.o. female with a chief complaint of Pain of the Right Shoulder    Encounter Diagnoses   Name Primary?    Calcific tendinitis of right shoulder Yes    Chronic right shoulder pain       Plan:  POC US performed to evaluate current status of calcific deposit - showing improvement in her calcific tendinopathy, overall reduction in size, and appears to be improving, there is tendinosis throughout the supraspinatus tendon, and sell; as effusion within the subacromial bursa  Recommend repeat subacromial bursa corticosteroid injection today  Would not recommend more aggressive treatments, such as TenJet at this time  Continue home exercises    Follow-up: As needed or sooner if there are any problems between now and then.    Thank you for choosing [facility] and Dr. Reginaldo Conde for your orthopedic & sports medicine care. It is our goal to provide you with exceptional care that will help keep you healthy, active, and get you back in the game.    Please do not hesitate to reach out to us via email, phone, or MyChart with any questions, concerns, or feedback.    If you are experiencing pain/discomfort ,or have questions after 5pm and would like to be connected to the [facility] on-call team, please call this number and specify which Sports Medicine provider is treating you: [phone]

## 2023-05-02 NOTE — PROCEDURES
Large Joint Aspiration/Injection: R subacromial bursa    Date/Time: 5/2/2023 1:00 PM  Performed by: Reginaldo Conde MD  Authorized by: Reginaldo Conde MD     Consent Done?:  Yes (Verbal)  Indications:  Pain  Site marked: the procedure site was marked    Timeout: prior to procedure the correct patient, procedure, and site was verified      Local anesthesia used?: Yes    Anesthesia:  Local infiltration  Local anesthetic:  Topical anesthetic, bupivacaine 0.5% without epinephrine and lidocaine 1% without epinephrine  Anesthetic total (ml):  4      Details:  Needle Size:  21 G  Ultrasonic Guidance for needle placement?: Yes    Images are saved and documented.  Approach:  Lateral  Location:  Shoulder  Site:  R subacromial bursa  Medications:  6 mg betamethasone acetate-betamethasone sodium phosphate 6 mg/mL  Patient tolerance:  Patient tolerated the procedure well with no immediate complications     Ultrasound guidance was used for needle localization. Images were saved and stored for documentation. The appropriate structures were visualized. Dynamic visualization of the needle was continuous throughout the procedures and maintained good position.     We discussed the proper protocols after the injection such as no submerging pools, baths tubs, or hot tubs for 24 hr.  Showering is okay today.  We also discussed that blood sugars can be elevated after an injection and asked patient to properly check their sugars over the next few days and contact their PCP if there are any concerns.  We discussed red flags such as fevers, chills, red, warm, tender joint at the area of injection to please seek medical care immediately.

## 2023-05-02 NOTE — PROGRESS NOTES
Patient ID: Rosana Aguiar  YOB: 1950  MRN: [id number]    Chief Complaint: Pain of the Right Shoulder    Referred By: Self    Occupation: Retired    History of Present Illness: Rosana Aguiar is a left-hand dominant 73 y.o. female who presents today with 5/10 pain at her follow up for right shoulder pain.    She was initially seen on 9/15/22.  Patient states that it began hurting last year in 2021 when supporting  when walking, lifting, transferring, etc. She describes the pain as and intermittent pain with soreness. She uses ibuprofen for relief. She states movement and lifting makes the pain worse. Her  passed away 6 months ago, and it has been a difficult period for her. Her pain is now significantly limiting her ADLs and performing simple self care & housework.     XR and POCUS demonstrated calcific tendonitis of the rotator cuff.  Right shoulder CSI administered at the appointment on 9/15/22. She was referred to physical therapy.  She initially reported excellent pain relief.  Since then, the injection has worn off and her pain has returned.  She had a negative experience at physical therapy and stopped going in November.  She has continued her home exercises but the pain has persisted and has become just as severe as it was prior to her injection.  At her last visit on 12/20/22, US exam demonstrated softening of the calcific deposit.  Barbitage was performed with subsequent cortisone injection.    At her last visit in January 2023, she reported significant improvement in her right shoulder symptoms.  This lasted until mid April when she felt her symptoms begin to return.  She denies any new injuries but feels she's been using her arm and sleeping on that side more recently.  The pain is still only 30% of when it was at its worst.    Past Medical History:   Past Medical History:   Diagnosis Date    Atherosclerotic PVD with intermittent claudication 10/19/2022    Diabetes  mellitus, type 2 20 years    BS didn't check 07/26/2022    High cholesterol     Hypertension     Macular degeneration      Past Surgical History:   Procedure Laterality Date    APPENDECTOMY      CATARACT EXTRACTION Bilateral     cyst removal from breast (Left)      EYE SURGERY  Cataract    HYSTERECTOMY      TONSILLECTOMY      TUBAL LIGATION  1975     Family History   Problem Relation Age of Onset    Cancer Mother         female    Diabetes Mother     Macular degeneration Mother     Alcohol abuse Father     Cancer Daughter         colon    Diabetes Maternal Uncle     Heart disease Neg Hx     Stroke Neg Hx      Social History     Socioeconomic History    Marital status:    Tobacco Use    Smoking status: Every Day     Packs/day: 1.00     Years: 55.00     Pack years: 55.00     Types: Cigarettes     Start date: 1962     Passive exposure: Past    Smokeless tobacco: Never   Substance and Sexual Activity    Alcohol use: Yes     Comment: rarely    Drug use: Never    Sexual activity: Not Currently     Partners: Male     Birth control/protection: None     Social Determinants of Health     Financial Resource Strain: High Risk    Difficulty of Paying Living Expenses: Hard   Food Insecurity: No Food Insecurity    Worried About Running Out of Food in the Last Year: Never true    Ran Out of Food in the Last Year: Never true   Transportation Needs: No Transportation Needs    Lack of Transportation (Medical): No    Lack of Transportation (Non-Medical): No   Physical Activity: Inactive    Days of Exercise per Week: 0 days    Minutes of Exercise per Session: 0 min   Stress: Stress Concern Present    Feeling of Stress : Very much   Social Connections: Socially Isolated    Frequency of Communication with Friends and Family: More than three times a week    Frequency of Social Gatherings with Friends and Family: Once a week    Attends Confucianism Services: Never    Active Member of Clubs or Organizations: No    Attends Club or  "Organization Meetings: Never    Marital Status:    Housing Stability: Low Risk     Unable to Pay for Housing in the Last Year: No    Number of Places Lived in the Last Year: 1    Unstable Housing in the Last Year: No     Medication List with Changes/Refills   Current Medications    ALBUTEROL (PROVENTIL) 2.5 MG /3 ML (0.083 %) NEBULIZER SOLUTION    Take 3 mLs (2.5 mg total) by nebulization every 6 (six) hours as needed for Wheezing. Rescue    ALBUTEROL (VENTOLIN HFA) 90 MCG/ACTUATION INHALER    Inhale 2 puffs into the lungs every 6 (six) hours as needed for Wheezing. Rescue    AMLODIPINE (NORVASC) 10 MG TABLET    Take 1 tablet (10 mg total) by mouth once daily.    ASPIRIN (ECOTRIN) 81 MG EC TABLET    Take 1 tablet (81 mg total) by mouth once daily.    CARVEDILOL (COREG) 6.25 MG TABLET    Take 1 tablet (6.25 mg total) by mouth 2 (two) times daily with meals.    CILOSTAZOL (PLETAL) 50 MG TAB    Take 1 tablet (50 mg total) by mouth 2 (two) times daily.    FLUOXETINE 10 MG CAPSULE    Take 1 capsule (10 mg total) by mouth once daily.    FLUTICASONE PROPIONATE (FLONASE) 50 MCG/ACTUATION NASAL SPRAY    1 spray (50 mcg total) by Each Nostril route once daily.    INSULIN GLARGINE U-300 CONC (TOUJEO MAX U-300 SOLOSTAR) 300 UNIT/ML (3 ML) INSULIN PEN    Inject 60 Units into the skin once daily.    LEVOCETIRIZINE (XYZAL) 5 MG TABLET    Take 1 tablet (5 mg total) by mouth every evening.    LOSARTAN-HYDROCHLOROTHIAZIDE 100-25 MG (HYZAAR) 100-25 MG PER TABLET    Take 1 tablet by mouth once daily.    NICOTINE (NICODERM CQ) 21 MG/24 HR    Place 1 patch onto the skin once daily.    PEN NEEDLE, DIABETIC (BD CAROL 2ND GEN PEN NEEDLE) 32 GAUGE X 5/32" NDLE    1 each by Misc.(Non-Drug; Combo Route) route once daily.    ROSUVASTATIN (CRESTOR) 20 MG TABLET    Take 1 tablet (20 mg total) by mouth once daily.    TIOTROPIUM-OLODATEROL (STIOLTO RESPIMAT) 2.5-2.5 MCG/ACTUATION MIST    Inhale 1 puff into the lungs once daily. Controller "     Review of patient's allergies indicates:   Allergen Reactions    Neomycin-bacitracin-polymyxin Itching       Physical Exam:   There is no height or weight on file to calculate BMI.    Physical Exam  Constitutional:       General: She is not in acute distress.     Appearance: Normal appearance.   HENT:      Head: Normocephalic and atraumatic.   Eyes:      Extraocular Movements: Extraocular movements intact.      Conjunctiva/sclera: Conjunctivae normal.   Pulmonary:      Effort: Pulmonary effort is normal. No respiratory distress.   Skin:     General: Skin is warm and dry.   Neurological:      General: No focal deficit present.      Mental Status: She is alert and oriented to person, place, and time.   Psychiatric:         Mood and Affect: Mood normal.     Detailed MSK exam:     Right Shoulder:  Inspection: No effusion, erythema, or ecchymosis   Palpation tenderness: Bicipital Groove, lateral subacromial space  Range of motion: 165 deg Flexion         75 deg External Rotation in Adduction         IR to Lumbar  Strength:  4/5 Abduction    4/5 External Rotation in Adduction    5/5 Internal Rotation   Tests:  + Lisa, Neer's    + Empty Can  N/V Exam:  Radial: Normal motor (EPL/thumbs up)              Normal sensory (dorsal hand)   Median: Normal motor (FPL/A-OK)      Normal sensory (thumb)   Ulnar:  Normal motor (Interossei/scissors-spread)     Normal sensory (5th finger)   LABC: Normal sensory (lateral forearm)   MABC: Normal sensory (medial forearm)   MC: Normal motor (elbow flexion)   Axillary: Normal motor/sensory (deltoid)  Normal radial and ulnar pulses, warm and well perfused with capillary refill < 2 sec      Imaging:    FOCUSED:  1. Diagnostic Extremity - MSK-Sports Ultrasound was recommended due to shoulder pain.  2. Diagnostic Extremity - MSK-Sports Ultrasound Performed: Reginaldo Conde Extremity Study: right anterior shoulder/supraspinatus.    TECHNIQUE: Real time ultrasound examination of the right  anterior shoulder/supraspinatus was performed with SonoSite Edge 2, 9-L MHz linear probe(s).     FINDINGS: The images are of adequate diagnostic quality with identification of all echogenic structures made except for the vascular structures unless otherwise noted.     Supraspinatus was visualized in the anatomic long and short axes.  There was intrasubstance hypoechoic and overall heterogeneity foci at the anterior supraspinatus tendon, near its insertion on the greater tubercle.  There was tendinosis throughout the supraspinatus tendon, without any larger high-grade tearing.  There is associated cortical irregularity at the greater tubercle insertion.    Moderately sized effusion within the subacromial bursa.    The rest of the sonographic examination was unremarkable.  Ultrasound images were directly reviewed with the patient and then a treatment plan was discussed.  The images were saved and stored for documentation in the EMR.     IMPRESSION:  Calcific tendinopathy of the distal supraspinatus tendon with overall tendinosis.  Subacromial bursitis.      Relevant imaging results were reviewed and interpreted by me.      This was discussed with the patient and / or family today.       Patient Instructions   Assessment:  Rosana Aguiar is a 73 y.o. female with a chief complaint of Pain of the Right Shoulder    Encounter Diagnoses   Name Primary?    Calcific tendinitis of right shoulder Yes    Chronic right shoulder pain       Plan:  POC US performed to evaluate current status of calcific deposit - showing improvement in her calcific tendinopathy, overall reduction in size, and appears to be improving, there is tendinosis throughout the supraspinatus tendon, and sell; as effusion within the subacromial bursa  Recommend repeat subacromial bursa corticosteroid injection today  Would not recommend more aggressive treatments, such as TenJet at this time  Continue home exercises    Follow-up: As needed or sooner if there  are any problems between now and then.    Thank you for choosing [facility] and Dr. Reginaldo Conde for your orthopedic & sports medicine care. It is our goal to provide you with exceptional care that will help keep you healthy, active, and get you back in the game.    Please do not hesitate to reach out to us via email, phone, or MyChart with any questions, concerns, or feedback.    If you are experiencing pain/discomfort ,or have questions after 5pm and would like to be connected to the [facility] on-call team, please call this number and specify which Sports Medicine provider is treating you: [phone]        Reginaldo Conde MD  Primary Care Sports Medicine      Disclaimer: This note was prepared using a voice recognition system and is likely to have sound alike errors within the text.

## 2023-05-17 DIAGNOSIS — E11.51 TYPE 2 DIABETES MELLITUS WITH DIABETIC PERIPHERAL ANGIOPATHY WITHOUT GANGRENE, WITH LONG-TERM CURRENT USE OF INSULIN: ICD-10-CM

## 2023-05-17 DIAGNOSIS — Z79.4 TYPE 2 DIABETES MELLITUS WITH DIABETIC PERIPHERAL ANGIOPATHY WITHOUT GANGRENE, WITH LONG-TERM CURRENT USE OF INSULIN: ICD-10-CM

## 2023-05-17 DIAGNOSIS — I70.219 ATHEROSCLEROTIC PVD WITH INTERMITTENT CLAUDICATION: ICD-10-CM

## 2023-05-17 DIAGNOSIS — E78.5 HYPERLIPIDEMIA, UNSPECIFIED HYPERLIPIDEMIA TYPE: ICD-10-CM

## 2023-05-17 RX ORDER — ROSUVASTATIN CALCIUM 20 MG/1
20 TABLET, COATED ORAL DAILY
Qty: 30 TABLET | Refills: 6 | Status: CANCELLED | OUTPATIENT
Start: 2023-05-17 | End: 2024-05-16

## 2023-05-18 DIAGNOSIS — E78.5 HYPERLIPIDEMIA, UNSPECIFIED HYPERLIPIDEMIA TYPE: ICD-10-CM

## 2023-05-18 DIAGNOSIS — E11.51 TYPE 2 DIABETES MELLITUS WITH DIABETIC PERIPHERAL ANGIOPATHY WITHOUT GANGRENE, WITH LONG-TERM CURRENT USE OF INSULIN: ICD-10-CM

## 2023-05-18 DIAGNOSIS — I70.219 ATHEROSCLEROTIC PVD WITH INTERMITTENT CLAUDICATION: ICD-10-CM

## 2023-05-18 DIAGNOSIS — Z79.4 TYPE 2 DIABETES MELLITUS WITH DIABETIC PERIPHERAL ANGIOPATHY WITHOUT GANGRENE, WITH LONG-TERM CURRENT USE OF INSULIN: ICD-10-CM

## 2023-05-18 RX ORDER — ROSUVASTATIN CALCIUM 20 MG/1
20 TABLET, COATED ORAL DAILY
Qty: 30 TABLET | Refills: 6 | Status: SHIPPED | OUTPATIENT
Start: 2023-05-18 | End: 2023-07-20 | Stop reason: SDUPTHER

## 2023-05-29 ENCOUNTER — PATIENT OUTREACH (OUTPATIENT)
Dept: PULMONOLOGY | Facility: CLINIC | Age: 73
End: 2023-05-29
Payer: MEDICARE

## 2023-05-29 ENCOUNTER — CLINICAL SUPPORT (OUTPATIENT)
Dept: PULMONOLOGY | Facility: CLINIC | Age: 73
End: 2023-05-29
Payer: MEDICARE

## 2023-05-29 ENCOUNTER — HOSPITAL ENCOUNTER (OUTPATIENT)
Dept: RADIOLOGY | Facility: HOSPITAL | Age: 73
Discharge: HOME | End: 2023-05-29
Attending: PHYSICIAN ASSISTANT
Payer: MEDICARE

## 2023-05-29 ENCOUNTER — OFFICE VISIT (OUTPATIENT)
Dept: PULMONOLOGY | Facility: CLINIC | Age: 73
End: 2023-05-29
Payer: MEDICARE

## 2023-05-29 ENCOUNTER — TELEPHONE (OUTPATIENT)
Dept: PULMONOLOGY | Facility: CLINIC | Age: 73
End: 2023-05-29
Payer: MEDICARE

## 2023-05-29 VITALS
HEIGHT: 63 IN | SYSTOLIC BLOOD PRESSURE: 110 MMHG | OXYGEN SATURATION: 96 % | WEIGHT: 165.81 LBS | DIASTOLIC BLOOD PRESSURE: 70 MMHG | HEART RATE: 74 BPM | BODY MASS INDEX: 29.38 KG/M2 | RESPIRATION RATE: 17 BRPM

## 2023-05-29 DIAGNOSIS — J43.9 EMPHYSEMA LUNG: Primary | ICD-10-CM

## 2023-05-29 DIAGNOSIS — F17.210 SMOKING GREATER THAN 30 PACK YEARS: Primary | ICD-10-CM

## 2023-05-29 DIAGNOSIS — J41.0 SIMPLE CHRONIC BRONCHITIS: ICD-10-CM

## 2023-05-29 DIAGNOSIS — R91.1 PULMONARY NODULE: ICD-10-CM

## 2023-05-29 DIAGNOSIS — F17.210 TOBACCO DEPENDENCE DUE TO CIGARETTES: ICD-10-CM

## 2023-05-29 PROCEDURE — 1160F RVW MEDS BY RX/DR IN RCRD: CPT | Mod: HCNC,CPTII,S$GLB, | Performed by: INTERNAL MEDICINE

## 2023-05-29 PROCEDURE — 1101F PT FALLS ASSESS-DOCD LE1/YR: CPT | Mod: HCNC,CPTII,S$GLB, | Performed by: INTERNAL MEDICINE

## 2023-05-29 PROCEDURE — 3074F SYST BP LT 130 MM HG: CPT | Mod: HCNC,CPTII,S$GLB, | Performed by: INTERNAL MEDICINE

## 2023-05-29 PROCEDURE — 99214 OFFICE O/P EST MOD 30 MIN: CPT | Mod: HCNC,S$GLB,, | Performed by: INTERNAL MEDICINE

## 2023-05-29 PROCEDURE — 99999 PR PBB SHADOW E&M-EST. PATIENT-LVL I: ICD-10-PCS | Mod: PBBFAC,HCNC,,

## 2023-05-29 PROCEDURE — 1159F PR MEDICATION LIST DOCUMENTED IN MEDICAL RECORD: ICD-10-PCS | Mod: HCNC,CPTII,S$GLB, | Performed by: INTERNAL MEDICINE

## 2023-05-29 PROCEDURE — 3078F DIAST BP <80 MM HG: CPT | Mod: HCNC,CPTII,S$GLB, | Performed by: INTERNAL MEDICINE

## 2023-05-29 PROCEDURE — 71250 CT THORAX DX C-: CPT | Mod: 26,HCNC,, | Performed by: RADIOLOGY

## 2023-05-29 PROCEDURE — 99214 PR OFFICE/OUTPT VISIT, EST, LEVL IV, 30-39 MIN: ICD-10-PCS | Mod: HCNC,S$GLB,, | Performed by: INTERNAL MEDICINE

## 2023-05-29 PROCEDURE — 71250 CT THORAX DX C-: CPT | Mod: TC,HCNC

## 2023-05-29 PROCEDURE — 1159F MED LIST DOCD IN RCRD: CPT | Mod: HCNC,CPTII,S$GLB, | Performed by: INTERNAL MEDICINE

## 2023-05-29 PROCEDURE — 99999 PR PBB SHADOW E&M-EST. PATIENT-LVL IV: CPT | Mod: PBBFAC,HCNC,, | Performed by: INTERNAL MEDICINE

## 2023-05-29 PROCEDURE — 3288F FALL RISK ASSESSMENT DOCD: CPT | Mod: HCNC,CPTII,S$GLB, | Performed by: INTERNAL MEDICINE

## 2023-05-29 PROCEDURE — 3288F PR FALLS RISK ASSESSMENT DOCUMENTED: ICD-10-PCS | Mod: HCNC,CPTII,S$GLB, | Performed by: INTERNAL MEDICINE

## 2023-05-29 PROCEDURE — 1101F PR PT FALLS ASSESS DOC 0-1 FALLS W/OUT INJ PAST YR: ICD-10-PCS | Mod: HCNC,CPTII,S$GLB, | Performed by: INTERNAL MEDICINE

## 2023-05-29 PROCEDURE — 3072F LOW RISK FOR RETINOPATHY: CPT | Mod: HCNC,CPTII,S$GLB, | Performed by: INTERNAL MEDICINE

## 2023-05-29 PROCEDURE — 3072F PR LOW RISK FOR RETINOPATHY: ICD-10-PCS | Mod: HCNC,CPTII,S$GLB, | Performed by: INTERNAL MEDICINE

## 2023-05-29 PROCEDURE — 71250 CT CHEST WITHOUT CONTRAST: ICD-10-PCS | Mod: 26,HCNC,, | Performed by: RADIOLOGY

## 2023-05-29 PROCEDURE — 1160F PR REVIEW ALL MEDS BY PRESCRIBER/CLIN PHARMACIST DOCUMENTED: ICD-10-PCS | Mod: HCNC,CPTII,S$GLB, | Performed by: INTERNAL MEDICINE

## 2023-05-29 PROCEDURE — 99999 PR PBB SHADOW E&M-EST. PATIENT-LVL IV: ICD-10-PCS | Mod: PBBFAC,HCNC,, | Performed by: INTERNAL MEDICINE

## 2023-05-29 PROCEDURE — 3074F PR MOST RECENT SYSTOLIC BLOOD PRESSURE < 130 MM HG: ICD-10-PCS | Mod: HCNC,CPTII,S$GLB, | Performed by: INTERNAL MEDICINE

## 2023-05-29 PROCEDURE — 99999 PR PBB SHADOW E&M-EST. PATIENT-LVL I: CPT | Mod: PBBFAC,HCNC,,

## 2023-05-29 PROCEDURE — 3078F PR MOST RECENT DIASTOLIC BLOOD PRESSURE < 80 MM HG: ICD-10-PCS | Mod: HCNC,CPTII,S$GLB, | Performed by: INTERNAL MEDICINE

## 2023-05-29 RX ORDER — VARENICLINE TARTRATE 0.5 (11)-1
KIT ORAL
Qty: 53 EACH | Refills: 0 | Status: SHIPPED | OUTPATIENT
Start: 2023-05-29 | End: 2024-01-22

## 2023-05-29 RX ORDER — TIOTROPIUM BROMIDE AND OLODATEROL 3.124; 2.736 UG/1; UG/1
2 SPRAY, METERED RESPIRATORY (INHALATION) DAILY
Qty: 4 G | Refills: 11 | Status: SHIPPED | OUTPATIENT
Start: 2023-05-29 | End: 2023-07-20 | Stop reason: SDUPTHER

## 2023-05-29 NOTE — PROGRESS NOTES
Pulmonary Outpatient Follow Up Visit     Subjective:       Patient ID: Rosana Aguiar is a 73 y.o. female.    Chief Complaint: COPD      HPI        Patient is a 72 y.o. female presenting for evaluation of abnormal chest x-ray.      Chest x-ray showed small left lung nodule/calcified granuloma.      More than 30 pack-year smoking history.      Complains of coughing and shortness on exertion.  Not able to use albuterol inhaler properly.  Chronic cough with sputum production    Reports snoring but not agreeable to proceed with sleep study.      Peripheral vascular disease followed by cardiology.  Review of Systems   Constitutional:  Positive for fatigue. Negative for fever and chills.   HENT:  Negative for nosebleeds.    Eyes:  Negative for redness.   Respiratory:  Positive for snoring, cough, shortness of breath, dyspnea on extertion and use of rescue inhaler. Negative for choking.    Genitourinary:  Negative for hematuria.   Endocrine:  Negative for cold intolerance.    Musculoskeletal:  Positive for arthralgias and back pain.   Skin:  Negative for rash.   Gastrointestinal:  Negative for vomiting.   Neurological:  Negative for syncope.   Hematological:  Negative for adenopathy.   Psychiatric/Behavioral:  Positive for sleep disturbance. Negative for confusion.      Outpatient Encounter Medications as of 5/29/2023   Medication Sig Dispense Refill    albuterol (PROVENTIL) 2.5 mg /3 mL (0.083 %) nebulizer solution Take 3 mLs (2.5 mg total) by nebulization every 6 (six) hours as needed for Wheezing. Rescue 360 mL 5    albuterol (VENTOLIN HFA) 90 mcg/actuation inhaler Inhale 2 puffs into the lungs every 6 (six) hours as needed for Wheezing. Rescue 18 g 11    amLODIPine (NORVASC) 10 MG tablet Take 1 tablet (10 mg total) by mouth once daily. 90 tablet 3    aspirin (ECOTRIN) 81 MG EC tablet Take 1 tablet (81 mg total) by mouth once daily. 90 tablet 3    carvediloL (COREG) 6.25 MG  "tablet Take 1 tablet (6.25 mg total) by mouth 2 (two) times daily with meals. 180 tablet 3    cilostazoL (PLETAL) 50 MG Tab Take 1 tablet (50 mg total) by mouth 2 (two) times daily. 60 tablet 11    FLUoxetine 10 MG capsule Take 1 capsule (10 mg total) by mouth once daily. 30 capsule 11    fluticasone propionate (FLONASE) 50 mcg/actuation nasal spray 1 spray (50 mcg total) by Each Nostril route once daily. 16 g 2    insulin glargine U-300 conc (TOUJEO MAX U-300 SOLOSTAR) 300 unit/mL (3 mL) insulin pen Inject 60 Units into the skin once daily. 3 pen 11    levocetirizine (XYZAL) 5 MG tablet Take 1 tablet (5 mg total) by mouth every evening. 30 tablet 11    losartan-hydrochlorothiazide 100-25 mg (HYZAAR) 100-25 mg per tablet Take 1 tablet by mouth once daily. 90 tablet 3    nicotine (NICODERM CQ) 21 mg/24 hr Place 1 patch onto the skin once daily. 28 patch 0    pen needle, diabetic (BD CAROL 2ND GEN PEN NEEDLE) 32 gauge x 5/32" Ndle 1 each by Misc.(Non-Drug; Combo Route) route once daily. 100 each 1    rosuvastatin (CRESTOR) 20 MG tablet Take 1 tablet (20 mg total) by mouth once daily. 30 tablet 6    tiotropium-olodateroL (STIOLTO RESPIMAT) 2.5-2.5 mcg/actuation Mist Inhale 1 puff into the lungs once daily. Controller 4 g 11    varenicline (CHANTIX STARTING MONTH BOX) 0.5 mg (11)- 1 mg (42) tablet Take one 0.5mg tablet by mouth once daily x 3 days, THEN increase to one 0.5mg tablet by mouth twice daily x 4 days, THEN increase to one 1mg tablet by mouth twice daily 53 each 0    [DISCONTINUED] rosuvastatin (CRESTOR) 20 MG tablet Take 1 tablet (20 mg total) by mouth once daily. 30 tablet 6     No facility-administered encounter medications on file as of 5/29/2023.       Objective:     Vital Signs (Most Recent)  Vital Signs  Pulse: 74  Resp: 17  SpO2: 96 %  BP: 110/70  Height and Weight  Height: 5' 3" (160 cm)  Weight: 75.2 kg (165 lb 12.6 oz)  BSA (Calculated - sq m): 1.83 sq meters  BMI (Calculated): 29.4  Weight in (lb) " to have BMI = 25: 140.8]  Wt Readings from Last 2 Encounters:   05/29/23 75.2 kg (165 lb 12.6 oz)   02/27/23 75.2 kg (165 lb 12.6 oz)       Physical Exam   Constitutional: She is oriented to person, place, and time. She appears well-developed and well-nourished. No distress.   HENT:   Head: Normocephalic.   Cardiovascular: Normal rate and regular rhythm.   Pulmonary/Chest: Normal expansion and effort normal. No stridor. No respiratory distress. She has decreased breath sounds. She exhibits no tenderness.   Abdominal: She exhibits no distension.   Musculoskeletal:         General: No tenderness.      Cervical back: Neck supple.   Lymphadenopathy:     She has no cervical adenopathy.   Neurological: She is alert and oriented to person, place, and time. Gait normal.   Skin: Skin is warm. No cyanosis. Nails show no clubbing.   Psychiatric: She has a normal mood and affect. Her behavior is normal. Judgment and thought content normal.   Nursing note and vitals reviewed.    Laboratory  Lab Results   Component Value Date    WBC 7.26 07/11/2022    RBC 4.17 07/11/2022    HGB 13.1 07/11/2022    HCT 41.3 07/11/2022    MCV 99 (H) 07/11/2022    MCH 31.4 (H) 07/11/2022    MCHC 31.7 (L) 07/11/2022    RDW 13.2 07/11/2022     07/11/2022    MPV 9.7 07/11/2022    GRAN 4.2 07/11/2022    GRAN 57.3 07/11/2022    LYMPH 1.8 07/11/2022    LYMPH 24.9 07/11/2022    MONO 0.5 07/11/2022    MONO 7.4 07/11/2022    EOS 0.7 (H) 07/11/2022    BASO 0.08 07/11/2022    EOSINOPHIL 9.0 (H) 07/11/2022    BASOPHIL 1.1 07/11/2022       BMP  Lab Results   Component Value Date     07/11/2022    K 3.8 07/11/2022    CL 98 07/11/2022    CO2 25 07/11/2022    BUN 12 07/11/2022    CREATININE 0.9 07/11/2022    CALCIUM 9.7 07/11/2022    ANIONGAP 13 07/11/2022    ESTGFRAFRICA >60.0 07/11/2022    EGFRNONAA >60.0 07/11/2022    AST 17 07/11/2022    ALT 16 07/11/2022    PROT 7.0 07/11/2022       No results found for: BNP    Lab Results   Component Value Date     TSH 1.285 01/06/2022       Lab Results   Component Value Date    SEDRATE 33 (H) 07/11/2022       Lab Results   Component Value Date    CRP 1.4 07/11/2022     No results found for: IGE     No results found for: ASPERGILLUS  No results found for: AFUMIGATUSCL     No results found for: ACE     Diagnostic Results:  I have personally reviewed today the following studies:      CT chest without contrast May 29, 2023    COMPARISON:  11/30/2022     FINDINGS:  Base of Neck: No significant abnormality.     Thoracic soft tissues: Normal.     Aorta: Left-sided aortic arch.  No aneurysm.     Heart: Normal size. No effusion. Severe aortic and coronary artery atherosclerotic calcification.     Pulmonary vasculature: Pulmonary arteries distribute normally.     Sammie/Mediastinum: No pathologic chasity enlargement.     Esophagus: Normal.     Upper Abdomen: No abnormality of the partially imaged upper abdomen.     Airways: Patent.     Lungs/Pleura: Interval minimal enlargement of the right lower lobe nodule now measuring 10 x 9 x 9 mm, previously 9 x 9 x 9 mm as remeasured on prior study of 11/30/2022.  no new nodules.     Bones: No acute fracture. No suspicious lytic or sclerotic lesions.     Impression:     Interval minimal enlargement of the right lower lobe nodule now measuring 10 x 9 x 9 mm, previously 9 x 9 x 9 mm as remeasured on prior study of 11/30/2022. Consider PET-CT versus histologic sampling.          Assessment/Plan:   Smoking greater than 30 pack years  -     varenicline (CHANTIX STARTING MONTH BOX) 0.5 mg (11)- 1 mg (42) tablet; Take one 0.5mg tablet by mouth once daily x 3 days, THEN increase to one 0.5mg tablet by mouth twice daily x 4 days, THEN increase to one 1mg tablet by mouth twice daily  Dispense: 53 each; Refill: 0    Tobacco dependence due to cigarettes  -     varenicline (CHANTIX STARTING MONTH BOX) 0.5 mg (11)- 1 mg (42) tablet; Take one 0.5mg tablet by mouth once daily x 3 days, THEN increase to one 0.5mg  tablet by mouth twice daily x 4 days, THEN increase to one 1mg tablet by mouth twice daily  Dispense: 53 each; Refill: 0    Pulmonary nodule  -     CT Chest Without Contrast; Future; Expected date: 08/29/2023    Simple chronic bronchitis    Continue albuterol p.r.n. Stiolto 2 puffs daily.      Nicotine patches.  Chantix.  Counseled 5 minutes about smoking cessation.  Provided with Chantix adverse effects and patient drug information.      Three and six-months surveillance CT scan stable right lung nodule 9 x 10 mm in size.  Will repeat CT scan in 3 months and if stable will return to regular surveillance protocol.      Nodule size Low risk patient High risk patient   4 mm  No follow up Follow up CT in 12 months. If unchanged, no further follow up.   4 - 6 mm Follow up CT in 12 months; if unchanged, no further follow up.  Initial follow up CT in 6 - 12 months, then at 18 - 24 months if no change.       6 - 8 mm  Initial follow up CT at 6 - 12 months and at 18 months if no change.  Initial follow up CT at 3 - 6 months. Then at 9-12 months and 24 months if no change.       > 8 mm Initial follow up CT at 3, 6, 9 and 24 months, dynamic contrast enhanced CT, PET-CT and/or biopsy. Initial follow up CT at 3, 6, 9 and 24 months, dynamic contrast enhanced CT, PET-CT and/or biopsy.          Follow up in about 3 months (around 8/29/2023).    This note was prepared using voice recognition system and is likely to have sound alike errors that may have been overlooked even after proof reading.  Please call me with any questions    Discussed diagnosis, its evaluation, treatment and usual course. All questions answered.      Jonatan Santana MD

## 2023-05-29 NOTE — PATIENT INSTRUCTIONS
What are some things I need to know or do while I take this drug?    Tell all of your health care providers that you take this drug. This includes your doctors, nurses, pharmacists, and dentists.      Avoid driving and doing other tasks or actions that call for you to be alert until you see how this drug affects you.     Accidents like car accidents have happened in people taking this drug. Talk with your doctor.     Do not take this drug for longer than you were told by your doctor.     Talk with your doctor before you drink alcohol.     When you stop smoking, other drugs may be affected. Talk with your doctor.     Talk with your doctor if you have seizures or have ever had seizures.     New or worse mental, mood, or behavior problems have happened with this drug. These problems include thoughts of suicide or killing someone else, depression, forceful actions, fury, anxiety, and anger. These problems have happened in people with and without a history of mental or mood problems. Watch people who take this drug closely. If you think you have any of these problems, call your doctor right away. Call your doctor right away if you have any other signs like nervousness, restlessness, grouchiness, panic attacks, or any new or worse changes in mood or actions.     You may have signs of nicotine withdrawal when you try to quit smoking even when using drugs like this one to help you quit smoking. There are many signs of nicotine withdrawal. Rarely depression and suicidal thoughts have happened in people trying to quit smoking. Talk with your doctor.     A very bad reaction called angioedema has happened with this drug. Sometimes, this may be life-threatening. Signs may include swelling of the hands, face, lips, eyes, tongue, or throat; trouble breathing; trouble swallowing; or unusual hoarseness. Get medical help right away if you have any of these signs.      Tell your doctor if you are pregnant or plan on getting pregnant.  You will need to talk about the benefits and risks of using this drug while you are pregnant.      Tell your doctor if you are breast-feeding. You will need to talk about any risks to your baby.    What are some side effects that I need to call my doctor about right away?   WARNING/CAUTION: Even though it may be rare, some people may have very bad and sometimes deadly side effects when taking a drug. Tell your doctor or get medical help right away if you have any of the following signs or symptoms that may be related to a very bad side effect:     Signs of an allergic reaction, like rash; hives; itching; red, swollen, blistered, or peeling skin with or without fever; wheezing; tightness in the chest or throat; trouble breathing, swallowing, or talking; unusual hoarseness; or swelling of the mouth, face, lips, tongue, or throat.     Shortness of breath.     Feeling confused.     Hallucinations (seeing or hearing things that are not there).     Very upset stomach or throwing up.     Seizures.     A very bad skin reaction (Carias-Woodrow syndrome/toxic epidermal necrolysis) may happen. It can cause very bad health problems that may not go away, and sometimes death. Get medical help right away if you have signs like red, swollen, blistered, or peeling skin (with or without fever); red or irritated eyes; or sores in your mouth, throat, nose, or eyes.     New or worse heart and blood vessel problems have happened with this drug. This includes heart attack and stroke. Most of the time, this happened in people who already had heart or blood vessel problems. Call your doctor right away if you have signs of heart attack like chest pain that may spread to the arms, neck, jaw, back, or stomach; sweating that is not normal; or feeling sick or throwing up. Call your doctor right away if you have signs of stroke like weakness on 1 side of the body; eyesight, speech, or balance problems; feeling confused; drooping on 1 side of the  face; or very bad headache.     Sleepwalking has happened with this drug. Sometimes, this can lead to harm to you or others. Call your doctor right away if you start to sleepwalk.    What are some other side effects of this drug?   All drugs may cause side effects. However, many people have no side effects or only have minor side effects. Call your doctor or get medical help if any of these side effects or any other side effects bother you or do not go away:     Upset stomach or throwing up.     Strange or odd dreams.     Gas.     Not able to sleep.     Headache.     Constipation.     Dry mouth.     Belly pain.     Change in taste.     Feeling tired or weak.     Signs of a common cold.    These are not all of the side effects that may occur. If you have questions about side effects, call your doctor. Call your doctor for medical advice about side effects.     You may report side effects to your national health agency.    How is this drug best taken?  Use this drug as ordered by your doctor. Read all information given to you. Follow all instructions closely.    Get counseling to help you quit smoking.    Take after eating with a full glass of water.    There are different ways that you can use this drug to help you quit smoking. Talk with your doctor about when to start treatment with this drug and when to stop smoking.    What do I do if I miss a dose?   Take a missed dose as soon as you think about it.    If it is close to the time for your next dose, skip the missed dose and go back to your normal time.    Do not take 2 doses at the same time or extra doses.    How do I store and/or throw out this drug?    Store at room temperature.     Store in a dry place. Do not store in a bathroom.     Keep all drugs in a safe place. Keep all drugs out of the reach of children and pets.     Throw away unused or  drugs. Do not flush down a toilet or pour down a drain unless you are told to do so. Check with your  pharmacist if you have questions about the best way to throw out drugs. There may be drug take-back programs in your area.    General drug facts   If your symptoms or health problems do not get better or if they become worse, call your doctor.     Do not share your drugs with others and do not take anyone else's drugs.    Keep a list of all your drugs (prescription, natural products, vitamins, OTC) with you. Give this list to your doctor.    Talk with the doctor before starting any new drug, including prescription or OTC, natural products, or vitamins.     Some drugs may have another patient information leaflet. If you have any questions about this drug, please talk with your doctor, nurse, pharmacist, or other health care provider.    If you think there has been an overdose, call your poison control center or get medical care right away. Be ready to tell or show what was taken, how much, and when it happened.

## 2023-05-29 NOTE — PROGRESS NOTES
Pulmonary Disease Management  [facility]  Follow up Visit  Chronic Care Management    Rosana Aguiar  was seen 5/29/2023  3:11 PM in the [facility] for evaluation, education, reinforcement of self management techniques and exacerbation action plan.    Jennifer Sherman    Past Medical History:   Diagnosis Date    Atherosclerotic PVD with intermittent claudication 10/19/2022    Diabetes mellitus, type 2 20 years    BS didn't check 07/26/2022    High cholesterol     Hypertension     Macular degeneration        Patient's Medications   New Prescriptions    No medications on file   Previous Medications    ALBUTEROL (PROVENTIL) 2.5 MG /3 ML (0.083 %) NEBULIZER SOLUTION    Take 3 mLs (2.5 mg total) by nebulization every 6 (six) hours as needed for Wheezing. Rescue    ALBUTEROL (VENTOLIN HFA) 90 MCG/ACTUATION INHALER    Inhale 2 puffs into the lungs every 6 (six) hours as needed for Wheezing. Rescue    AMLODIPINE (NORVASC) 10 MG TABLET    Take 1 tablet (10 mg total) by mouth once daily.    ASPIRIN (ECOTRIN) 81 MG EC TABLET    Take 1 tablet (81 mg total) by mouth once daily.    CARVEDILOL (COREG) 6.25 MG TABLET    Take 1 tablet (6.25 mg total) by mouth 2 (two) times daily with meals.    CILOSTAZOL (PLETAL) 50 MG TAB    Take 1 tablet (50 mg total) by mouth 2 (two) times daily.    FLUOXETINE 10 MG CAPSULE    Take 1 capsule (10 mg total) by mouth once daily.    FLUTICASONE PROPIONATE (FLONASE) 50 MCG/ACTUATION NASAL SPRAY    1 spray (50 mcg total) by Each Nostril route once daily.    INSULIN GLARGINE U-300 CONC (TOUJEO MAX U-300 SOLOSTAR) 300 UNIT/ML (3 ML) INSULIN PEN    Inject 60 Units into the skin once daily.    LEVOCETIRIZINE (XYZAL) 5 MG TABLET    Take 1 tablet (5 mg total) by mouth every evening.    LOSARTAN-HYDROCHLOROTHIAZIDE 100-25 MG (HYZAAR) 100-25 MG PER TABLET    Take 1 tablet by mouth once daily.    NICOTINE (NICODERM CQ) 21 MG/24 HR    Place 1 patch onto the skin once daily.  "   PEN NEEDLE, DIABETIC (BD CAROL 2ND GEN PEN NEEDLE) 32 GAUGE X 5/32" NDLE    1 each by Misc.(Non-Drug; Combo Route) route once daily.    ROSUVASTATIN (CRESTOR) 20 MG TABLET    Take 1 tablet (20 mg total) by mouth once daily.    TIOTROPIUM-OLODATEROL (STIOLTO RESPIMAT) 2.5-2.5 MCG/ACTUATION MIST    Inhale 1 puff into the lungs once daily. Controller    VARENICLINE (CHANTIX STARTING MONTH BOX) 0.5 MG (11)- 1 MG (42) TABLET    Take one 0.5mg tablet by mouth once daily x 3 days, THEN increase to one 0.5mg tablet by mouth twice daily x 4 days, THEN increase to one 1mg tablet by mouth twice daily   Modified Medications    No medications on file   Discontinued Medications    No medications on file             Educational assessment:   [x]            Good  []            Fair  []            Poor    Readiness to learn:   [x]            Good  []            Fair  []            Poor    Vision Status:   [x]            Good  []            Fair  []            Poor    Reading Ability:  [x]            Good  []            Fair  []            Poor    Knowledge of condition:   [x]            Good  []            Fair  []            Poor    Language Barriers:   []            Good  []            Fair  []            Poor  [x]            None    Cognitive/ Physical Barriers:   []            Good  []            Fair  []            Poor  [x]            None    Learning best by:                       [x]            Seeing  []            Hearing  [x]            Reading                         [x]            Doing    Describe any barrier /Limitation or financial implications of care choices identified     []            Financial  []            Emotional  []            Education  []            Vision/Hearing  []            Physical  [x]            None  []                TOPIC /CONTENT FOR TODAY:    [x]            MDI with or without spacer  []            Dry powder inhaler  []            Acapella  []            Peak Flow meter  []            " COPD action plan  []            Nebulizer use  []            Oxygen use safety  []            Breathing and cough techniques  []            Energy conservation  []            Infection prevention  []            OTHER________________________        Learner:    []            Patient   []            Caregiver    Method:    []            Verbal explanation  []            Audio visual    []            Literature  []            Teach back      Evaluation:    []            Teach back  []            Demonstrate  []            Follow up phone call    []            2 weeks     []            4 weeks   []            PRN    Additional comments:   Patient was seen today to review respiratory medication purpose and proper technique for use of inhalers. Patient practiced proper technique using MDI with valved holding chamber (spacer) and DPI inhalers. Patient demonstrated understanding. Literature was given to patient.     Asthma trigger checklist was verbally reviewed and literature given to patient.     Infection prevention was discussed. Patient was reminded to get influenza vaccine. Hand hygiene and cleaning of respiratory equipment was also discussed. Patient verbalized understanding.      COPD action plan was reviewed and literature was given to patient. Patient verbalized understanding.      She smokes 1/2 ppd cigarettes. The patient is sincerely urged to quit smoking. The numerous direct health benefits are discussed. If she decides to quit, there are a number of helpful adjunctive aids available. There are  alternatives available to help with this difficult task, but first and foremost, she must make a firm commitment and decision to quit. As we age, even healthy lungs lose some function. Smokers who get COPD lose lung function at a much faster rate. People who have frequent flare-ups lose lung function faster, too. Quitting smoking is the most important thing you can do to actually slow down the loss of lung function.  While you cannot return the lung function to normal after quitting smoking, you will slow the progression of your COPD.     Stiolto changed to 2 puffs once daily.    Plan:  Quit smoking  Medication adherence  Monthly Pulmonary Disease Management Questionnaire  Follow-up PDM appointment scheduled for 9/19/23     Reinforce education  Meds: stiolto  DME Needs: na  Action Plan: copd  Immunization: Pneumococcal- current, Flu-na , Covid 19- current  Next Provider Visit: 8/29/23  Next Spirometry/CPFT: not scheduled  Approximate time spent with patient: 30 minutes

## 2023-05-31 DIAGNOSIS — F17.210 SMOKING GREATER THAN 30 PACK YEARS: Primary | ICD-10-CM

## 2023-06-06 ENCOUNTER — PATIENT MESSAGE (OUTPATIENT)
Dept: PHARMACY | Facility: CLINIC | Age: 73
End: 2023-06-06
Payer: MEDICARE

## 2023-06-09 DIAGNOSIS — E11.9 TYPE 2 DIABETES MELLITUS WITHOUT COMPLICATION, WITH LONG-TERM CURRENT USE OF INSULIN: ICD-10-CM

## 2023-06-09 DIAGNOSIS — Z79.4 TYPE 2 DIABETES MELLITUS WITHOUT COMPLICATION, WITH LONG-TERM CURRENT USE OF INSULIN: ICD-10-CM

## 2023-06-09 RX ORDER — PEN NEEDLE, DIABETIC 30 GX3/16"
1 NEEDLE, DISPOSABLE MISCELLANEOUS DAILY
Qty: 100 EACH | Refills: 1 | Status: SHIPPED | OUTPATIENT
Start: 2023-06-09 | End: 2023-12-29 | Stop reason: SDUPTHER

## 2023-06-09 NOTE — TELEPHONE ENCOUNTER
No care due was identified.  [facility] Embedded Care Due Messages. Reference number: [id number].   6/09/2023 9:26:00 AM CDT

## 2023-06-09 NOTE — TELEPHONE ENCOUNTER
Refill Decision Note   Rosana Aguiar  is requesting a refill authorization.  Brief Assessment and Rationale for Refill:  Approve     Medication Therapy Plan:       Medication Reconciliation Completed: No   Comments:     No Care Gaps recommended.     Note composed:10:24 AM 06/09/2023

## 2023-06-14 ENCOUNTER — CLINICAL SUPPORT (OUTPATIENT)
Dept: SMOKING CESSATION | Facility: CLINIC | Age: 73
End: 2023-06-14

## 2023-06-14 DIAGNOSIS — F17.200 NICOTINE DEPENDENCE: Primary | ICD-10-CM

## 2023-06-14 DIAGNOSIS — I10 HYPERTENSION, UNSPECIFIED TYPE: ICD-10-CM

## 2023-06-14 PROCEDURE — 99407 BEHAV CHNG SMOKING > 10 MIN: CPT | Mod: S$GLB,,,

## 2023-06-14 PROCEDURE — 99407 PR TOBACCO USE CESSATION INTENSIVE >10 MINUTES: ICD-10-PCS | Mod: S$GLB,,,

## 2023-06-14 RX ORDER — AMLODIPINE BESYLATE 10 MG/1
TABLET ORAL
Qty: 90 TABLET | Refills: 3 | Status: SHIPPED | OUTPATIENT
Start: 2023-06-14

## 2023-06-14 NOTE — PROGRESS NOTES
Spoke with patient today in regard to smoking cessation progress for 6 month telephone follow up, she states not tobacco free.  Patient states not interested in returning to the program at this time.  Patient states she does not want to take varenicline, Wellbutrin, and the patches make her arm itch, and she is not interested in talking to a counselor.  Informed patient of benefit period, future follow up, and contact information if any further help or support is needed.  Will complete smart form for 6 month follow up on Quit attempt #1.

## 2023-06-25 DIAGNOSIS — I10 HYPERTENSION, UNSPECIFIED TYPE: ICD-10-CM

## 2023-06-25 DIAGNOSIS — Z79.4 TYPE 2 DIABETES MELLITUS WITHOUT COMPLICATION, WITH LONG-TERM CURRENT USE OF INSULIN: ICD-10-CM

## 2023-06-25 DIAGNOSIS — E11.9 TYPE 2 DIABETES MELLITUS WITHOUT COMPLICATION, WITH LONG-TERM CURRENT USE OF INSULIN: ICD-10-CM

## 2023-06-26 RX ORDER — LOSARTAN POTASSIUM AND HYDROCHLOROTHIAZIDE 25; 100 MG/1; MG/1
TABLET ORAL
Qty: 90 TABLET | Refills: 3 | Status: SHIPPED | OUTPATIENT
Start: 2023-06-26

## 2023-07-06 ENCOUNTER — PATIENT OUTREACH (OUTPATIENT)
Dept: PULMONOLOGY | Facility: CLINIC | Age: 73
End: 2023-07-06
Payer: MEDICARE

## 2023-07-07 ENCOUNTER — PATIENT MESSAGE (OUTPATIENT)
Dept: INFECTIOUS DISEASES | Facility: CLINIC | Age: 73
End: 2023-07-07
Payer: MEDICARE

## 2023-07-11 ENCOUNTER — LAB VISIT (OUTPATIENT)
Dept: LAB | Facility: HOSPITAL | Age: 73
End: 2023-07-11
Attending: INTERNAL MEDICINE
Payer: MEDICARE

## 2023-07-11 DIAGNOSIS — Z79.4 TYPE 2 DIABETES MELLITUS WITH DIABETIC PERIPHERAL ANGIOPATHY WITHOUT GANGRENE, WITH LONG-TERM CURRENT USE OF INSULIN: ICD-10-CM

## 2023-07-11 DIAGNOSIS — I25.10 CORONARY ARTERY CALCIFICATION: ICD-10-CM

## 2023-07-11 DIAGNOSIS — E11.51 TYPE 2 DIABETES MELLITUS WITH DIABETIC PERIPHERAL ANGIOPATHY WITHOUT GANGRENE, WITH LONG-TERM CURRENT USE OF INSULIN: ICD-10-CM

## 2023-07-11 DIAGNOSIS — I25.84 CORONARY ARTERY CALCIFICATION: ICD-10-CM

## 2023-07-11 DIAGNOSIS — E78.5 HYPERLIPIDEMIA, UNSPECIFIED HYPERLIPIDEMIA TYPE: ICD-10-CM

## 2023-07-11 LAB
ALBUMIN SERPL BCP-MCNC: 3.5 G/DL (ref 3.5–5.2)
ALP SERPL-CCNC: 85 U/L (ref 55–135)
ALT SERPL W/O P-5'-P-CCNC: 9 U/L (ref 10–44)
ANION GAP SERPL CALC-SCNC: 10 MMOL/L (ref 8–16)
AST SERPL-CCNC: 12 U/L (ref 10–40)
BILIRUB SERPL-MCNC: 0.2 MG/DL (ref 0.1–1)
BUN SERPL-MCNC: 9 MG/DL (ref 8–23)
CALCIUM SERPL-MCNC: 8.8 MG/DL (ref 8.7–10.5)
CHLORIDE SERPL-SCNC: 98 MMOL/L (ref 95–110)
CHOLEST SERPL-MCNC: 107 MG/DL (ref 120–199)
CHOLEST/HDLC SERPL: 2.4 {RATIO} (ref 2–5)
CO2 SERPL-SCNC: 26 MMOL/L (ref 23–29)
CREAT SERPL-MCNC: 1 MG/DL (ref 0.5–1.4)
EST. GFR  (NO RACE VARIABLE): 58.8 ML/MIN/1.73 M^2
ESTIMATED AVG GLUCOSE: 163 MG/DL (ref 68–131)
GLUCOSE SERPL-MCNC: 79 MG/DL (ref 70–110)
HBA1C MFR BLD: 7.3 % (ref 4–5.6)
HDLC SERPL-MCNC: 45 MG/DL (ref 40–75)
HDLC SERPL: 42.1 % (ref 20–50)
LDLC SERPL CALC-MCNC: 45.4 MG/DL (ref 63–159)
NONHDLC SERPL-MCNC: 62 MG/DL
POTASSIUM SERPL-SCNC: 3 MMOL/L (ref 3.5–5.1)
PROT SERPL-MCNC: 6.8 G/DL (ref 6–8.4)
SODIUM SERPL-SCNC: 134 MMOL/L (ref 136–145)
TRIGL SERPL-MCNC: 83 MG/DL (ref 30–150)

## 2023-07-11 PROCEDURE — 36415 COLL VENOUS BLD VENIPUNCTURE: CPT | Mod: HCNC | Performed by: INTERNAL MEDICINE

## 2023-07-11 PROCEDURE — 80053 COMPREHEN METABOLIC PANEL: CPT | Mod: HCNC | Performed by: INTERNAL MEDICINE

## 2023-07-11 PROCEDURE — 83036 HEMOGLOBIN GLYCOSYLATED A1C: CPT | Mod: HCNC | Performed by: FAMILY MEDICINE

## 2023-07-11 PROCEDURE — 80061 LIPID PANEL: CPT | Mod: HCNC | Performed by: INTERNAL MEDICINE

## 2023-07-17 ENCOUNTER — TELEPHONE (OUTPATIENT)
Dept: CARDIOLOGY | Facility: CLINIC | Age: 73
End: 2023-07-17
Payer: MEDICARE

## 2023-07-17 DIAGNOSIS — I25.10 CORONARY ARTERY CALCIFICATION: Primary | ICD-10-CM

## 2023-07-17 DIAGNOSIS — I25.84 CORONARY ARTERY CALCIFICATION: Primary | ICD-10-CM

## 2023-07-17 DIAGNOSIS — I10 HYPERTENSION, UNSPECIFIED TYPE: ICD-10-CM

## 2023-07-17 NOTE — TELEPHONE ENCOUNTER
Patient was notified of results. All questions were answered. Pt verbalized understanding. Pt will call back with any other questions or concerns.    Labs scheduled for 7/28  ----- Message from Jonathan Coburn MD sent at 7/17/2023  7:26 AM CDT -----  LABS LOOK GOOD POTASSIUM IS LOW NEEDS TOE AT MORE BANANA AND ORANGE JUICE.   REPEAT BMP IN 2 WEEKS WITH PHONE REVIEW

## 2023-07-19 DIAGNOSIS — I70.219 ATHEROSCLEROTIC PVD WITH INTERMITTENT CLAUDICATION: Primary | ICD-10-CM

## 2023-07-20 ENCOUNTER — OFFICE VISIT (OUTPATIENT)
Dept: INTERNAL MEDICINE | Facility: CLINIC | Age: 73
End: 2023-07-20
Payer: MEDICARE

## 2023-07-20 VITALS
HEIGHT: 63 IN | TEMPERATURE: 98 F | SYSTOLIC BLOOD PRESSURE: 102 MMHG | WEIGHT: 165.81 LBS | DIASTOLIC BLOOD PRESSURE: 60 MMHG | OXYGEN SATURATION: 99 % | BODY MASS INDEX: 29.38 KG/M2 | HEART RATE: 76 BPM

## 2023-07-20 DIAGNOSIS — E78.5 HYPERLIPIDEMIA, UNSPECIFIED HYPERLIPIDEMIA TYPE: ICD-10-CM

## 2023-07-20 DIAGNOSIS — Z80.0 FAMILY HISTORY OF LYNCH SYNDROME: ICD-10-CM

## 2023-07-20 DIAGNOSIS — I70.219 ATHEROSCLEROTIC PVD WITH INTERMITTENT CLAUDICATION: ICD-10-CM

## 2023-07-20 DIAGNOSIS — Z12.11 SCREEN FOR COLON CANCER: ICD-10-CM

## 2023-07-20 DIAGNOSIS — I10 PRIMARY HYPERTENSION: Primary | ICD-10-CM

## 2023-07-20 DIAGNOSIS — F33.9 DEPRESSION, RECURRENT: ICD-10-CM

## 2023-07-20 DIAGNOSIS — Z80.0 FAMILY HISTORY OF LYNCH SYNDROME: Primary | ICD-10-CM

## 2023-07-20 DIAGNOSIS — J41.0 SIMPLE CHRONIC BRONCHITIS: ICD-10-CM

## 2023-07-20 DIAGNOSIS — Z79.4 TYPE 2 DIABETES MELLITUS WITH DIABETIC PERIPHERAL ANGIOPATHY WITHOUT GANGRENE, WITH LONG-TERM CURRENT USE OF INSULIN: ICD-10-CM

## 2023-07-20 DIAGNOSIS — E11.51 TYPE 2 DIABETES MELLITUS WITH DIABETIC PERIPHERAL ANGIOPATHY WITHOUT GANGRENE, WITH LONG-TERM CURRENT USE OF INSULIN: ICD-10-CM

## 2023-07-20 PROCEDURE — 3078F DIAST BP <80 MM HG: CPT | Mod: HCNC,CPTII,S$GLB, | Performed by: FAMILY MEDICINE

## 2023-07-20 PROCEDURE — 3066F NEPHROPATHY DOC TX: CPT | Mod: HCNC,CPTII,S$GLB, | Performed by: FAMILY MEDICINE

## 2023-07-20 PROCEDURE — 1159F MED LIST DOCD IN RCRD: CPT | Mod: HCNC,CPTII,S$GLB, | Performed by: FAMILY MEDICINE

## 2023-07-20 PROCEDURE — 1101F PT FALLS ASSESS-DOCD LE1/YR: CPT | Mod: HCNC,CPTII,S$GLB, | Performed by: FAMILY MEDICINE

## 2023-07-20 PROCEDURE — 3061F NEG MICROALBUMINURIA REV: CPT | Mod: HCNC,CPTII,S$GLB, | Performed by: FAMILY MEDICINE

## 2023-07-20 PROCEDURE — 1126F PR PAIN SEVERITY QUANTIFIED, NO PAIN PRESENT: ICD-10-PCS | Mod: HCNC,CPTII,S$GLB, | Performed by: FAMILY MEDICINE

## 2023-07-20 PROCEDURE — 3288F FALL RISK ASSESSMENT DOCD: CPT | Mod: HCNC,CPTII,S$GLB, | Performed by: FAMILY MEDICINE

## 2023-07-20 PROCEDURE — 99214 OFFICE O/P EST MOD 30 MIN: CPT | Mod: HCNC,S$GLB,, | Performed by: FAMILY MEDICINE

## 2023-07-20 PROCEDURE — 3072F PR LOW RISK FOR RETINOPATHY: ICD-10-PCS | Mod: HCNC,CPTII,S$GLB, | Performed by: FAMILY MEDICINE

## 2023-07-20 PROCEDURE — 3288F PR FALLS RISK ASSESSMENT DOCUMENTED: ICD-10-PCS | Mod: HCNC,CPTII,S$GLB, | Performed by: FAMILY MEDICINE

## 2023-07-20 PROCEDURE — 3074F PR MOST RECENT SYSTOLIC BLOOD PRESSURE < 130 MM HG: ICD-10-PCS | Mod: HCNC,CPTII,S$GLB, | Performed by: FAMILY MEDICINE

## 2023-07-20 PROCEDURE — 3051F HG A1C>EQUAL 7.0%<8.0%: CPT | Mod: HCNC,CPTII,S$GLB, | Performed by: FAMILY MEDICINE

## 2023-07-20 PROCEDURE — 3061F PR NEG MICROALBUMINURIA RESULT DOCUMENTED/REVIEW: ICD-10-PCS | Mod: HCNC,CPTII,S$GLB, | Performed by: FAMILY MEDICINE

## 2023-07-20 PROCEDURE — 1160F RVW MEDS BY RX/DR IN RCRD: CPT | Mod: HCNC,CPTII,S$GLB, | Performed by: FAMILY MEDICINE

## 2023-07-20 PROCEDURE — 3066F PR DOCUMENTATION OF TREATMENT FOR NEPHROPATHY: ICD-10-PCS | Mod: HCNC,CPTII,S$GLB, | Performed by: FAMILY MEDICINE

## 2023-07-20 PROCEDURE — 1160F PR REVIEW ALL MEDS BY PRESCRIBER/CLIN PHARMACIST DOCUMENTED: ICD-10-PCS | Mod: HCNC,CPTII,S$GLB, | Performed by: FAMILY MEDICINE

## 2023-07-20 PROCEDURE — 99214 PR OFFICE/OUTPT VISIT, EST, LEVL IV, 30-39 MIN: ICD-10-PCS | Mod: HCNC,S$GLB,, | Performed by: FAMILY MEDICINE

## 2023-07-20 PROCEDURE — 1101F PR PT FALLS ASSESS DOC 0-1 FALLS W/OUT INJ PAST YR: ICD-10-PCS | Mod: HCNC,CPTII,S$GLB, | Performed by: FAMILY MEDICINE

## 2023-07-20 PROCEDURE — 99999 PR PBB SHADOW E&M-EST. PATIENT-LVL III: CPT | Mod: PBBFAC,HCNC,, | Performed by: FAMILY MEDICINE

## 2023-07-20 PROCEDURE — 99999 PR PBB SHADOW E&M-EST. PATIENT-LVL III: ICD-10-PCS | Mod: PBBFAC,HCNC,, | Performed by: FAMILY MEDICINE

## 2023-07-20 PROCEDURE — 3078F PR MOST RECENT DIASTOLIC BLOOD PRESSURE < 80 MM HG: ICD-10-PCS | Mod: HCNC,CPTII,S$GLB, | Performed by: FAMILY MEDICINE

## 2023-07-20 PROCEDURE — 3072F LOW RISK FOR RETINOPATHY: CPT | Mod: HCNC,CPTII,S$GLB, | Performed by: FAMILY MEDICINE

## 2023-07-20 PROCEDURE — 1126F AMNT PAIN NOTED NONE PRSNT: CPT | Mod: HCNC,CPTII,S$GLB, | Performed by: FAMILY MEDICINE

## 2023-07-20 PROCEDURE — 3074F SYST BP LT 130 MM HG: CPT | Mod: HCNC,CPTII,S$GLB, | Performed by: FAMILY MEDICINE

## 2023-07-20 PROCEDURE — 1159F PR MEDICATION LIST DOCUMENTED IN MEDICAL RECORD: ICD-10-PCS | Mod: HCNC,CPTII,S$GLB, | Performed by: FAMILY MEDICINE

## 2023-07-20 PROCEDURE — 3051F PR MOST RECENT HEMOGLOBIN A1C LEVEL 7.0 - < 8.0%: ICD-10-PCS | Mod: HCNC,CPTII,S$GLB, | Performed by: FAMILY MEDICINE

## 2023-07-20 RX ORDER — ROSUVASTATIN CALCIUM 20 MG/1
20 TABLET, COATED ORAL DAILY
Qty: 90 TABLET | Refills: 3 | Status: SHIPPED | OUTPATIENT
Start: 2023-07-20 | End: 2024-07-19

## 2023-07-20 RX ORDER — TIOTROPIUM BROMIDE AND OLODATEROL 3.124; 2.736 UG/1; UG/1
2 SPRAY, METERED RESPIRATORY (INHALATION) DAILY
Qty: 4 G | Refills: 11 | Status: SHIPPED | OUTPATIENT
Start: 2023-07-20

## 2023-07-20 RX ORDER — INSULIN GLARGINE 300 U/ML
60 INJECTION, SOLUTION SUBCUTANEOUS DAILY
Qty: 3 PEN | Refills: 11 | Status: SHIPPED | OUTPATIENT
Start: 2023-07-20 | End: 2024-07-19

## 2023-07-20 RX ORDER — FLUOXETINE 10 MG/1
10 CAPSULE ORAL DAILY
Qty: 90 CAPSULE | Refills: 3 | Status: SHIPPED | OUTPATIENT
Start: 2023-07-20 | End: 2024-01-22 | Stop reason: SDUPTHER

## 2023-07-20 NOTE — PROGRESS NOTES
Rosana Aguiar  07/20/2023  [id number]    Carito Kohler MD  Patient Care Team:  Carito Kohler MD as PCP - General (Family Medicine)  Jonathan Coburn MD as Consulting Physician (Interventional Cardiology)  Jonatan Santana MD as Consulting Physician (Pulmonary Disease)  Yifan Murillo OD as Consulting Physician (Optometry)  Reginaldo Conde MD as Consulting Physician (Orthopedic Surgery)          Visit Type:a scheduled routine follow-up visit    Chief Complaint:  Chief Complaint   Patient presents with    Follow-up       History of Present Illness:  73 year old  6 month follow up   DM    Lab Results   Component Value Date    HGBA1C 7.3 (H) 07/11/2023   Lantus, taking 56 units, instead of 60.   Her sugars are a little higher.     Needs eye exam.    She is on Crestor for LDL control    She reports BP in goal range.    She has tried to quit smoking  She said wellbutrin made her cry  She was given Chantix  She has seen Dr. Coburn- PVD.  She continues to smoke, but her night time sx are better with Pletal and she is on Statin.    She has lower potassium  Will recheck this.    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 Other children are negative.      History:  Past Medical History:   Diagnosis Date    Atherosclerotic PVD with intermittent claudication 10/19/2022    Diabetes mellitus, type 2 20 years    BS didn't check 07/26/2022    High cholesterol     Hypertension     Macular degeneration      Past Surgical History:   Procedure Laterality Date    APPENDECTOMY      CATARACT EXTRACTION Bilateral     cyst removal from breast (Left)      EYE SURGERY  Cataract    HYSTERECTOMY      TONSILLECTOMY      TUBAL LIGATION  1975     Family History   Problem Relation Age of Onset    Cancer Mother         female    Diabetes Mother     Macular degeneration Mother     Alcohol abuse Father     Cancer Daughter         colon    Diabetes Maternal Uncle     Heart disease Neg Hx     Stroke Neg Hx      Social History      Socioeconomic History    Marital status:    Tobacco Use    Smoking status: Every Day     Packs/day: 1.00     Years: 55.00     Pack years: 55.00     Types: Cigarettes     Start date: 1962     Passive exposure: Past    Smokeless tobacco: Never   Substance and Sexual Activity    Alcohol use: Yes     Comment: rarely    Drug use: Never    Sexual activity: Not Currently     Partners: Male     Birth control/protection: None     Social Determinants of Health     Financial Resource Strain: High Risk    Difficulty of Paying Living Expenses: Hard   Food Insecurity: No Food Insecurity    Worried About Running Out of Food in the Last Year: Never true    Ran Out of Food in the Last Year: Never true   Transportation Needs: No Transportation Needs    Lack of Transportation (Medical): No    Lack of Transportation (Non-Medical): No   Physical Activity: Inactive    Days of Exercise per Week: 0 days    Minutes of Exercise per Session: 0 min   Stress: Stress Concern Present    Feeling of Stress : Very much   Social Connections: Socially Isolated    Frequency of Communication with Friends and Family: More than three times a week    Frequency of Social Gatherings with Friends and Family: Once a week    Attends [employer] Services: Never    Active Member of Clubs or Organizations: No    Attends Club or Organization Meetings: Never    Marital Status:    Housing Stability: Low Risk     Unable to Pay for Housing in the Last Year: No    Number of Places Lived in the Last Year: 1    Unstable Housing in the Last Year: No     Patient Active Problem List   Diagnosis    Hyperlipidemia    Hypertension    Type 2 diabetes mellitus with diabetic peripheral angiopathy without gangrene, with long-term current use of insulin    Thyroid nodule    Asymptomatic microscopic hematuria    Statin myopathy    Bruit of left carotid artery    Abdominal aortic atherosclerosis    Grief    Decreased ROM of right shoulder    Shoulder weakness     "Atherosclerotic PVD with intermittent claudication    Coronary artery calcification    Emphysema lung    Carotid stenosis    Osteopenia    Depression, recurrent    Simple chronic bronchitis    Pulmonary nodule     Review of patient's allergies indicates:   Allergen Reactions    Neomycin-bacitracin-polymyxin Itching       The following were reviewed at this visit: active problem list, medication list, allergies, family history, social history, and health maintenance.    Medications:  Current Outpatient Medications on File Prior to Visit   Medication Sig Dispense Refill    albuterol (VENTOLIN HFA) 90 mcg/actuation inhaler Inhale 2 puffs into the lungs every 6 (six) hours as needed for Wheezing. Rescue 18 g 11    amLODIPine (NORVASC) 10 MG tablet TAKE 1 TABLET ONE TIME DAILY 90 tablet 3    aspirin (ECOTRIN) 81 MG EC tablet Take 1 tablet (81 mg total) by mouth once daily. 90 tablet 3    carvediloL (COREG) 6.25 MG tablet Take 1 tablet (6.25 mg total) by mouth 2 (two) times daily with meals. 180 tablet 3    cilostazoL (PLETAL) 50 MG Tab Take 1 tablet (50 mg total) by mouth 2 (two) times daily. 60 tablet 11    fluticasone propionate (FLONASE) 50 mcg/actuation nasal spray 1 spray (50 mcg total) by Each Nostril route once daily. 16 g 2    levocetirizine (XYZAL) 5 MG tablet Take 1 tablet (5 mg total) by mouth every evening. 30 tablet 11    losartan-hydrochlorothiazide 100-25 mg (HYZAAR) 100-25 mg per tablet TAKE 1 TABLET EVERY DAY 90 tablet 3    nicotine (NICODERM CQ) 21 mg/24 hr Place 1 patch onto the skin once daily. 28 patch 0    pen needle, diabetic (BD ULTRA-FINE CAROL PEN NEEDLE) 32 gauge x 5/32" Ndle 1 each by Misc.(Non-Drug; Combo Route) route once daily. 100 each 1    varenicline (CHANTIX STARTING MONTH BOX) 0.5 mg (11)- 1 mg (42) tablet Take one 0.5mg tablet by mouth once daily x 3 days, THEN increase to one 0.5mg tablet by mouth twice daily x 4 days, THEN increase to one 1mg tablet by mouth twice daily 53 each 0    " [DISCONTINUED] FLUoxetine 10 MG capsule Take 1 capsule (10 mg total) by mouth once daily. 30 capsule 11    [DISCONTINUED] insulin glargine U-300 conc (TOUJEO MAX U-300 SOLOSTAR) 300 unit/mL (3 mL) insulin pen Inject 60 Units into the skin once daily. 3 pen 11    [DISCONTINUED] rosuvastatin (CRESTOR) 20 MG tablet Take 1 tablet (20 mg total) by mouth once daily. 30 tablet 6    [DISCONTINUED] tiotropium-olodateroL (STIOLTO RESPIMAT) 2.5-2.5 mcg/actuation Mist Inhale 2 puffs into the lungs once daily. Controller 4 g 11    albuterol (PROVENTIL) 2.5 mg /3 mL (0.083 %) nebulizer solution Take 3 mLs (2.5 mg total) by nebulization every 6 (six) hours as needed for Wheezing. Rescue (Patient not taking: Reported on 7/20/2023) 360 mL 5     No current facility-administered medications on file prior to visit.       Medications have been reviewed and reconciled with patient at this visit.  Barriers to medications reviewed with patient.    Adverse reactions to current medications reviewed with patient..    Over the counter medications reviewed and reconciled with patient.    Exam:  Wt Readings from Last 3 Encounters:   07/20/23 75.2 kg (165 lb 12.6 oz)   05/29/23 75.2 kg (165 lb 12.6 oz)   02/27/23 75.2 kg (165 lb 12.6 oz)     Temp Readings from Last 3 Encounters:   07/20/23 97.6 °F (36.4 °C) (Tympanic)   01/19/23 97.8 °F (36.6 °C)   09/22/22 98.1 °F (36.7 °C) (Tympanic)     BP Readings from Last 3 Encounters:   07/20/23 102/60   05/29/23 110/70   02/27/23 (!) 130/58     Pulse Readings from Last 3 Encounters:   07/20/23 76   05/29/23 74   02/27/23 63     Body mass index is 29.37 kg/m².      Review of Systems   Constitutional: Negative.  Negative for chills and fever.   HENT: Negative.  Negative for congestion, sinus pain and sore throat.    Eyes:  Negative for blurred vision and double vision.   Respiratory:  Negative for cough, sputum production, shortness of breath and wheezing.    Cardiovascular:  Negative for chest pain,  palpitations and leg swelling.   Gastrointestinal:  Negative for abdominal pain, constipation, diarrhea, heartburn, nausea and vomiting.   Genitourinary: Negative.    Musculoskeletal: Negative.    Skin: Negative.  Negative for rash.   Neurological: Negative.    Endo/Heme/Allergies: Negative.  Negative for polydipsia. Does not bruise/bleed easily.   Psychiatric/Behavioral:  Negative for depression and substance abuse.    Physical Exam  Nursing note reviewed.   Cardiovascular:      Rate and Rhythm: Normal rate and regular rhythm.   Pulmonary:      Effort: Pulmonary effort is normal. No respiratory distress.   Neurological:      Mental Status: She is alert and oriented to person, place, and time.   Psychiatric:         Mood and Affect: Mood normal.         Behavior: Behavior normal.         Thought Content: Thought content normal.         Judgment: Judgment normal.   Protective Sensation (w/ 10 gram monofilament):  Right: Intact  Left: Intact    Visual Inspection:  Normal -  Bilateral    Pedal Pulses:   Right: Present- Diminished  Left: Present- Diminished.    Posterior Tibialis Pulses:   Right:Diminished  Left: Diminished     Laboratory Reviewed ({Yes)  Lab Results   Component Value Date    WBC 7.26 07/11/2022    HGB 13.1 07/11/2022    HCT 41.3 07/11/2022     07/11/2022    CHOL 107 (L) 07/11/2023    TRIG 83 07/11/2023    HDL 45 07/11/2023    ALT 9 (L) 07/11/2023    AST 12 07/11/2023     (L) 07/11/2023    K 3.0 (L) 07/11/2023    CL 98 07/11/2023    CREATININE 1.0 07/11/2023    BUN 9 07/11/2023    CO2 26 07/11/2023    TSH 1.285 01/06/2022    HGBA1C 7.3 (H) 07/11/2023       Rosana was seen today for follow-up.    Diagnoses and all orders for this visit:    Primary hypertension  HTN in goal range    Type 2 diabetes mellitus with diabetic peripheral angiopathy without gangrene, with long-term current use of insulin  Hga1c is elevated  Needs better diet  Check BS    Screen for colon cancer  -     Fecal  Immunochemical Test (iFOBT); Future    Recheck Potassium, next Friday    FAMILY history of Nichols  Daughter and grandson  Patient declines colon cancer screening, but wants Nichols testing?    If she is positive, then she knows that she will have to have colon screen.  She currently decline the screen    Use the chantix to quit smoking              Care Plan/Goals: Reviewed    Goals    None         Follow up: No follow-ups on file.    After visit summary was printed and given to patient upon discharge today.  Patient goals and care plan are included in After Visit Summary.

## 2023-07-23 ENCOUNTER — PATIENT MESSAGE (OUTPATIENT)
Dept: INTERNAL MEDICINE | Facility: CLINIC | Age: 73
End: 2023-07-23
Payer: MEDICARE

## 2023-07-28 ENCOUNTER — LAB VISIT (OUTPATIENT)
Dept: LAB | Facility: HOSPITAL | Age: 73
End: 2023-07-28
Attending: INTERNAL MEDICINE
Payer: MEDICARE

## 2023-07-28 DIAGNOSIS — Z80.0 FAMILY HISTORY OF LYNCH SYNDROME: ICD-10-CM

## 2023-07-28 DIAGNOSIS — I25.84 CORONARY ARTERY CALCIFICATION: ICD-10-CM

## 2023-07-28 DIAGNOSIS — I10 HYPERTENSION, UNSPECIFIED TYPE: ICD-10-CM

## 2023-07-28 DIAGNOSIS — I25.10 CORONARY ARTERY CALCIFICATION: ICD-10-CM

## 2023-07-28 LAB
ANION GAP SERPL CALC-SCNC: 12 MMOL/L (ref 8–16)
BUN SERPL-MCNC: 14 MG/DL (ref 8–23)
CALCIUM SERPL-MCNC: 8.9 MG/DL (ref 8.7–10.5)
CHLORIDE SERPL-SCNC: 98 MMOL/L (ref 95–110)
CO2 SERPL-SCNC: 23 MMOL/L (ref 23–29)
CREAT SERPL-MCNC: 0.9 MG/DL (ref 0.5–1.4)
EST. GFR  (NO RACE VARIABLE): >60 ML/MIN/1.73 M^2
GLUCOSE SERPL-MCNC: 114 MG/DL (ref 70–110)
POTASSIUM SERPL-SCNC: 4 MMOL/L (ref 3.5–5.1)
SODIUM SERPL-SCNC: 133 MMOL/L (ref 136–145)

## 2023-07-28 PROCEDURE — 36415 COLL VENOUS BLD VENIPUNCTURE: CPT | Mod: HCNC | Performed by: FAMILY MEDICINE

## 2023-07-28 PROCEDURE — 80048 BASIC METABOLIC PNL TOTAL CA: CPT | Mod: HCNC | Performed by: INTERNAL MEDICINE

## 2023-07-28 PROCEDURE — 81292 MLH1 GENE FULL SEQ: CPT | Mod: HCNC | Performed by: FAMILY MEDICINE

## 2023-08-01 ENCOUNTER — HOSPITAL ENCOUNTER (OUTPATIENT)
Dept: CARDIOLOGY | Facility: HOSPITAL | Age: 73
Discharge: HOME | End: 2023-08-01
Attending: INTERNAL MEDICINE
Payer: MEDICARE

## 2023-08-01 ENCOUNTER — OFFICE VISIT (OUTPATIENT)
Dept: CARDIOLOGY | Facility: CLINIC | Age: 73
End: 2023-08-01
Payer: MEDICARE

## 2023-08-01 VITALS
WEIGHT: 168.19 LBS | HEIGHT: 63 IN | OXYGEN SATURATION: 98 % | SYSTOLIC BLOOD PRESSURE: 102 MMHG | DIASTOLIC BLOOD PRESSURE: 54 MMHG | HEART RATE: 78 BPM | BODY MASS INDEX: 29.8 KG/M2

## 2023-08-01 DIAGNOSIS — Z79.4 TYPE 2 DIABETES MELLITUS WITH DIABETIC PERIPHERAL ANGIOPATHY WITHOUT GANGRENE, WITH LONG-TERM CURRENT USE OF INSULIN: ICD-10-CM

## 2023-08-01 DIAGNOSIS — J43.9 PULMONARY EMPHYSEMA, UNSPECIFIED EMPHYSEMA TYPE: ICD-10-CM

## 2023-08-01 DIAGNOSIS — I70.219 ATHEROSCLEROTIC PVD WITH INTERMITTENT CLAUDICATION: Primary | ICD-10-CM

## 2023-08-01 DIAGNOSIS — E04.1 THYROID NODULE: ICD-10-CM

## 2023-08-01 DIAGNOSIS — I70.0 ABDOMINAL AORTIC ATHEROSCLEROSIS: ICD-10-CM

## 2023-08-01 DIAGNOSIS — T46.6X5A STATIN MYOPATHY: ICD-10-CM

## 2023-08-01 DIAGNOSIS — E78.5 HYPERLIPIDEMIA, UNSPECIFIED HYPERLIPIDEMIA TYPE: ICD-10-CM

## 2023-08-01 DIAGNOSIS — I10 PRIMARY HYPERTENSION: ICD-10-CM

## 2023-08-01 DIAGNOSIS — I25.84 CORONARY ARTERY CALCIFICATION: ICD-10-CM

## 2023-08-01 DIAGNOSIS — G72.0 STATIN MYOPATHY: ICD-10-CM

## 2023-08-01 DIAGNOSIS — I70.219 ATHEROSCLEROTIC PVD WITH INTERMITTENT CLAUDICATION: ICD-10-CM

## 2023-08-01 DIAGNOSIS — E11.51 TYPE 2 DIABETES MELLITUS WITH DIABETIC PERIPHERAL ANGIOPATHY WITHOUT GANGRENE, WITH LONG-TERM CURRENT USE OF INSULIN: ICD-10-CM

## 2023-08-01 DIAGNOSIS — I65.29 STENOSIS OF CAROTID ARTERY, UNSPECIFIED LATERALITY: ICD-10-CM

## 2023-08-01 DIAGNOSIS — R09.89 BRUIT OF LEFT CAROTID ARTERY: ICD-10-CM

## 2023-08-01 DIAGNOSIS — I25.10 CORONARY ARTERY CALCIFICATION: ICD-10-CM

## 2023-08-01 PROCEDURE — 93010 ELECTROCARDIOGRAM REPORT: CPT | Mod: HCNC,,, | Performed by: INTERNAL MEDICINE

## 2023-08-01 PROCEDURE — 3074F PR MOST RECENT SYSTOLIC BLOOD PRESSURE < 130 MM HG: ICD-10-PCS | Mod: HCNC,CPTII,S$GLB, | Performed by: INTERNAL MEDICINE

## 2023-08-01 PROCEDURE — 3072F PR LOW RISK FOR RETINOPATHY: ICD-10-PCS | Mod: HCNC,CPTII,S$GLB, | Performed by: INTERNAL MEDICINE

## 2023-08-01 PROCEDURE — 3051F PR MOST RECENT HEMOGLOBIN A1C LEVEL 7.0 - < 8.0%: ICD-10-PCS | Mod: HCNC,CPTII,S$GLB, | Performed by: INTERNAL MEDICINE

## 2023-08-01 PROCEDURE — 3066F PR DOCUMENTATION OF TREATMENT FOR NEPHROPATHY: ICD-10-PCS | Mod: HCNC,CPTII,S$GLB, | Performed by: INTERNAL MEDICINE

## 2023-08-01 PROCEDURE — 1160F PR REVIEW ALL MEDS BY PRESCRIBER/CLIN PHARMACIST DOCUMENTED: ICD-10-PCS | Mod: HCNC,CPTII,S$GLB, | Performed by: INTERNAL MEDICINE

## 2023-08-01 PROCEDURE — 1101F PR PT FALLS ASSESS DOC 0-1 FALLS W/OUT INJ PAST YR: ICD-10-PCS | Mod: HCNC,CPTII,S$GLB, | Performed by: INTERNAL MEDICINE

## 2023-08-01 PROCEDURE — 3288F PR FALLS RISK ASSESSMENT DOCUMENTED: ICD-10-PCS | Mod: HCNC,CPTII,S$GLB, | Performed by: INTERNAL MEDICINE

## 2023-08-01 PROCEDURE — 1160F RVW MEDS BY RX/DR IN RCRD: CPT | Mod: HCNC,CPTII,S$GLB, | Performed by: INTERNAL MEDICINE

## 2023-08-01 PROCEDURE — 3288F FALL RISK ASSESSMENT DOCD: CPT | Mod: HCNC,CPTII,S$GLB, | Performed by: INTERNAL MEDICINE

## 2023-08-01 PROCEDURE — 1126F AMNT PAIN NOTED NONE PRSNT: CPT | Mod: HCNC,CPTII,S$GLB, | Performed by: INTERNAL MEDICINE

## 2023-08-01 PROCEDURE — 93010 EKG 12-LEAD: ICD-10-PCS | Mod: HCNC,,, | Performed by: INTERNAL MEDICINE

## 2023-08-01 PROCEDURE — 99214 PR OFFICE/OUTPT VISIT, EST, LEVL IV, 30-39 MIN: ICD-10-PCS | Mod: HCNC,S$GLB,, | Performed by: INTERNAL MEDICINE

## 2023-08-01 PROCEDURE — 3072F LOW RISK FOR RETINOPATHY: CPT | Mod: HCNC,CPTII,S$GLB, | Performed by: INTERNAL MEDICINE

## 2023-08-01 PROCEDURE — 1159F PR MEDICATION LIST DOCUMENTED IN MEDICAL RECORD: ICD-10-PCS | Mod: HCNC,CPTII,S$GLB, | Performed by: INTERNAL MEDICINE

## 2023-08-01 PROCEDURE — 3078F PR MOST RECENT DIASTOLIC BLOOD PRESSURE < 80 MM HG: ICD-10-PCS | Mod: HCNC,CPTII,S$GLB, | Performed by: INTERNAL MEDICINE

## 2023-08-01 PROCEDURE — 1126F PR PAIN SEVERITY QUANTIFIED, NO PAIN PRESENT: ICD-10-PCS | Mod: HCNC,CPTII,S$GLB, | Performed by: INTERNAL MEDICINE

## 2023-08-01 PROCEDURE — 93005 ELECTROCARDIOGRAM TRACING: CPT | Mod: HCNC

## 2023-08-01 PROCEDURE — 1159F MED LIST DOCD IN RCRD: CPT | Mod: HCNC,CPTII,S$GLB, | Performed by: INTERNAL MEDICINE

## 2023-08-01 PROCEDURE — 99214 OFFICE O/P EST MOD 30 MIN: CPT | Mod: HCNC,S$GLB,, | Performed by: INTERNAL MEDICINE

## 2023-08-01 PROCEDURE — 3061F NEG MICROALBUMINURIA REV: CPT | Mod: HCNC,CPTII,S$GLB, | Performed by: INTERNAL MEDICINE

## 2023-08-01 PROCEDURE — 99999 PR PBB SHADOW E&M-EST. PATIENT-LVL IV: ICD-10-PCS | Mod: PBBFAC,HCNC,, | Performed by: INTERNAL MEDICINE

## 2023-08-01 PROCEDURE — 99999 PR PBB SHADOW E&M-EST. PATIENT-LVL IV: CPT | Mod: PBBFAC,HCNC,, | Performed by: INTERNAL MEDICINE

## 2023-08-01 PROCEDURE — 1101F PT FALLS ASSESS-DOCD LE1/YR: CPT | Mod: HCNC,CPTII,S$GLB, | Performed by: INTERNAL MEDICINE

## 2023-08-01 PROCEDURE — 3066F NEPHROPATHY DOC TX: CPT | Mod: HCNC,CPTII,S$GLB, | Performed by: INTERNAL MEDICINE

## 2023-08-01 PROCEDURE — 3078F DIAST BP <80 MM HG: CPT | Mod: HCNC,CPTII,S$GLB, | Performed by: INTERNAL MEDICINE

## 2023-08-01 PROCEDURE — 3074F SYST BP LT 130 MM HG: CPT | Mod: HCNC,CPTII,S$GLB, | Performed by: INTERNAL MEDICINE

## 2023-08-01 PROCEDURE — 3061F PR NEG MICROALBUMINURIA RESULT DOCUMENTED/REVIEW: ICD-10-PCS | Mod: HCNC,CPTII,S$GLB, | Performed by: INTERNAL MEDICINE

## 2023-08-01 PROCEDURE — 3051F HG A1C>EQUAL 7.0%<8.0%: CPT | Mod: HCNC,CPTII,S$GLB, | Performed by: INTERNAL MEDICINE

## 2023-08-01 NOTE — PROGRESS NOTES
Subjective:   Patient ID:  Rosana Aguiar is a 73 y.o. female who presents for follow up of No chief complaint on file.      HPI  10/19/2022  A 73 yo female with htn hlp diabetes ? Allergy to statins is here for evaluation of pvd. She is compliant with salt her diabetes is controlled . Has known h/o carotid disease aSYMPTOMATIC. SHE IS HERE FRO EVALUATION OF PVD SHE HAS LIMITING CLAUDICATION OF BOTH LOWER EXTREMITIES WORSE ON THE RIGHT SIDE. SHE IS AVOIDING WALKING TO AVOID PAIN. HAS NO NOCTURNAL SYMPTOMS. SHE CONTINUES TO SMOKE HAS NO ULCERATION OR DISCOLORATION IN FEET. HAS NO ULCERATION. HAS NO NUMBNESS OR TINGLING./ HE RLEG PAIN DID NOT IMPROVE WITH STOPPING STATINS.  HAS BALANCE ISSUE AND FEELS WOBBLY AT TIMES ? NEUROPATHY. HAS NO FALLS. DISCUSSED SMOKING CESSATION NOT INTERESTED IN GETTING HELP FROM OUR TEAm. Not sure she wants to quit smoking still dealing with death of  few months back.  She also was offered pcsk9 inhibitors was too costly for her to try.     1/19/2023  Tolerated meds well has no further leg pain or claudication she is still smoking follows with pulmonary . Cardiac w/u negative. Was evaluated by pulmonary    8/1/2023   Here for f/u she is on chantix decreased cigarettes not able to quit she gets nervous. Denies any chest pain shortness of breath she is back complaining of leg pain down the calves and thigh she has to stop she is limited with her activity.   Past Medical History:   Diagnosis Date    Atherosclerotic PVD with intermittent claudication 10/19/2022    Diabetes mellitus, type 2 20 years    BS didn't check 07/26/2022    High cholesterol     Hypertension     Macular degeneration        Past Surgical History:   Procedure Laterality Date    APPENDECTOMY      CATARACT EXTRACTION Bilateral     cyst removal from breast (Left)      EYE SURGERY  Cataract    HYSTERECTOMY      TONSILLECTOMY      TUBAL LIGATION  1975       Social History     Tobacco Use    Smoking status: Every Day      Current packs/day: 1.00     Average packs/day: 1 pack/day for 61.6 years (61.6 ttl pk-yrs)     Types: Cigarettes     Start date: 1962     Passive exposure: Past    Smokeless tobacco: Never   Substance Use Topics    Alcohol use: Yes     Comment: rarely    Drug use: Never       Family History   Problem Relation Age of Onset    Cancer Mother         female    Diabetes Mother     Macular degeneration Mother     Alcohol abuse Father     Cancer Daughter         colon    Diabetes Maternal Uncle     Heart disease Neg Hx     Stroke Neg Hx        Current Outpatient Medications   Medication Sig    albuterol (VENTOLIN HFA) 90 mcg/actuation inhaler Inhale 2 puffs into the lungs every 6 (six) hours as needed for Wheezing. Rescue    amLODIPine (NORVASC) 10 MG tablet TAKE 1 TABLET ONE TIME DAILY    aspirin (ECOTRIN) 81 MG EC tablet Take 1 tablet (81 mg total) by mouth once daily.    carvediloL (COREG) 6.25 MG tablet Take 1 tablet (6.25 mg total) by mouth 2 (two) times daily with meals.    cilostazoL (PLETAL) 50 MG Tab Take 1 tablet (50 mg total) by mouth 2 (two) times daily.    FLUoxetine 10 MG capsule Take 1 capsule (10 mg total) by mouth once daily.    fluticasone propionate (FLONASE) 50 mcg/actuation nasal spray 1 spray (50 mcg total) by Each Nostril route once daily.    insulin glargine U-300 conc (TOUJEO MAX U-300 SOLOSTAR) 300 unit/mL (3 mL) insulin pen Inject 60 Units into the skin once daily.    levocetirizine (XYZAL) 5 MG tablet Take 1 tablet (5 mg total) by mouth every evening.    losartan-hydrochlorothiazide 100-25 mg (HYZAAR) 100-25 mg per tablet TAKE 1 TABLET EVERY DAY    nicotine (NICODERM CQ) 21 mg/24 hr Place 1 patch onto the skin once daily.    rosuvastatin (CRESTOR) 20 MG tablet Take 1 tablet (20 mg total) by mouth once daily.    tiotropium-olodateroL (STIOLTO RESPIMAT) 2.5-2.5 mcg/actuation Mist Inhale 2 puffs into the lungs once daily. Controller    varenicline (CHANTIX STARTING MONTH BOX) 0.5 mg (11)- 1 mg (42)  "tablet Take one 0.5mg tablet by mouth once daily x 3 days, THEN increase to one 0.5mg tablet by mouth twice daily x 4 days, THEN increase to one 1mg tablet by mouth twice daily    albuterol (PROVENTIL) 2.5 mg /3 mL (0.083 %) nebulizer solution Take 3 mLs (2.5 mg total) by nebulization every 6 (six) hours as needed for Wheezing. Rescue (Patient not taking: Reported on 7/20/2023)    pen needle, diabetic (BD ULTRA-FINE CAROL PEN NEEDLE) 32 gauge x 5/32" Ndle 1 each by Misc.(Non-Drug; Combo Route) route once daily.     No current facility-administered medications for this visit.     Current Outpatient Medications on File Prior to Visit   Medication Sig    albuterol (VENTOLIN HFA) 90 mcg/actuation inhaler Inhale 2 puffs into the lungs every 6 (six) hours as needed for Wheezing. Rescue    amLODIPine (NORVASC) 10 MG tablet TAKE 1 TABLET ONE TIME DAILY    aspirin (ECOTRIN) 81 MG EC tablet Take 1 tablet (81 mg total) by mouth once daily.    carvediloL (COREG) 6.25 MG tablet Take 1 tablet (6.25 mg total) by mouth 2 (two) times daily with meals.    cilostazoL (PLETAL) 50 MG Tab Take 1 tablet (50 mg total) by mouth 2 (two) times daily.    FLUoxetine 10 MG capsule Take 1 capsule (10 mg total) by mouth once daily.    fluticasone propionate (FLONASE) 50 mcg/actuation nasal spray 1 spray (50 mcg total) by Each Nostril route once daily.    insulin glargine U-300 conc (TOUJEO MAX U-300 SOLOSTAR) 300 unit/mL (3 mL) insulin pen Inject 60 Units into the skin once daily.    levocetirizine (XYZAL) 5 MG tablet Take 1 tablet (5 mg total) by mouth every evening.    losartan-hydrochlorothiazide 100-25 mg (HYZAAR) 100-25 mg per tablet TAKE 1 TABLET EVERY DAY    nicotine (NICODERM CQ) 21 mg/24 hr Place 1 patch onto the skin once daily.    rosuvastatin (CRESTOR) 20 MG tablet Take 1 tablet (20 mg total) by mouth once daily.    tiotropium-olodateroL (STIOLTO RESPIMAT) 2.5-2.5 mcg/actuation Mist Inhale 2 puffs into the lungs once daily. Controller " "   varenicline (CHANTIX STARTING MONTH BOX) 0.5 mg (11)- 1 mg (42) tablet Take one 0.5mg tablet by mouth once daily x 3 days, THEN increase to one 0.5mg tablet by mouth twice daily x 4 days, THEN increase to one 1mg tablet by mouth twice daily    albuterol (PROVENTIL) 2.5 mg /3 mL (0.083 %) nebulizer solution Take 3 mLs (2.5 mg total) by nebulization every 6 (six) hours as needed for Wheezing. Rescue (Patient not taking: Reported on 7/20/2023)    pen needle, diabetic (BD ULTRA-FINE CAROL PEN NEEDLE) 32 gauge x 5/32" Ndle 1 each by Misc.(Non-Drug; Combo Route) route once daily.     No current facility-administered medications on file prior to visit.     Review of patient's allergies indicates:   Allergen Reactions    Neomycin-bacitracin-polymyxin Itching      Review of Systems   Constitutional: Negative for diaphoresis, malaise/fatigue and weight gain.   HENT:  Negative for hoarse voice.    Eyes:  Negative for double vision and visual disturbance.   Cardiovascular:  Positive for claudication. Negative for chest pain, cyanosis, dyspnea on exertion, irregular heartbeat, leg swelling, near-syncope, orthopnea, palpitations, paroxysmal nocturnal dyspnea and syncope.   Respiratory:  Negative for cough, hemoptysis, shortness of breath and snoring.    Hematologic/Lymphatic: Negative for bleeding problem. Does not bruise/bleed easily.   Skin:  Negative for color change and poor wound healing.   Musculoskeletal:  Negative for muscle cramps, muscle weakness and myalgias.   Gastrointestinal:  Negative for bloating, abdominal pain, change in bowel habit, diarrhea, heartburn, hematemesis, hematochezia, melena and nausea.   Neurological:  Negative for excessive daytime sleepiness, dizziness, headaches, light-headedness, loss of balance, numbness and weakness.   Psychiatric/Behavioral:  Negative for memory loss. The patient does not have insomnia.    Allergic/Immunologic: Negative for hives.       Objective:   Physical " Exam  Constitutional:       General: She is not in acute distress.     Appearance: Normal appearance. She is well-developed. She is not ill-appearing.   HENT:      Head: Normocephalic and atraumatic.   Eyes:      General: No scleral icterus.     Pupils: Pupils are equal, round, and reactive to light.   Neck:      Thyroid: No thyromegaly.      Vascular: Normal carotid pulses. No carotid bruit, hepatojugular reflux or JVD.      Trachea: No tracheal deviation.   Cardiovascular:      Rate and Rhythm: Normal rate and regular rhythm.      Pulses:           Carotid pulses are 2+ on the right side with bruit and 2+ on the left side with bruit.       Radial pulses are 2+ on the right side and 2+ on the left side.        Femoral pulses are 2+ on the right side and 2+ on the left side.       Popliteal pulses are 1+ on the right side and 1+ on the left side.        Dorsalis pedis pulses are 1+ on the right side and 1+ on the left side.        Posterior tibial pulses are 0 on the right side and 0 on the left side.      Heart sounds: Normal heart sounds. No murmur heard.     No friction rub. No gallop.   Pulmonary:      Effort: Pulmonary effort is normal. No respiratory distress.      Breath sounds: Normal breath sounds. No wheezing, rhonchi or rales.   Chest:      Chest wall: No tenderness.   Abdominal:      General: Bowel sounds are normal. There is no abdominal bruit.      Palpations: Abdomen is soft. There is no hepatomegaly or pulsatile mass.      Tenderness: There is no abdominal tenderness.   Musculoskeletal:      Right shoulder: No deformity.      Cervical back: Normal range of motion and neck supple.   Skin:     General: Skin is warm and dry.      Findings: No erythema or rash.      Nails: There is no clubbing.   Neurological:      Mental Status: She is alert and oriented to person, place, and time.      Cranial Nerves: No cranial nerve deficit.      Coordination: Coordination normal.   Psychiatric:         Speech:  "Speech normal.         Behavior: Behavior normal.       Vitals:    08/01/23 1617 08/01/23 1618   BP: (!) 104/56 (!) 102/54   BP Location: Left arm Right arm   Patient Position: Sitting Sitting   BP Method: Large (Manual) Large (Manual)   Pulse: 78    SpO2: 98%    Weight: 76.3 kg (168 lb 3.4 oz)    Height: 5' 3" (1.6 m)      Lab Results   Component Value Date    CHOL 107 (L) 07/11/2023    CHOL 258 (H) 07/11/2022    CHOL 141 01/06/2022      Body mass index is 29.8 kg/m².   Lab Results   Component Value Date    HGBA1C 7.3 (H) 07/11/2023      BMP  Lab Results   Component Value Date     (L) 07/28/2023    K 4.0 07/28/2023    CL 98 07/28/2023    CO2 23 07/28/2023    BUN 14 07/28/2023    CREATININE 0.9 07/28/2023    CALCIUM 8.9 07/28/2023    ANIONGAP 12 07/28/2023    EGFRNORACEVR >60.0 07/28/2023      Lab Results   Component Value Date    HDL 45 07/11/2023    HDL 45 07/11/2022    HDL 54 01/06/2022     Lab Results   Component Value Date    LDLCALC 45.4 (L) 07/11/2023    LDLCALC 168.6 (H) 07/11/2022    LDLCALC 64.8 01/06/2022     Lab Results   Component Value Date    TRIG 83 07/11/2023    TRIG 222 (H) 07/11/2022    TRIG 111 01/06/2022     Lab Results   Component Value Date    CHOLHDL 42.1 07/11/2023    CHOLHDL 17.4 (L) 07/11/2022    CHOLHDL 38.3 01/06/2022       Chemistry        Component Value Date/Time     (L) 07/28/2023 1054    K 4.0 07/28/2023 1054    CL 98 07/28/2023 1054    CO2 23 07/28/2023 1054    BUN 14 07/28/2023 1054    CREATININE 0.9 07/28/2023 1054     (H) 07/28/2023 1054        Component Value Date/Time    CALCIUM 8.9 07/28/2023 1054    ALKPHOS 85 07/11/2023 0924    AST 12 07/11/2023 0924    ALT 9 (L) 07/11/2023 0924    BILITOT 0.2 07/11/2023 0924    ESTGFRAFRICA >60.0 07/11/2022 1201    EGFRNONAA >60.0 07/11/2022 1201          Lab Results   Component Value Date    TSH 1.285 01/06/2022     No results found for: "INR", "PROTIME"  Lab Results   Component Value Date    WBC 7.26 07/11/2022    HGB " 13.1 07/11/2022    HCT 41.3 07/11/2022    MCV 99 (H) 07/11/2022     07/11/2022     BMP  Sodium   Date Value Ref Range Status   07/28/2023 133 (L) 136 - 145 mmol/L Final     Potassium   Date Value Ref Range Status   07/28/2023 4.0 3.5 - 5.1 mmol/L Final     Chloride   Date Value Ref Range Status   07/28/2023 98 95 - 110 mmol/L Final     CO2   Date Value Ref Range Status   07/28/2023 23 23 - 29 mmol/L Final     BUN   Date Value Ref Range Status   07/28/2023 14 8 - 23 mg/dL Final     Creatinine   Date Value Ref Range Status   07/28/2023 0.9 0.5 - 1.4 mg/dL Final     Calcium   Date Value Ref Range Status   07/28/2023 8.9 8.7 - 10.5 mg/dL Final     Anion Gap   Date Value Ref Range Status   07/28/2023 12 8 - 16 mmol/L Final     eGFR if    Date Value Ref Range Status   07/11/2022 >60.0 >60 mL/min/1.73 m^2 Final     eGFR if non    Date Value Ref Range Status   07/11/2022 >60.0 >60 mL/min/1.73 m^2 Final     Comment:     Calculation used to obtain the estimated glomerular filtration  rate (eGFR) is the CKD-EPI equation.        Estimated Creatinine Clearance: 52.9 mL/min (based on SCr of 0.9 mg/dL).    Assessment:     1. Atherosclerotic PVD with intermittent claudication    2. Pulmonary emphysema, unspecified emphysema type    3. Abdominal aortic atherosclerosis    4. Bruit of left carotid artery    5. Stenosis of carotid artery, unspecified laterality    6. Coronary artery calcification    7. Hyperlipidemia, unspecified hyperlipidemia type    8. Primary hypertension    9. Thyroid nodule    10. Type 2 diabetes mellitus with diabetic peripheral angiopathy without gangrene, with long-term current use of insulin    11. Statin myopathy      I think she ahs a multitude of symptoms with possible neuropathy component as well as neurogenic claudication and  vasculogenic claudication that' seems marleen be getting more limiting affecting lifestyle and ability to get  healthy cardiovascular wise.  She  deserves a reevaluation non invasively despite her exam being unchanged. She was counseled about smoking cessation that she is trying to anjali along with. In addition htn is controlled lipids on target. However a1c is increased counsled about compliance./ I think pending non invasive eval may want to decide on angiography. She is compliant with ehr pletal.   Plan:   As per above zahra and arterial duplex phone review

## 2023-08-03 ENCOUNTER — HOSPITAL ENCOUNTER (OUTPATIENT)
Dept: CARDIOLOGY | Facility: HOSPITAL | Age: 73
Discharge: HOME | End: 2023-08-03
Attending: INTERNAL MEDICINE
Payer: MEDICARE

## 2023-08-03 VITALS
WEIGHT: 167 LBS | SYSTOLIC BLOOD PRESSURE: 151 MMHG | WEIGHT: 168 LBS | DIASTOLIC BLOOD PRESSURE: 67 MMHG | DIASTOLIC BLOOD PRESSURE: 67 MMHG | SYSTOLIC BLOOD PRESSURE: 151 MMHG | BODY MASS INDEX: 29.77 KG/M2 | HEIGHT: 63 IN | HEIGHT: 63 IN | BODY MASS INDEX: 29.59 KG/M2

## 2023-08-03 DIAGNOSIS — E11.51 TYPE 2 DIABETES MELLITUS WITH DIABETIC PERIPHERAL ANGIOPATHY WITHOUT GANGRENE, WITH LONG-TERM CURRENT USE OF INSULIN: ICD-10-CM

## 2023-08-03 DIAGNOSIS — I70.219 ATHEROSCLEROTIC PVD WITH INTERMITTENT CLAUDICATION: ICD-10-CM

## 2023-08-03 DIAGNOSIS — Z79.4 TYPE 2 DIABETES MELLITUS WITH DIABETIC PERIPHERAL ANGIOPATHY WITHOUT GANGRENE, WITH LONG-TERM CURRENT USE OF INSULIN: ICD-10-CM

## 2023-08-03 LAB
LEFT ABI: 0.77
LEFT ANT TIBIAL SYS PSV: 67 CM/S
LEFT ARM BP: 149 MMHG
LEFT CFA PSV: 259 CM/S
LEFT DORSALIS PEDIS: 117 MMHG
LEFT PERONEAL SYS PSV: 43 CM/S
LEFT POPLITEAL PSV: 79 CM/S
LEFT POST TIBIAL SYS PSV: 45 CM/S
LEFT POSTERIOR TIBIAL: 107 MMHG
LEFT PROFUNDA SYS PSV: 149 CM/S
LEFT SUPER FEMORAL DIST SYS PSV: 157 CM/S
LEFT SUPER FEMORAL OSTIAL SYS PSV: 155 CM/S
LEFT TBI: 0.55
LEFT TIB/PER TRUNK SYS PSV: 46 CM/S
LEFT TOE PRESSURE: 83 MMHG
OHS CV LEFT LOWER EXTREMITY ABI (NO CALC): 0.77
OHS CV RIGHT ABI LOWER EXTREMITY (NO CALC): 0.76
RIGHT ABI: 0.76
RIGHT ANT TIBIAL SYS PSV: 66 CM/S
RIGHT ARM BP: 151 MMHG
RIGHT CFA PSV: 151 CM/S
RIGHT DORSALIS PEDIS: 107 MMHG
RIGHT PERONEAL SYS PSV: 45 CM/S
RIGHT POPLITEAL PSV: 33 CM/S
RIGHT POST TIBIAL SYS PSV: 49 CM/S
RIGHT POSTERIOR TIBIAL: 115 MMHG
RIGHT PROFUNDA SYS PSV: 149 CM/S
RIGHT SUPER FEMORAL DIST SYS PSV: 48 CM/S
RIGHT SUPER FEMORAL MID SYS PSV: 39 CM/S
RIGHT SUPER FEMORAL OSTIAL SYS PSV: 156 CM/S
RIGHT SUPER FEMORAL PROX SYS PSV: 57 CM/S
RIGHT TBI: 0.68
RIGHT TIB/PER TRUNK SYS PSV: 75 CM/S
RIGHT TOE PRESSURE: 103 MMHG

## 2023-08-03 PROCEDURE — 93922 UPR/L XTREMITY ART 2 LEVELS: CPT | Mod: HCNC

## 2023-08-03 PROCEDURE — 93925 LOWER EXTREMITY STUDY: CPT | Mod: 26,HCNC,, | Performed by: INTERNAL MEDICINE

## 2023-08-03 PROCEDURE — 93925 LOWER EXTREMITY STUDY: CPT | Mod: HCNC

## 2023-08-03 PROCEDURE — 93925 CV US DOPPLER ARTERIAL LEGS BILATERAL (CUPID ONLY): ICD-10-PCS | Mod: 26,HCNC,, | Performed by: INTERNAL MEDICINE

## 2023-08-03 PROCEDURE — 93922 UPR/L XTREMITY ART 2 LEVELS: CPT | Mod: 26,HCNC,, | Performed by: INTERNAL MEDICINE

## 2023-08-03 PROCEDURE — 93922 ANKLE BRACHIAL INDICES (ABI): ICD-10-PCS | Mod: 26,HCNC,, | Performed by: INTERNAL MEDICINE

## 2023-08-04 ENCOUNTER — TELEPHONE (OUTPATIENT)
Dept: CARDIOLOGY | Facility: CLINIC | Age: 73
End: 2023-08-04
Payer: MEDICARE

## 2023-08-04 NOTE — TELEPHONE ENCOUNTER
Patient was notified of results. All questions were answered. Pt verbalized understanding. Pt will call back with any other questions or concerns.      ----- Message from Jonathan Coburn MD sent at 8/4/2023 12:50 PM CDT -----  Blockages in legs unchanged

## 2023-08-17 LAB
GENE STUDIED ID: NORMAL
LAB TEST METHOD: NORMAL
LYNCH ADDITIONAL INFORMATION: NORMAL
LYNCH INTERPRETATION: NORMAL
LYNCH RELEASED BY: NORMAL
LYNCH RESOURCES: NORMAL
LYNCH RESULT SUMMARY: NEGATIVE
LYNCH RESULT: NORMAL
LYNCH SOURCE: NORMAL
LYNCH SPECIMEN: NORMAL
TEST PERFORMANCE INFO SPEC: NORMAL
TEST PERFORMANCE INFO SPEC: NORMAL

## 2023-08-18 ENCOUNTER — PATIENT MESSAGE (OUTPATIENT)
Dept: INTERNAL MEDICINE | Facility: CLINIC | Age: 73
End: 2023-08-18
Payer: MEDICARE

## 2023-08-28 NOTE — PROGRESS NOTES
Pulmonary Outpatient Follow Up Visit     Subjective:       Patient ID: Rosana Aguiar is a 73 y.o. female.    Chief Complaint: COPD    8/29/23  Here for COPD follow up  Doing well on stiolto daily  Rarely using albuterol  Down to 1/2 pack cig a day  Tried patches, allergic to adhesive  She is trying to quit  No signs of exacerbation today  Chest CT stable, will return to yearly surveillance         5/2023 Dr. Santana:  Patient is a 72 y.o. female presenting for evaluation of abnormal chest x-ray.      Chest x-ray showed small left lung nodule/calcified granuloma.      More than 30 pack-year smoking history.      Complains of coughing and shortness on exertion.  Not able to use albuterol inhaler properly.  Chronic cough with sputum production    Reports snoring but not agreeable to proceed with sleep study.      Peripheral vascular disease followed by cardiology.  Review of Systems   Constitutional:  Positive for fatigue. Negative for fever and chills.   HENT:  Negative for nosebleeds.    Eyes:  Negative for redness.   Respiratory:  Positive for snoring, cough, shortness of breath, dyspnea on extertion and use of rescue inhaler. Negative for choking.    Genitourinary:  Negative for hematuria.   Endocrine:  Negative for cold intolerance.    Musculoskeletal:  Positive for arthralgias and back pain.   Skin:  Negative for rash.   Gastrointestinal:  Negative for vomiting.   Neurological:  Negative for syncope.   Hematological:  Negative for adenopathy.   Psychiatric/Behavioral:  Positive for sleep disturbance. Negative for confusion.        Outpatient Encounter Medications as of 8/29/2023   Medication Sig Dispense Refill    albuterol (VENTOLIN HFA) 90 mcg/actuation inhaler Inhale 2 puffs into the lungs every 6 (six) hours as needed for Wheezing. Rescue 18 g 11    amLODIPine (NORVASC) 10 MG tablet TAKE 1 TABLET ONE TIME DAILY 90 tablet 3    carvediloL (COREG) 6.25 MG tablet Take 1  "tablet (6.25 mg total) by mouth 2 (two) times daily with meals. 180 tablet 3    cilostazoL (PLETAL) 50 MG Tab Take 1 tablet (50 mg total) by mouth 2 (two) times daily. 60 tablet 11    FLUoxetine 10 MG capsule Take 1 capsule (10 mg total) by mouth once daily. 90 capsule 3    fluticasone propionate (FLONASE) 50 mcg/actuation nasal spray 1 spray (50 mcg total) by Each Nostril route once daily. 16 g 2    insulin glargine U-300 conc (TOUJEO MAX U-300 SOLOSTAR) 300 unit/mL (3 mL) insulin pen Inject 60 Units into the skin once daily. 3 pen 11    levocetirizine (XYZAL) 5 MG tablet Take 1 tablet (5 mg total) by mouth every evening. 30 tablet 11    losartan-hydrochlorothiazide 100-25 mg (HYZAAR) 100-25 mg per tablet TAKE 1 TABLET EVERY DAY 90 tablet 3    nicotine (NICODERM CQ) 21 mg/24 hr Place 1 patch onto the skin once daily. 28 patch 0    pen needle, diabetic (BD ULTRA-FINE CAROL PEN NEEDLE) 32 gauge x 5/32" Ndle 1 each by Misc.(Non-Drug; Combo Route) route once daily. 100 each 1    rosuvastatin (CRESTOR) 20 MG tablet Take 1 tablet (20 mg total) by mouth once daily. 90 tablet 3    tiotropium-olodateroL (STIOLTO RESPIMAT) 2.5-2.5 mcg/actuation Mist Inhale 2 puffs into the lungs once daily. Controller 4 g 11    albuterol (PROVENTIL) 2.5 mg /3 mL (0.083 %) nebulizer solution Take 3 mLs (2.5 mg total) by nebulization every 6 (six) hours as needed for Wheezing. Rescue (Patient not taking: Reported on 7/20/2023) 360 mL 5    aspirin (ECOTRIN) 81 MG EC tablet Take 1 tablet (81 mg total) by mouth once daily. 90 tablet 3    varenicline (CHANTIX STARTING MONTH BOX) 0.5 mg (11)- 1 mg (42) tablet Take one 0.5mg tablet by mouth once daily x 3 days, THEN increase to one 0.5mg tablet by mouth twice daily x 4 days, THEN increase to one 1mg tablet by mouth twice daily (Patient not taking: Reported on 8/29/2023) 53 each 0     No facility-administered encounter medications on file as of 8/29/2023.       Objective:     Vital Signs (Most " "Recent)  Vital Signs  Pulse: 82  Resp: 18  SpO2: 99 %  BP: 122/68  Height and Weight  Height: 5' 3" (160 cm)  Weight: 75.3 kg (166 lb 0.1 oz)  BSA (Calculated - sq m): 1.83 sq meters  BMI (Calculated): 29.4  Weight in (lb) to have BMI = 25: 140.8]  Wt Readings from Last 2 Encounters:   08/29/23 75.3 kg (166 lb 0.1 oz)   08/03/23 75.8 kg (167 lb)       Physical Exam   Constitutional: She is oriented to person, place, and time. She appears well-developed and well-nourished. No distress.   HENT:   Head: Normocephalic.   Cardiovascular: Normal rate and regular rhythm.   Pulmonary/Chest: Normal expansion and effort normal. No stridor. No respiratory distress. She has decreased breath sounds. She exhibits no tenderness.   Abdominal: She exhibits no distension.   Musculoskeletal:         General: No tenderness.      Cervical back: Neck supple.   Lymphadenopathy:     She has no cervical adenopathy.   Neurological: She is alert and oriented to person, place, and time. Gait normal.   Skin: Skin is warm. No cyanosis. Nails show no clubbing.   Psychiatric: She has a normal mood and affect. Her behavior is normal. Judgment and thought content normal.   Nursing note and vitals reviewed.      Laboratory  Lab Results   Component Value Date    WBC 7.26 07/11/2022    RBC 4.17 07/11/2022    HGB 13.1 07/11/2022    HCT 41.3 07/11/2022    MCV 99 (H) 07/11/2022    MCH 31.4 (H) 07/11/2022    MCHC 31.7 (L) 07/11/2022    RDW 13.2 07/11/2022     07/11/2022    MPV 9.7 07/11/2022    GRAN 4.2 07/11/2022    GRAN 57.3 07/11/2022    LYMPH 1.8 07/11/2022    LYMPH 24.9 07/11/2022    MONO 0.5 07/11/2022    MONO 7.4 07/11/2022    EOS 0.7 (H) 07/11/2022    BASO 0.08 07/11/2022    EOSINOPHIL 9.0 (H) 07/11/2022    BASOPHIL 1.1 07/11/2022       BMP  Lab Results   Component Value Date     (L) 07/28/2023    K 4.0 07/28/2023    CL 98 07/28/2023    CO2 23 07/28/2023    BUN 14 07/28/2023    CREATININE 0.9 07/28/2023    CALCIUM 8.9 07/28/2023    " "ANIONGAP 12 07/28/2023    ESTGFRAFRICA >60.0 07/11/2022    EGFRNONAA >60.0 07/11/2022    AST 12 07/11/2023    ALT 9 (L) 07/11/2023    PROT 6.8 07/11/2023       No results found for: "BNP"    Lab Results   Component Value Date    TSH 1.285 01/06/2022       Lab Results   Component Value Date    SEDRATE 33 (H) 07/11/2022       Lab Results   Component Value Date    CRP 1.4 07/11/2022     No results found for: "IGE"     No results found for: "ASPERGILLUS"  No results found for: "AFUMIGATUSCL"     No results found for: "ACE"     Diagnostic Results:  I have personally reviewed today the following studies:      CT chest without contrast May 29, 2023    COMPARISON:  11/30/2022     FINDINGS:  Base of Neck: No significant abnormality.     Thoracic soft tissues: Normal.     Aorta: Left-sided aortic arch.  No aneurysm.     Heart: Normal size. No effusion. Severe aortic and coronary artery atherosclerotic calcification.     Pulmonary vasculature: Pulmonary arteries distribute normally.     Sammie/Mediastinum: No pathologic chasity enlargement.     Esophagus: Normal.     Upper Abdomen: No abnormality of the partially imaged upper abdomen.     Airways: Patent.     Lungs/Pleura: Interval minimal enlargement of the right lower lobe nodule now measuring 10 x 9 x 9 mm, previously 9 x 9 x 9 mm as remeasured on prior study of 11/30/2022.  no new nodules.     Bones: No acute fracture. No suspicious lytic or sclerotic lesions.     Impression:     Interval minimal enlargement of the right lower lobe nodule now measuring 10 x 9 x 9 mm, previously 9 x 9 x 9 mm as remeasured on prior study of 11/30/2022. Consider PET-CT versus histologic sampling.          Assessment/Plan:   Other emphysema    Pulmonary nodule    Simple chronic bronchitis        Continue albuterol p.r.n. Stiolto 2 puffs daily.    Chantix.  Counseled >10 minutes about smoking cessation.  Provided with Chantix adverse effects and patient drug information.      Three and six-months " surveillance CT scan stable right lung nodule 9 x 10 mm in size.  repeat CT scan in 3 months was stable. return to regular surveillance protocol.    Nodule size Low risk patient High risk patient   4 mm  No follow up Follow up CT in 12 months. If unchanged, no further follow up.   4 - 6 mm Follow up CT in 12 months; if unchanged, no further follow up.  Initial follow up CT in 6 - 12 months, then at 18 - 24 months if no change.       6 - 8 mm  Initial follow up CT at 6 - 12 months and at 18 months if no change.  Initial follow up CT at 3 - 6 months. Then at 9-12 months and 24 months if no change.       > 8 mm Initial follow up CT at 3, 6, 9 and 24 months, dynamic contrast enhanced CT, PET-CT and/or biopsy. Initial follow up CT at 3, 6, 9 and 24 months, dynamic contrast enhanced CT, PET-CT and/or biopsy.      Follow up for f/u after scheduled chest ct 5/2024.    This note was prepared using voice recognition system and is likely to have sound alike errors that may have been overlooked even after proof reading.  Please call me with any questions    Discussed diagnosis, its evaluation, treatment and usual course. All questions answered.      Fartun Blackman PA-C

## 2023-08-29 ENCOUNTER — OFFICE VISIT (OUTPATIENT)
Dept: PULMONOLOGY | Facility: CLINIC | Age: 73
End: 2023-08-29
Payer: MEDICARE

## 2023-08-29 VITALS
OXYGEN SATURATION: 99 % | RESPIRATION RATE: 18 BRPM | HEIGHT: 63 IN | DIASTOLIC BLOOD PRESSURE: 68 MMHG | SYSTOLIC BLOOD PRESSURE: 122 MMHG | BODY MASS INDEX: 29.41 KG/M2 | WEIGHT: 166 LBS | HEART RATE: 82 BPM

## 2023-08-29 DIAGNOSIS — J43.8 OTHER EMPHYSEMA: Primary | ICD-10-CM

## 2023-08-29 DIAGNOSIS — J41.0 SIMPLE CHRONIC BRONCHITIS: ICD-10-CM

## 2023-08-29 DIAGNOSIS — R91.1 PULMONARY NODULE: ICD-10-CM

## 2023-08-29 PROCEDURE — 3061F NEG MICROALBUMINURIA REV: CPT | Mod: HCNC,CPTII,S$GLB, | Performed by: PHYSICIAN ASSISTANT

## 2023-08-29 PROCEDURE — 3074F PR MOST RECENT SYSTOLIC BLOOD PRESSURE < 130 MM HG: ICD-10-PCS | Mod: HCNC,CPTII,S$GLB, | Performed by: PHYSICIAN ASSISTANT

## 2023-08-29 PROCEDURE — 3074F SYST BP LT 130 MM HG: CPT | Mod: HCNC,CPTII,S$GLB, | Performed by: PHYSICIAN ASSISTANT

## 2023-08-29 PROCEDURE — 99214 OFFICE O/P EST MOD 30 MIN: CPT | Mod: HCNC,S$GLB,, | Performed by: PHYSICIAN ASSISTANT

## 2023-08-29 PROCEDURE — 3072F PR LOW RISK FOR RETINOPATHY: ICD-10-PCS | Mod: HCNC,CPTII,S$GLB, | Performed by: PHYSICIAN ASSISTANT

## 2023-08-29 PROCEDURE — 1101F PT FALLS ASSESS-DOCD LE1/YR: CPT | Mod: HCNC,CPTII,S$GLB, | Performed by: PHYSICIAN ASSISTANT

## 2023-08-29 PROCEDURE — 99999 PR PBB SHADOW E&M-EST. PATIENT-LVL IV: CPT | Mod: PBBFAC,HCNC,, | Performed by: PHYSICIAN ASSISTANT

## 2023-08-29 PROCEDURE — 1159F MED LIST DOCD IN RCRD: CPT | Mod: HCNC,CPTII,S$GLB, | Performed by: PHYSICIAN ASSISTANT

## 2023-08-29 PROCEDURE — 99999 PR PBB SHADOW E&M-EST. PATIENT-LVL IV: ICD-10-PCS | Mod: PBBFAC,HCNC,, | Performed by: PHYSICIAN ASSISTANT

## 2023-08-29 PROCEDURE — 3051F PR MOST RECENT HEMOGLOBIN A1C LEVEL 7.0 - < 8.0%: ICD-10-PCS | Mod: HCNC,CPTII,S$GLB, | Performed by: PHYSICIAN ASSISTANT

## 2023-08-29 PROCEDURE — 3288F FALL RISK ASSESSMENT DOCD: CPT | Mod: HCNC,CPTII,S$GLB, | Performed by: PHYSICIAN ASSISTANT

## 2023-08-29 PROCEDURE — 1160F PR REVIEW ALL MEDS BY PRESCRIBER/CLIN PHARMACIST DOCUMENTED: ICD-10-PCS | Mod: HCNC,CPTII,S$GLB, | Performed by: PHYSICIAN ASSISTANT

## 2023-08-29 PROCEDURE — 3051F HG A1C>EQUAL 7.0%<8.0%: CPT | Mod: HCNC,CPTII,S$GLB, | Performed by: PHYSICIAN ASSISTANT

## 2023-08-29 PROCEDURE — 3061F PR NEG MICROALBUMINURIA RESULT DOCUMENTED/REVIEW: ICD-10-PCS | Mod: HCNC,CPTII,S$GLB, | Performed by: PHYSICIAN ASSISTANT

## 2023-08-29 PROCEDURE — 1101F PR PT FALLS ASSESS DOC 0-1 FALLS W/OUT INJ PAST YR: ICD-10-PCS | Mod: HCNC,CPTII,S$GLB, | Performed by: PHYSICIAN ASSISTANT

## 2023-08-29 PROCEDURE — 3066F NEPHROPATHY DOC TX: CPT | Mod: HCNC,CPTII,S$GLB, | Performed by: PHYSICIAN ASSISTANT

## 2023-08-29 PROCEDURE — 3078F DIAST BP <80 MM HG: CPT | Mod: HCNC,CPTII,S$GLB, | Performed by: PHYSICIAN ASSISTANT

## 2023-08-29 PROCEDURE — 1159F PR MEDICATION LIST DOCUMENTED IN MEDICAL RECORD: ICD-10-PCS | Mod: HCNC,CPTII,S$GLB, | Performed by: PHYSICIAN ASSISTANT

## 2023-08-29 PROCEDURE — 3288F PR FALLS RISK ASSESSMENT DOCUMENTED: ICD-10-PCS | Mod: HCNC,CPTII,S$GLB, | Performed by: PHYSICIAN ASSISTANT

## 2023-08-29 PROCEDURE — 99214 PR OFFICE/OUTPT VISIT, EST, LEVL IV, 30-39 MIN: ICD-10-PCS | Mod: HCNC,S$GLB,, | Performed by: PHYSICIAN ASSISTANT

## 2023-08-29 PROCEDURE — 3078F PR MOST RECENT DIASTOLIC BLOOD PRESSURE < 80 MM HG: ICD-10-PCS | Mod: HCNC,CPTII,S$GLB, | Performed by: PHYSICIAN ASSISTANT

## 2023-08-29 PROCEDURE — 3072F LOW RISK FOR RETINOPATHY: CPT | Mod: HCNC,CPTII,S$GLB, | Performed by: PHYSICIAN ASSISTANT

## 2023-08-29 PROCEDURE — 3066F PR DOCUMENTATION OF TREATMENT FOR NEPHROPATHY: ICD-10-PCS | Mod: HCNC,CPTII,S$GLB, | Performed by: PHYSICIAN ASSISTANT

## 2023-08-29 PROCEDURE — 1160F RVW MEDS BY RX/DR IN RCRD: CPT | Mod: HCNC,CPTII,S$GLB, | Performed by: PHYSICIAN ASSISTANT

## 2023-08-29 NOTE — ASSESSMENT & PLAN NOTE
Controlled on stiolto daily  Albuterol prn  Stressed smoking cessation today, discussed >10 minutes

## 2023-09-06 ENCOUNTER — PATIENT MESSAGE (OUTPATIENT)
Dept: OPHTHALMOLOGY | Facility: CLINIC | Age: 73
End: 2023-09-06

## 2023-09-06 ENCOUNTER — OFFICE VISIT (OUTPATIENT)
Dept: OPHTHALMOLOGY | Facility: CLINIC | Age: 73
End: 2023-09-06
Payer: MEDICARE

## 2023-09-06 DIAGNOSIS — E11.9 DIABETES MELLITUS TYPE 2 WITHOUT RETINOPATHY: Primary | ICD-10-CM

## 2023-09-06 DIAGNOSIS — Z46.0 ENCOUNTER FOR FITTING OR ADJUSTMENT OF SPECTACLES OR CONTACT LENSES: ICD-10-CM

## 2023-09-06 DIAGNOSIS — H35.3131 NONEXUDATIVE AGE-RELATED MACULAR DEGENERATION, BILATERAL, EARLY DRY STAGE: ICD-10-CM

## 2023-09-06 DIAGNOSIS — Z96.1 PSEUDOPHAKIA OF BOTH EYES: ICD-10-CM

## 2023-09-06 DIAGNOSIS — H52.7 REFRACTIVE ERRORS: ICD-10-CM

## 2023-09-06 PROCEDURE — 3061F NEG MICROALBUMINURIA REV: CPT | Mod: HCNC,CPTII,S$GLB, | Performed by: OPTOMETRIST

## 2023-09-06 PROCEDURE — 3066F NEPHROPATHY DOC TX: CPT | Mod: HCNC,CPTII,S$GLB, | Performed by: OPTOMETRIST

## 2023-09-06 PROCEDURE — 92014 COMPRE OPH EXAM EST PT 1/>: CPT | Mod: HCNC,S$GLB,, | Performed by: OPTOMETRIST

## 2023-09-06 PROCEDURE — 92015 PR REFRACTION: ICD-10-PCS | Mod: HCNC,S$GLB,, | Performed by: OPTOMETRIST

## 2023-09-06 PROCEDURE — 3061F PR NEG MICROALBUMINURIA RESULT DOCUMENTED/REVIEW: ICD-10-PCS | Mod: HCNC,CPTII,S$GLB, | Performed by: OPTOMETRIST

## 2023-09-06 PROCEDURE — 99999 PR PBB SHADOW E&M-EST. PATIENT-LVL III: ICD-10-PCS | Mod: PBBFAC,HCNC,, | Performed by: OPTOMETRIST

## 2023-09-06 PROCEDURE — 92014 PR EYE EXAM, EST PATIENT,COMPREHESV: ICD-10-PCS | Mod: HCNC,S$GLB,, | Performed by: OPTOMETRIST

## 2023-09-06 PROCEDURE — 92015 DETERMINE REFRACTIVE STATE: CPT | Mod: HCNC,S$GLB,, | Performed by: OPTOMETRIST

## 2023-09-06 PROCEDURE — 3066F PR DOCUMENTATION OF TREATMENT FOR NEPHROPATHY: ICD-10-PCS | Mod: HCNC,CPTII,S$GLB, | Performed by: OPTOMETRIST

## 2023-09-06 PROCEDURE — 1159F PR MEDICATION LIST DOCUMENTED IN MEDICAL RECORD: ICD-10-PCS | Mod: HCNC,CPTII,S$GLB, | Performed by: OPTOMETRIST

## 2023-09-06 PROCEDURE — 1159F MED LIST DOCD IN RCRD: CPT | Mod: HCNC,CPTII,S$GLB, | Performed by: OPTOMETRIST

## 2023-09-06 PROCEDURE — 2023F DILAT RTA XM W/O RTNOPTHY: CPT | Mod: HCNC,CPTII,S$GLB, | Performed by: OPTOMETRIST

## 2023-09-06 PROCEDURE — 99999 PR PBB SHADOW E&M-EST. PATIENT-LVL III: CPT | Mod: PBBFAC,HCNC,, | Performed by: OPTOMETRIST

## 2023-09-06 PROCEDURE — 2023F PR DILATED RETINAL EXAM W/O EVID OF RETINOPATHY: ICD-10-PCS | Mod: HCNC,CPTII,S$GLB, | Performed by: OPTOMETRIST

## 2023-09-06 PROCEDURE — 3051F PR MOST RECENT HEMOGLOBIN A1C LEVEL 7.0 - < 8.0%: ICD-10-PCS | Mod: HCNC,CPTII,S$GLB, | Performed by: OPTOMETRIST

## 2023-09-06 PROCEDURE — 3051F HG A1C>EQUAL 7.0%<8.0%: CPT | Mod: HCNC,CPTII,S$GLB, | Performed by: OPTOMETRIST

## 2023-09-06 NOTE — TELEPHONE ENCOUNTER
Chronic Disease Management:   Called patient to confirm Pulmonary Disease Management appointment. Patient confirmed. Advised patient to bring respiratory inhalers to visit.    
Displaced L ureteral stent

## 2023-09-06 NOTE — PROGRESS NOTES
HPI    NIDDM exam  Decrease near and distance visual acuity with contact lenses.  Out of contact lenses x 1 month  Last eye exam 07/26/2022 [facility].  Last eye visit 08/03/2022 [facility]  Update glasses and contact lenses RX.  Lab Results       Component                Value               Date                       HGBA1C                   7.3 (H)             07/11/2023              Last edited by Hortencia Kendrick MA on 9/6/2023  1:10 PM.            Assessment /Plan     For exam results, see Encounter Report.    Diabetes mellitus type 2 without retinopathy    Nonexudative age-related macular degeneration, bilateral, early dry stage    Pseudophakia of both eyes    Encounter for fitting or adjustment of spectacles or contact lenses    Refractive errors      No Background Diabetic Retinopathy  Strict BG control, f/u w/ PCP, and annual DFE  Stressed importance of DM control to preserve vision    Minimal AMD OU, AREDS2    Stable IOL OU.    Discussed CL charges.  Dispense Final Rx for glasses  No changes CL Rx  RTC 1 year  Discussed above and answered questions.

## 2023-09-13 ENCOUNTER — PATIENT OUTREACH (OUTPATIENT)
Dept: ADMINISTRATIVE | Facility: HOSPITAL | Age: 73
End: 2023-09-13
Payer: MEDICARE

## 2023-09-13 NOTE — PROGRESS NOTES
HTN Report: Called to obtain home BP reading, Pt no longer has BP cuff, will have BP checked by nurse next week after Pulmonary appt.

## 2023-09-14 ENCOUNTER — PATIENT OUTREACH (OUTPATIENT)
Dept: ADMINISTRATIVE | Facility: HOSPITAL | Age: 73
End: 2023-09-14
Payer: MEDICARE

## 2023-09-14 NOTE — PROGRESS NOTES
COLON CA SCREEN: per chart review pt is overdue for screen, FIT KIT was ordered 01/2023, spoke to pt, asked her to label specimen container and collect sample and drop off to lab, pt verbalized compliance.

## 2023-09-19 ENCOUNTER — CLINICAL SUPPORT (OUTPATIENT)
Dept: PULMONOLOGY | Facility: CLINIC | Age: 73
End: 2023-09-19
Payer: MEDICARE

## 2023-09-19 ENCOUNTER — CLINICAL SUPPORT (OUTPATIENT)
Dept: INTERNAL MEDICINE | Facility: CLINIC | Age: 73
End: 2023-09-19
Payer: MEDICARE

## 2023-09-19 VITALS — HEART RATE: 74 BPM | BODY MASS INDEX: 29.26 KG/M2 | HEIGHT: 63 IN | WEIGHT: 165.13 LBS | OXYGEN SATURATION: 95 %

## 2023-09-19 VITALS — SYSTOLIC BLOOD PRESSURE: 128 MMHG | DIASTOLIC BLOOD PRESSURE: 62 MMHG | HEART RATE: 68 BPM

## 2023-09-19 DIAGNOSIS — J43.9 EMPHYSEMA LUNG: ICD-10-CM

## 2023-09-19 DIAGNOSIS — I10 HYPERTENSION, UNSPECIFIED TYPE: Primary | ICD-10-CM

## 2023-09-19 DIAGNOSIS — J41.0 SIMPLE CHRONIC BRONCHITIS: Primary | ICD-10-CM

## 2023-09-19 PROCEDURE — 99999 PR PBB SHADOW E&M-EST. PATIENT-LVL II: CPT | Mod: PBBFAC,HCNC,,

## 2023-09-19 PROCEDURE — 99999 PR PBB SHADOW E&M-EST. PATIENT-LVL I: ICD-10-PCS | Mod: PBBFAC,HCNC,,

## 2023-09-19 PROCEDURE — 99999 PR PBB SHADOW E&M-EST. PATIENT-LVL I: CPT | Mod: PBBFAC,HCNC,,

## 2023-09-19 PROCEDURE — 99999 PR PBB SHADOW E&M-EST. PATIENT-LVL II: ICD-10-PCS | Mod: PBBFAC,HCNC,,

## 2023-09-19 NOTE — PATIENT INSTRUCTIONS
Asthma Trigger Checklist  Allergens, irritants, and other things may trigger your asthma. Check the box next to each of your triggers. After each trigger is a list of ways to avoid it.   Dust mites. Dust mites live in mattresses, bedding, carpets, curtains, and indoor dust.  To kill dust mites, wash bedding in hot water (130°F) each week.  Cover mattress and pillows with special dust-mite-proof cases.  Don't use upholstered furniture like sofas or chairs in the bedroom.  Use allergy-proof filters for air conditioners and furnaces. Replace or clean them as instructed.  If you can, replace carpeting with wood or tile can, especially in the bedroom.  Animals. Animals with fur or feathers shed dander (allergens).  It's best to choose a pet that doesn't have fur or feathers, such as a fish or a reptile.  If you have pets, keep them off your bed and out of your bedroom.  Wash your hands and clothes after handling pets.    Mold. Mold grows in damp places, such as bathrooms, basements, and closets.  Ask someone to clean damp areas in your home every week. Or try wearing a face mask while you clean.  Run an exhaust fan while bathing. Or leave a window open in the bathroom.  Repair water leaks in or around your home.  Have someone else cut grass or rake leaves, if possible.  Don't use vaporizers or humidifiers. They encourage mold growth.    Pollen. Pollen from trees, grasses, and weeds is a common allergen. (Flower pollens are generally not a problem).  Try to learn what types of pollen affect you most. Pollen levels vary depending on the plant, the season, and the time of day.  If possible, use air conditioning instead of opening the windows in your home or car.  Have someone else do yard work, if possible.    Cockroaches. Roaches are found in many homes and produce allergens.  Keep your kitchen clean and dry. A leaky faucet or drain can attract roaches.  Remove garbage from your home daily.  Store food in tightly  sealed containers. Wash dishes as soon as they are used.  Use bait stations or traps to control roaches. Avoid using chemical sprays.    Smoke. Smoke may be from cigarettes, cigars, pipes, incense or candles, barbecues or grills, and fireplaces.  Don't smoke. And don't let people smoke in your home or car.  When you travel, ask for nonsmoking rental cars and hotel rooms.  Avoid fireplaces and wood stoves. If you can't, sit away from them. Make sure the smoke is directed outside.  Don't burn incense or use candles.  Move away from smoky outdoor cooking grills.    Smog.  Smog is from car exhaust and other pollution.  Read or listen to local air-quality reports. These let you know when air quality is poor.  Stay indoors as much as you can on smoggy days. If possible, use air conditioning instead of opening the windows.  In your car, set air conditioning to recirculate air, so less pollution gets in.    Strong odors. These include air fresheners, deodorizers, and cleaning products; perfume, deodorant, and other beauty products; incense and candles; and insect sprays and other sprays.  Use scent-free products like deodorant or body lotion.  Avoid using cleaning products with bleach and ammonia. Make your own cleaning solution with white vinegar, baking soda, or mild dish soap.  Use exhaust fans while cooking. Or open a window, if possible.   Avoid perfumes, air fresheners, potpourri, and other scented products.          Other irritants. These include dust, aerosol sprays, and powders.  Wear a face mask while doing tasks like sanding, dusting, sweeping, and yard work. Open doors and windows if working indoors.  Use pump spray bottles instead of aerosols.  Pour liquid  onto a rag or cloth instead of spraying them.    Weather. Weather conditions can trigger symptoms or make them worse.  Watch for very high or low temperatures, very humid conditions, or a lot of wind, as these conditions can make symptoms  worse.  Limit outdoor activity during the type of weather that affects you.  Wear a scarf over your mouth and nose in cold weather.    Colds, flu, and sinus infections. Upper respiratory infections can trigger asthma.  Wash your hands often with soap and warm water or use a hand  containing alcohol.  Get a yearly flu shot. And ask if you should get a pneumonia vaccine.  Take care of your general health. Get plenty of sleep. And eat a variety of healthy foods.    Food additives. Food additives can trigger asthma flare-ups in some people.  Check food labels for sulfites or other similar ingredients. These are often found in foods such as wine, beer, and dried fruits.  Avoid foods that contain these additives.    Medicine. Aspirin, NSAIDS like ibuprofen and naproxen, and heart medicines like beta-blockers may be triggers.  Tell your health care provider if you think certain medicines trigger symptoms.   Be sure to read the labels on over-the-counter medicines. They may have ingredients that cause symptoms for you.   , Emotions. Laughing, crying, or feeling excited are triggers for some people.   To help you stay calm: Try breathing in slowly through your nose for a count of 2 seconds. Close your lips and breathe out for 4 seconds. Repeat.  Try to focus on a soothing image in your mind. This will help relax you and calm your breathing.  Remember to take your daily controller medicines. When you're upset or under stress, it's easy to forget.    Exercise. For some people, exercise can trigger symptoms. Don't let this keep you from being active.   If you have not been exercising regularly, start slow and work up gradually.  Take all of your medicines as prescribed.  If you use quick-relief medicine, make sure you have it with you when you exercise.  Stop if you have any symptoms. Make sure you talk with your provider about these symptoms.  © 0282-4809 [employer] [address], [place]  [address]. All rights reserved. This information is not intended as a substitute for professional medical care. Always follow your healthcare professional's instructions.           ACTION PLAN    GREEN ZONE  My sputum is clear/white/usual color and easily cleared.  My breathing is no harder than usual.  I can do my usual activities.  I can think clearly.   Take your usual medicines, including oxygen, as you are told to do so by your health care provider.   YELLOW ZONE  My sputum has change (color, thickness, amount).  I am more short of breath than usual.  I cough or wheeze more.  I weigh more and my legs/feet swell.  I cannot do my usual activities without resting.   Call your health care provider. You will probably be told to begin taking an antibiotic and prednisone. Have your pharmacy phone number available.   RED ZONE  I cannot cough out my sputum.  I am much more short of breath than normal.  I need to sit up to breathe  I cannot do my usual activities.  I am unable to speak more than one or two words at a time.  I am confused.   Call your health care provider. You may be asked to come in to be seen, told to go to the emergency room, or told to call 9-1-1.

## 2023-09-19 NOTE — PROGRESS NOTES
Called home line- no answer. Left detailed msg to explain what cold medications to avoid and which are appropriate. Attempted cell number- voice mail full. Left call back number if has further questions. Today is Friday and phone lines will be off for the weekend.   Pulmonary Disease Management  [facility]  Final Follow up Visit  Chronic Care Management    Rosana Aguiar  was seen 9/19/2023  1:00 PM in the [facility] Management [facility] for evaluation, education, reinforcement of self management techniques and exacerbation action plan.    Jennifer Sherman    Past Medical History:   Diagnosis Date    Atherosclerotic PVD with intermittent claudication 10/19/2022    Diabetes mellitus, type 2 2003    BS didn't check 09/06/2023    High cholesterol     Hypertension     Macular degeneration        Patient's Medications   New Prescriptions    No medications on file   Previous Medications    ALBUTEROL (PROVENTIL) 2.5 MG /3 ML (0.083 %) NEBULIZER SOLUTION    Take 3 mLs (2.5 mg total) by nebulization every 6 (six) hours as needed for Wheezing. Rescue    ALBUTEROL (VENTOLIN HFA) 90 MCG/ACTUATION INHALER    Inhale 2 puffs into the lungs every 6 (six) hours as needed for Wheezing. Rescue    AMLODIPINE (NORVASC) 10 MG TABLET    TAKE 1 TABLET ONE TIME DAILY    ASPIRIN (ECOTRIN) 81 MG EC TABLET    Take 1 tablet (81 mg total) by mouth once daily.    CARVEDILOL (COREG) 6.25 MG TABLET    Take 1 tablet (6.25 mg total) by mouth 2 (two) times daily with meals.    CILOSTAZOL (PLETAL) 50 MG TAB    Take 1 tablet (50 mg total) by mouth 2 (two) times daily.    FLUOXETINE 10 MG CAPSULE    Take 1 capsule (10 mg total) by mouth once daily.    FLUTICASONE PROPIONATE (FLONASE) 50 MCG/ACTUATION NASAL SPRAY    1 spray (50 mcg total) by Each Nostril route once daily.    INSULIN GLARGINE U-300 CONC (TOUJEO MAX U-300 SOLOSTAR) 300 UNIT/ML (3 ML) INSULIN PEN    Inject 60 Units into the skin once daily.    LEVOCETIRIZINE (XYZAL) 5 MG TABLET    Take 1 tablet (5 mg total) by mouth every evening.    LOSARTAN-HYDROCHLOROTHIAZIDE 100-25 MG (HYZAAR) 100-25 MG PER TABLET    TAKE 1 TABLET EVERY DAY    NICOTINE (NICODERM CQ) 21 MG/24 HR    Place 1 patch onto the skin once daily.    PEN NEEDLE, DIABETIC (BD  "ULTRA-FINE CAROL PEN NEEDLE) 32 GAUGE X 5/32" NDLE    1 each by Misc.(Non-Drug; Combo Route) route once daily.    ROSUVASTATIN (CRESTOR) 20 MG TABLET    Take 1 tablet (20 mg total) by mouth once daily.    TIOTROPIUM-OLODATEROL (STIOLTO RESPIMAT) 2.5-2.5 MCG/ACTUATION MIST    Inhale 2 puffs into the lungs once daily. Controller    VARENICLINE (CHANTIX STARTING MONTH BOX) 0.5 MG (11)- 1 MG (42) TABLET    Take one 0.5mg tablet by mouth once daily x 3 days, THEN increase to one 0.5mg tablet by mouth twice daily x 4 days, THEN increase to one 1mg tablet by mouth twice daily   Modified Medications    No medications on file   Discontinued Medications    No medications on file             Educational assessment:   [x]            Good  []            Fair  []            Poor    Readiness to learn:   [x]            Good  []            Fair  []            Poor    Vision Status:   [x]            Good  []            Fair  []            Poor    Reading Ability:  [x]            Good  []            Fair  []            Poor    Knowledge of condition:   [x]            Good  []            Fair  []            Poor    Language Barriers:   []            Good  []            Fair  []            Poor  [x]            None    Cognitive/ Physical Barriers:   []            Good  []            Fair  []            Poor  [x]            None    Learning best by:                       [x]            Seeing  []            Hearing  [x]            Reading                         [x]            Doing    Describe any barrier /Limitation or financial implications of care choices identified     []            Financial  []            Emotional  []            Education  []            Vision/Hearing  []            Physical  [x]            None  []                TOPIC /CONTENT FOR TODAY:    [x]            MDI with or without spacer  [x]            Respimat inhaler  []            Acapella   []           Peak Flow meter  [x]            COPD action plan  []        "     Nebulizer use  []            Oxygen use safety  []            Breathing and cough techniques  [x]            Energy conservation  [x]            Infection prevention  []           OTHER________________________        Learner:    [x]            Patient   []            Caregiver    Method:    [x]            Verbal explanation  []            Audio visual    []            Literature  [x]            Teach back      Evaluation:    [x]            Teach back  [x]            Demonstrate  []            Follow up phone call    []            2 weeks     []            4 weeks   [x]            PRN    Additional comments:   Patient was seen today to review respiratory medication purpose and proper technique for use of inhalers. Patient practiced proper technique using MDI with valved holding chamber (spacer) and DPI inhalers. Patient demonstrated understanding. Literature was given to patient.     Asthma trigger checklist was verbally reviewed and literature given to patient.     Infection prevention was discussed. Patient was reminded to get influenza vaccine. Hand hygiene and cleaning of respiratory equipment was also discussed. Patient verbalized understanding.      COPD action plan was reviewed and literature was given to patient. Patient verbalized understanding.          Plan:  Monthly Pulmonary Disease Management Questionnaire  Reinforce education  Meds: Stiolto, Albuterol  DME Needs: na   Action Plan  Immunization: Pneumococcal- due, Flu-current , Covid 19- current  Next Provider Visit: 3/18/24  Next Spirometry/CPFT: not scheduled  Approximate time spent with patient: 30 minutes

## 2023-09-19 NOTE — PROGRESS NOTES
Patient roomed using two patient identifier method:  and spelling of first/last name. Pleasant mood. BP taken on right arm. 128/62. Patient told to follow up as needed. Verbalized understanding.

## 2023-10-17 DIAGNOSIS — Z79.4 TYPE 2 DIABETES MELLITUS WITH DIABETIC PERIPHERAL ANGIOPATHY WITHOUT GANGRENE, WITH LONG-TERM CURRENT USE OF INSULIN: ICD-10-CM

## 2023-10-17 DIAGNOSIS — I70.219 ATHEROSCLEROTIC PVD WITH INTERMITTENT CLAUDICATION: ICD-10-CM

## 2023-10-17 DIAGNOSIS — E11.51 TYPE 2 DIABETES MELLITUS WITH DIABETIC PERIPHERAL ANGIOPATHY WITHOUT GANGRENE, WITH LONG-TERM CURRENT USE OF INSULIN: ICD-10-CM

## 2023-10-17 DIAGNOSIS — E78.5 HYPERLIPIDEMIA, UNSPECIFIED HYPERLIPIDEMIA TYPE: ICD-10-CM

## 2023-10-17 RX ORDER — CILOSTAZOL 50 MG/1
50 TABLET ORAL 2 TIMES DAILY
Qty: 60 TABLET | Refills: 11 | Status: SHIPPED | OUTPATIENT
Start: 2023-10-17 | End: 2024-10-16

## 2023-12-20 ENCOUNTER — CLINICAL SUPPORT (OUTPATIENT)
Dept: SMOKING CESSATION | Facility: CLINIC | Age: 73
End: 2023-12-20

## 2023-12-20 DIAGNOSIS — F17.200 NICOTINE DEPENDENCE: Primary | ICD-10-CM

## 2023-12-20 PROCEDURE — 99407 PR TOBACCO USE CESSATION INTENSIVE >10 MINUTES: ICD-10-PCS | Mod: S$GLB,,, | Performed by: GENERAL PRACTICE

## 2023-12-20 PROCEDURE — 99407 BEHAV CHNG SMOKING > 10 MIN: CPT | Mod: S$GLB,,, | Performed by: GENERAL PRACTICE

## 2023-12-20 NOTE — PROGRESS NOTES
Spoke with patient today in regard to smoking cessation progress 12 month telephone follow up, she states that she is not tobacco free.  Patient stated she trimmed down from 2 packs to below a half a pack daily.  Patient stated she has tried numerous treatment options, Chantix, nicotine patches, and nicotine lozenges without any success.  Commended patient on the accomplishments thus far.  Informed patient of benefit period, future follow up, and contact information if any further help or support is needed.  Will complete smart form for 12 month follow up on Quit attempt #1 and resolve episode.

## 2023-12-29 DIAGNOSIS — E11.9 TYPE 2 DIABETES MELLITUS WITHOUT COMPLICATION, WITH LONG-TERM CURRENT USE OF INSULIN: ICD-10-CM

## 2023-12-29 DIAGNOSIS — Z79.4 TYPE 2 DIABETES MELLITUS WITHOUT COMPLICATION, WITH LONG-TERM CURRENT USE OF INSULIN: ICD-10-CM

## 2023-12-29 RX ORDER — PEN NEEDLE, DIABETIC 30 GX3/16"
1 NEEDLE, DISPOSABLE MISCELLANEOUS DAILY
Qty: 100 EACH | Refills: 3 | Status: SHIPPED | OUTPATIENT
Start: 2023-12-29 | End: 2024-12-28

## 2023-12-29 NOTE — TELEPHONE ENCOUNTER
Care Due:                  Date            Visit Type   Department     Provider  --------------------------------------------------------------------------------                                SAME DAY -                              ESTABLISHED   [facility]  Last Visit: 07-      PATIENT      MEDICINE       Carito Kohler                              EP -                              PRIMARY      [facility]  Next Visit: 01-      CARE ([facility])   MEDICINE       Carito Kohler                                                            Last  Test          Frequency    Reason                     Performed    Due Date  --------------------------------------------------------------------------------    HBA1C.......  6 months...  insulin..................  07- 01-    [employer] Embedded Care Due Messages. Reference number: [id number].   12/29/2023 11:52:34 AM CST

## 2023-12-30 NOTE — TELEPHONE ENCOUNTER
Provider Staff:  Action required for this patient    Requires labs      Please see care gap opportunities below in Care Due Message.    Thanks!  [facility]     Appointments      Date Provider   Last Visit   7/20/2023 Carito Kohler MD   Next Visit   1/22/2024 Carito Kohler MD     Refill Decision Note   Rosana Aguiar  is requesting a refill authorization.  Brief Assessment and Rationale for Refill:  Approve     Medication Therapy Plan:         Comments:     Note composed:11:38 PM 12/29/2023

## 2024-01-22 ENCOUNTER — OFFICE VISIT (OUTPATIENT)
Dept: INTERNAL MEDICINE | Facility: CLINIC | Age: 74
End: 2024-01-22
Payer: MEDICARE

## 2024-01-22 ENCOUNTER — LAB VISIT (OUTPATIENT)
Dept: LAB | Facility: HOSPITAL | Age: 74
End: 2024-01-22
Attending: FAMILY MEDICINE
Payer: MEDICARE

## 2024-01-22 VITALS
DIASTOLIC BLOOD PRESSURE: 66 MMHG | OXYGEN SATURATION: 96 % | HEART RATE: 65 BPM | BODY MASS INDEX: 29.72 KG/M2 | TEMPERATURE: 97 F | SYSTOLIC BLOOD PRESSURE: 122 MMHG | WEIGHT: 167.75 LBS | HEIGHT: 63 IN

## 2024-01-22 DIAGNOSIS — Z79.4 TYPE 2 DIABETES MELLITUS WITH DIABETIC PERIPHERAL ANGIOPATHY WITHOUT GANGRENE, WITH LONG-TERM CURRENT USE OF INSULIN: ICD-10-CM

## 2024-01-22 DIAGNOSIS — E08.311 DIABETES MELLITUS DUE TO UNDERLYING CONDITION WITH RETINOPATHY AND MACULAR EDEMA, WITH LONG-TERM CURRENT USE OF INSULIN, UNSPECIFIED LATERALITY, UNSPECIFIED RETINOPATHY SEVERITY: Primary | ICD-10-CM

## 2024-01-22 DIAGNOSIS — I70.0 ATHEROSCLEROSIS OF AORTA: ICD-10-CM

## 2024-01-22 DIAGNOSIS — I70.0 ABDOMINAL AORTIC ATHEROSCLEROSIS: ICD-10-CM

## 2024-01-22 DIAGNOSIS — I73.9 PVD (PERIPHERAL VASCULAR DISEASE): ICD-10-CM

## 2024-01-22 DIAGNOSIS — F33.9 DEPRESSION, RECURRENT: ICD-10-CM

## 2024-01-22 DIAGNOSIS — Z79.4 DIABETES MELLITUS DUE TO UNDERLYING CONDITION WITH RETINOPATHY AND MACULAR EDEMA, WITH LONG-TERM CURRENT USE OF INSULIN, UNSPECIFIED LATERALITY, UNSPECIFIED RETINOPATHY SEVERITY: Primary | ICD-10-CM

## 2024-01-22 DIAGNOSIS — I65.29 STENOSIS OF CAROTID ARTERY, UNSPECIFIED LATERALITY: ICD-10-CM

## 2024-01-22 DIAGNOSIS — E11.51 TYPE 2 DIABETES MELLITUS WITH DIABETIC PERIPHERAL ANGIOPATHY WITHOUT GANGRENE, WITH LONG-TERM CURRENT USE OF INSULIN: ICD-10-CM

## 2024-01-22 DIAGNOSIS — E08.311 DIABETES MELLITUS DUE TO UNDERLYING CONDITION WITH RETINOPATHY AND MACULAR EDEMA, WITH LONG-TERM CURRENT USE OF INSULIN, UNSPECIFIED LATERALITY, UNSPECIFIED RETINOPATHY SEVERITY: ICD-10-CM

## 2024-01-22 DIAGNOSIS — Z12.11 SCREEN FOR COLON CANCER: ICD-10-CM

## 2024-01-22 DIAGNOSIS — Z79.4 DIABETES MELLITUS DUE TO UNDERLYING CONDITION WITH RETINOPATHY AND MACULAR EDEMA, WITH LONG-TERM CURRENT USE OF INSULIN, UNSPECIFIED LATERALITY, UNSPECIFIED RETINOPATHY SEVERITY: ICD-10-CM

## 2024-01-22 PROBLEM — J43.9 EMPHYSEMA LUNG: Status: RESOLVED | Noted: 2022-12-05 | Resolved: 2024-01-22

## 2024-01-22 PROBLEM — J41.0 SIMPLE CHRONIC BRONCHITIS: Status: RESOLVED | Noted: 2023-02-27 | Resolved: 2024-01-22

## 2024-01-22 LAB
ALBUMIN SERPL BCP-MCNC: 3.9 G/DL (ref 3.5–5.2)
ALP SERPL-CCNC: 82 U/L (ref 55–135)
ALT SERPL W/O P-5'-P-CCNC: 17 U/L (ref 10–44)
ANION GAP SERPL CALC-SCNC: 11 MMOL/L (ref 8–16)
AST SERPL-CCNC: 18 U/L (ref 10–40)
BILIRUB SERPL-MCNC: 0.3 MG/DL (ref 0.1–1)
BUN SERPL-MCNC: 15 MG/DL (ref 8–23)
CALCIUM SERPL-MCNC: 9.5 MG/DL (ref 8.7–10.5)
CHLORIDE SERPL-SCNC: 102 MMOL/L (ref 95–110)
CO2 SERPL-SCNC: 24 MMOL/L (ref 23–29)
CREAT SERPL-MCNC: 1.1 MG/DL (ref 0.5–1.4)
EST. GFR  (NO RACE VARIABLE): 52.4 ML/MIN/1.73 M^2
ESTIMATED AVG GLUCOSE: 146 MG/DL (ref 68–131)
GLUCOSE SERPL-MCNC: 138 MG/DL (ref 70–110)
HBA1C MFR BLD: 6.7 % (ref 4–5.6)
POTASSIUM SERPL-SCNC: 3.6 MMOL/L (ref 3.5–5.1)
PROT SERPL-MCNC: 7.4 G/DL (ref 6–8.4)
SODIUM SERPL-SCNC: 137 MMOL/L (ref 136–145)

## 2024-01-22 PROCEDURE — 36415 COLL VENOUS BLD VENIPUNCTURE: CPT | Mod: HCNC | Performed by: FAMILY MEDICINE

## 2024-01-22 PROCEDURE — 3074F SYST BP LT 130 MM HG: CPT | Mod: HCNC,CPTII,S$GLB, | Performed by: FAMILY MEDICINE

## 2024-01-22 PROCEDURE — 3288F FALL RISK ASSESSMENT DOCD: CPT | Mod: HCNC,CPTII,S$GLB, | Performed by: FAMILY MEDICINE

## 2024-01-22 PROCEDURE — 99214 OFFICE O/P EST MOD 30 MIN: CPT | Mod: HCNC,S$GLB,, | Performed by: FAMILY MEDICINE

## 2024-01-22 PROCEDURE — 3072F LOW RISK FOR RETINOPATHY: CPT | Mod: HCNC,CPTII,S$GLB, | Performed by: FAMILY MEDICINE

## 2024-01-22 PROCEDURE — 1126F AMNT PAIN NOTED NONE PRSNT: CPT | Mod: HCNC,CPTII,S$GLB, | Performed by: FAMILY MEDICINE

## 2024-01-22 PROCEDURE — 3078F DIAST BP <80 MM HG: CPT | Mod: HCNC,CPTII,S$GLB, | Performed by: FAMILY MEDICINE

## 2024-01-22 PROCEDURE — 1101F PT FALLS ASSESS-DOCD LE1/YR: CPT | Mod: HCNC,CPTII,S$GLB, | Performed by: FAMILY MEDICINE

## 2024-01-22 PROCEDURE — 83036 HEMOGLOBIN GLYCOSYLATED A1C: CPT | Mod: HCNC | Performed by: FAMILY MEDICINE

## 2024-01-22 PROCEDURE — 3044F HG A1C LEVEL LT 7.0%: CPT | Mod: HCNC,CPTII,S$GLB, | Performed by: FAMILY MEDICINE

## 2024-01-22 PROCEDURE — 80053 COMPREHEN METABOLIC PANEL: CPT | Mod: HCNC | Performed by: FAMILY MEDICINE

## 2024-01-22 PROCEDURE — 99999 PR PBB SHADOW E&M-EST. PATIENT-LVL V: CPT | Mod: PBBFAC,HCNC,, | Performed by: FAMILY MEDICINE

## 2024-01-22 PROCEDURE — 1160F RVW MEDS BY RX/DR IN RCRD: CPT | Mod: HCNC,CPTII,S$GLB, | Performed by: FAMILY MEDICINE

## 2024-01-22 PROCEDURE — 1159F MED LIST DOCD IN RCRD: CPT | Mod: HCNC,CPTII,S$GLB, | Performed by: FAMILY MEDICINE

## 2024-01-22 RX ORDER — FLUOXETINE HYDROCHLORIDE 20 MG/1
20 CAPSULE ORAL DAILY
Qty: 90 CAPSULE | Refills: 3 | Status: SHIPPED | OUTPATIENT
Start: 2024-01-22 | End: 2025-01-21

## 2024-01-22 RX ORDER — ASPIRIN 81 MG/1
81 TABLET ORAL DAILY
Qty: 90 TABLET | Refills: 3 | Status: SHIPPED | OUTPATIENT
Start: 2024-01-22 | End: 2025-01-21

## 2024-01-22 NOTE — PROGRESS NOTES
Rosana Aguiar  01/22/2024  [id number]    Carito Kohler MD  Patient Care Team:  Carito Kohler MD as PCP - General (Family Medicine)  Jonathan Coburn MD as Consulting Physician (Interventional Cardiology)  oJnatan Santana MD as Consulting Physician (Pulmonary Disease)  Yifan Murillo OD as Consulting Physician (Optometry)  Reginaldo Conde MD as Consulting Physician (Orthopedic Surgery)          Visit Type:a scheduled routine follow-up visit    Chief Complaint:  Chief Complaint   Patient presents with    Follow-up     6 month       History of Present Illness:  6 month DM  Lab Results   Component Value Date    HGBA1C 7.3 (H) 07/11/2023   On Lantus    She is on Crestor    Seen Cards, Dr. Cobunr  PVD and on Statin and Pletal  She cannot walk 20 feet without stopping  Legs hurt.  Not at rest.      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 Other children are negative.     Health Maintenance Due   Topic Date Due    TETANUS VACCINE  Never done    Aspirin/Antiplatelet Therapy  Never done    Colorectal Cancer Screening  Never done    Shingles Vaccine (1 of 2) Never done    RSV Vaccine (Age 60+ and Pregnant patients) (1 - 1-dose 60+ series) Never done    Hemoglobin A1c  01/11/2024       She is overdue for colonoscopy.  I recommended she do this screen  She had delayed her scheduling.    More depression  Increase prozac      History:  Past Medical History:   Diagnosis Date    Atherosclerotic PVD with intermittent claudication 10/19/2022    Diabetes mellitus, type 2 2003    BS didn't check 09/06/2023    High cholesterol     Hypertension     Macular degeneration      Past Surgical History:   Procedure Laterality Date    APPENDECTOMY      CATARACT EXTRACTION Bilateral     cyst removal from breast (Left)      EYE SURGERY  Cataract    HYSTERECTOMY      TONSILLECTOMY      TUBAL LIGATION  1975     Family History   Problem Relation Age of Onset    Cancer Mother         female    Diabetes Mother     Macular  degeneration Mother     Alcohol abuse Father     Cancer Daughter         colon    Diabetes Maternal Uncle     Heart disease Neg Hx     Stroke Neg Hx      Social History     Socioeconomic History    Marital status:    Tobacco Use    Smoking status: Every Day     Current packs/day: 1.00     Average packs/day: 1 pack/day for 62.1 years (62.1 ttl pk-yrs)     Types: Cigarettes     Start date: 1962     Passive exposure: Past    Smokeless tobacco: Never   Substance and Sexual Activity    Alcohol use: Yes     Comment: rarely    Drug use: Never    Sexual activity: Not Currently     Partners: Male     Birth control/protection: None     Social Determinants of Health     Financial Resource Strain: High Risk (12/5/2022)    Overall Financial Resource Strain (CARDIA)     Difficulty of Paying Living Expenses: Hard   Food Insecurity: No Food Insecurity (12/5/2022)    Hunger Vital Sign     Worried About Running Out of Food in the Last Year: Never true     Ran Out of Food in the Last Year: Never true   Transportation Needs: No Transportation Needs (12/5/2022)    PRAPARE - Transportation     Lack of Transportation (Medical): No     Lack of Transportation (Non-Medical): No   Physical Activity: Inactive (12/5/2022)    Exercise Vital Sign     Days of Exercise per Week: 0 days     Minutes of Exercise per Session: 0 min   Stress: Stress Concern Present (12/5/2022)    [employer] - Occupational Stress Questionnaire     Feeling of Stress : Very much   Social Connections: Socially Isolated (12/5/2022)    Social Connection and Isolation Panel [NHANES]     Frequency of Communication with Friends and Family: More than three times a week     Frequency of Social Gatherings with Friends and Family: Once a week     Attends Taoism Services: Never     Active Member of Clubs or Organizations: No     Attends Club or Organization Meetings: Never     Marital Status:    Housing Stability: Low Risk  (12/5/2022)     Housing Stability Vital Sign     Unable to Pay for Housing in the Last Year: No     Number of Places Lived in the Last Year: 1     Unstable Housing in the Last Year: No     Patient Active Problem List   Diagnosis    Hyperlipidemia    Hypertension    Type 2 diabetes mellitus with diabetic peripheral angiopathy without gangrene, with long-term current use of insulin    Thyroid nodule    Asymptomatic microscopic hematuria    Statin myopathy    Bruit of left carotid artery    Abdominal aortic atherosclerosis    Grief    Decreased ROM of right shoulder    Shoulder weakness    Atherosclerotic PVD with intermittent claudication    Coronary artery calcification    Carotid stenosis    Osteopenia    Depression, recurrent    Pulmonary nodule    Diabetes mellitus due to underlying condition with retinopathy and macular edema, with long-term current use of insulin, unspecified laterality, unspecified retinopathy severity     Review of patient's allergies indicates:   Allergen Reactions    Clindamycin Other (See Comments)    Neomycin-bacitracin-polymyxin Itching    Nitrofurantoin monohyd/m-cryst Other (See Comments)       The following were reviewed at this visit: active problem list, medication list, allergies, family history, social history, and health maintenance.    Medications:  Current Outpatient Medications on File Prior to Visit   Medication Sig Dispense Refill    albuterol (VENTOLIN HFA) 90 mcg/actuation inhaler Inhale 2 puffs into the lungs every 6 (six) hours as needed for Wheezing. Rescue 18 g 11    amLODIPine (NORVASC) 10 MG tablet TAKE 1 TABLET ONE TIME DAILY 90 tablet 3    carvediloL (COREG) 6.25 MG tablet Take 1 tablet (6.25 mg total) by mouth 2 (two) times daily with meals. 180 tablet 3    cilostazoL (PLETAL) 50 MG Tab Take 1 tablet (50 mg total) by mouth 2 (two) times daily. 60 tablet 11    fluticasone propionate (FLONASE) 50 mcg/actuation nasal spray 1 spray (50 mcg total) by Each Nostril route once daily.  "16 g 2    ibuprofen (ADVIL,MOTRIN) 800 MG tablet Take 1 tablet (800 mg total) by mouth every 6 (six) hours if needed for mild pain for up to 10 days. 32 tablet 0    insulin glargine U-300 conc (TOUJEO MAX U-300 SOLOSTAR) 300 unit/mL (3 mL) insulin pen Inject 60 Units into the skin once daily. 3 pen 11    levocetirizine (XYZAL) 5 MG tablet Take 1 tablet (5 mg total) by mouth every evening. 30 tablet 11    losartan-hydrochlorothiazide 100-25 mg (HYZAAR) 100-25 mg per tablet TAKE 1 TABLET EVERY DAY 90 tablet 3    pen needle, diabetic (BD CAROL 2ND GEN PEN NEEDLE) 32 gauge x 5/32" Ndle 1 each by Misc.(Non-Drug; Combo Route) route once daily. 100 each 3    rosuvastatin (CRESTOR) 20 MG tablet Take 1 tablet (20 mg total) by mouth once daily. 90 tablet 3    tiotropium-olodateroL (STIOLTO RESPIMAT) 2.5-2.5 mcg/actuation Mist Inhale 2 puffs into the lungs once daily. Controller 4 g 11    [DISCONTINUED] FLUoxetine 10 MG capsule Take 1 capsule (10 mg total) by mouth once daily. 90 capsule 3    [DISCONTINUED] amoxicillin (AMOXIL) 500 MG capsule Take 1 capsule (500 mg total) by mouth every 8 (eight) hours for 7 days. (Patient not taking: Reported on 1/22/2024) 21 capsule 0    [DISCONTINUED] aspirin (ECOTRIN) 81 MG EC tablet Take 1 tablet (81 mg total) by mouth once daily. 90 tablet 3    [DISCONTINUED] nicotine (NICODERM CQ) 21 mg/24 hr Place 1 patch onto the skin once daily. (Patient not taking: Reported on 9/6/2023) 28 patch 0    [DISCONTINUED] varenicline (CHANTIX STARTING MONTH BOX) 0.5 mg (11)- 1 mg (42) tablet Take one 0.5mg tablet by mouth once daily x 3 days, THEN increase to one 0.5mg tablet by mouth twice daily x 4 days, THEN increase to one 1mg tablet by mouth twice daily (Patient not taking: Reported on 8/29/2023) 53 each 0     No current facility-administered medications on file prior to visit.       Medications have been reviewed and reconciled with patient at this visit.  Barriers to medications reviewed with " patient.    Adverse reactions to current medications reviewed with patient..    Over the counter medications reviewed and reconciled with patient.    Exam:  Wt Readings from Last 3 Encounters:   01/22/24 76.1 kg (167 lb 12.3 oz)   09/19/23 74.9 kg (165 lb 2 oz)   08/29/23 75.3 kg (166 lb 0.1 oz)     Temp Readings from Last 3 Encounters:   01/22/24 96.9 °F (36.1 °C) (Tympanic)   07/20/23 97.6 °F (36.4 °C) (Tympanic)   01/19/23 97.8 °F (36.6 °C)     BP Readings from Last 3 Encounters:   01/22/24 122/66   09/19/23 128/62   08/29/23 122/68     Pulse Readings from Last 3 Encounters:   01/22/24 65   09/19/23 68   09/19/23 74     Body mass index is 29.72 kg/m².      Review of Systems   Constitutional: Negative.  Negative for chills and fever.   HENT: Negative.  Negative for congestion, sinus pain and sore throat.    Eyes:  Negative for blurred vision and double vision.   Respiratory:  Negative for cough, sputum production, shortness of breath and wheezing.    Cardiovascular:  Negative for chest pain, palpitations and leg swelling.   Gastrointestinal:  Negative for abdominal pain, constipation, diarrhea, heartburn, nausea and vomiting.   Genitourinary: Negative.    Musculoskeletal: Negative.    Skin: Negative.  Negative for rash.   Neurological: Negative.    Endo/Heme/Allergies: Negative.  Negative for polydipsia. Does not bruise/bleed easily.   Psychiatric/Behavioral:  Negative for depression and substance abuse.      Physical Exam  Nursing note reviewed.   Pulmonary:      Effort: Pulmonary effort is normal. No respiratory distress.   Neurological:      Mental Status: She is alert and oriented to person, place, and time.   Psychiatric:         Mood and Affect: Mood normal.         Behavior: Behavior normal.         Thought Content: Thought content normal.         Judgment: Judgment normal.         Laboratory Reviewed ({Yes)  Lab Results   Component Value Date    WBC 7.26 07/11/2022    HGB 13.1 07/11/2022    HCT 41.3  07/11/2022     07/11/2022    CHOL 107 (L) 07/11/2023    TRIG 83 07/11/2023    HDL 45 07/11/2023    ALT 9 (L) 07/11/2023    AST 12 07/11/2023     (L) 07/28/2023    K 4.0 07/28/2023    CL 98 07/28/2023    CREATININE 0.9 07/28/2023    BUN 14 07/28/2023    CO2 23 07/28/2023    TSH 1.285 01/06/2022    HGBA1C 7.3 (H) 07/11/2023       Rosana was seen today for follow-up.    Diagnoses and all orders for this visit:    Diabetes mellitus due to underlying condition with retinopathy and macular edema, with long-term current use of insulin, unspecified laterality, unspecified retinopathy severity  -     Comprehensive Metabolic Panel; Future  -     Hemoglobin A1C; Future    Type 2 diabetes mellitus with diabetic peripheral angiopathy without gangrene, with long-term current use of insulin  -     Comprehensive Metabolic Panel; Future  -     Hemoglobin A1C; Future    Depression, recurrent  -     FLUoxetine 20 MG capsule; Take 1 capsule (20 mg total) by mouth once daily.    Abdominal aortic atherosclerosis  -     Comprehensive Metabolic Panel; Future  -     aspirin (ECOTRIN) 81 MG EC tablet; Take 1 tablet (81 mg total) by mouth once daily.    Screen for colon cancer  -     Ambulatory referral/consult to Endo Procedure ; Future    Atherosclerosis of aorta  -     Ambulatory referral/consult to Cardiovascular Surgery; Future    PVD (peripheral vascular disease)  -     Ambulatory referral/consult to Cardiovascular Surgery; Future    Stenosis of carotid artery, unspecified laterality  -     aspirin (ECOTRIN) 81 MG EC tablet; Take 1 tablet (81 mg total) by mouth once daily.          Recheck A1c    On Statin  Lab Results   Component Value Date    LDLCALC 45.4 (L) 07/11/2023   KNown atherosclerosis  Risk reduction  Follow up with Cards    Schedule colon, when ready.  Has had orders    Requesting a new Vascular consult.- sent to Dr. Shannon KENNEDY as she wants a second opinion   Depression worsening  Not able to walk  I  will send to [facility] KENNEDY for second opinion  She is on medical tx.  Needs to stop smoking.      Care Plan/Goals: Reviewed    Goals    None         Follow up: Follow up in about 6 months (around 7/22/2024).    After visit summary was printed and given to patient upon discharge today.  Patient goals and care plan are included in After Visit Summary.

## 2024-01-29 ENCOUNTER — INITIAL CONSULT (OUTPATIENT)
Dept: VASCULAR SURGERY | Facility: CLINIC | Age: 74
End: 2024-01-29
Payer: MEDICARE

## 2024-01-29 ENCOUNTER — TELEPHONE (OUTPATIENT)
Dept: INTERNAL MEDICINE | Facility: CLINIC | Age: 74
End: 2024-01-29
Payer: MEDICARE

## 2024-01-29 DIAGNOSIS — I10 PRIMARY HYPERTENSION: ICD-10-CM

## 2024-01-29 DIAGNOSIS — I25.10 CORONARY ARTERY CALCIFICATION: ICD-10-CM

## 2024-01-29 DIAGNOSIS — I73.9 PVD (PERIPHERAL VASCULAR DISEASE): ICD-10-CM

## 2024-01-29 DIAGNOSIS — E78.5 HYPERLIPIDEMIA, UNSPECIFIED HYPERLIPIDEMIA TYPE: ICD-10-CM

## 2024-01-29 DIAGNOSIS — I70.219 ATHEROSCLEROTIC PVD WITH INTERMITTENT CLAUDICATION: Primary | ICD-10-CM

## 2024-01-29 DIAGNOSIS — I25.84 CORONARY ARTERY CALCIFICATION: ICD-10-CM

## 2024-01-29 DIAGNOSIS — I70.0 ATHEROSCLEROSIS OF AORTA: ICD-10-CM

## 2024-01-29 DIAGNOSIS — R09.89 BRUIT OF LEFT CAROTID ARTERY: ICD-10-CM

## 2024-01-29 PROCEDURE — 99205 OFFICE O/P NEW HI 60 MIN: CPT | Mod: HCNC,S$GLB,, | Performed by: STUDENT IN AN ORGANIZED HEALTH CARE EDUCATION/TRAINING PROGRAM

## 2024-01-29 PROCEDURE — 99999 PR PBB SHADOW E&M-EST. PATIENT-LVL III: CPT | Mod: PBBFAC,HCNC,, | Performed by: STUDENT IN AN ORGANIZED HEALTH CARE EDUCATION/TRAINING PROGRAM

## 2024-01-29 NOTE — TELEPHONE ENCOUNTER
"Faxed Cardiovascular Surgery referral to Dr. Mario's office    1/29/2024 9:39:24 AM Transmission Record          Sent to [phone] with remote ID "[id number]             "          Result: (0/339;0/0) Success          Page record: 1 - 16          Elapsed time: 27:51 on channel 50       "

## 2024-01-29 NOTE — ASSESSMENT & PLAN NOTE
Follows with cardiology if I am unable to address her pvd percutaneously and she requires surgery will refer back to cardiac evaluation

## 2024-01-29 NOTE — H&P (VIEW-ONLY)
Clint Urena    OFFICE NOTE    DATE OF VISIT: 2024  PATIENT NAME: Rosana Aguiar  : 1950  MRN: [id number]  PRIMARY CARE PHYSICIAN: Carito Kohler MD  CARDIOLOGIST:   REFERRING PROVIDER: Carito Kohler MD    CHIEF COMPLAINT   Chief Complaint   Patient presents with    Leg Pain     Patient c/o bilateral leg pain when walking short and long distances.        HISTORY OF PRESENT ILLNESS:  Rosana Aguiar is a 73 y.o. female who presents to clinic today with bilateral lower extremity short distance claudication which is lifestyle limiting. She feels she cannot do her day to day activities for example go shopping or traveling to see sporting events. She does not have pain at rest or tissue loss. She has been followed by dr. Coburn who was planning angiogram at some point. She has unfortunately not been able to reduce her smoking habits and her level of activity has continued to decline. This started happening about 2 years when her  passed away from cancer. She now smokes about 1 pack per day especially because of the pain she's having in her legs    ALLERGIES:  Review of patient's allergies indicates:   Allergen Reactions    Clindamycin Other (See Comments)    Neomycin-bacitracin-polymyxin Itching    Nitrofurantoin monohyd/m-cryst Other (See Comments)       PAST MEDICAL HISTORY:  Past Medical History:   Diagnosis Date    Atherosclerotic PVD with intermittent claudication 10/19/2022    Diabetes mellitus, type 2     BS didn't check 2023    High cholesterol     Hypertension     Macular degeneration        PAST SURGICAL HISTORY:  Past Surgical History:   Procedure Laterality Date    APPENDECTOMY      CATARACT EXTRACTION Bilateral     cyst removal from breast (Left)      EYE SURGERY  Cataract    HYSTERECTOMY      TONSILLECTOMY      TUBAL LIGATION         SOCIAL HISTORY:   Social History     Tobacco Use    Smoking status: Every Day     Current packs/day: 1.00     Average packs/day:  1 pack/day for 62.1 years (62.1 ttl pk-yrs)     Types: Cigarettes     Start date: 1962     Passive exposure: Past    Smokeless tobacco: Never   Substance Use Topics    Alcohol use: Yes     Comment: rarely    Drug use: Never       FAMILY HISTORY:  Family History   Problem Relation Age of Onset    Cancer Mother         female    Diabetes Mother     Macular degeneration Mother     Alcohol abuse Father     Cancer Daughter         colon    Diabetes Maternal Uncle     Heart disease Neg Hx     Stroke Neg Hx        REVIEW OF SYSTEMS:  10 pt ROS otherwise negative    PHYSICAL EXAM:  There were no vitals filed for this visit.   Physical Exam  Vss  Gen nad alert oriented  Cv rrr  Lung ctab  Abd soft nt nd  Ext non palpable peripheral pulses, no ulceration noted b/l feet        VASCULAR LAB STUDIES:  Rt robina suggest moderate disease there is scattered plaque w/o any significant stenosis the pta appeqars occluded.    Left robina suggest moderate disease the left sfa is occluded.the waveforms distally are monophasic    ROBINA right 0.76 L 0.77    ASSESSMENT AND PLAN:  Bruit of left carotid artery  Has known carotid disease I will plan to follow this on outpatient basis. Plan to obtain updated carotid studies at 3 months    Hypertension  Continue medical management    Hyperlipidemia  Continue medical management    Atherosclerotic PVD with intermittent claudication  She has short distance lifestyle limiting claudication. Recommended smoking cessation in the mean time I am planning to do left cfa access right leg angiogram possible intervention and dx angiogram of left leg as well. She is to continue risk factor modification with aspirin statin and she is already on pletal.    Coronary artery calcification  Follows with cardiology if I am unable to address her pvd percutaneously and she requires surgery will refer back to cardiac evaluation      Rosana was seen today for leg pain.    Diagnoses and all orders for this  visit:    Atherosclerotic PVD with intermittent claudication    Bruit of left carotid artery    Atherosclerosis of aorta  -     Ambulatory referral/consult to Cardiovascular Surgery    PVD (peripheral vascular disease)  -     Ambulatory referral/consult to Cardiovascular Surgery    Primary hypertension    Hyperlipidemia, unspecified hyperlipidemia type    Coronary artery calcification        No follow-ups on file.        Clint Urena  [facility]  Vascular Surgery  [phone] (Clinic Number)

## 2024-01-29 NOTE — ASSESSMENT & PLAN NOTE
She has short distance lifestyle limiting claudication. Recommended smoking cessation in the mean time I am planning to do left cfa access right leg angiogram possible intervention and dx angiogram of left leg as well. She is to continue risk factor modification with aspirin statin and she is already on pletal.

## 2024-01-29 NOTE — ASSESSMENT & PLAN NOTE
Has known carotid disease I will plan to follow this on outpatient basis. Plan to obtain updated carotid studies at 3 months

## 2024-01-29 NOTE — PROGRESS NOTES
Clint Urena    OFFICE NOTE    DATE OF VISIT: 2024  PATIENT NAME: Rosana Aguiar  : 1950  MRN: [id number]  PRIMARY CARE PHYSICIAN: Carito Kohler MD  CARDIOLOGIST:   REFERRING PROVIDER: Carito Kohler MD    CHIEF COMPLAINT   Chief Complaint   Patient presents with    Leg Pain     Patient c/o bilateral leg pain when walking short and long distances.        HISTORY OF PRESENT ILLNESS:  Rosana Aguiar is a 73 y.o. female who presents to clinic today with bilateral lower extremity short distance claudication which is lifestyle limiting. She feels she cannot do her day to day activities for example go shopping or traveling to see sporting events. She does not have pain at rest or tissue loss. She has been followed by dr. Coburn who was planning angiogram at some point. She has unfortunately not been able to reduce her smoking habits and her level of activity has continued to decline. This started happening about 2 years when her  passed away from cancer. She now smokes about 1 pack per day especially because of the pain she's having in her legs    ALLERGIES:  Review of patient's allergies indicates:   Allergen Reactions    Clindamycin Other (See Comments)    Neomycin-bacitracin-polymyxin Itching    Nitrofurantoin monohyd/m-cryst Other (See Comments)       PAST MEDICAL HISTORY:  Past Medical History:   Diagnosis Date    Atherosclerotic PVD with intermittent claudication 10/19/2022    Diabetes mellitus, type 2     BS didn't check 2023    High cholesterol     Hypertension     Macular degeneration        PAST SURGICAL HISTORY:  Past Surgical History:   Procedure Laterality Date    APPENDECTOMY      CATARACT EXTRACTION Bilateral     cyst removal from breast (Left)      EYE SURGERY  Cataract    HYSTERECTOMY      TONSILLECTOMY      TUBAL LIGATION         SOCIAL HISTORY:   Social History     Tobacco Use    Smoking status: Every Day     Current packs/day: 1.00     Average packs/day:  1 pack/day for 62.1 years (62.1 ttl pk-yrs)     Types: Cigarettes     Start date: 1962     Passive exposure: Past    Smokeless tobacco: Never   Substance Use Topics    Alcohol use: Yes     Comment: rarely    Drug use: Never       FAMILY HISTORY:  Family History   Problem Relation Age of Onset    Cancer Mother         female    Diabetes Mother     Macular degeneration Mother     Alcohol abuse Father     Cancer Daughter         colon    Diabetes Maternal Uncle     Heart disease Neg Hx     Stroke Neg Hx        REVIEW OF SYSTEMS:  10 pt ROS otherwise negative    PHYSICAL EXAM:  There were no vitals filed for this visit.   Physical Exam  Vss  Gen nad alert oriented  Cv rrr  Lung ctab  Abd soft nt nd  Ext non palpable peripheral pulses, no ulceration noted b/l feet        VASCULAR LAB STUDIES:  Rt robina suggest moderate disease there is scattered plaque w/o any significant stenosis the pta appeqars occluded.    Left robina suggest moderate disease the left sfa is occluded.the waveforms distally are monophasic    ROBINA right 0.76 L 0.77    ASSESSMENT AND PLAN:  Bruit of left carotid artery  Has known carotid disease I will plan to follow this on outpatient basis. Plan to obtain updated carotid studies at 3 months    Hypertension  Continue medical management    Hyperlipidemia  Continue medical management    Atherosclerotic PVD with intermittent claudication  She has short distance lifestyle limiting claudication. Recommended smoking cessation in the mean time I am planning to do left cfa access right leg angiogram possible intervention and dx angiogram of left leg as well. She is to continue risk factor modification with aspirin statin and she is already on pletal.    Coronary artery calcification  Follows with cardiology if I am unable to address her pvd percutaneously and she requires surgery will refer back to cardiac evaluation      Rosana was seen today for leg pain.    Diagnoses and all orders for this  visit:    Atherosclerotic PVD with intermittent claudication    Bruit of left carotid artery    Atherosclerosis of aorta  -     Ambulatory referral/consult to Cardiovascular Surgery    PVD (peripheral vascular disease)  -     Ambulatory referral/consult to Cardiovascular Surgery    Primary hypertension    Hyperlipidemia, unspecified hyperlipidemia type    Coronary artery calcification        No follow-ups on file.        Clint Urena  [facility]  Vascular Surgery  [phone] (Clinic Number)

## 2024-01-30 DIAGNOSIS — I65.29 STENOSIS OF CAROTID ARTERY, UNSPECIFIED LATERALITY: ICD-10-CM

## 2024-01-30 DIAGNOSIS — R09.89 BRUIT OF LEFT CAROTID ARTERY: Primary | ICD-10-CM

## 2024-02-05 ENCOUNTER — HOSPITAL ENCOUNTER (OUTPATIENT)
Dept: VASCULAR SURGERY | Facility: HOSPITAL | Age: 74
Discharge: HOME | End: 2024-02-05
Payer: MEDICARE

## 2024-02-05 DIAGNOSIS — I65.29 STENOSIS OF CAROTID ARTERY, UNSPECIFIED LATERALITY: ICD-10-CM

## 2024-02-05 DIAGNOSIS — R09.89 BRUIT OF LEFT CAROTID ARTERY: ICD-10-CM

## 2024-02-05 PROCEDURE — 93880 EXTRACRANIAL BILAT STUDY: CPT

## 2024-02-05 PROCEDURE — 93880 EXTRACRANIAL BILAT STUDY: CPT | Mod: 26,,, | Performed by: STUDENT IN AN ORGANIZED HEALTH CARE EDUCATION/TRAINING PROGRAM

## 2024-02-07 LAB
LEFT CBA DIAS: 10 CM/S
LEFT CBA SYS: 145 CM/S
LEFT CCA DIST DIAS: 4 CM/S
LEFT CCA DIST SYS: 86 CM/S
LEFT CCA MID DIAS: 5 CM/S
LEFT CCA MID SYS: 65 CM/S
LEFT CCA PROX DIAS: 6 CM/S
LEFT CCA PROX SYS: 67 CM/S
LEFT ECA DIAS: 2 CM/S
LEFT ECA SYS: 386 CM/S
LEFT ICA DIST DIAS: 9 CM/S
LEFT ICA DIST SYS: 81 CM/S
LEFT ICA MID DIAS: 12 CM/S
LEFT ICA MID SYS: 108 CM/S
LEFT ICA PROX DIAS: 11 CM/S
LEFT ICA PROX SYS: 279 CM/S
LEFT VERTEBRAL DIAS: 3 CM/S
LEFT VERTEBRAL SYS: 40 CM/S
OHS CV CAROTID RIGHT ICA EDV HIGHEST: 13
OHS CV CAROTID ULTRASOUND LEFT ICA/CCA RATIO: 3.24
OHS CV CAROTID ULTRASOUND RIGHT ICA/CCA RATIO: 1.4
OHS CV PV CAROTID LEFT HIGHEST CCA: 86
OHS CV PV CAROTID LEFT HIGHEST ICA: 279
OHS CV PV CAROTID RIGHT HIGHEST CCA: 167
OHS CV PV CAROTID RIGHT HIGHEST ICA: 144
OHS CV US CAROTID LEFT HIGHEST EDV: 12
RIGHT CBA DIAS: 7 CM/S
RIGHT CBA SYS: 143 CM/S
RIGHT CCA DIST DIAS: 3 CM/S
RIGHT CCA DIST SYS: 103 CM/S
RIGHT CCA MID DIAS: 7 CM/S
RIGHT CCA MID SYS: 93 CM/S
RIGHT CCA PROX DIAS: 7 CM/S
RIGHT CCA PROX SYS: 167 CM/S
RIGHT ECA DIAS: 3 CM/S
RIGHT ECA SYS: 245 CM/S
RIGHT ICA DIST DIAS: 10 CM/S
RIGHT ICA DIST SYS: 78 CM/S
RIGHT ICA MID DIAS: 10 CM/S
RIGHT ICA MID SYS: 95 CM/S
RIGHT ICA PROX DIAS: 13 CM/S
RIGHT ICA PROX SYS: 144 CM/S
RIGHT VERTEBRAL DIAS: 7 CM/S
RIGHT VERTEBRAL SYS: 80 CM/S

## 2024-02-09 ENCOUNTER — HOSPITAL ENCOUNTER (OUTPATIENT)
Facility: HOSPITAL | Age: 74
Discharge: HOME | End: 2024-02-09
Attending: STUDENT IN AN ORGANIZED HEALTH CARE EDUCATION/TRAINING PROGRAM | Admitting: STUDENT IN AN ORGANIZED HEALTH CARE EDUCATION/TRAINING PROGRAM
Payer: MEDICARE

## 2024-02-09 DIAGNOSIS — I70.213 ATHEROSCLEROSIS OF NATIVE ARTERY OF BOTH LOWER EXTREMITIES WITH INTERMITTENT CLAUDICATION: ICD-10-CM

## 2024-02-09 DIAGNOSIS — I70.0 ABDOMINAL AORTIC ATHEROSCLEROSIS: ICD-10-CM

## 2024-02-09 DIAGNOSIS — I70.219 ATHEROSCLEROTIC PVD WITH INTERMITTENT CLAUDICATION: Primary | ICD-10-CM

## 2024-02-09 LAB
BASOPHILS # BLD AUTO: 0.07 K/UL (ref 0–0.2)
BASOPHILS NFR BLD: 1 % (ref 0–1.9)
DIFFERENTIAL METHOD BLD: ABNORMAL
EOSINOPHIL # BLD AUTO: 0.3 K/UL (ref 0–0.5)
EOSINOPHIL NFR BLD: 3.6 % (ref 0–8)
ERYTHROCYTE [DISTWIDTH] IN BLOOD BY AUTOMATED COUNT: 13.1 % (ref 11.5–14.5)
HCT VFR BLD AUTO: 34.5 % (ref 37–48.5)
HGB BLD-MCNC: 12.2 G/DL (ref 12–16)
IMM GRANULOCYTES # BLD AUTO: 0.03 K/UL (ref 0–0.04)
IMM GRANULOCYTES NFR BLD AUTO: 0.4 % (ref 0–0.5)
LYMPHOCYTES # BLD AUTO: 1.8 K/UL (ref 1–4.8)
LYMPHOCYTES NFR BLD: 25.2 % (ref 18–48)
MCH RBC QN AUTO: 31.6 PG (ref 27–31)
MCHC RBC AUTO-ENTMCNC: 35.4 G/DL (ref 32–36)
MCV RBC AUTO: 89 FL (ref 82–98)
MONOCYTES # BLD AUTO: 0.5 K/UL (ref 0.3–1)
MONOCYTES NFR BLD: 7.6 % (ref 4–15)
NEUTROPHILS # BLD AUTO: 4.4 K/UL (ref 1.8–7.7)
NEUTROPHILS NFR BLD: 62.2 % (ref 38–73)
NRBC BLD-RTO: 0 /100 WBC
PLATELET # BLD AUTO: 312 K/UL (ref 150–450)
PMV BLD AUTO: 8.9 FL (ref 9.2–12.9)
RBC # BLD AUTO: 3.86 M/UL (ref 4–5.4)
WBC # BLD AUTO: 7.01 K/UL (ref 3.9–12.7)

## 2024-02-09 PROCEDURE — C1760 CLOSURE DEV, VASC: HCPCS | Mod: HCNC | Performed by: STUDENT IN AN ORGANIZED HEALTH CARE EDUCATION/TRAINING PROGRAM

## 2024-02-09 PROCEDURE — C1887 CATHETER, GUIDING: HCPCS | Mod: HCNC | Performed by: STUDENT IN AN ORGANIZED HEALTH CARE EDUCATION/TRAINING PROGRAM

## 2024-02-09 PROCEDURE — 36246 INS CATH ABD/L-EXT ART 2ND: CPT | Mod: HCNC,LT | Performed by: STUDENT IN AN ORGANIZED HEALTH CARE EDUCATION/TRAINING PROGRAM

## 2024-02-09 PROCEDURE — 63600175 PHARM REV CODE 636 W HCPCS: Performed by: STUDENT IN AN ORGANIZED HEALTH CARE EDUCATION/TRAINING PROGRAM

## 2024-02-09 PROCEDURE — 75625 CONTRAST EXAM ABDOMINL AORTA: CPT | Mod: 26,HCNC,, | Performed by: STUDENT IN AN ORGANIZED HEALTH CARE EDUCATION/TRAINING PROGRAM

## 2024-02-09 PROCEDURE — 99152 MOD SED SAME PHYS/QHP 5/>YRS: CPT | Mod: HCNC | Performed by: STUDENT IN AN ORGANIZED HEALTH CARE EDUCATION/TRAINING PROGRAM

## 2024-02-09 PROCEDURE — C1730 CATH, EP, 19 OR FEW ELECT: HCPCS | Mod: HCNC | Performed by: STUDENT IN AN ORGANIZED HEALTH CARE EDUCATION/TRAINING PROGRAM

## 2024-02-09 PROCEDURE — C1894 INTRO/SHEATH, NON-LASER: HCPCS | Mod: HCNC | Performed by: STUDENT IN AN ORGANIZED HEALTH CARE EDUCATION/TRAINING PROGRAM

## 2024-02-09 PROCEDURE — 25000003 PHARM REV CODE 250: Performed by: STUDENT IN AN ORGANIZED HEALTH CARE EDUCATION/TRAINING PROGRAM

## 2024-02-09 PROCEDURE — 36246 INS CATH ABD/L-EXT ART 2ND: CPT | Mod: HCNC,LT,, | Performed by: STUDENT IN AN ORGANIZED HEALTH CARE EDUCATION/TRAINING PROGRAM

## 2024-02-09 PROCEDURE — 75625 CONTRAST EXAM ABDOMINL AORTA: CPT | Mod: HCNC | Performed by: STUDENT IN AN ORGANIZED HEALTH CARE EDUCATION/TRAINING PROGRAM

## 2024-02-09 PROCEDURE — 75716 ARTERY X-RAYS ARMS/LEGS: CPT | Mod: 26,HCNC,, | Performed by: STUDENT IN AN ORGANIZED HEALTH CARE EDUCATION/TRAINING PROGRAM

## 2024-02-09 PROCEDURE — 85025 COMPLETE CBC W/AUTO DIFF WBC: CPT | Performed by: STUDENT IN AN ORGANIZED HEALTH CARE EDUCATION/TRAINING PROGRAM

## 2024-02-09 PROCEDURE — 99152 MOD SED SAME PHYS/QHP 5/>YRS: CPT | Mod: HCNC,,, | Performed by: STUDENT IN AN ORGANIZED HEALTH CARE EDUCATION/TRAINING PROGRAM

## 2024-02-09 PROCEDURE — 75716 ARTERY X-RAYS ARMS/LEGS: CPT | Mod: HCNC | Performed by: STUDENT IN AN ORGANIZED HEALTH CARE EDUCATION/TRAINING PROGRAM

## 2024-02-09 PROCEDURE — 99153 MOD SED SAME PHYS/QHP EA: CPT | Mod: HCNC | Performed by: STUDENT IN AN ORGANIZED HEALTH CARE EDUCATION/TRAINING PROGRAM

## 2024-02-09 PROCEDURE — 27201423 OPTIME MED/SURG SUP & DEVICES STERILE SUPPLY: Mod: HCNC | Performed by: STUDENT IN AN ORGANIZED HEALTH CARE EDUCATION/TRAINING PROGRAM

## 2024-02-09 PROCEDURE — C1769 GUIDE WIRE: HCPCS | Mod: HCNC | Performed by: STUDENT IN AN ORGANIZED HEALTH CARE EDUCATION/TRAINING PROGRAM

## 2024-02-09 RX ORDER — DIPHENHYDRAMINE HYDROCHLORIDE 50 MG/ML
INJECTION INTRAMUSCULAR; INTRAVENOUS
Status: DISCONTINUED | OUTPATIENT
Start: 2024-02-09 | End: 2024-02-09 | Stop reason: HOSPADM

## 2024-02-09 RX ORDER — FENTANYL CITRATE 50 UG/ML
INJECTION, SOLUTION INTRAMUSCULAR; INTRAVENOUS
Status: DISCONTINUED | OUTPATIENT
Start: 2024-02-09 | End: 2024-02-09 | Stop reason: HOSPADM

## 2024-02-09 RX ORDER — SODIUM CHLORIDE 9 MG/ML
INJECTION, SOLUTION INTRAVENOUS
Status: DISCONTINUED | OUTPATIENT
Start: 2024-02-09 | End: 2024-02-09 | Stop reason: HOSPADM

## 2024-02-09 RX ORDER — LIDOCAINE HYDROCHLORIDE 20 MG/ML
INJECTION, SOLUTION INFILTRATION; PERINEURAL
Status: DISCONTINUED | OUTPATIENT
Start: 2024-02-09 | End: 2024-02-09 | Stop reason: HOSPADM

## 2024-02-09 RX ORDER — OXYCODONE HYDROCHLORIDE 5 MG/1
5 TABLET ORAL EVERY 4 HOURS PRN
Status: DISCONTINUED | OUTPATIENT
Start: 2024-02-09 | End: 2024-02-09 | Stop reason: HOSPADM

## 2024-02-09 RX ORDER — MIDAZOLAM HYDROCHLORIDE 1 MG/ML
INJECTION, SOLUTION INTRAMUSCULAR; INTRAVENOUS
Status: DISCONTINUED | OUTPATIENT
Start: 2024-02-09 | End: 2024-02-09 | Stop reason: HOSPADM

## 2024-02-09 RX ORDER — HEPARIN SODIUM 1000 [USP'U]/ML
INJECTION, SOLUTION INTRAVENOUS; SUBCUTANEOUS
Status: DISCONTINUED | OUTPATIENT
Start: 2024-02-09 | End: 2024-02-09 | Stop reason: HOSPADM

## 2024-02-09 RX ORDER — PROTAMINE SULFATE 10 MG/ML
INJECTION, SOLUTION INTRAVENOUS
Status: DISCONTINUED | OUTPATIENT
Start: 2024-02-09 | End: 2024-02-09 | Stop reason: HOSPADM

## 2024-02-09 NOTE — PROGRESS NOTES
Discharge instructions  rev'd w/ pt and sister, [facility]. VSS. R groin WDL, no hematoma, dressing CDI post ambulation. IV removed. Daughter to stay w/ pt overnight. Pt to have venous U/S Monday.  Pt to  car via wheelchair.

## 2024-02-09 NOTE — Clinical Note
The sheath was exchanged in the left femoral artery. 5 FR sheath exchanged for a 6 FR destination sheath

## 2024-02-09 NOTE — Clinical Note
An angiography of the  right lower extremity was performed with the catheter and hand injected with 20 mL of contrast

## 2024-02-09 NOTE — OP NOTE
Angiogram Extremity Unilateral/poss pta right leg intervention Procedure Note  2/9/2024     Surgeon(s):  Clint Urena MD       Diagnosis:   Peripheral vascular disease  Short distance claudication lifestyle limiting b/l lower ext  HTN  HLD  DM2    Procedures:   Conscious sedation  U/s guided left cfa access  Aortogram  Selective catheterization right sfa and diagnostic angiogram right lower extremity  Diagnostic angiogram left lower extremity  Mynx closure device    Anesthesia: Conscious sedation was achieved using intravenous Versed and Fentanyl administered by a independent, trained observer who monitored the patient's rhythm strip, vital signs and pulse oximetry.  Supervised intraprocedural time: 35 minutes    Details of Access:   The patient was brought to the Cath Lab awake and alert and underwent conscious IV sedation.  Access to the left common femoral artery was achieved under real-time ultrasound guidance.  A permanent image was saved and stored to PACS showing a patent and pulsatile common femoral artery. The vessel was then cannulated with a micropuncture needle.  A guidewire was inserted via the lumen of the needle, the needle was removed and a 5 [place] Lucian sheath inserted over the guidewire.  A 5 [place] Lucian Omni catheter was then positioned in the suprarenal abdominal aorta and abdominal aortography was performed.  An up and over technique was then used to position the catheter in the contralateral external iliac artery and from this position digital lower extremity runoff angiography was performed.    At this point I attempted to cross the chronic total occlusion of SFA but was not successful it is a fairly long segment  and she likely needs bypass here    Diagnostic Findings:     Aortogram performed shows patent infrarenal aorta which is slightly ectatic but no aneurysm identified. There is no flow limiting stenosis. The mesenteric vessels and b/l renal vessels are patent. The iliac vessels are  patent. The right cfa is patent but heavily diseased SFA from origin to mid sfa, at mid sfa the artery becomes occluded. Reconstitution of the above knee popliteal with peroneal runoff to ankle and AT runoff to the foot, the PT is occluded proximally but very distal pt reconstitutes from peroneal collateral and goes into the foot. There is complete anterior and posterior pedal arch. The profunda is patent on the right    On the left side there is heavily diseased cfa, sfa is occluded at its origin, the profunda is patent. The above knee popliteal reconstitutes and there is three vessel runoff to the foot.    (No adequate recent catheter-based angiographic study was available.  The decision to intervene was based on this current diagnostic study.)    Details of Intervention:     Estimated Blood Loss: none               Implants: * No implants in log *           Condition: Hemodynamically Stable        Clint Urena MD   2/9/2024   1:04 PM

## 2024-02-09 NOTE — INTERVAL H&P NOTE
The patient has been examined and the H&P has been reviewed:    I concur with the findings and no changes have occurred since H&P was written.    Procedure risks, benefits and alternative options discussed and understood by patient/family.    Planning left cfa access right leg angiogram  And left leg dx angiogram today    COURT Urena      There are no hospital problems to display for this patient.

## 2024-02-09 NOTE — Clinical Note
An angiography of the  left lower extremity was performed with the catheter and hand injected with 20 mL of contrast

## 2024-02-09 NOTE — DISCHARGE INSTRUCTIONS
"Post-op Heart Catheterization    1. DIET: It is advisable for you to follow a diet that limits the intake of salt, sugar, saturated fats and cholesterol.     2. FOR THE NEXT 24 HOURS:   For the next 8 hours, you should be watched by a responsible adult. This person should make sure your condition is not getting worse.   Don't drink any alcohol for the next 24 hours.  Don't drive, operate dangerous machinery, or make important business or personal decisions during the next 24 hours.  Your healthcare provider may tell you not to take any medicine by mouth for pain or sleep in the next 4 hours. These medicines may react with the medicines you were given in the hospital. This could cause a much stronger response than usual.       3. ACTIVITY:                                Day of discharge:             NO vigorous activity, lifting or straining                                                   Day after discharge:         Avoid heavy lifting (up to 10 lbs)                                                                        The day after discharge you may shower, but avoid tub baths for 5 days                                                                         Wash site gently with soap and water        NO powder or lotion to your procedure site.                 Before you shower, remove dressing, apply bandaid if desired                                                                                                                                                                            2nd day after discharge:  Resume normal activities                                                                         Exercise program as instructed      4. WOUND CARE: It is not unusual to have a small amount of bruising appear in the groin area. It is also common to have a tender "knot" develop beneath the skin at the puncture site in the groin. This is scar tissue only and is not a cause for concern or alarm. This tender " "knot may take several weeks to fully resolve. The bruise will usually spread over several days. If the lump gets bigger, call you doctor immediately.    5. DISCOMFORT: For general discomfort at the puncture site, you may take 1 or 2 Acetaminophen (Tylenol) tablets every 4 hours as needed. (Do not take more than 4000 mg a day)                 6. CALL YOUR HEALTHCARE PROVIDER IF YOU START TO HAVE THE FOLLOWING SYMPTOMS:        1. Problems with the affected leg: Pain, discomfort, loss of warmth, numbness or tingling                                                                                                                            2. Problems at the groin site: Bleeding, pain that is sudden/sharp/persistent,                   swelling at site or a change in "lump" size, increased redness or drainage at                     puncture site                                                               3. High fever (101 degrees or higher)       4.  Drowsiness that doesn't get better       5. Weakness or dizziness that doesn't get better            6. Repeated vomiting        7. GO TO  THE EMERGENCY ROOM OR CALL 911 IF YOU HAVE: Chest pains or discomforts not relieved with 3 nitroglycerin doses (sublingual tablets or spray), numbness or severe pain or if your foot or leg becomes cold or discolored or uncontrolled bleeding from site (apply direct pressure above site).  "

## 2024-02-11 DIAGNOSIS — I25.84 CORONARY ARTERY CALCIFICATION: Primary | ICD-10-CM

## 2024-02-11 DIAGNOSIS — I70.219 ATHEROSCLEROTIC PVD WITH INTERMITTENT CLAUDICATION: ICD-10-CM

## 2024-02-11 DIAGNOSIS — I25.10 CORONARY ARTERY CALCIFICATION: Primary | ICD-10-CM

## 2024-02-12 ENCOUNTER — TELEPHONE (OUTPATIENT)
Dept: CARDIOLOGY | Facility: HOSPITAL | Age: 74
End: 2024-02-12
Payer: MEDICARE

## 2024-02-12 ENCOUNTER — HOSPITAL ENCOUNTER (OUTPATIENT)
Dept: VASCULAR SURGERY | Facility: HOSPITAL | Age: 74
Discharge: HOME | End: 2024-02-12
Attending: STUDENT IN AN ORGANIZED HEALTH CARE EDUCATION/TRAINING PROGRAM
Payer: MEDICARE

## 2024-02-12 DIAGNOSIS — R09.82 POST-NASAL DRIP: ICD-10-CM

## 2024-02-12 PROCEDURE — 93970 EXTREMITY STUDY: CPT | Mod: 26,HCNC,, | Performed by: SURGERY

## 2024-02-12 PROCEDURE — 93970 EXTREMITY STUDY: CPT | Mod: HCNC

## 2024-02-13 RX ORDER — LEVOCETIRIZINE DIHYDROCHLORIDE 5 MG/1
5 TABLET, FILM COATED ORAL NIGHTLY
Qty: 30 TABLET | Refills: 11 | Status: SHIPPED | OUTPATIENT
Start: 2024-02-13 | End: 2025-02-12

## 2024-02-29 VITALS
HEART RATE: 64 BPM | DIASTOLIC BLOOD PRESSURE: 70 MMHG | SYSTOLIC BLOOD PRESSURE: 155 MMHG | HEIGHT: 63 IN | OXYGEN SATURATION: 94 % | TEMPERATURE: 98 F | BODY MASS INDEX: 29.72 KG/M2 | RESPIRATION RATE: 15 BRPM | WEIGHT: 167.75 LBS

## 2024-03-04 ENCOUNTER — HOSPITAL ENCOUNTER (OUTPATIENT)
Dept: CARDIOLOGY | Facility: HOSPITAL | Age: 74
Discharge: HOME | End: 2024-03-04
Attending: INTERNAL MEDICINE
Payer: MEDICARE

## 2024-03-04 ENCOUNTER — HOSPITAL ENCOUNTER (OUTPATIENT)
Dept: RADIOLOGY | Facility: HOSPITAL | Age: 74
Discharge: HOME | End: 2024-03-04
Attending: INTERNAL MEDICINE
Payer: MEDICARE

## 2024-03-04 DIAGNOSIS — I25.10 CORONARY ARTERY CALCIFICATION: ICD-10-CM

## 2024-03-04 DIAGNOSIS — I25.84 CORONARY ARTERY CALCIFICATION: ICD-10-CM

## 2024-03-04 DIAGNOSIS — I70.219 ATHEROSCLEROTIC PVD WITH INTERMITTENT CLAUDICATION: ICD-10-CM

## 2024-03-04 LAB
CV STRESS BASE HR: 69 BPM
DIASTOLIC BLOOD PRESSURE: 40 MMHG
NUC REST EJECTION FRACTION: 76
NUC STRESS EJECTION FRACTION: 74 %
OHS CV CPX 85 PERCENT MAX PREDICTED HEART RATE MALE: 122
OHS CV CPX MAX PREDICTED HEART RATE: 144
OHS CV CPX PATIENT IS FEMALE: 1
OHS CV CPX PATIENT IS MALE: 0
OHS CV CPX PEAK DIASTOLIC BLOOD PRESSURE: 57 MMHG
OHS CV CPX PEAK HEAR RATE: 103 BPM
OHS CV CPX PEAK RATE PRESSURE PRODUCT: NORMAL
OHS CV CPX PEAK SYSTOLIC BLOOD PRESSURE: 147 MMHG
OHS CV CPX PERCENT MAX PREDICTED HEART RATE ACHIEVED: 74
OHS CV CPX RATE PRESSURE PRODUCT PRESENTING: 8694
SYSTOLIC BLOOD PRESSURE: 126 MMHG

## 2024-03-04 PROCEDURE — 93016 CV STRESS TEST SUPVJ ONLY: CPT | Mod: HCNC,,, | Performed by: INTERNAL MEDICINE

## 2024-03-04 PROCEDURE — 78452 HT MUSCLE IMAGE SPECT MULT: CPT | Mod: HCNC

## 2024-03-04 PROCEDURE — 93017 CV STRESS TEST TRACING ONLY: CPT | Mod: HCNC

## 2024-03-04 PROCEDURE — 78452 HT MUSCLE IMAGE SPECT MULT: CPT | Mod: 26,HCNC,, | Performed by: INTERNAL MEDICINE

## 2024-03-04 PROCEDURE — A9502 TC99M TETROFOSMIN: HCPCS | Mod: HCNC | Performed by: INTERNAL MEDICINE

## 2024-03-04 PROCEDURE — 63600175 PHARM REV CODE 636 W HCPCS: Mod: HCNC | Performed by: INTERNAL MEDICINE

## 2024-03-04 PROCEDURE — 93018 CV STRESS TEST I&R ONLY: CPT | Mod: HCNC,,, | Performed by: INTERNAL MEDICINE

## 2024-03-04 RX ORDER — REGADENOSON 0.08 MG/ML
0.4 INJECTION, SOLUTION INTRAVENOUS ONCE
Status: COMPLETED | OUTPATIENT
Start: 2024-03-04 | End: 2024-03-04

## 2024-03-04 RX ADMIN — REGADENOSON 0.4 MG: 0.08 INJECTION, SOLUTION INTRAVENOUS at 09:03

## 2024-03-04 RX ADMIN — TETROFOSMIN 9.3 MILLICURIE: 1.38 INJECTION, POWDER, LYOPHILIZED, FOR SOLUTION INTRAVENOUS at 08:03

## 2024-03-04 RX ADMIN — TETROFOSMIN 30 MILLICURIE: 1.38 INJECTION, POWDER, LYOPHILIZED, FOR SOLUTION INTRAVENOUS at 10:03

## 2024-03-05 ENCOUNTER — TELEPHONE (OUTPATIENT)
Dept: CARDIOLOGY | Facility: CLINIC | Age: 74
End: 2024-03-05
Payer: MEDICARE

## 2024-03-05 ENCOUNTER — TELEPHONE (OUTPATIENT)
Dept: VASCULAR SURGERY | Facility: CLINIC | Age: 74
End: 2024-03-05
Payer: MEDICARE

## 2024-03-05 NOTE — TELEPHONE ENCOUNTER
Patient was notified of results. All questions were answered. Pt verbalized understanding. Pt will call back with any other questions or concerns.    Vascular surgery will be notified patient is cleared  ----- Message from Jonathan Coburn MD sent at 3/4/2024  7:34 PM CST -----  STRESS NORMAL PROCEED WITH SURGERY AT MODERATE RISK

## 2024-03-05 NOTE — TELEPHONE ENCOUNTER
Per Dr. Coburn    ----- Message from Belia Torres LPN sent at 3/5/2024  9:42 AM CST -----  Patient is cleared per Dr. Coburn  ----- Message -----  From: Jonathan Coburn MD  Sent: 3/4/2024   7:34 PM CST  To: [facility] Staff    STRESS NORMAL PROCEED WITH SURGERY AT MODERATE RISK

## 2024-03-08 ENCOUNTER — TELEPHONE (OUTPATIENT)
Dept: PREADMISSION TESTING | Facility: HOSPITAL | Age: 74
End: 2024-03-08
Payer: MEDICARE

## 2024-03-08 NOTE — TELEPHONE ENCOUNTER
----- Message from Fartun Macario sent at 3/8/2024 11:24 AM CST -----  Regarding: RE: CMP & EKG  No CMP needed only BMP is needed.  Do not stop ASA and Pletal.  If EKG has been done within the past 6 months it's not needed.    ----- Message -----  From: Ana Samano RN  Sent: 3/8/2024   9:38 AM CST  To: Clint Urena MD; Romel Jaramillo Staff  Subject: CMP & EKG                                        Hello, Pre-Admit Testing Department is inquiring about this surgery patient's CMP & EKG for their procedure on 3/14/24. Please let us know if it will be available in Epic, or will be done day of surgery.     Also, pt needs Pletal and Aspirin instructions.     Thank you,  -[facility] Pre Admit Nurse

## 2024-03-12 ENCOUNTER — ANESTHESIA EVENT (OUTPATIENT)
Dept: SURGERY | Facility: HOSPITAL | Age: 74
DRG: 254 | End: 2024-03-12
Payer: MEDICARE

## 2024-03-13 ENCOUNTER — TELEPHONE (OUTPATIENT)
Dept: PREADMISSION TESTING | Facility: HOSPITAL | Age: 74
End: 2024-03-13
Payer: MEDICARE

## 2024-03-13 NOTE — ANESTHESIA PREPROCEDURE EVALUATION
03/13/2024  Rosana Aguiar is a 74 y.o., female.    Patient Active Problem List   Diagnosis    Hyperlipidemia    Hypertension    Type 2 diabetes mellitus with diabetic peripheral angiopathy without gangrene, with long-term current use of insulin    Thyroid nodule    Asymptomatic microscopic hematuria    Statin myopathy    Bruit of left carotid artery    Abdominal aortic atherosclerosis    Grief    Decreased ROM of right shoulder    Shoulder weakness    Atherosclerotic PVD with intermittent claudication    Coronary artery calcification    Carotid stenosis    Osteopenia    Depression, recurrent    Pulmonary nodule    Diabetes mellitus due to underlying condition with retinopathy and macular edema, with long-term current use of insulin, unspecified laterality, unspecified retinopathy severity     Past Surgical History:   Procedure Laterality Date    ANGIOGRAPHY OF LOWER EXTREMITY N/A 02/09/2024    Procedure: Angiogram Extremity Unilateral/poss pta right leg intervention;  Surgeon: Clint Urena MD;  Location: [facility] CATH LAB;  Service: Peripheral Vascular;  Laterality: N/A;  left common femoral access    APPENDECTOMY      CATARACT EXTRACTION Bilateral     cyst removal from breast (Left)      EYE SURGERY  Cataract    HYSTERECTOMY      TONSILLECTOMY      TUBAL LIGATION  1975     Results for orders placed during the hospital encounter of 11/28/22    Echo    Interpretation Summary  · The left ventricle is normal in size with mild concentric hypertrophy and normal systolic function.  · The estimated ejection fraction is 60%.  · Grade I left ventricular diastolic dysfunction.  · Normal right ventricular size with normal right ventricular systolic function.  · Moderate aortic regurgitation.  · Mild tricuspid regurgitation.  · Normal central venous pressure (3 mmHg).  · The estimated PA systolic pressure is 29  mmHg.    Pre-op Assessment    I have reviewed the Patient Summary Reports.    I have reviewed the NPO Status.   I have reviewed the Medications.     Review of Systems  Anesthesia Hx:  No problems with previous Anesthesia                Social:  Smoker       Hematology/Oncology:  Hematology Normal                                     Cardiovascular:     Hypertension   CAD          PVD hyperlipidemia                             Pulmonary:  Pulmonary Normal                       Renal/:  Renal/ Normal                 Hepatic/GI:  Hepatic/GI Normal                 Neurological:  Neurology Normal                                      Endocrine:  Diabetes               Physical Exam  General: Well nourished, Cooperative, Alert and Oriented    Airway:  Mallampati: III / III  Mouth Opening: Small, but > 3cm  TM Distance: 4 - 6 cm  Tongue: Large  Neck ROM: Extension Decreased, Flexion Decreased    Dental:  Intact        Anesthesia Plan  Type of Anesthesia, risks & benefits discussed:    Anesthesia Type: Gen ETT  Intra-op Monitoring Plan: Standard ASA Monitors  Post Op Pain Control Plan: multimodal analgesia  Induction:  IV  Airway Plan: Direct  Informed Consent: Informed consent signed with the Patient and all parties understand the risks and agree with anesthesia plan.  All questions answered.   ASA Score: 3  Day of Surgery Review of History & Physical: H&P Update referred to the surgeon/provider.I have interviewed and examined the patient. I have reviewed the patient's H&P dated: There are no significant changes. H&P completed by Anesthesiologist.    Ready For Surgery From Anesthesia Perspective.     .    Chemistry        Component Value Date/Time     01/22/2024 1550    K 3.6 01/22/2024 1550     01/22/2024 1550    CO2 24 01/22/2024 1550    BUN 15 01/22/2024 1550    CREATININE 1.1 01/22/2024 1550     (H) 01/22/2024 1550        Component Value Date/Time    CALCIUM 9.5 01/22/2024 1550    ALKPHOS 82  01/22/2024 1550    AST 18 01/22/2024 1550    ALT 17 01/22/2024 1550    BILITOT 0.3 01/22/2024 1550    ESTGFRAFRICA >60.0 07/11/2022 1201    EGFRNONAA >60.0 07/11/2022 1201        Lab Results   Component Value Date    WBC 7.01 02/09/2024    HGB 12.2 02/09/2024    HCT 34.5 (L) 02/09/2024    MCV 89 02/09/2024     02/09/2024       Normal sinus rhythm   Normal ECG   When compared with ECG of 28-NOV-2022 12:51,   No significant change was found   Confirmed by NATASHA GARNETT MD ([id number]) on 8/1/2023 4:37:29 PM       Nuc Stress 3/4/24:      Normal myocardial perfusion scan. There is no evidence of myocardial ischemia or infarction.    The gated perfusion images showed an ejection fraction of 76% at rest. The gated perfusion images showed an ejection fraction of 74% post stress.    There is normal wall motion at rest and post stress.    The patient reported no chest pain during the stress test.

## 2024-03-13 NOTE — TELEPHONE ENCOUNTER
Called and spoke with pt about the following:     Please arrive to [facility] at 0515 am on 3/14/24 for your scheduled procedure.  Address: [address] [place] [address] [place] [address] ([address] on left, 1st Floor Lobby)  >>>NO eating or drinking after midnight unless instructed otherwise by your Surgeon<<<    Thank you,  -[facility] Pre Admit Testing Dept.  Mon-Fri 7:30 am - 4 pm [phone]

## 2024-03-14 ENCOUNTER — HOSPITAL ENCOUNTER (INPATIENT)
Facility: HOSPITAL | Age: 74
LOS: 3 days | Discharge: HOME | DRG: 254 | End: 2024-03-17
Attending: STUDENT IN AN ORGANIZED HEALTH CARE EDUCATION/TRAINING PROGRAM | Admitting: STUDENT IN AN ORGANIZED HEALTH CARE EDUCATION/TRAINING PROGRAM
Payer: MEDICARE

## 2024-03-14 ENCOUNTER — ANESTHESIA (OUTPATIENT)
Dept: SURGERY | Facility: HOSPITAL | Age: 74
DRG: 254 | End: 2024-03-14
Payer: MEDICARE

## 2024-03-14 DIAGNOSIS — M79.609 POSTOPERATIVE PAIN OF EXTREMITY: Primary | ICD-10-CM

## 2024-03-14 DIAGNOSIS — I70.219 INTERMITTENT CLAUDICATION DUE TO ATHEROSCLEROSIS OF ARTERY OF EXTREMITY: ICD-10-CM

## 2024-03-14 DIAGNOSIS — G89.18 POSTOPERATIVE PAIN OF EXTREMITY: Primary | ICD-10-CM

## 2024-03-14 LAB
ABO + RH BLD: NORMAL
ANION GAP SERPL CALC-SCNC: 13 MMOL/L (ref 8–16)
BLD GP AB SCN CELLS X3 SERPL QL: NORMAL
BUN SERPL-MCNC: 11 MG/DL (ref 8–23)
CALCIUM SERPL-MCNC: 9.4 MG/DL (ref 8.7–10.5)
CHLORIDE SERPL-SCNC: 98 MMOL/L (ref 95–110)
CO2 SERPL-SCNC: 23 MMOL/L (ref 23–29)
CREAT SERPL-MCNC: 1 MG/DL (ref 0.5–1.4)
EST. GFR  (NO RACE VARIABLE): 58 ML/MIN/1.73 M^2
GLUCOSE SERPL-MCNC: 120 MG/DL (ref 70–110)
POCT GLUCOSE: 128 MG/DL (ref 70–110)
POCT GLUCOSE: 146 MG/DL (ref 70–110)
POCT GLUCOSE: 157 MG/DL (ref 70–110)
POCT GLUCOSE: 161 MG/DL (ref 70–110)
POTASSIUM SERPL-SCNC: 3.5 MMOL/L (ref 3.5–5.1)
SODIUM SERPL-SCNC: 134 MMOL/L (ref 136–145)
SPECIMEN OUTDATE: NORMAL

## 2024-03-14 PROCEDURE — 36000709 HC OR TIME LEV III EA ADD 15 MIN: Performed by: STUDENT IN AN ORGANIZED HEALTH CARE EDUCATION/TRAINING PROGRAM

## 2024-03-14 PROCEDURE — 37000008 HC ANESTHESIA 1ST 15 MINUTES: Performed by: STUDENT IN AN ORGANIZED HEALTH CARE EDUCATION/TRAINING PROGRAM

## 2024-03-14 PROCEDURE — 041K0JL BYPASS RIGHT FEMORAL ARTERY TO POPLITEAL ARTERY WITH SYNTHETIC SUBSTITUTE, OPEN APPROACH: ICD-10-PCS | Performed by: STUDENT IN AN ORGANIZED HEALTH CARE EDUCATION/TRAINING PROGRAM

## 2024-03-14 PROCEDURE — 25000003 PHARM REV CODE 250: Performed by: PHYSICIAN ASSISTANT

## 2024-03-14 PROCEDURE — 35656 BPG FEMORAL-POPLITEAL: CPT | Mod: RT,,, | Performed by: STUDENT IN AN ORGANIZED HEALTH CARE EDUCATION/TRAINING PROGRAM

## 2024-03-14 PROCEDURE — C1768 GRAFT, VASCULAR: HCPCS | Performed by: STUDENT IN AN ORGANIZED HEALTH CARE EDUCATION/TRAINING PROGRAM

## 2024-03-14 PROCEDURE — 36000708 HC OR TIME LEV III 1ST 15 MIN: Performed by: STUDENT IN AN ORGANIZED HEALTH CARE EDUCATION/TRAINING PROGRAM

## 2024-03-14 PROCEDURE — 80048 BASIC METABOLIC PNL TOTAL CA: CPT | Performed by: ANESTHESIOLOGY

## 2024-03-14 PROCEDURE — 37000009 HC ANESTHESIA EA ADD 15 MINS: Performed by: STUDENT IN AN ORGANIZED HEALTH CARE EDUCATION/TRAINING PROGRAM

## 2024-03-14 PROCEDURE — 86901 BLOOD TYPING SEROLOGIC RH(D): CPT | Performed by: ANESTHESIOLOGY

## 2024-03-14 PROCEDURE — 25000003 PHARM REV CODE 250: Performed by: STUDENT IN AN ORGANIZED HEALTH CARE EDUCATION/TRAINING PROGRAM

## 2024-03-14 PROCEDURE — 35656 BPG FEMORAL-POPLITEAL: CPT | Mod: AS,RT,, | Performed by: PHYSICIAN ASSISTANT

## 2024-03-14 PROCEDURE — 36415 COLL VENOUS BLD VENIPUNCTURE: CPT | Performed by: STUDENT IN AN ORGANIZED HEALTH CARE EDUCATION/TRAINING PROGRAM

## 2024-03-14 PROCEDURE — 71000033 HC RECOVERY, INTIAL HOUR: Performed by: STUDENT IN AN ORGANIZED HEALTH CARE EDUCATION/TRAINING PROGRAM

## 2024-03-14 PROCEDURE — 27201423 OPTIME MED/SURG SUP & DEVICES STERILE SUPPLY: Performed by: STUDENT IN AN ORGANIZED HEALTH CARE EDUCATION/TRAINING PROGRAM

## 2024-03-14 PROCEDURE — 63600175 PHARM REV CODE 636 W HCPCS: Performed by: STUDENT IN AN ORGANIZED HEALTH CARE EDUCATION/TRAINING PROGRAM

## 2024-03-14 PROCEDURE — 25000003 PHARM REV CODE 250: Performed by: NURSE ANESTHETIST, CERTIFIED REGISTERED

## 2024-03-14 PROCEDURE — 63600175 PHARM REV CODE 636 W HCPCS: Performed by: NURSE ANESTHETIST, CERTIFIED REGISTERED

## 2024-03-14 PROCEDURE — 63600175 PHARM REV CODE 636 W HCPCS: Performed by: PHYSICIAN ASSISTANT

## 2024-03-14 PROCEDURE — 86920 COMPATIBILITY TEST SPIN: CPT | Performed by: STUDENT IN AN ORGANIZED HEALTH CARE EDUCATION/TRAINING PROGRAM

## 2024-03-14 PROCEDURE — 11000001 HC ACUTE MED/SURG PRIVATE ROOM

## 2024-03-14 PROCEDURE — 71000039 HC RECOVERY, EACH ADD'L HOUR: Performed by: STUDENT IN AN ORGANIZED HEALTH CARE EDUCATION/TRAINING PROGRAM

## 2024-03-14 PROCEDURE — 63600175 PHARM REV CODE 636 W HCPCS: Mod: JZ,JG | Performed by: ANESTHESIOLOGY

## 2024-03-14 DEVICE — HEMAGARD KNITTED STRAIGHT COLLAGEN COATED VASCULAR GRAFT
Type: IMPLANTABLE DEVICE | Site: LEG | Status: FUNCTIONAL
Brand: HEMAGARD

## 2024-03-14 RX ORDER — PROPOFOL 10 MG/ML
VIAL (ML) INTRAVENOUS
Status: DISCONTINUED | OUTPATIENT
Start: 2024-03-14 | End: 2024-03-14

## 2024-03-14 RX ORDER — HYDROCODONE BITARTRATE AND ACETAMINOPHEN 5; 325 MG/1; MG/1
1 TABLET ORAL EVERY 6 HOURS PRN
Status: DISCONTINUED | OUTPATIENT
Start: 2024-03-14 | End: 2024-03-17 | Stop reason: HOSPADM

## 2024-03-14 RX ORDER — HEPARIN SODIUM 1000 [USP'U]/ML
INJECTION, SOLUTION INTRAVENOUS; SUBCUTANEOUS
Status: DISCONTINUED | OUTPATIENT
Start: 2024-03-14 | End: 2024-03-15

## 2024-03-14 RX ORDER — CILOSTAZOL 50 MG/1
50 TABLET ORAL 2 TIMES DAILY
Status: DISCONTINUED | OUTPATIENT
Start: 2024-03-15 | End: 2024-03-17 | Stop reason: HOSPADM

## 2024-03-14 RX ORDER — IBUPROFEN 200 MG
24 TABLET ORAL
Status: DISCONTINUED | OUTPATIENT
Start: 2024-03-14 | End: 2024-03-17 | Stop reason: HOSPADM

## 2024-03-14 RX ORDER — TRAMADOL HYDROCHLORIDE 50 MG/1
50 TABLET ORAL 2 TIMES DAILY
Status: DISCONTINUED | OUTPATIENT
Start: 2024-03-14 | End: 2024-03-17 | Stop reason: HOSPADM

## 2024-03-14 RX ORDER — CARVEDILOL 6.25 MG/1
6.25 TABLET ORAL 2 TIMES DAILY WITH MEALS
Status: DISCONTINUED | OUTPATIENT
Start: 2024-03-15 | End: 2024-03-17 | Stop reason: HOSPADM

## 2024-03-14 RX ORDER — IBUPROFEN 200 MG
1 TABLET ORAL DAILY
Status: DISCONTINUED | OUTPATIENT
Start: 2024-03-15 | End: 2024-03-17 | Stop reason: HOSPADM

## 2024-03-14 RX ORDER — CEFAZOLIN SODIUM 2 G/50ML
2 SOLUTION INTRAVENOUS
Status: DISCONTINUED | OUTPATIENT
Start: 2024-03-14 | End: 2024-03-14

## 2024-03-14 RX ORDER — SODIUM CHLORIDE 9 MG/ML
INJECTION, SOLUTION INTRAVENOUS CONTINUOUS
Status: DISCONTINUED | OUTPATIENT
Start: 2024-03-14 | End: 2024-03-15

## 2024-03-14 RX ORDER — SODIUM CHLORIDE 0.9 % (FLUSH) 0.9 %
2 SYRINGE (ML) INJECTION EVERY 6 HOURS
Status: DISCONTINUED | OUTPATIENT
Start: 2024-03-14 | End: 2024-03-14 | Stop reason: HOSPADM

## 2024-03-14 RX ORDER — CEFAZOLIN SODIUM 1 G/3ML
INJECTION, POWDER, FOR SOLUTION INTRAMUSCULAR; INTRAVENOUS
Status: DISCONTINUED | OUTPATIENT
Start: 2024-03-14 | End: 2024-03-15

## 2024-03-14 RX ORDER — HYDROCODONE BITARTRATE AND ACETAMINOPHEN 500; 5 MG/1; MG/1
TABLET ORAL
Status: DISCONTINUED | OUTPATIENT
Start: 2024-03-14 | End: 2024-03-14 | Stop reason: HOSPADM

## 2024-03-14 RX ORDER — ENALAPRILAT 1.25 MG/ML
1.25 INJECTION INTRAVENOUS EVERY 6 HOURS PRN
Status: DISCONTINUED | OUTPATIENT
Start: 2024-03-14 | End: 2024-03-17 | Stop reason: HOSPADM

## 2024-03-14 RX ORDER — GLUCAGON 1 MG
1 KIT INJECTION
Status: DISCONTINUED | OUTPATIENT
Start: 2024-03-14 | End: 2024-03-17 | Stop reason: HOSPADM

## 2024-03-14 RX ORDER — HYDROCHLOROTHIAZIDE 25 MG/1
50 TABLET ORAL DAILY
Status: DISCONTINUED | OUTPATIENT
Start: 2024-03-15 | End: 2024-03-17 | Stop reason: HOSPADM

## 2024-03-14 RX ORDER — OXYCODONE AND ACETAMINOPHEN 5; 325 MG/1; MG/1
1 TABLET ORAL
Status: DISCONTINUED | OUTPATIENT
Start: 2024-03-14 | End: 2024-03-14 | Stop reason: HOSPADM

## 2024-03-14 RX ORDER — ALBUTEROL SULFATE 0.83 MG/ML
2.5 SOLUTION RESPIRATORY (INHALATION) EVERY 6 HOURS PRN
Status: DISCONTINUED | OUTPATIENT
Start: 2024-03-14 | End: 2024-03-17 | Stop reason: HOSPADM

## 2024-03-14 RX ORDER — CILOSTAZOL 50 MG/1
50 TABLET ORAL 2 TIMES DAILY
Status: DISCONTINUED | OUTPATIENT
Start: 2024-03-14 | End: 2024-03-14

## 2024-03-14 RX ORDER — ROCURONIUM BROMIDE 10 MG/ML
INJECTION, SOLUTION INTRAVENOUS
Status: DISCONTINUED | OUTPATIENT
Start: 2024-03-14 | End: 2024-03-15

## 2024-03-14 RX ORDER — ASPIRIN 81 MG/1
81 TABLET ORAL DAILY
Status: DISCONTINUED | OUTPATIENT
Start: 2024-03-15 | End: 2024-03-17 | Stop reason: HOSPADM

## 2024-03-14 RX ORDER — CEFAZOLIN SODIUM 1 G/3ML
INJECTION, POWDER, FOR SOLUTION INTRAMUSCULAR; INTRAVENOUS
Status: DISCONTINUED | OUTPATIENT
Start: 2024-03-14 | End: 2024-03-14 | Stop reason: HOSPADM

## 2024-03-14 RX ORDER — AMLODIPINE BESYLATE 10 MG/1
10 TABLET ORAL DAILY
Status: DISCONTINUED | OUTPATIENT
Start: 2024-03-15 | End: 2024-03-17 | Stop reason: HOSPADM

## 2024-03-14 RX ORDER — ALBUTEROL SULFATE 90 UG/1
2 AEROSOL, METERED RESPIRATORY (INHALATION) EVERY 6 HOURS PRN
Status: DISCONTINUED | OUTPATIENT
Start: 2024-03-14 | End: 2024-03-14

## 2024-03-14 RX ORDER — IBUPROFEN 200 MG
16 TABLET ORAL
Status: DISCONTINUED | OUTPATIENT
Start: 2024-03-14 | End: 2024-03-17 | Stop reason: HOSPADM

## 2024-03-14 RX ORDER — MORPHINE SULFATE 4 MG/ML
2 INJECTION, SOLUTION INTRAMUSCULAR; INTRAVENOUS EVERY 4 HOURS PRN
Status: DISCONTINUED | OUTPATIENT
Start: 2024-03-14 | End: 2024-03-15

## 2024-03-14 RX ORDER — ONDANSETRON HYDROCHLORIDE 2 MG/ML
INJECTION, SOLUTION INTRAVENOUS
Status: DISCONTINUED | OUTPATIENT
Start: 2024-03-14 | End: 2024-03-15

## 2024-03-14 RX ORDER — FAMOTIDINE 10 MG/ML
20 INJECTION INTRAVENOUS DAILY
Status: DISCONTINUED | OUTPATIENT
Start: 2024-03-14 | End: 2024-03-14 | Stop reason: SDUPTHER

## 2024-03-14 RX ORDER — ACETAMINOPHEN 10 MG/ML
1000 INJECTION, SOLUTION INTRAVENOUS ONCE
Status: COMPLETED | OUTPATIENT
Start: 2024-03-14 | End: 2024-03-14

## 2024-03-14 RX ORDER — HEPARIN SODIUM 1000 [USP'U]/ML
INJECTION, SOLUTION INTRAVENOUS; SUBCUTANEOUS
Status: DISCONTINUED | OUTPATIENT
Start: 2024-03-14 | End: 2024-03-14 | Stop reason: HOSPADM

## 2024-03-14 RX ORDER — ENOXAPARIN SODIUM 100 MG/ML
40 INJECTION SUBCUTANEOUS EVERY 24 HOURS
Status: DISCONTINUED | OUTPATIENT
Start: 2024-03-14 | End: 2024-03-17 | Stop reason: HOSPADM

## 2024-03-14 RX ORDER — LOSARTAN POTASSIUM 25 MG/1
25 TABLET ORAL DAILY
Status: DISCONTINUED | OUTPATIENT
Start: 2024-03-15 | End: 2024-03-17 | Stop reason: HOSPADM

## 2024-03-14 RX ORDER — ONDANSETRON 4 MG/1
4 TABLET, ORALLY DISINTEGRATING ORAL EVERY 8 HOURS PRN
Status: DISCONTINUED | OUTPATIENT
Start: 2024-03-14 | End: 2024-03-17 | Stop reason: HOSPADM

## 2024-03-14 RX ORDER — LIDOCAINE HYDROCHLORIDE 20 MG/ML
INJECTION, SOLUTION EPIDURAL; INFILTRATION; INTRACAUDAL; PERINEURAL
Status: DISCONTINUED | OUTPATIENT
Start: 2024-03-14 | End: 2024-03-15

## 2024-03-14 RX ORDER — ONDANSETRON HYDROCHLORIDE 2 MG/ML
4 INJECTION, SOLUTION INTRAVENOUS DAILY PRN
Status: DISCONTINUED | OUTPATIENT
Start: 2024-03-14 | End: 2024-03-14 | Stop reason: HOSPADM

## 2024-03-14 RX ORDER — DOCUSATE SODIUM 100 MG/1
100 CAPSULE, LIQUID FILLED ORAL DAILY
Status: DISCONTINUED | OUTPATIENT
Start: 2024-03-14 | End: 2024-03-17 | Stop reason: HOSPADM

## 2024-03-14 RX ORDER — MEPERIDINE HYDROCHLORIDE 25 MG/ML
12.5 INJECTION INTRAMUSCULAR; INTRAVENOUS; SUBCUTANEOUS ONCE AS NEEDED
Status: DISCONTINUED | OUTPATIENT
Start: 2024-03-14 | End: 2024-03-14 | Stop reason: HOSPADM

## 2024-03-14 RX ORDER — ATORVASTATIN CALCIUM 40 MG/1
80 TABLET, FILM COATED ORAL DAILY
Status: DISCONTINUED | OUTPATIENT
Start: 2024-03-15 | End: 2024-03-17 | Stop reason: HOSPADM

## 2024-03-14 RX ORDER — FENTANYL CITRATE 50 UG/ML
25 INJECTION, SOLUTION INTRAMUSCULAR; INTRAVENOUS EVERY 5 MIN PRN
Status: DISCONTINUED | OUTPATIENT
Start: 2024-03-14 | End: 2024-03-14 | Stop reason: HOSPADM

## 2024-03-14 RX ORDER — HYDRALAZINE HYDROCHLORIDE 20 MG/ML
10 INJECTION INTRAMUSCULAR; INTRAVENOUS EVERY 8 HOURS PRN
Status: DISCONTINUED | OUTPATIENT
Start: 2024-03-14 | End: 2024-03-17 | Stop reason: HOSPADM

## 2024-03-14 RX ORDER — HYDROCODONE BITARTRATE AND ACETAMINOPHEN 10; 325 MG/1; MG/1
1 TABLET ORAL EVERY 6 HOURS PRN
Status: DISCONTINUED | OUTPATIENT
Start: 2024-03-14 | End: 2024-03-15

## 2024-03-14 RX ORDER — FENTANYL CITRATE 50 UG/ML
INJECTION, SOLUTION INTRAMUSCULAR; INTRAVENOUS
Status: DISCONTINUED | OUTPATIENT
Start: 2024-03-14 | End: 2024-03-15

## 2024-03-14 RX ORDER — FAMOTIDINE 20 MG/1
20 TABLET, FILM COATED ORAL DAILY
Status: DISCONTINUED | OUTPATIENT
Start: 2024-03-14 | End: 2024-03-15

## 2024-03-14 RX ORDER — EPHEDRINE SULFATE 50 MG/ML
INJECTION, SOLUTION INTRAVENOUS
Status: DISCONTINUED | OUTPATIENT
Start: 2024-03-14 | End: 2024-03-15

## 2024-03-14 RX ORDER — HYDROMORPHONE HYDROCHLORIDE 2 MG/ML
0.2 INJECTION, SOLUTION INTRAMUSCULAR; INTRAVENOUS; SUBCUTANEOUS EVERY 5 MIN PRN
Status: DISCONTINUED | OUTPATIENT
Start: 2024-03-14 | End: 2024-03-14 | Stop reason: HOSPADM

## 2024-03-14 RX ORDER — ONDANSETRON HYDROCHLORIDE 4 MG/5ML
4 SOLUTION ORAL EVERY 8 HOURS PRN
Status: DISCONTINUED | OUTPATIENT
Start: 2024-03-14 | End: 2024-03-14 | Stop reason: SDUPTHER

## 2024-03-14 RX ORDER — ADHESIVE BANDAGE
30 BANDAGE TOPICAL DAILY PRN
Status: DISCONTINUED | OUTPATIENT
Start: 2024-03-14 | End: 2024-03-17 | Stop reason: HOSPADM

## 2024-03-14 RX ORDER — HYDROCODONE BITARTRATE AND ACETAMINOPHEN 7.5; 325 MG/1; MG/1
1 TABLET ORAL EVERY 6 HOURS PRN
Status: DISCONTINUED | OUTPATIENT
Start: 2024-03-14 | End: 2024-03-17 | Stop reason: HOSPADM

## 2024-03-14 RX ADMIN — EPHEDRINE SULFATE 5 MG: 50 INJECTION INTRAVENOUS at 08:03

## 2024-03-14 RX ADMIN — ROCURONIUM BROMIDE 50 MG: 10 INJECTION, SOLUTION INTRAVENOUS at 07:03

## 2024-03-14 RX ADMIN — ROCURONIUM BROMIDE 20 MG: 10 INJECTION, SOLUTION INTRAVENOUS at 09:03

## 2024-03-14 RX ADMIN — MORPHINE SULFATE 2 MG: 4 INJECTION INTRAVENOUS at 05:03

## 2024-03-14 RX ADMIN — HEPARIN SODIUM 9000 UNITS: 1000 INJECTION, SOLUTION INTRAVENOUS; SUBCUTANEOUS at 09:03

## 2024-03-14 RX ADMIN — HYDROMORPHONE HYDROCHLORIDE 0.2 MG: 2 INJECTION INTRAMUSCULAR; INTRAVENOUS; SUBCUTANEOUS at 12:03

## 2024-03-14 RX ADMIN — LIDOCAINE HYDROCHLORIDE 60 MG: 20 INJECTION, SOLUTION EPIDURAL; INFILTRATION; INTRACAUDAL; PERINEURAL at 07:03

## 2024-03-14 RX ADMIN — SODIUM CHLORIDE 10 MCG/MIN: 9 INJECTION, SOLUTION INTRAVENOUS at 07:03

## 2024-03-14 RX ADMIN — CEFAZOLIN 2 G: 330 INJECTION, POWDER, FOR SOLUTION INTRAMUSCULAR; INTRAVENOUS at 07:03

## 2024-03-14 RX ADMIN — HEPARIN SODIUM 1000 UNITS: 1000 INJECTION, SOLUTION INTRAVENOUS; SUBCUTANEOUS at 09:03

## 2024-03-14 RX ADMIN — ONDANSETRON 4 MG: 2 INJECTION INTRAMUSCULAR; INTRAVENOUS at 10:03

## 2024-03-14 RX ADMIN — PROPOFOL 30 MG: 10 INJECTION, EMULSION INTRAVENOUS at 11:03

## 2024-03-14 RX ADMIN — TRAMADOL HYDROCHLORIDE 50 MG: 50 TABLET, COATED ORAL at 09:03

## 2024-03-14 RX ADMIN — PROPOFOL 70 MG: 10 INJECTION, EMULSION INTRAVENOUS at 07:03

## 2024-03-14 RX ADMIN — ROCURONIUM BROMIDE 50 MG: 10 INJECTION, SOLUTION INTRAVENOUS at 08:03

## 2024-03-14 RX ADMIN — CEFAZOLIN 2 G: 2 INJECTION, POWDER, FOR SOLUTION INTRAMUSCULAR; INTRAVENOUS at 03:03

## 2024-03-14 RX ADMIN — PROPOFOL 50 MG: 10 INJECTION, EMULSION INTRAVENOUS at 07:03

## 2024-03-14 RX ADMIN — SODIUM CHLORIDE, POTASSIUM CHLORIDE, SODIUM LACTATE AND CALCIUM CHLORIDE: 600; 310; 30; 20 INJECTION, SOLUTION INTRAVENOUS at 07:03

## 2024-03-14 RX ADMIN — ENOXAPARIN SODIUM 40 MG: 40 INJECTION SUBCUTANEOUS at 05:03

## 2024-03-14 RX ADMIN — SODIUM CHLORIDE: 9 INJECTION, SOLUTION INTRAVENOUS at 01:03

## 2024-03-14 RX ADMIN — HYDROCODONE BITARTRATE AND ACETAMINOPHEN 1 TABLET: 10; 325 TABLET ORAL at 01:03

## 2024-03-14 RX ADMIN — HYDROCODONE BITARTRATE AND ACETAMINOPHEN 1 TABLET: 10; 325 TABLET ORAL at 08:03

## 2024-03-14 RX ADMIN — FAMOTIDINE 20 MG: 20 TABLET ORAL at 01:03

## 2024-03-14 RX ADMIN — FENTANYL CITRATE 50 MCG: 50 INJECTION, SOLUTION INTRAMUSCULAR; INTRAVENOUS at 07:03

## 2024-03-14 RX ADMIN — INSULIN DETEMIR 10 UNITS: 100 INJECTION, SOLUTION SUBCUTANEOUS at 09:03

## 2024-03-14 RX ADMIN — SUGAMMADEX 200 MG: 100 INJECTION, SOLUTION INTRAVENOUS at 11:03

## 2024-03-14 RX ADMIN — CEFAZOLIN 2 G: 2 INJECTION, POWDER, FOR SOLUTION INTRAMUSCULAR; INTRAVENOUS at 11:03

## 2024-03-14 RX ADMIN — ACETAMINOPHEN 1000 MG: 10 INJECTION INTRAVENOUS at 12:03

## 2024-03-14 RX ADMIN — HYDROMORPHONE HYDROCHLORIDE 0.2 MG: 2 INJECTION INTRAMUSCULAR; INTRAVENOUS; SUBCUTANEOUS at 11:03

## 2024-03-14 NOTE — PLAN OF CARE
Pt AAOx4. Oriented to room/ call light. NS@ 75mL/hr. Dressing to right thigh C/D/I. Prevena wound vac pump to right groin. PRN pain meds adm as ordered. Remains free from incident/injury. Call light in reach. All active orders reviewed.

## 2024-03-14 NOTE — CONSULTS
[facility] Medicine  Consult Note    Patient Name: Rosana Aguiar  MRN: [id number]  Admission Date: 3/14/2024  Hospital Length of Stay: 0 days  Attending Physician: Clint Urena MD   Primary Care Provider: Carito Kohler MD           Patient information was obtained from patient, past medical records, and ER records.     Consults  Subjective:     Principal Problem: <principal problem not specified>    Chief Complaint: No chief complaint on file.       HPI: Rosana Aguiar is a 74-year-old woman with a past medical history of atherosclerotic peripheral vascular disease (PVD) with intermittent claudication since October 19, 2022, diabetes mellitus type 2 diagnosed in 2003, high cholesterol, hypertension, and macular degeneration, who presented to the ER due to lifestyle-limiting claudication that significantly restricts her mobility because of pain in her legs, especially after walking short distances. Despite the discomfort, she continues to smoke, which further exacerbates her condition. Medical examination revealed a long-segment occlusion of the superficial femoral artery (SFA) bilaterally, with the right side causing slightly more discomfort. Consequently, she was admitted to the vascular surgery service for a right femoropopliteal (fem-pop) bypass using a dacron graft. Perioperative vital signs were well within acceptable limits. Perioperative imaging and routine labs were unremarkable, indicating no immediate postoperative complications or unexpected findings. Hospital Medicine was consulted for perioperative management, with primary team emphasizing a planned hospital stay of 2-3 days for pain management, with a follow-up appointment scheduled in 4 weeks to discuss treatment options for her left leg.    Past Medical History:   Diagnosis Date    Atherosclerotic PVD with intermittent claudication 10/19/2022    Diabetes mellitus, type 2 2003    BS didn't check 09/06/2023    High  cholesterol     Hypertension     Macular degeneration        Past Surgical History:   Procedure Laterality Date    ANGIOGRAPHY OF LOWER EXTREMITY N/A 02/09/2024    Procedure: Angiogram Extremity Unilateral/poss pta right leg intervention;  Surgeon: Clint Urena MD;  Location: [facility] CATH LAB;  Service: Peripheral Vascular;  Laterality: N/A;  left common femoral access    APPENDECTOMY      CATARACT EXTRACTION Bilateral     cyst removal from breast (Left)      EYE SURGERY  Cataract    HYSTERECTOMY      TONSILLECTOMY      TUBAL LIGATION  1975       Review of patient's allergies indicates:   Allergen Reactions    Clindamycin Other (See Comments)    Neomycin-bacitracin-polymyxin Itching    Nitrofurantoin monohyd/m-cryst Other (See Comments)       No current facility-administered medications on file prior to encounter.     Current Outpatient Medications on File Prior to Encounter   Medication Sig    albuterol (VENTOLIN HFA) 90 mcg/actuation inhaler Inhale 2 puffs into the lungs every 6 (six) hours as needed for Wheezing. Rescue    amLODIPine (NORVASC) 10 MG tablet TAKE 1 TABLET ONE TIME DAILY    aspirin (ECOTRIN) 81 MG EC tablet Take 1 tablet (81 mg total) by mouth once daily.    carvediloL (COREG) 6.25 MG tablet Take 1 tablet (6.25 mg total) by mouth 2 (two) times daily with meals.    cilostazoL (PLETAL) 50 MG Tab Take 1 tablet (50 mg total) by mouth 2 (two) times daily.    FLUoxetine 20 MG capsule Take 1 capsule (20 mg total) by mouth once daily.    insulin glargine U-300 conc (TOUJEO MAX U-300 SOLOSTAR) 300 unit/mL (3 mL) insulin pen Inject 60 Units into the skin once daily. (Patient taking differently: Inject 60 Units into the skin every evening.)    levocetirizine (XYZAL) 5 MG tablet Take 1 tablet (5 mg total) by mouth every evening.    losartan-hydrochlorothiazide 100-25 mg (HYZAAR) 100-25 mg per tablet TAKE 1 TABLET EVERY DAY (Patient taking differently: Take 1 tablet by mouth once daily.)    pen needle, diabetic  "(BD CAROL 2ND GEN PEN NEEDLE) 32 gauge x 5/32" Ndle 1 each by Misc.(Non-Drug; Combo Route) route once daily.    rosuvastatin (CRESTOR) 20 MG tablet Take 1 tablet (20 mg total) by mouth once daily.    tiotropium-olodateroL (STIOLTO RESPIMAT) 2.5-2.5 mcg/actuation Mist Inhale 2 puffs into the lungs once daily. Controller    fluticasone propionate (FLONASE) 50 mcg/actuation nasal spray 1 spray (50 mcg total) by Each Nostril route once daily.    ibuprofen (ADVIL,MOTRIN) 800 MG tablet Take 1 tablet (800 mg total) by mouth every 6 (six) hours if needed for mild pain for up to 10 days.     Family History       Problem Relation (Age of Onset)    Alcohol abuse Father    Cancer Mother, Daughter    Diabetes Mother, Maternal Uncle    Macular degeneration Mother          Tobacco Use    Smoking status: Every Day     Current packs/day: 1.00     Average packs/day: 1 pack/day for 62.2 years (62.2 ttl pk-yrs)     Types: Cigarettes     Start date: 1962     Passive exposure: Past    Smokeless tobacco: Never    Tobacco comments:     HOLD MIDNIGHT PRIOR TO SX   Substance and Sexual Activity    Alcohol use: Yes     Comment: rarely; HOLD 72HRS    Drug use: Never    Sexual activity: Not Currently     Partners: Male     Birth control/protection: None     Review of Systems   Constitutional:  Negative for chills, fatigue and fever.   HENT:  Negative for congestion, postnasal drip, rhinorrhea and sore throat.    Eyes:  Negative for pain and visual disturbance.   Respiratory:  Negative for cough, chest tightness, shortness of breath and wheezing.    Cardiovascular:  Negative for chest pain, palpitations and leg swelling.   Gastrointestinal:  Negative for abdominal distention, abdominal pain, constipation, diarrhea, nausea and vomiting.   Genitourinary:  Negative for difficulty urinating, flank pain and hematuria.   Musculoskeletal:  Positive for arthralgias and gait problem. Negative for back pain and joint swelling.   Skin:  Negative for pallor " and rash.   Neurological:  Negative for dizziness, syncope and weakness.   Psychiatric/Behavioral:  Negative for confusion, decreased concentration and suicidal ideas.      Objective:     Vital Signs (Most Recent):  Temp: 97.5 °F (36.4 °C) (03/14/24 1245)  Pulse: 79 (03/14/24 1256)  Resp: 15 (03/14/24 1322)  BP: (!) 138/59 (03/14/24 1256)  SpO2: (!) 94 % (03/14/24 1256) Vital Signs (24h Range):  Temp:  [97.5 °F (36.4 °C)-97.9 °F (36.6 °C)] 97.5 °F (36.4 °C)  Pulse:  [72-82] 79  Resp:  [11-22] 15  SpO2:  [88 %-100 %] 94 %  BP: (124-145)/(59-80) 138/59  Arterial Line BP: (123-141)/(40-48) 132/44     Weight: 75.1 kg (165 lb 9.1 oz)  Body mass index is 28.42 kg/m².     Physical Exam  Vitals reviewed.   Constitutional:       General: She is not in acute distress.     Appearance: Normal appearance. She is normal weight. She is not ill-appearing or toxic-appearing.   HENT:      Head: Normocephalic and atraumatic.      Nose: Nose normal. No congestion or rhinorrhea.   Eyes:      Extraocular Movements: Extraocular movements intact.      Conjunctiva/sclera: Conjunctivae normal.      Pupils: Pupils are equal, round, and reactive to light.   Cardiovascular:      Rate and Rhythm: Normal rate and regular rhythm.      Pulses: Normal pulses.      Heart sounds: No murmur heard.  Pulmonary:      Effort: Pulmonary effort is normal. No respiratory distress.      Breath sounds: Normal breath sounds. No wheezing.   Abdominal:      General: Abdomen is flat. Bowel sounds are normal. There is no distension.      Palpations: Abdomen is soft.      Tenderness: There is no abdominal tenderness. There is no guarding or rebound.   Musculoskeletal:         General: No swelling, tenderness or deformity. Normal range of motion.      Cervical back: Normal range of motion and neck supple. No rigidity.   Skin:     General: Skin is warm and dry.      Capillary Refill: Capillary refill takes less than 2 seconds.      Coloration: Skin is not pale.       Comments: Incision site bloody drainage noted on dressing.   Neurological:      General: No focal deficit present.      Mental Status: She is alert and oriented to person, place, and time. Mental status is at baseline.      Motor: No weakness.      Gait: Gait normal.   Psychiatric:         Mood and Affect: Mood normal.         Behavior: Behavior normal.         Thought Content: Thought content normal.          Significant Labs: All pertinent labs within the past 24 hours have been reviewed.    Significant Imaging: I have reviewed all pertinent imaging results/findings within the past 24 hours.  Assessment/Plan:     Atherosclerotic PVD with intermittent claudication  --s/p vascular intervention  --vascular surgery primary  --pain/antiemetics/bowel regimen ordered  --fall precautions, neurovascular checks   --perioperative IV abx for prophylactic coverage  --DVT ppx: lovenox        Type 2 diabetes mellitus with diabetic peripheral angiopathy without gangrene, with long-term current use of insulin  --last A1c 6.7 on 01/22/2024  --Home medications include basal 60u QHS- held for now  --10u detemir QHS + SSI ordered; modify as necessary  --Accuchecks ACQHS  --Fasting AM glucose goal <140          Hypertension  --home medications include:  Norvasc, losartan, hydrochlorothiazide  --restart as tolerated   --PRN IV hydralazine 10mg Q6H for sBP > 175 mmHg  --Q4HVS    Hyperlipidemia  --continue statin therapy on admission        VTE Risk Mitigation (From admission, onward)           Ordered     enoxaparin injection 40 mg  Every 24 hours         03/14/24 2589                        Thank you for your consult. I will follow-up with patient. Please contact us if you have any additional questions.    Ángel Siddiqui MD  Department of Hospital Medicine   [facility] - Med Surg

## 2024-03-14 NOTE — ASSESSMENT & PLAN NOTE
--home medications include:  Norvasc, losartan, hydrochlorothiazide  --restart as tolerated   --PRN IV hydralazine 10mg Q6H for sBP > 175 mmHg  --Q4HVS

## 2024-03-14 NOTE — NURSING
Patient assisted to sitting up on the side of the bed, family at bedside. Nurse called back to room after a few minutes of sitting. Up. Dressing to right thigh saturated with blood. Dressing intact. Surgeon notified and picture sent. Dorsalis pedis +2. No swelling or hematoma noted to area. No new orders received. Will continue to monitor.

## 2024-03-14 NOTE — OP NOTE
Operative Note    Date of Service: 3/14/2024     Pre-Operative Diagnosis:   Short distance lifestyle limiting claudication right leg    Post-Operative Diagnosis: Same       Procedure:   Right femoral to above knee popliteal bypass using 8mm dacron graft    Anesthesia Type: General     Surgeon: Clint Urena MD  Assistant: Lin Dc PA-C skilled hands were essential to the the procedure as there was no available resident in the facility to assist.      Procedure:     Patient brought to the operating room placed in supine position and intubated. The right groin and right leg prepped and draped in sterile fashion. Time out performed. Incision made transverse in the right groin the right common femoral artery, sfa and profunda were isolated with vessel loops.    Next an incision was made above the knee and subsartorial plane developed, the popliteal artery was identified. Next I tunneled 8mm dacron graft through the medial thigh in a sub sartorius plane. The patient was heparinized.    Arteriotomy created on right cfa and proximal anastomosis created using 5-0 prolene. The graft was flushed. Next 6-0 prolene used to create the distal anastomosis into the popliteal artery above knee.  The graft was again flushed and back bled prior to unclamping. We lost about 60cc total blood. The patient has an excellent triphasic signal in the popliteal artery at the end of the case. The incisions were closed in layers of 2-0 and 3-0 vicryl and skin closed with 4-0 monocryl. A prevena dressing placed on the right groin and sterile dressing with tegaderm placed in right medial thigh incision above knee.     Estimated Blood Loss:   60 mL         Drains: none           Complications:  none           Disposition: PACU - hemodynamically stable.    Post-op Plan:  Will be admitted to the hospital for observation, medical management and pain control  PT/OT for evaluation and mobilization  Discharge will be determined after POD  #1    Clint Urena MD  Phone Number: [phone]      Clint Urena MD   3/14/2024   11:23 AM

## 2024-03-14 NOTE — ANESTHESIA PROCEDURE NOTES
Arterial    Diagnosis: hemodynamic monitoring    Patient location during procedure: done in OR  Timeout: 3/14/2024 7:30 AM  Procedure end time: 3/14/2024 7:35 AM    Staffing  Authorizing Provider: Bhavesh De Anda MD  Performing Provider: Kuldeep Daniel CRNA    Staffing  Performed by: Kuldeep Daniel CRNA  Authorized by: Bhavesh De Anda MD    Anesthesiologist was present at the time of the procedure.    Preanesthetic Checklist  Completed: patient identified, IV checked, site marked, risks and benefits discussed, surgical consent, monitors and equipment checked, pre-op evaluation, timeout performed and anesthesia consent givenArterial  Skin Prep: chlorhexidine gluconate  Local Infiltration: none  Orientation: right  Location: radial    Catheter Size: 20 G  Catheter placement by Ultrasound guidance. Heme positive aspiration all ports.   Vessel Caliber: patent, compressibility normal  Vascular Doppler:  not done  Needle advanced into vessel with real time Ultrasound guidance.Insertion Attempts: 2  Assessment  Dressing: secured with tape and tegaderm  Patient: Tolerated well

## 2024-03-14 NOTE — ASSESSMENT & PLAN NOTE
--last A1c 6.7 on 01/22/2024  --Home medications include basal 60u QHS- held for now  --10u detemir QHS + SSI ordered; modify as necessary  --Accuchecks ACQHS  --Fasting AM glucose goal <140

## 2024-03-14 NOTE — SUBJECTIVE & OBJECTIVE
Past Medical History:   Diagnosis Date    Atherosclerotic PVD with intermittent claudication 10/19/2022    Diabetes mellitus, type 2 2003    BS didn't check 09/06/2023    High cholesterol     Hypertension     Macular degeneration        Past Surgical History:   Procedure Laterality Date    ANGIOGRAPHY OF LOWER EXTREMITY N/A 02/09/2024    Procedure: Angiogram Extremity Unilateral/poss pta right leg intervention;  Surgeon: Clint Urena MD;  Location: [facility] CATH LAB;  Service: Peripheral Vascular;  Laterality: N/A;  left common femoral access    APPENDECTOMY      CATARACT EXTRACTION Bilateral     cyst removal from breast (Left)      EYE SURGERY  Cataract    HYSTERECTOMY      TONSILLECTOMY      TUBAL LIGATION  1975       Review of patient's allergies indicates:   Allergen Reactions    Clindamycin Other (See Comments)    Neomycin-bacitracin-polymyxin Itching    Nitrofurantoin monohyd/m-cryst Other (See Comments)       No current facility-administered medications on file prior to encounter.     Current Outpatient Medications on File Prior to Encounter   Medication Sig    albuterol (VENTOLIN HFA) 90 mcg/actuation inhaler Inhale 2 puffs into the lungs every 6 (six) hours as needed for Wheezing. Rescue    amLODIPine (NORVASC) 10 MG tablet TAKE 1 TABLET ONE TIME DAILY    aspirin (ECOTRIN) 81 MG EC tablet Take 1 tablet (81 mg total) by mouth once daily.    carvediloL (COREG) 6.25 MG tablet Take 1 tablet (6.25 mg total) by mouth 2 (two) times daily with meals.    cilostazoL (PLETAL) 50 MG Tab Take 1 tablet (50 mg total) by mouth 2 (two) times daily.    FLUoxetine 20 MG capsule Take 1 capsule (20 mg total) by mouth once daily.    insulin glargine U-300 conc (TOUJEO MAX U-300 SOLOSTAR) 300 unit/mL (3 mL) insulin pen Inject 60 Units into the skin once daily. (Patient taking differently: Inject 60 Units into the skin every evening.)    levocetirizine (XYZAL) 5 MG tablet Take 1 tablet (5 mg total) by mouth every evening.     "losartan-hydrochlorothiazide 100-25 mg (HYZAAR) 100-25 mg per tablet TAKE 1 TABLET EVERY DAY (Patient taking differently: Take 1 tablet by mouth once daily.)    pen needle, diabetic (BD CAROL 2ND GEN PEN NEEDLE) 32 gauge x 5/32" Ndle 1 each by Misc.(Non-Drug; Combo Route) route once daily.    rosuvastatin (CRESTOR) 20 MG tablet Take 1 tablet (20 mg total) by mouth once daily.    tiotropium-olodateroL (STIOLTO RESPIMAT) 2.5-2.5 mcg/actuation Mist Inhale 2 puffs into the lungs once daily. Controller    fluticasone propionate (FLONASE) 50 mcg/actuation nasal spray 1 spray (50 mcg total) by Each Nostril route once daily.    ibuprofen (ADVIL,MOTRIN) 800 MG tablet Take 1 tablet (800 mg total) by mouth every 6 (six) hours if needed for mild pain for up to 10 days.     Family History       Problem Relation (Age of Onset)    Alcohol abuse Father    Cancer Mother, Daughter    Diabetes Mother, Maternal Uncle    Macular degeneration Mother          Tobacco Use    Smoking status: Every Day     Current packs/day: 1.00     Average packs/day: 1 pack/day for 62.2 years (62.2 ttl pk-yrs)     Types: Cigarettes     Start date: 1962     Passive exposure: Past    Smokeless tobacco: Never    Tobacco comments:     HOLD MIDNIGHT PRIOR TO SX   Substance and Sexual Activity    Alcohol use: Yes     Comment: rarely; HOLD 72HRS    Drug use: Never    Sexual activity: Not Currently     Partners: Male     Birth control/protection: None     Review of Systems   Constitutional:  Negative for chills, fatigue and fever.   HENT:  Negative for congestion, postnasal drip, rhinorrhea and sore throat.    Eyes:  Negative for pain and visual disturbance.   Respiratory:  Negative for cough, chest tightness, shortness of breath and wheezing.    Cardiovascular:  Negative for chest pain, palpitations and leg swelling.   Gastrointestinal:  Negative for abdominal distention, abdominal pain, constipation, diarrhea, nausea and vomiting.   Genitourinary:  Negative for " difficulty urinating, flank pain and hematuria.   Musculoskeletal:  Positive for arthralgias and gait problem. Negative for back pain and joint swelling.   Skin:  Negative for pallor and rash.   Neurological:  Negative for dizziness, syncope and weakness.   Psychiatric/Behavioral:  Negative for confusion, decreased concentration and suicidal ideas.      Objective:     Vital Signs (Most Recent):  Temp: 97.5 °F (36.4 °C) (03/14/24 1245)  Pulse: 79 (03/14/24 1256)  Resp: 15 (03/14/24 1322)  BP: (!) 138/59 (03/14/24 1256)  SpO2: (!) 94 % (03/14/24 1256) Vital Signs (24h Range):  Temp:  [97.5 °F (36.4 °C)-97.9 °F (36.6 °C)] 97.5 °F (36.4 °C)  Pulse:  [72-82] 79  Resp:  [11-22] 15  SpO2:  [88 %-100 %] 94 %  BP: (124-145)/(59-80) 138/59  Arterial Line BP: (123-141)/(40-48) 132/44     Weight: 75.1 kg (165 lb 9.1 oz)  Body mass index is 28.42 kg/m².     Physical Exam  Vitals reviewed.   Constitutional:       General: She is not in acute distress.     Appearance: Normal appearance. She is normal weight. She is not ill-appearing or toxic-appearing.   HENT:      Head: Normocephalic and atraumatic.      Nose: Nose normal. No congestion or rhinorrhea.   Eyes:      Extraocular Movements: Extraocular movements intact.      Conjunctiva/sclera: Conjunctivae normal.      Pupils: Pupils are equal, round, and reactive to light.   Cardiovascular:      Rate and Rhythm: Normal rate and regular rhythm.      Pulses: Normal pulses.      Heart sounds: No murmur heard.  Pulmonary:      Effort: Pulmonary effort is normal. No respiratory distress.      Breath sounds: Normal breath sounds. No wheezing.   Abdominal:      General: Abdomen is flat. Bowel sounds are normal. There is no distension.      Palpations: Abdomen is soft.      Tenderness: There is no abdominal tenderness. There is no guarding or rebound.   Musculoskeletal:         General: No swelling, tenderness or deformity. Normal range of motion.      Cervical back: Normal range of motion  and neck supple. No rigidity.   Skin:     General: Skin is warm and dry.      Capillary Refill: Capillary refill takes less than 2 seconds.      Coloration: Skin is not pale.      Comments: Incision site bloody drainage noted on dressing.   Neurological:      General: No focal deficit present.      Mental Status: She is alert and oriented to person, place, and time. Mental status is at baseline.      Motor: No weakness.      Gait: Gait normal.   Psychiatric:         Mood and Affect: Mood normal.         Behavior: Behavior normal.         Thought Content: Thought content normal.          Significant Labs: All pertinent labs within the past 24 hours have been reviewed.    Significant Imaging: I have reviewed all pertinent imaging results/findings within the past 24 hours.

## 2024-03-14 NOTE — HPI
Rosana Aguiar is a 74-year-old woman with a past medical history of atherosclerotic peripheral vascular disease (PVD) with intermittent claudication since October 19, 2022, diabetes mellitus type 2 diagnosed in 2003, high cholesterol, hypertension, and macular degeneration, who presented to the ER due to lifestyle-limiting claudication that significantly restricts her mobility because of pain in her legs, especially after walking short distances. Despite the discomfort, she continues to smoke, which further exacerbates her condition. Medical examination revealed a long-segment occlusion of the superficial femoral artery (SFA) bilaterally, with the right side causing slightly more discomfort. Consequently, she was admitted to the vascular surgery service for a right femoropopliteal (fem-pop) bypass using a dacron graft. Perioperative vital signs were well within acceptable limits. Perioperative imaging and routine labs were unremarkable, indicating no immediate postoperative complications or unexpected findings. Hospital Medicine was consulted for perioperative management, with primary team emphasizing a planned hospital stay of 2-3 days for pain management, with a follow-up appointment scheduled in 4 weeks to discuss treatment options for her left leg.

## 2024-03-14 NOTE — ANESTHESIA PROCEDURE NOTES
Intubation    Date/Time: 3/14/2024 7:22 AM    Performed by: Kuldeep Daniel CRNA  Authorized by: Bhavesh De Anda MD    Intubation:     Induction:  Intravenous    Intubated:  Postinduction    Mask Ventilation:  Easy mask    Attempts:  1    Attempted By:  CRNA and student    Method of Intubation:  Video laryngoscopy    Blade:  Henning 3    Laryngeal View Grade: Grade I - full view of cords      Difficult Airway Encountered?: No      Complications:  None    Airway Device:  Oral endotracheal tube    Airway Device Size:  7.0    Style/Cuff Inflation:  Cuffed (inflated to minimal occlusive pressure)    Inflation Amount (mL):  10    Tube secured:  21    Secured at:  The teeth    Placement Verified By:  Capnometry    Complicating Factors:  None, poor neck/head extension and small mouth    Findings Post-Intubation:  BS equal bilateral

## 2024-03-14 NOTE — ASSESSMENT & PLAN NOTE
--s/p vascular intervention  --vascular surgery primary  --pain/antiemetics/bowel regimen ordered  --fall precautions, neurovascular checks   --perioperative IV abx for prophylactic coverage  --DVT ppx: lovenox

## 2024-03-14 NOTE — H&P
History and Physical    No chief complaint on file.      HISTORY OF PRESENT ILLNESS:    Patient has short distance lifestyle limiting claudication. She continues to smoke due to the pain in her legs from walking. She has undergone angiogram and has long segment sfa occlusion b/l and right side bothers her slightly more. She presents now for right fempop bypass    Past Medical History:   Diagnosis Date    Atherosclerotic PVD with intermittent claudication 10/19/2022    Diabetes mellitus, type 2 2003    BS didn't check 09/06/2023    High cholesterol     Hypertension     Macular degeneration        Past Surgical History:   Procedure Laterality Date    ANGIOGRAPHY OF LOWER EXTREMITY N/A 02/09/2024    Procedure: Angiogram Extremity Unilateral/poss pta right leg intervention;  Surgeon: Clint Urena MD;  Location: [facility] CATH LAB;  Service: Peripheral Vascular;  Laterality: N/A;  left common femoral access    APPENDECTOMY      CATARACT EXTRACTION Bilateral     cyst removal from breast (Left)      EYE SURGERY  Cataract    HYSTERECTOMY      TONSILLECTOMY      TUBAL LIGATION  1975       Social History     Socioeconomic History    Marital status:    Tobacco Use    Smoking status: Every Day     Current packs/day: 1.00     Average packs/day: 1 pack/day for 62.2 years (62.2 ttl pk-yrs)     Types: Cigarettes     Start date: 1962     Passive exposure: Past    Smokeless tobacco: Never    Tobacco comments:     HOLD MIDNIGHT PRIOR TO SX   Substance and Sexual Activity    Alcohol use: Yes     Comment: rarely; HOLD 72HRS    Drug use: Never    Sexual activity: Not Currently     Partners: Male     Birth control/protection: None     Social Determinants of Health     Financial Resource Strain: High Risk (12/5/2022)    Overall Financial Resource Strain (CARDIA)     Difficulty of Paying Living Expenses: Hard   Food Insecurity: No Food Insecurity (12/5/2022)    Hunger Vital Sign     Worried About Running Out of Food in the Last  Year: Never true     Ran Out of Food in the Last Year: Never true   Transportation Needs: No Transportation Needs (12/5/2022)    PRAPARE - Transportation     Lack of Transportation (Medical): No     Lack of Transportation (Non-Medical): No   Physical Activity: Inactive (12/5/2022)    Exercise Vital Sign     Days of Exercise per Week: 0 days     Minutes of Exercise per Session: 0 min   Stress: Stress Concern Present (12/5/2022)    [employer] - Occupational Stress Questionnaire     Feeling of Stress : Very much   Social Connections: Socially Isolated (12/5/2022)    Social Connection and Isolation Panel [NHANES]     Frequency of Communication with Friends and Family: More than three times a week     Frequency of Social Gatherings with Friends and Family: Once a week     Attends Sikh Services: Never     Active Member of Clubs or Organizations: No     Attends Club or Organization Meetings: Never     Marital Status:    Housing Stability: Low Risk  (12/5/2022)    Housing Stability Vital Sign     Unable to Pay for Housing in the Last Year: No     Number of Places Lived in the Last Year: 1     Unstable Housing in the Last Year: No       Family History   Problem Relation Age of Onset    Cancer Mother         female    Diabetes Mother     Macular degeneration Mother     Alcohol abuse Father     Cancer Daughter         colon    Diabetes Maternal Uncle     Heart disease Neg Hx     Stroke Neg Hx        Review of patient's allergies indicates:   Allergen Reactions    Clindamycin Other (See Comments)    Neomycin-bacitracin-polymyxin Itching    Nitrofurantoin monohyd/m-cryst Other (See Comments)       ROS:  10 point review of systems is negative except as mentioned in HPI.    Vitals:    03/14/24 1205   BP:    Pulse:    Resp: 18   Temp:        Objective:  Vital signs: (most recent): Blood pressure 129/60, pulse 76, temperature 97.7 °F (36.5 °C), temperature source Temporal, resp. rate 18,  "height 5' 4" (1.626 m), weight 75.1 kg (165 lb 9.1 oz), SpO2 98 %, not currently breastfeeding.    Abd soft nt nd  Ext dopplerable dp pt b/l  No leg swelling    IMAGING:  I have personally reviewed the imaging.  No orders to display       MDM      Assessment & Plan      Rosana Aguiar is a 74 y.o. female 75 yo F with lifestyle limiting claudication  Will proceed right fempop bypass with dacron graft  Admit to hospital for pain control  Anticipate 2-3 days within hospital  Plan to follow up in 4 weeks to discuss plan for the left leg      Clint Urena  3/14/2024  [facility]  Vascular Surgery  [phone] (Clinic Number)   "

## 2024-03-14 NOTE — ANESTHESIA PROCEDURE NOTES
Central Line    Diagnosis: venous access  Patient location during procedure: done in OR  Procedure start time: 3/14/2024 7:30 AM  Timeout: 3/14/2024 7:30 AM  Procedure end time: 3/14/2024 7:31 AM    Staffing  Authorizing Provider: Bhavesh De Anda MD  Performing Provider: Kuldeep Daniel CRNA    Staffing  Performed: anesthesiologist   Anesthesiologist: Bhavesh De Anda MD  Performed by: Kuldeep Daniel CRNA  Authorized by: Bhavesh De Anda MD    Anesthesiologist was present at the time of the procedure.  Preanesthetic Checklist  Completed: patient identified, IV checked, site marked, risks and benefits discussed, surgical consent, monitors and equipment checked, pre-op evaluation, timeout performed and anesthesia consent given  Indication   Indication: vascular access     Anesthesia   general anesthesia    Central Line   Skin Prep: skin prepped with chlorhexidine (without alcohol), skin prep agent completely dried prior to procedure  Sterile Barriers Followed: Yes    All five maximal barriers used- gloves, gown, cap, mask, and large sterile sheet    hand hygiene performed prior to central venous catheter insertion  Location: left external jugular.   Catheter type: single lumen  Catheter Size: 7 Fr  Ultrasound: vascular probe with ultrasound   Vessel Caliber: patent  Needle advanced into vessel with real time Ultrasound guidance.    Manometry: none  Insertion Attempts: 1    Post-Procedure    Adverse Events:none      Guidewire  Guidewire removed intact, verified with nurse.

## 2024-03-15 PROBLEM — G89.18 POSTOPERATIVE PAIN OF EXTREMITY: Status: ACTIVE | Noted: 2024-03-15

## 2024-03-15 PROBLEM — M79.609 POSTOPERATIVE PAIN OF EXTREMITY: Status: ACTIVE | Noted: 2024-03-15

## 2024-03-15 LAB
ALBUMIN SERPL BCP-MCNC: 3.3 G/DL (ref 3.5–5.2)
ALP SERPL-CCNC: 64 U/L (ref 55–135)
ALT SERPL W/O P-5'-P-CCNC: 14 U/L (ref 10–44)
ANION GAP SERPL CALC-SCNC: 10 MMOL/L (ref 8–16)
AST SERPL-CCNC: 14 U/L (ref 10–40)
BASOPHILS # BLD AUTO: 0.05 K/UL (ref 0–0.2)
BASOPHILS NFR BLD: 0.6 % (ref 0–1.9)
BILIRUB SERPL-MCNC: 0.3 MG/DL (ref 0.1–1)
BUN SERPL-MCNC: 11 MG/DL (ref 8–23)
CALCIUM SERPL-MCNC: 8.4 MG/DL (ref 8.7–10.5)
CHLORIDE SERPL-SCNC: 101 MMOL/L (ref 95–110)
CO2 SERPL-SCNC: 23 MMOL/L (ref 23–29)
CREAT SERPL-MCNC: 0.9 MG/DL (ref 0.5–1.4)
DIFFERENTIAL METHOD BLD: ABNORMAL
EOSINOPHIL # BLD AUTO: 0.3 K/UL (ref 0–0.5)
EOSINOPHIL NFR BLD: 3.8 % (ref 0–8)
ERYTHROCYTE [DISTWIDTH] IN BLOOD BY AUTOMATED COUNT: 13.2 % (ref 11.5–14.5)
EST. GFR  (NO RACE VARIABLE): >60 ML/MIN/1.73 M^2
GLUCOSE SERPL-MCNC: 133 MG/DL (ref 70–110)
HCT VFR BLD AUTO: 30 % (ref 37–48.5)
HGB BLD-MCNC: 10.2 G/DL (ref 12–16)
IMM GRANULOCYTES # BLD AUTO: 0.02 K/UL (ref 0–0.04)
IMM GRANULOCYTES NFR BLD AUTO: 0.3 % (ref 0–0.5)
LYMPHOCYTES # BLD AUTO: 1.6 K/UL (ref 1–4.8)
LYMPHOCYTES NFR BLD: 20.4 % (ref 18–48)
MCH RBC QN AUTO: 31.5 PG (ref 27–31)
MCHC RBC AUTO-ENTMCNC: 34 G/DL (ref 32–36)
MCV RBC AUTO: 93 FL (ref 82–98)
MONOCYTES # BLD AUTO: 0.8 K/UL (ref 0.3–1)
MONOCYTES NFR BLD: 9.5 % (ref 4–15)
NEUTROPHILS # BLD AUTO: 5.2 K/UL (ref 1.8–7.7)
NEUTROPHILS NFR BLD: 65.4 % (ref 38–73)
NRBC BLD-RTO: 0 /100 WBC
PLATELET # BLD AUTO: 255 K/UL (ref 150–450)
PMV BLD AUTO: 8.6 FL (ref 9.2–12.9)
POCT GLUCOSE: 120 MG/DL (ref 70–110)
POCT GLUCOSE: 130 MG/DL (ref 70–110)
POCT GLUCOSE: 134 MG/DL (ref 70–110)
POCT GLUCOSE: 169 MG/DL (ref 70–110)
POTASSIUM SERPL-SCNC: 3.5 MMOL/L (ref 3.5–5.1)
PROT SERPL-MCNC: 5.7 G/DL (ref 6–8.4)
RBC # BLD AUTO: 3.24 M/UL (ref 4–5.4)
SODIUM SERPL-SCNC: 134 MMOL/L (ref 136–145)
WBC # BLD AUTO: 7.88 K/UL (ref 3.9–12.7)

## 2024-03-15 PROCEDURE — 97162 PT EVAL MOD COMPLEX 30 MIN: CPT

## 2024-03-15 PROCEDURE — 97530 THERAPEUTIC ACTIVITIES: CPT

## 2024-03-15 PROCEDURE — 63600175 PHARM REV CODE 636 W HCPCS: Performed by: PHYSICIAN ASSISTANT

## 2024-03-15 PROCEDURE — 97166 OT EVAL MOD COMPLEX 45 MIN: CPT

## 2024-03-15 PROCEDURE — 36415 COLL VENOUS BLD VENIPUNCTURE: CPT | Performed by: STUDENT IN AN ORGANIZED HEALTH CARE EDUCATION/TRAINING PROGRAM

## 2024-03-15 PROCEDURE — 63600175 PHARM REV CODE 636 W HCPCS: Mod: JZ,JG | Performed by: STUDENT IN AN ORGANIZED HEALTH CARE EDUCATION/TRAINING PROGRAM

## 2024-03-15 PROCEDURE — 25000003 PHARM REV CODE 250: Performed by: INTERNAL MEDICINE

## 2024-03-15 PROCEDURE — 80053 COMPREHEN METABOLIC PANEL: CPT | Performed by: STUDENT IN AN ORGANIZED HEALTH CARE EDUCATION/TRAINING PROGRAM

## 2024-03-15 PROCEDURE — 11000001 HC ACUTE MED/SURG PRIVATE ROOM

## 2024-03-15 PROCEDURE — S4991 NICOTINE PATCH NONLEGEND: HCPCS | Performed by: INTERNAL MEDICINE

## 2024-03-15 PROCEDURE — 85025 COMPLETE CBC W/AUTO DIFF WBC: CPT | Performed by: STUDENT IN AN ORGANIZED HEALTH CARE EDUCATION/TRAINING PROGRAM

## 2024-03-15 PROCEDURE — 25000003 PHARM REV CODE 250: Performed by: STUDENT IN AN ORGANIZED HEALTH CARE EDUCATION/TRAINING PROGRAM

## 2024-03-15 PROCEDURE — 25000003 PHARM REV CODE 250: Performed by: PHYSICIAN ASSISTANT

## 2024-03-15 RX ORDER — HYDROCODONE BITARTRATE AND ACETAMINOPHEN 10; 325 MG/1; MG/1
1 TABLET ORAL EVERY 4 HOURS PRN
Status: DISCONTINUED | OUTPATIENT
Start: 2024-03-15 | End: 2024-03-17 | Stop reason: HOSPADM

## 2024-03-15 RX ORDER — FAMOTIDINE 20 MG/1
20 TABLET, FILM COATED ORAL 2 TIMES DAILY
Status: DISCONTINUED | OUTPATIENT
Start: 2024-03-15 | End: 2024-03-17 | Stop reason: HOSPADM

## 2024-03-15 RX ORDER — MORPHINE SULFATE 2 MG/ML
2 INJECTION, SOLUTION INTRAMUSCULAR; INTRAVENOUS EVERY 8 HOURS PRN
Status: DISCONTINUED | OUTPATIENT
Start: 2024-03-15 | End: 2024-03-17 | Stop reason: HOSPADM

## 2024-03-15 RX ADMIN — DOCUSATE SODIUM 100 MG: 100 CAPSULE, LIQUID FILLED ORAL at 09:03

## 2024-03-15 RX ADMIN — HYDROCODONE BITARTRATE AND ACETAMINOPHEN 1 TABLET: 10; 325 TABLET ORAL at 07:03

## 2024-03-15 RX ADMIN — CILOSTAZOL 50 MG: 50 TABLET ORAL at 09:03

## 2024-03-15 RX ADMIN — MORPHINE SULFATE 2 MG: 4 INJECTION INTRAVENOUS at 12:03

## 2024-03-15 RX ADMIN — CARVEDILOL 6.25 MG: 6.25 TABLET, FILM COATED ORAL at 09:03

## 2024-03-15 RX ADMIN — HYDROCHLOROTHIAZIDE 50 MG: 25 TABLET ORAL at 09:03

## 2024-03-15 RX ADMIN — HYDROCODONE BITARTRATE AND ACETAMINOPHEN 1 TABLET: 10; 325 TABLET ORAL at 03:03

## 2024-03-15 RX ADMIN — AMLODIPINE BESYLATE 10 MG: 10 TABLET ORAL at 09:03

## 2024-03-15 RX ADMIN — ENOXAPARIN SODIUM 40 MG: 40 INJECTION SUBCUTANEOUS at 06:03

## 2024-03-15 RX ADMIN — ASPIRIN 81 MG: 81 TABLET, COATED ORAL at 09:03

## 2024-03-15 RX ADMIN — TRAMADOL HYDROCHLORIDE 50 MG: 50 TABLET, COATED ORAL at 09:03

## 2024-03-15 RX ADMIN — LOSARTAN POTASSIUM 25 MG: 25 TABLET, FILM COATED ORAL at 09:03

## 2024-03-15 RX ADMIN — CARVEDILOL 6.25 MG: 6.25 TABLET, FILM COATED ORAL at 06:03

## 2024-03-15 RX ADMIN — INSULIN DETEMIR 10 UNITS: 100 INJECTION, SOLUTION SUBCUTANEOUS at 09:03

## 2024-03-15 RX ADMIN — ATORVASTATIN CALCIUM 80 MG: 40 TABLET, FILM COATED ORAL at 09:03

## 2024-03-15 RX ADMIN — NICOTINE 1 PATCH: 21 PATCH, EXTENDED RELEASE TRANSDERMAL at 09:03

## 2024-03-15 RX ADMIN — MORPHINE SULFATE 2 MG: 2 INJECTION, SOLUTION INTRAMUSCULAR; INTRAVENOUS at 01:03

## 2024-03-15 NOTE — PLAN OF CARE
[facility] - Med Surg  Initial Discharge Assessment       Primary Care Provider: Carito Kohler MD    Admission Diagnosis: Atherosclerosis of native artery of both lower extremities with intermittent claudication [I70.213]  Intermittent claudication due to atherosclerosis of artery of extremity [I70.219]    Admission Date: 3/14/2024  Expected Discharge Date:     Transition of Care Barriers: None    Payor: HUMANA MANAGED MEDICARE / Plan: HUMANA MEDICARE SELECT PARTNER / Product Type: Medicare Advantage /     Extended Emergency Contact Information  Primary Emergency Contact: Trisha Gaines  Mobile Phone: [phone]  Relation: Sister  Preferred language: English   needed? No    Discharge Plan A: Home with family  Discharge Plan B: Home Health      [facility]  [address] [place] [address]  Phone: [phone] Fax: [phone]    [facility] - [facility] [address]  [address] [address]  Phone: [phone] Fax: [phone]    [id number] - [place] - [address] AT [facility]  [address]  [place] [address]  Phone: [phone] Fax: [phone]      Initial Assessment (most recent)       Adult Discharge Assessment - 03/15/24 1311          Discharge Assessment    Assessment Type Discharge Planning Assessment     Confirmed/corrected address, phone number and insurance Yes     Source of Information patient     People in Home child(alvaro), adult     Do you expect to return to your current living situation? Yes     Do you have help at home or someone to help you manage your care at home? Yes     Prior to hospitilization cognitive status: Alert/Oriented     Current cognitive status: Alert/Oriented     Walking or Climbing Stairs Difficulty no     Dressing/Bathing Difficulty no     Equipment Currently Used at Home none     Readmission within 30 days? No     Patient  currently being followed by outpatient case management? No     Do you currently have service(s) that help you manage your care at home? No     Do you take prescription medications? Yes     Do you have prescription coverage? Yes     Do you have any problems affording any of your prescribed medications? No     Is the patient taking medications as prescribed? yes     How do you get to doctors appointments? car, drives self     Are you on dialysis? No     Do you take coumadin? No     Discharge Plan A Home with family     Discharge Plan B Home Health     DME Needed Upon Discharge  none     Discharge Plan discussed with: Patient     Transition of Care Barriers None                      Reports independent with adls prior to admission.  No anticipated needs.

## 2024-03-15 NOTE — PT/OT/SLP EVAL
"Occupational Therapy   Evaluation    Name: Rosana Aguiar  MRN: [id number]  Admitting Diagnosis: Postoperative pain of extremity  Recent Surgery: Procedure(s) (LRB):  CREATION, BYPASS, ARTERIAL, FEMORAL TO POPLITEAL, USING GRAFT (Right) 1 Day Post-Op    Recommendations:     Discharge Recommendations: Moderate Intensity Therapy  Discharge Equipment Recommendations:  to be determined by next level of care  Barriers to discharge:  Decreased caregiver support    Assessment:     Rosana Aguiar is a 74 y.o. female with a medical diagnosis of Postoperative pain of extremity.  She presents with the following performance deficits affecting function: weakness, impaired endurance, impaired self care skills, impaired functional mobility, gait instability, impaired balance, decreased coordination, decreased upper extremity function, decreased lower extremity function, decreased safety awareness, pain, decreased ROM, impaired skin, edema, impaired cardiopulmonary response to activity.      Rehab Prognosis: Fair; patient would benefit from acute skilled OT services to address these deficits and reach maximum level of function.       Plan:     Patient to be seen 2 x/week to address the above listed problems via self-care/home management, therapeutic activities, therapeutic exercises  Plan of Care Expires: 03/29/24  Plan of Care Reviewed with: patient    Subjective     Chief Complaint: R groin and RLE pain  Patient/Family Comments/goals: "I want to walk, I want to get better."    Occupational Profile:  Living Environment: lives with daughter in a 1 story house with threshold to enter. Walk-in shower. Pt reports daughter works during the day.  Previous level of function: Pt Mod (I) with ADLs, stating she was able to complete activities but "it hurt." Mod (I) with functional mobility household distances using no AD, pt reports holding onto walls/furniture while ambulating.  Roles and Routines: drives and is retired  Equipment Used " at Home: raised toilet, cane, straight, shower chair, other (see comments), walker, rolling (motorized scooter and suction grab bars)  Assistance upon Discharge: limited assist from family    Pain/Comfort:  Pain Rating 1: 3/10 (AT REST)  Location - Side 1: Right  Location - Orientation 1: generalized  Location 1: groin (wound vac site)  Pain Addressed 1: Pre-medicate for activity, Reposition, Distraction  Pain Rating Post-Intervention 1: 10/10 (WITH MOVEMENT)  Pain Rating 2: 3/10 (AT REST)  Location - Side 2: Right  Location - Orientation 2: generalized  Location 2: leg  Pain Addressed 2: Pre-medicate for activity, Reposition, Distraction  Pain Rating Post-Intervention 2: 10/10 (WITH MOVEMENT)    Objective:     Communicated with: Nurse and epic chart review prior to session.  Patient found HOB elevated with peripheral IV, wound vac, bed alarm upon OT entry to room.    General Precautions: Standard, fall, diabetic  Orthopedic Precautions: N/A  Braces: N/A  Respiratory Status: Room air    Occupational Performance:    Bed Mobility:    Patient completed Rolling/Turning to Right with moderate assistance and 2 persons  Patient completed Supine to Sit with moderate assistance and 2 persons  Patient completed Sit to Supine with moderate assistance and 2 persons  Forward scoot to EOB with CGA.  Supine scoot to HOB with Total A x2 and bed in trend.    Functional Mobility/Transfers:  Patient completed Sit <> Stand Transfer with moderate assistance and of 2 persons  with  rolling walker   Functional Mobility: Attempted side stepping but unable to progress at this time d/t increased pain.     Activities of Daily Living:  Feeding:  setup A. Drink from cup  Lower Body Dressing: total assistance riley socks    Cognitive/Visual Perceptual:  Cognitive/Psychosocial Skills:     -       Oriented to: Person, Place, Time, and Situation   -       Follows Commands/attention:Follows multistep  commands  -       Communication: clear/fluent  -        Memory: No Deficits noted  -       Safety awareness/insight to disability: impaired   Visual/Perceptual:      -pt reports she typically wears contacts but they are not present at this time. .    Physical Exam:  Edema:  RLE  Sensation:    -       Intact  Upper Extremity Range of Motion:     -       Right Upper Extremity: limited AROM in shoulder d/t pain, elbow WFL  -       Left Upper Extremity: limited AROM in shoulder d/t pain, elbow WFL  Upper Extremity Strength:    -       Right Upper Extremity: 3-/5 shoulder, 3/5 elbow  -       Left Upper Extremity: 3-/5 shoulder, 3/5 elbow   Strength:    -       Right Upper Extremity: good  -       Left Upper Extremity: good    AMPAC 6 Click ADL:  AMPAC Total Score: 14    Treatment & Education:  Patient educated on role of OT in acute setting and benefits of participation. Educated on techniques to use to increase independence and decrease fall risk with functional transfers. Educated on importance of OOB activity and calling for A to transfer and meet needs. Encouraged completion of B UE AROM therex throughout the day to tolerance to increase functional strength and activity tolerance. Educated patient on importance of increased tolerance to upright position and direct impact on CV endurance and strength. Patient encouraged to sit up in chair for a minimum of 2 consecutive hours per day. Patient stated understanding and in agreement with POC.     Patient left HOB elevated with all lines intact, call button in reach, and bed alarm on    GOALS:   Multidisciplinary Problems       Occupational Therapy Goals          Problem: Occupational Therapy    Goal Priority Disciplines Outcome Interventions   Occupational Therapy Goal     OT, PT/OT     Description: Goals to be met by: 3/29/24     Patient will increase functional independence with ADLs by performing:    LE Dressing with Moderate Assistance.  Grooming while bedside chair with Set-up Assistance.  Toileting from bedside  commode with Minimal Assistance for hygiene and clothing management.   Toilet transfer to bedside commode with Minimal Assistance with RW.  Upper extremity exercise program x15 reps per handout, with independence.                         History:     Past Medical History:   Diagnosis Date    Atherosclerotic PVD with intermittent claudication 10/19/2022    Diabetes mellitus, type 2 2003    BS didn't check 09/06/2023    High cholesterol     Hypertension     Macular degeneration          Past Surgical History:   Procedure Laterality Date    ANGIOGRAPHY OF LOWER EXTREMITY N/A 02/09/2024    Procedure: Angiogram Extremity Unilateral/poss pta right leg intervention;  Surgeon: Clint Urena MD;  Location: [facility] CATH LAB;  Service: Peripheral Vascular;  Laterality: N/A;  left common femoral access    APPENDECTOMY      CATARACT EXTRACTION Bilateral     CREATION OF FEMOROPOPLITEAL ARTERIAL BYPASS USING GRAFT Right 3/14/2024    Procedure: CREATION, BYPASS, ARTERIAL, FEMORAL TO POPLITEAL, USING GRAFT;  Surgeon: Clint Urena MD;  Location: [facility] OR;  Service: Peripheral Vascular;  Laterality: Right;    cyst removal from breast (Left)      EYE SURGERY  Cataract    HYSTERECTOMY      TONSILLECTOMY      TUBAL LIGATION  1975       Time Tracking:     OT Date of Treatment: 03/15/24  OT Start Time: 1345  OT Stop Time: 1415  OT Total Time (min): 30 min    Billable Minutes:Evaluation 15  Therapeutic Activity 15    3/15/2024  Mini Larry OT

## 2024-03-15 NOTE — PROGRESS NOTES
Pharmacist Renal Dose Adjustment Note    Rosana Aguiar is a 74 y.o. female being treated with the medication famotidine    Patient Data:    Vital Signs (Most Recent):  Temp: 99.1 °F (37.3 °C) (03/15/24 1134)  Pulse: 76 (03/15/24 1134)  Resp: 18 (03/15/24 1134)  BP: 127/85 (03/15/24 1134)  SpO2: (!) 91 % (03/15/24 1134) Vital Signs (72h Range):  Temp:  [97.5 °F (36.4 °C)-99.1 °F (37.3 °C)]   Pulse:  [72-82]   Resp:  [11-22]   BP: (124-152)/(59-85)   SpO2:  [88 %-100 %]   Arterial Line BP: (123-141)/(40-48)      Recent Labs   Lab 03/14/24  0631 03/15/24  1030   CREATININE 1.0 0.9     Serum creatinine: 0.9 mg/dL 03/15/24 1030  Estimated creatinine clearance: 52.8 mL/min    Medication:famotidine 20mg daily will be changed to famotidine 20mg BID for crcl > 50 ml/min    Pharmacist's Name: Rosalina Vazquez  Pharmacist's Extension: [phone]     No

## 2024-03-15 NOTE — PLAN OF CARE
PT EVAL complete. Required MOD A of 2 for bed mobility, MOD A of 2 for STS, unable to take steps. Recommending moderate intensity therapy upon d/c.

## 2024-03-15 NOTE — PROGRESS NOTES
[facility] Medicine  Progress Note     Patient Name:  Rosana Aguiar  MRN:  [id number]  Patient Class: IP-Inpatient  Admission Date:  3/14/2024   Length of Stay:  1  Attending Physician: Ángel Siddiqui MD  Primary Care Provider: Carito Kohler MD    Subjective:      Subsequent Care: Follow up Postoperative pain of extremity        HPI:   Rosana Aguiar is a 74-year-old woman with a past medical history of atherosclerotic peripheral vascular disease (PVD) with intermittent claudication since October 19, 2022, diabetes mellitus type 2 diagnosed in 2003, high cholesterol, hypertension, and macular degeneration, who presented to the ER due to lifestyle-limiting claudication that significantly restricts her mobility because of pain in her legs, especially after walking short distances. Despite the discomfort, she continues to smoke, which further exacerbates her condition. Medical examination revealed a long-segment occlusion of the superficial femoral artery (SFA) bilaterally, with the right side causing slightly more discomfort. Consequently, she was admitted to the vascular surgery service for a right femoropopliteal (fem-pop) bypass using a dacron graft. Perioperative vital signs were well within acceptable limits. Perioperative imaging and routine labs were unremarkable, indicating no immediate postoperative complications or unexpected findings. Hospital Medicine was consulted for perioperative management, with primary team emphasizing a planned hospital stay of 2-3 days for pain management, with a follow-up appointment scheduled in 4 weeks to discuss treatment options for her left leg.         Overview/Hospital Course:  03/15/2024  Some improvement in postoperative pain.  Has needed infrequent IV PRN doses. Will increase frequency of Norco and decrease frequency of IV Morphine. Continue to monitor.      Interval Hx  NAEON.  Complains of intermittent breakthrough pain upon  "exertion or movement of her lower right extremity.    Objective  BP (!) 150/65 (BP Location: Right arm, Patient Position: Lying)   Pulse 78   Temp 98.4 °F (36.9 °C) (Oral)   Resp 18   Ht 5' 4" (1.626 m)   Wt 75.1 kg (165 lb 9.1 oz)   SpO2 (!) 90%   Breastfeeding No   BMI 28.42 kg/m²     Intake/Output Summary (Last 24 hours) at 3/15/2024 1012  Last data filed at 3/14/2024 1820  Gross per 24 hour   Intake 1776.67 ml   Output --   Net 1776.67 ml       PHYSICAL EXAM  Constitutional:       General: She is not in acute distress.     Appearance: Normal appearance. She is normal weight. She is not ill-appearing or toxic-appearing.   HENT:      Head: Normocephalic and atraumatic.      Nose: Nose normal. No congestion or rhinorrhea.   Eyes:      Extraocular Movements: Extraocular movements intact.      Conjunctiva/sclera: Conjunctivae normal.      Pupils: Pupils are equal, round, and reactive to light.   Cardiovascular:      Rate and Rhythm: Normal rate and regular rhythm.      Pulses: Normal pulses.      Heart sounds: No murmur heard.  Pulmonary:      Effort: Pulmonary effort is normal. No respiratory distress.      Breath sounds: Normal breath sounds. No wheezing.   Abdominal:      General: Abdomen is flat. Bowel sounds are normal. There is no distension.      Palpations: Abdomen is soft.      Tenderness: There is no abdominal tenderness. There is no guarding or rebound.   Musculoskeletal:         General: No swelling, tenderness or deformity. Normal range of motion.      Cervical back: Normal range of motion and neck supple. No rigidity.   Skin:     General: Skin is warm and dry.      Capillary Refill: Capillary refill takes less than 2 seconds.      Coloration: Skin is not pale.      Comments: Incision site bloody drainage noted on dressing.   Neurological:      General: No focal deficit present.      Mental Status: She is alert and oriented to person, place, and time. Mental status is at baseline.      Motor: No " "weakness.      Gait: Gait normal.   Psychiatric:         Mood and Affect: Mood normal.         Behavior: Behavior normal.         Thought Content: Thought content normal.       LABS  All labs from the past 24 hours were reviewed.     BMP:   Recent Labs   Lab 03/14/24  0631   *   *   K 3.5   CL 98   CO2 23   BUN 11   CREATININE 1.0   CALCIUM 9.4     CBC:   No results for input(s): "WBC", "HGB", "HCT", "PLT" in the last 48 hours.  CMP:   Recent Labs   Lab 03/14/24  0631   *   K 3.5   CL 98   CO2 23   *   BUN 11   CREATININE 1.0   CALCIUM 9.4   ANIONGAP 13         IMAGING  All imaging from the past 24 hours were reviewed.         Assessment/Plan:      Atherosclerotic PVD with intermittent claudication  --s/p vascular intervention  --vascular surgery primary  --pain/antiemetics/bowel regimen ordered  --fall precautions, neurovascular checks   --perioperative IV abx for prophylactic coverage  --DVT ppx: lovenox    03/15/2024  Management per primary team      Type 2 diabetes mellitus with diabetic peripheral angiopathy without gangrene, with long-term current use of insulin  --last A1c 6.7 on 01/22/2024  --Home medications include basal 60u QHS- held for now  --10u detemir QHS + SSI ordered; modify as necessary  --Accuchecks ACQHS  --Fasting AM glucose goal <140    03/15/2024  At goal      Hypertension  --home medications include:  Norvasc, losartan, hydrochlorothiazide  --restart as tolerated   --PRN IV hydralazine 10mg Q6H for sBP > 175 mmHg  --Q4HVS    03/15/2024  Within acceptable parameters     Hyperlipidemia  --continue statin therapy on admission     CORE MEASURES:  VTE Risk Mitigation (From admission, onward)           Ordered     enoxaparin injection 40 mg  Every 24 hours         03/14/24 1107                    Code Status: FULL    Diet: Diet diabetic 2000 Calorie    Disposition: per primary team but suspect pending pain control on PO medications. Peripheral conditions stable. Will " follow peripherally       Ángel Siddiqui MD  Department of Hospital Medicine   [facility] - Telemetry ([facility])

## 2024-03-15 NOTE — PLAN OF CARE
Poc and medications reviewed with patient, patient verbalized understanding    Problem: Adult Inpatient Plan of Care  Goal: Absence of Hospital-Acquired Illness or Injury  Outcome: Ongoing, Progressing     Problem: Adult Inpatient Plan of Care  Goal: Optimal Comfort and Wellbeing  Outcome: Ongoing, Progressing     Problem: Diabetes Comorbidity  Goal: Blood Glucose Level Within Targeted Range  Outcome: Ongoing, Progressing     Problem: Infection  Goal: Absence of Infection Signs and Symptoms  Outcome: Ongoing, Progressing

## 2024-03-15 NOTE — PROGRESS NOTES
POD 1: Right femoral to above knee popliteal bypass using 8mm dacron graft     Interval History:  Patient seen and examined with Dr. Ortega. Patient sitting in bed with family at bedside. She is alert and denies current complaints. Reports pain when right leg is palpated. No events overnight. Has not been ambulating much and is requiring assistance.     Mobility: Moves all extremities  Pulse: Palpable pulse to right DP/PT  Wounds: Old shadowing noted to RLE incision. Prevena to right groin with no hematoma noted      Plan:  Patient doing well postop. Still having some pain and not ambulating much. Will keep patient today to ensure adequate pain control and will order therapy.  Will consult PT/OT to help mobilize patient.  Continue aspirin and statin.  Leave dressing and Prevena in place.  Continue to monitor pulse.  [facility] on board, appreciate assistance.  Pain control.      Massiel Vaughn NP  [facility]

## 2024-03-15 NOTE — PLAN OF CARE
OT honey completed. Recommends mod intensity therapy.  Mod A x2 for bed mobility and STS with RW.  c/o RLE and groin pain.

## 2024-03-15 NOTE — PLAN OF CARE
Patient is in stable condition, no acute distress, remained free from injuries, receiving IV fluids, receiving IV antibiotics, dressing to R thigh intact, wound vac to R groin intact, pain adequately controlled with PRN, blood glucose checks performed, VSS and all active orders reviewed. 24 hr chart check performed.

## 2024-03-15 NOTE — PT/OT/SLP EVAL
Physical Therapy Evaluation and Treatment    Patient Name: Rosana Aguiar   MRN: [id number]  Recent Surgery: Procedure(s) (LRB):  CREATION, BYPASS, ARTERIAL, FEMORAL TO POPLITEAL, USING GRAFT (Right) 1 Day Post-Op    Recommendations:     Discharge Recommendations: Moderate Intensity Therapy   Discharge Equipment Recommendations: to be determined by next level of care   Barriers to discharge: None    Assessment:     Rosana Aguiar is a 74 y.o. female admitted with a medical diagnosis of Postoperative pain of extremity. She presents with the following impairments/functional limitations: weakness, impaired endurance, impaired functional mobility, gait instability, impaired balance, pain, decreased safety awareness, impaired cardiopulmonary response to activity, edema, impaired skin, decreased lower extremity function, decreased ROM, decreased coordination.    Patient currently demonstrates a need for moderate intensity therapy on a daily basis secondary to an acute decline from prior level of function.    Rehab Prognosis: Good; patient would benefit from acute PT services to address these deficits and reach maximum level of function.    Plan:     During this hospitalization, patient to be seen 3 x/week to address the above listed problems via gait training, therapeutic activities, therapeutic exercises    Plan of Care Expires: 03/29/24    Subjective     Chief Complaint: Pt is motivated to participate despite her pain, reports ambulating to bathroom with staff  Patient Comments/Goals: To be able to walk in order to go to the baseball [employer] [place] in May.  Pain/Comfort:  Pain Rating 1: 3/10 (at rest)  Location - Side 1: Right  Location - Orientation 1: generalized  Location 1: groin  Pain Addressed 1: Reposition, Distraction, Cessation of Activity, Pre-medicate for activity  Pain Rating Post-Intervention 1: 10/10 (with all movement)    Social History:  Living Environment: Patient lives with their daughter in a  single story home with number of outside stair(s): threshold . Daughter works but has support from other family members if needed.   Prior Level of Function: Prior to admission, patient was independent, driving and retired, and ambulated household and community distances using no AD  Equipment Used at Home: grab bar, raised toilet  DME owned (not currently used):  motorized scooter, rolling walker, single point cane, and shower chair  Assistance Upon Discharge: family    Objective:     Communicated with nurse and epic chart review prior to session. Patient found HOB elevated with peripheral IV, wound vac, bed alarm upon PT entry to room.    General Precautions: Standard, fall   Orthopedic Precautions: N/A   Braces: N/A    Respiratory Status: Room air    Exams:  Cognition: Patient is oriented to Person, Place, Time, Situation  RLE ROM: WFL  RLE Strength:  NT due to severe pain, grossly 3+/5 based on functional movement  LLE ROM: WFL  LLE Strength:  Grossly 4-/5, limited due to pain in R LE  Sensation:    -       Intact  Skin Integrity/Edema:     -       Skin integrity: Visible skin intact  -       Edema: Present in B LE    Functional Mobility:  Gait belt applied - Yes  Bed Mobility  Rolling Right: moderate assistance and of 2 persons  Scooting: contact guard assistance  Supine to Sit: moderate assistance and of 2 persons for LE management and trunk management  Sit to Supine: maximal assistance and of 2 persons for LE management and trunk management  Supine Scooting: total A of 2  Transfers  Sit to Stand: moderate assistance and of 2 persons with rolling walker  Gait  Attempted side stepping with MOD A of 2, pt unable to complete. Will progress as able.   Balance  Sitting: stand by assistance  Standing: moderate assistance and of 2 persons    Therapeutic Activities and Exercises:   Pt educated on role of PT in acute care and POC. Educated on importance of OOB activities, activity pacing, and HEP (marching/hip flex,  "hip abd, heel slides/LAQ, quad sets, ankle pumps) in order to maintain/regain strength. Encouraged to sit up for all meals. Educated on proper use of RW for safety and to reduce risk of falling. Educated on "call don't fall" policy and increased risk of falling due to weakness, instructed to utilize call bell for assistance with all transfers. Pt agreeable to all requests.    AM-PAC 6 CLICK MOBILITY  Total Score:9    Patient left HOB elevated with all lines intact, call button in reach, and bed alarm on.    GOALS:   Multidisciplinary Problems       Physical Therapy Goals          Problem: Physical Therapy    Goal Priority Disciplines Outcome Goal Variances Interventions   Physical Therapy Goal     PT, PT/OT      Description: Goals to be met by 3/29/24.  1. Pt will complete bed mobility MIN A.  2. Pt will complete sit to stand MIN A.  3. Pt will ambulate 50ft MIN A using RW.  4. Pt will increase AMPAC score by 2 points to progress functional mobility.                       History:     Past Medical History:   Diagnosis Date    Atherosclerotic PVD with intermittent claudication 10/19/2022    Diabetes mellitus, type 2 2003    BS didn't check 09/06/2023    High cholesterol     Hypertension     Macular degeneration        Past Surgical History:   Procedure Laterality Date    ANGIOGRAPHY OF LOWER EXTREMITY N/A 02/09/2024    Procedure: Angiogram Extremity Unilateral/poss pta right leg intervention;  Surgeon: Clint Urena MD;  Location: [facility] CATH LAB;  Service: Peripheral Vascular;  Laterality: N/A;  left common femoral access    APPENDECTOMY      CATARACT EXTRACTION Bilateral     CREATION OF FEMOROPOPLITEAL ARTERIAL BYPASS USING GRAFT Right 3/14/2024    Procedure: CREATION, BYPASS, ARTERIAL, FEMORAL TO POPLITEAL, USING GRAFT;  Surgeon: Clint Urena MD;  Location: [facility] OR;  Service: Peripheral Vascular;  Laterality: Right;    cyst removal from breast (Left)      EYE SURGERY  Cataract    HYSTERECTOMY      " TONSILLECTOMY      TUBAL LIGATION  1975       Time Tracking:     PT Received On: 03/15/24  PT Start Time: 1344  PT Stop Time: 1409  PT Total Time (min): 25 min     Billable Minutes: Evaluation 15min and Therapeutic Activity 10min    3/15/2024

## 2024-03-16 LAB
ANION GAP SERPL CALC-SCNC: 8 MMOL/L (ref 8–16)
BASOPHILS # BLD AUTO: 0.05 K/UL (ref 0–0.2)
BASOPHILS NFR BLD: 0.6 % (ref 0–1.9)
BUN SERPL-MCNC: 11 MG/DL (ref 8–23)
CALCIUM SERPL-MCNC: 8.4 MG/DL (ref 8.7–10.5)
CHLORIDE SERPL-SCNC: 100 MMOL/L (ref 95–110)
CO2 SERPL-SCNC: 24 MMOL/L (ref 23–29)
CREAT SERPL-MCNC: 0.8 MG/DL (ref 0.5–1.4)
DIFFERENTIAL METHOD BLD: ABNORMAL
EOSINOPHIL # BLD AUTO: 0.3 K/UL (ref 0–0.5)
EOSINOPHIL NFR BLD: 3.5 % (ref 0–8)
ERYTHROCYTE [DISTWIDTH] IN BLOOD BY AUTOMATED COUNT: 13.1 % (ref 11.5–14.5)
EST. GFR  (NO RACE VARIABLE): >60 ML/MIN/1.73 M^2
GLUCOSE SERPL-MCNC: 141 MG/DL (ref 70–110)
HCT VFR BLD AUTO: 28.3 % (ref 37–48.5)
HGB BLD-MCNC: 9.8 G/DL (ref 12–16)
IMM GRANULOCYTES # BLD AUTO: 0.04 K/UL (ref 0–0.04)
IMM GRANULOCYTES NFR BLD AUTO: 0.5 % (ref 0–0.5)
LYMPHOCYTES # BLD AUTO: 1.1 K/UL (ref 1–4.8)
LYMPHOCYTES NFR BLD: 12.6 % (ref 18–48)
MCH RBC QN AUTO: 31.7 PG (ref 27–31)
MCHC RBC AUTO-ENTMCNC: 34.6 G/DL (ref 32–36)
MCV RBC AUTO: 92 FL (ref 82–98)
MONOCYTES # BLD AUTO: 0.8 K/UL (ref 0.3–1)
MONOCYTES NFR BLD: 9 % (ref 4–15)
NEUTROPHILS # BLD AUTO: 6.2 K/UL (ref 1.8–7.7)
NEUTROPHILS NFR BLD: 73.8 % (ref 38–73)
NRBC BLD-RTO: 0 /100 WBC
PLATELET # BLD AUTO: 224 K/UL (ref 150–450)
PMV BLD AUTO: 8.7 FL (ref 9.2–12.9)
POCT GLUCOSE: 127 MG/DL (ref 70–110)
POCT GLUCOSE: 137 MG/DL (ref 70–110)
POCT GLUCOSE: 158 MG/DL (ref 70–110)
POCT GLUCOSE: 203 MG/DL (ref 70–110)
POTASSIUM SERPL-SCNC: 3 MMOL/L (ref 3.5–5.1)
RBC # BLD AUTO: 3.09 M/UL (ref 4–5.4)
SODIUM SERPL-SCNC: 132 MMOL/L (ref 136–145)
WBC # BLD AUTO: 8.36 K/UL (ref 3.9–12.7)

## 2024-03-16 PROCEDURE — 85025 COMPLETE CBC W/AUTO DIFF WBC: CPT | Performed by: STUDENT IN AN ORGANIZED HEALTH CARE EDUCATION/TRAINING PROGRAM

## 2024-03-16 PROCEDURE — 63600175 PHARM REV CODE 636 W HCPCS: Performed by: PHYSICIAN ASSISTANT

## 2024-03-16 PROCEDURE — 80048 BASIC METABOLIC PNL TOTAL CA: CPT | Performed by: STUDENT IN AN ORGANIZED HEALTH CARE EDUCATION/TRAINING PROGRAM

## 2024-03-16 PROCEDURE — 25000003 PHARM REV CODE 250: Performed by: STUDENT IN AN ORGANIZED HEALTH CARE EDUCATION/TRAINING PROGRAM

## 2024-03-16 PROCEDURE — 36415 COLL VENOUS BLD VENIPUNCTURE: CPT | Performed by: STUDENT IN AN ORGANIZED HEALTH CARE EDUCATION/TRAINING PROGRAM

## 2024-03-16 PROCEDURE — 11000001 HC ACUTE MED/SURG PRIVATE ROOM

## 2024-03-16 PROCEDURE — S4991 NICOTINE PATCH NONLEGEND: HCPCS | Performed by: INTERNAL MEDICINE

## 2024-03-16 PROCEDURE — 25000003 PHARM REV CODE 250: Performed by: PHYSICIAN ASSISTANT

## 2024-03-16 PROCEDURE — 25000003 PHARM REV CODE 250: Performed by: INTERNAL MEDICINE

## 2024-03-16 RX ADMIN — DOCUSATE SODIUM 100 MG: 100 CAPSULE, LIQUID FILLED ORAL at 08:03

## 2024-03-16 RX ADMIN — ATORVASTATIN CALCIUM 80 MG: 40 TABLET, FILM COATED ORAL at 08:03

## 2024-03-16 RX ADMIN — HYDROCODONE BITARTRATE AND ACETAMINOPHEN 1 TABLET: 10; 325 TABLET ORAL at 06:03

## 2024-03-16 RX ADMIN — CARVEDILOL 6.25 MG: 6.25 TABLET, FILM COATED ORAL at 06:03

## 2024-03-16 RX ADMIN — CILOSTAZOL 50 MG: 50 TABLET ORAL at 08:03

## 2024-03-16 RX ADMIN — TRAMADOL HYDROCHLORIDE 50 MG: 50 TABLET, COATED ORAL at 08:03

## 2024-03-16 RX ADMIN — HYDROCHLOROTHIAZIDE 50 MG: 25 TABLET ORAL at 08:03

## 2024-03-16 RX ADMIN — CARVEDILOL 6.25 MG: 6.25 TABLET, FILM COATED ORAL at 08:03

## 2024-03-16 RX ADMIN — HYDROCODONE BITARTRATE AND ACETAMINOPHEN 1 TABLET: 10; 325 TABLET ORAL at 05:03

## 2024-03-16 RX ADMIN — CILOSTAZOL 50 MG: 50 TABLET ORAL at 09:03

## 2024-03-16 RX ADMIN — ASPIRIN 81 MG: 81 TABLET, COATED ORAL at 08:03

## 2024-03-16 RX ADMIN — FAMOTIDINE 20 MG: 20 TABLET ORAL at 08:03

## 2024-03-16 RX ADMIN — TRAMADOL HYDROCHLORIDE 50 MG: 50 TABLET, COATED ORAL at 09:03

## 2024-03-16 RX ADMIN — NICOTINE 1 PATCH: 21 PATCH, EXTENDED RELEASE TRANSDERMAL at 08:03

## 2024-03-16 RX ADMIN — LOSARTAN POTASSIUM 25 MG: 25 TABLET, FILM COATED ORAL at 08:03

## 2024-03-16 RX ADMIN — ENOXAPARIN SODIUM 40 MG: 40 INJECTION SUBCUTANEOUS at 06:03

## 2024-03-16 RX ADMIN — AMLODIPINE BESYLATE 10 MG: 10 TABLET ORAL at 08:03

## 2024-03-16 RX ADMIN — FAMOTIDINE 20 MG: 20 TABLET ORAL at 09:03

## 2024-03-16 RX ADMIN — INSULIN DETEMIR 10 UNITS: 100 INJECTION, SOLUTION SUBCUTANEOUS at 09:03

## 2024-03-16 NOTE — PLAN OF CARE
Patient is in stable condition, no acute distress, remained free from injuries dressing to R thigh intact, wound vac to R groin intact, pain adequately controlled with PRN, blood glucose checks performed, VSS and all active orders reviewed. 24 hr chart check performed.

## 2024-03-16 NOTE — PLAN OF CARE
Patient ambulated to bathroom without incident  Discussed poc with pt, pt verbalized understanding  Purposeful rounding every 2 hours  VS WDL  Fall precautions in place, remains injury free  Pain managed with medications as ordered  Bed locked at lowest position  Call light within reach  Chart check complete

## 2024-03-16 NOTE — PROGRESS NOTES
Patient doing better today.  In less discomfort.  Has a good pulse in the right foot.  Still not really mobilized well.  We will keep her 1 more day and discharge in the morning.

## 2024-03-17 VITALS
WEIGHT: 165.56 LBS | DIASTOLIC BLOOD PRESSURE: 70 MMHG | TEMPERATURE: 98 F | HEIGHT: 64 IN | SYSTOLIC BLOOD PRESSURE: 156 MMHG | OXYGEN SATURATION: 95 % | HEART RATE: 83 BPM | RESPIRATION RATE: 16 BRPM | BODY MASS INDEX: 28.27 KG/M2

## 2024-03-17 PROBLEM — M79.609 POSTOPERATIVE PAIN OF EXTREMITY: Status: RESOLVED | Noted: 2024-03-15 | Resolved: 2024-03-17

## 2024-03-17 PROBLEM — G89.18 POSTOPERATIVE PAIN OF EXTREMITY: Status: RESOLVED | Noted: 2024-03-15 | Resolved: 2024-03-17

## 2024-03-17 LAB
ANION GAP SERPL CALC-SCNC: 9 MMOL/L (ref 8–16)
BASOPHILS # BLD AUTO: 0.05 K/UL (ref 0–0.2)
BASOPHILS NFR BLD: 0.5 % (ref 0–1.9)
BUN SERPL-MCNC: 12 MG/DL (ref 8–23)
CALCIUM SERPL-MCNC: 8.7 MG/DL (ref 8.7–10.5)
CHLORIDE SERPL-SCNC: 97 MMOL/L (ref 95–110)
CO2 SERPL-SCNC: 27 MMOL/L (ref 23–29)
CREAT SERPL-MCNC: 0.9 MG/DL (ref 0.5–1.4)
DIFFERENTIAL METHOD BLD: ABNORMAL
EOSINOPHIL # BLD AUTO: 0.2 K/UL (ref 0–0.5)
EOSINOPHIL NFR BLD: 2.2 % (ref 0–8)
ERYTHROCYTE [DISTWIDTH] IN BLOOD BY AUTOMATED COUNT: 12.9 % (ref 11.5–14.5)
EST. GFR  (NO RACE VARIABLE): >60 ML/MIN/1.73 M^2
GLUCOSE SERPL-MCNC: 160 MG/DL (ref 70–110)
HCT VFR BLD AUTO: 29.4 % (ref 37–48.5)
HGB BLD-MCNC: 10.2 G/DL (ref 12–16)
IMM GRANULOCYTES # BLD AUTO: 0.04 K/UL (ref 0–0.04)
IMM GRANULOCYTES NFR BLD AUTO: 0.4 % (ref 0–0.5)
LYMPHOCYTES # BLD AUTO: 1.3 K/UL (ref 1–4.8)
LYMPHOCYTES NFR BLD: 13.5 % (ref 18–48)
MCH RBC QN AUTO: 31.7 PG (ref 27–31)
MCHC RBC AUTO-ENTMCNC: 34.7 G/DL (ref 32–36)
MCV RBC AUTO: 91 FL (ref 82–98)
MONOCYTES # BLD AUTO: 0.7 K/UL (ref 0.3–1)
MONOCYTES NFR BLD: 7.3 % (ref 4–15)
NEUTROPHILS # BLD AUTO: 7.1 K/UL (ref 1.8–7.7)
NEUTROPHILS NFR BLD: 76.1 % (ref 38–73)
NRBC BLD-RTO: 0 /100 WBC
PLATELET # BLD AUTO: 249 K/UL (ref 150–450)
PMV BLD AUTO: 8.8 FL (ref 9.2–12.9)
POCT GLUCOSE: 157 MG/DL (ref 70–110)
POTASSIUM SERPL-SCNC: 3.4 MMOL/L (ref 3.5–5.1)
RBC # BLD AUTO: 3.22 M/UL (ref 4–5.4)
SODIUM SERPL-SCNC: 133 MMOL/L (ref 136–145)
WBC # BLD AUTO: 9.36 K/UL (ref 3.9–12.7)

## 2024-03-17 PROCEDURE — 25000003 PHARM REV CODE 250: Performed by: STUDENT IN AN ORGANIZED HEALTH CARE EDUCATION/TRAINING PROGRAM

## 2024-03-17 PROCEDURE — 36415 COLL VENOUS BLD VENIPUNCTURE: CPT | Performed by: STUDENT IN AN ORGANIZED HEALTH CARE EDUCATION/TRAINING PROGRAM

## 2024-03-17 PROCEDURE — S4991 NICOTINE PATCH NONLEGEND: HCPCS | Performed by: INTERNAL MEDICINE

## 2024-03-17 PROCEDURE — 85025 COMPLETE CBC W/AUTO DIFF WBC: CPT | Performed by: STUDENT IN AN ORGANIZED HEALTH CARE EDUCATION/TRAINING PROGRAM

## 2024-03-17 PROCEDURE — 25000003 PHARM REV CODE 250: Performed by: PHYSICIAN ASSISTANT

## 2024-03-17 PROCEDURE — 25000003 PHARM REV CODE 250: Performed by: INTERNAL MEDICINE

## 2024-03-17 PROCEDURE — 80048 BASIC METABOLIC PNL TOTAL CA: CPT | Performed by: STUDENT IN AN ORGANIZED HEALTH CARE EDUCATION/TRAINING PROGRAM

## 2024-03-17 RX ORDER — HYDROCODONE BITARTRATE AND ACETAMINOPHEN 5; 325 MG/1; MG/1
1 TABLET ORAL EVERY 8 HOURS PRN
Qty: 21 TABLET | Refills: 0 | Status: SHIPPED | OUTPATIENT
Start: 2024-03-17 | End: 2024-03-24

## 2024-03-17 RX ADMIN — CARVEDILOL 6.25 MG: 6.25 TABLET, FILM COATED ORAL at 09:03

## 2024-03-17 RX ADMIN — FAMOTIDINE 20 MG: 20 TABLET ORAL at 09:03

## 2024-03-17 RX ADMIN — LOSARTAN POTASSIUM 25 MG: 25 TABLET, FILM COATED ORAL at 09:03

## 2024-03-17 RX ADMIN — NICOTINE 1 PATCH: 21 PATCH, EXTENDED RELEASE TRANSDERMAL at 09:03

## 2024-03-17 RX ADMIN — CILOSTAZOL 50 MG: 50 TABLET ORAL at 09:03

## 2024-03-17 RX ADMIN — AMLODIPINE BESYLATE 10 MG: 10 TABLET ORAL at 09:03

## 2024-03-17 RX ADMIN — HYDROCHLOROTHIAZIDE 50 MG: 25 TABLET ORAL at 09:03

## 2024-03-17 RX ADMIN — DOCUSATE SODIUM 100 MG: 100 CAPSULE, LIQUID FILLED ORAL at 09:03

## 2024-03-17 RX ADMIN — ASPIRIN 81 MG: 81 TABLET, COATED ORAL at 09:03

## 2024-03-17 RX ADMIN — ATORVASTATIN CALCIUM 80 MG: 40 TABLET, FILM COATED ORAL at 09:03

## 2024-03-17 RX ADMIN — TRAMADOL HYDROCHLORIDE 50 MG: 50 TABLET, COATED ORAL at 09:03

## 2024-03-17 NOTE — DISCHARGE SUMMARY
[facility] Surg  Discharge Note  Short Stay    Procedure(s) (LRB):  CREATION, BYPASS, ARTERIAL, FEMORAL TO POPLITEAL, USING GRAFT (Right)      OUTCOME: Condition has improved and patient is now ready for discharge.    DISPOSITION: Home or Self Care    FINAL DIAGNOSIS:  Postoperative pain of extremity    FOLLOWUP: In clinic    DISCHARGE INSTRUCTIONS:  No discharge procedures on file.     TIME SPENT ON DISCHARGE: 10   minutes

## 2024-03-17 NOTE — PLAN OF CARE
[facility] - Med Surg  Discharge Final Note    Primary Care Provider: Carito Kohler MD    Expected Discharge Date: 3/17/2024    Final Discharge Note (most recent)       Final Note - 03/17/24 0837          Final Note    Assessment Type Final Discharge Note     Anticipated Discharge Disposition Home or Self Care        Post-Acute Status    Discharge Delays None known at this time                     Important Message from Medicare             Contact Info       Clint Urena MD   Specialty: Vascular Surgery    [address]  [place] [address]   Phone: [phone]       Next Steps: Follow up in 3 week(s)          Discharge home, no home health or dme orders noted.

## 2024-03-17 NOTE — PLAN OF CARE
Patient ready for discharge. Discharge instructions provided and explained. Patient verbalized understanding. IV removed per order. Chart check complete.

## 2024-03-18 LAB
BLD PROD TYP BPU: NORMAL
BLD PROD TYP BPU: NORMAL
BLOOD UNIT EXPIRATION DATE: NORMAL
BLOOD UNIT EXPIRATION DATE: NORMAL
BLOOD UNIT TYPE CODE: 6200
BLOOD UNIT TYPE CODE: 6200
BLOOD UNIT TYPE: NORMAL
BLOOD UNIT TYPE: NORMAL
CODING SYSTEM: NORMAL
CODING SYSTEM: NORMAL
CROSSMATCH INTERPRETATION: NORMAL
CROSSMATCH INTERPRETATION: NORMAL
DISPENSE STATUS: NORMAL
DISPENSE STATUS: NORMAL
NUM UNITS TRANS PACKED RBC: NORMAL
NUM UNITS TRANS PACKED RBC: NORMAL

## 2024-03-19 ENCOUNTER — PATIENT OUTREACH (OUTPATIENT)
Dept: ADMINISTRATIVE | Facility: CLINIC | Age: 74
End: 2024-03-19
Payer: MEDICARE

## 2024-03-19 NOTE — PROGRESS NOTES
C3 nurse spoke with Rosana Aguair for a TCC post hospital discharge follow up call. The patient does not have a scheduled HOSFU appointment with Carito Kohler MD within 5-7 days post hospital discharge date 03/17/24. C3 nurse was unable to schedule HOSFU appointment in [facility].    Message sent to PCP staff requesting they contact patient and schedule follow up appointment.

## 2024-03-19 NOTE — TELEPHONE ENCOUNTER
@ [phone] secure high priority message to Clint Urena MD staff regarding pt stated question/concerns.

## 2024-03-22 ENCOUNTER — TELEPHONE (OUTPATIENT)
Dept: VASCULAR SURGERY | Facility: HOSPITAL | Age: 74
End: 2024-03-22
Payer: MEDICARE

## 2024-03-22 DIAGNOSIS — I70.219 INTERMITTENT CLAUDICATION DUE TO ATHEROSCLEROSIS OF ARTERY OF EXTREMITY: Primary | ICD-10-CM

## 2024-03-22 DIAGNOSIS — I73.9 CLAUDICATION IN PERIPHERAL VASCULAR DISEASE: ICD-10-CM

## 2024-03-25 NOTE — ANESTHESIA POSTPROCEDURE EVALUATION
Anesthesia Post Evaluation    Patient: Rosana Aguiar    Procedure(s) Performed: Procedure(s) (LRB):  CREATION, BYPASS, ARTERIAL, FEMORAL TO POPLITEAL, USING GRAFT (Right)    Final Anesthesia Type: general      Patient location during evaluation: PACU  Patient participation: Yes- Able to Participate  Level of consciousness: awake and alert, oriented and awake  Post-procedure vital signs: reviewed and stable  Pain management: adequate  Airway patency: patent    PONV status at discharge: No PONV  Anesthetic complications: no      Cardiovascular status: blood pressure returned to baseline  Respiratory status: unassisted  Hydration status: euvolemic  Follow-up not needed.              Vitals Value Taken Time   /70 03/17/24 0736   Temp 36.4 °C (97.6 °F) 03/17/24 0736   Pulse 83 03/17/24 0736   Resp 16 03/17/24 0916   SpO2 95 % 03/17/24 0736         Event Time   Out of Recovery 12:47:45         Pain/Norberto Score: No data recorded

## 2024-04-10 NOTE — DISCHARGE SUMMARY
[facility] Surg  Discharge Note  Short Stay    Procedure(s) (LRB):  CREATION, BYPASS, ARTERIAL, FEMORAL TO POPLITEAL, USING GRAFT (Right)      OUTCOME: Patient tolerated treatment/procedure well without complication and is now ready for discharge.    DISPOSITION: Home or Self Care    FINAL DIAGNOSIS:  Intermittent claudication due to atherosclerosis of artery of extremity    FOLLOWUP: In clinic    DISCHARGE INSTRUCTIONS:    Discharge Procedure Orders   Reason for not Ordering Smoking Cessation Referral     Order Specific Question Answer Comments   Reason for not ordering: Not medically appropriate at this time      Reason for not Prescribing Nicotine Replacement     Order Specific Question Answer Comments   Reason for not Prescribing: Not medically appropriate at this time         TIME SPENT ON DISCHARGE: 5 minutes

## 2024-04-15 ENCOUNTER — HOSPITAL ENCOUNTER (OUTPATIENT)
Dept: VASCULAR SURGERY | Facility: HOSPITAL | Age: 74
Discharge: HOME | End: 2024-04-15
Attending: STUDENT IN AN ORGANIZED HEALTH CARE EDUCATION/TRAINING PROGRAM
Payer: MEDICARE

## 2024-04-15 ENCOUNTER — OFFICE VISIT (OUTPATIENT)
Dept: VASCULAR SURGERY | Facility: CLINIC | Age: 74
End: 2024-04-15
Payer: MEDICARE

## 2024-04-15 ENCOUNTER — HOSPITAL ENCOUNTER (OUTPATIENT)
Dept: VASCULAR SURGERY | Facility: HOSPITAL | Age: 74
Discharge: HOME | End: 2024-04-15
Attending: NURSE PRACTITIONER
Payer: MEDICARE

## 2024-04-15 VITALS — SYSTOLIC BLOOD PRESSURE: 120 MMHG | DIASTOLIC BLOOD PRESSURE: 60 MMHG

## 2024-04-15 DIAGNOSIS — I89.0 LYMPHEDEMA: ICD-10-CM

## 2024-04-15 DIAGNOSIS — E78.5 HYPERLIPIDEMIA, UNSPECIFIED HYPERLIPIDEMIA TYPE: ICD-10-CM

## 2024-04-15 DIAGNOSIS — I73.9 CLAUDICATION IN PERIPHERAL VASCULAR DISEASE: ICD-10-CM

## 2024-04-15 DIAGNOSIS — I70.219 INTERMITTENT CLAUDICATION DUE TO ATHEROSCLEROSIS OF ARTERY OF EXTREMITY: Primary | ICD-10-CM

## 2024-04-15 DIAGNOSIS — I10 PRIMARY HYPERTENSION: ICD-10-CM

## 2024-04-15 DIAGNOSIS — I70.219 INTERMITTENT CLAUDICATION DUE TO ATHEROSCLEROSIS OF ARTERY OF EXTREMITY: ICD-10-CM

## 2024-04-15 PROCEDURE — 93922 UPR/L XTREMITY ART 2 LEVELS: CPT | Mod: 26,HCNC,, | Performed by: STUDENT IN AN ORGANIZED HEALTH CARE EDUCATION/TRAINING PROGRAM

## 2024-04-15 PROCEDURE — 1159F MED LIST DOCD IN RCRD: CPT | Mod: HCNC,CPTII,S$GLB, | Performed by: STUDENT IN AN ORGANIZED HEALTH CARE EDUCATION/TRAINING PROGRAM

## 2024-04-15 PROCEDURE — 3074F SYST BP LT 130 MM HG: CPT | Mod: HCNC,CPTII,S$GLB, | Performed by: STUDENT IN AN ORGANIZED HEALTH CARE EDUCATION/TRAINING PROGRAM

## 2024-04-15 PROCEDURE — 99024 POSTOP FOLLOW-UP VISIT: CPT | Mod: HCNC,S$GLB,, | Performed by: STUDENT IN AN ORGANIZED HEALTH CARE EDUCATION/TRAINING PROGRAM

## 2024-04-15 PROCEDURE — 3072F LOW RISK FOR RETINOPATHY: CPT | Mod: HCNC,CPTII,S$GLB, | Performed by: STUDENT IN AN ORGANIZED HEALTH CARE EDUCATION/TRAINING PROGRAM

## 2024-04-15 PROCEDURE — 3078F DIAST BP <80 MM HG: CPT | Mod: HCNC,CPTII,S$GLB, | Performed by: STUDENT IN AN ORGANIZED HEALTH CARE EDUCATION/TRAINING PROGRAM

## 2024-04-15 PROCEDURE — 1160F RVW MEDS BY RX/DR IN RCRD: CPT | Mod: HCNC,CPTII,S$GLB, | Performed by: STUDENT IN AN ORGANIZED HEALTH CARE EDUCATION/TRAINING PROGRAM

## 2024-04-15 PROCEDURE — 93971 EXTREMITY STUDY: CPT | Mod: HCNC,RT

## 2024-04-15 PROCEDURE — 99999 PR PBB SHADOW E&M-EST. PATIENT-LVL III: CPT | Mod: PBBFAC,HCNC,, | Performed by: STUDENT IN AN ORGANIZED HEALTH CARE EDUCATION/TRAINING PROGRAM

## 2024-04-15 PROCEDURE — 93926 LOWER EXTREMITY STUDY: CPT | Mod: HCNC,RT

## 2024-04-15 PROCEDURE — 93922 UPR/L XTREMITY ART 2 LEVELS: CPT | Mod: HCNC

## 2024-04-15 PROCEDURE — 93926 LOWER EXTREMITY STUDY: CPT | Mod: 26,HCNC,RT, | Performed by: STUDENT IN AN ORGANIZED HEALTH CARE EDUCATION/TRAINING PROGRAM

## 2024-04-15 PROCEDURE — 93971 EXTREMITY STUDY: CPT | Mod: 26,HCNC,RT, | Performed by: STUDENT IN AN ORGANIZED HEALTH CARE EDUCATION/TRAINING PROGRAM

## 2024-04-15 NOTE — ASSESSMENT & PLAN NOTE
Has lymphedema involving her right leg likely due to the surgery. Have recommended compression stockings to be worn daily, I wrote for wraps with foot piece. She is to return in 3 months for symptom check. Recommended elevation. She has not worn compression stockings before.

## 2024-04-15 NOTE — ASSESSMENT & PLAN NOTE
S/p right femoral to above knee popliteal bypass with 8mm dacron graft. She is healing as expected no hemodynamically significant areas of stenosis within the bypass graft, ROBINA has improved to 1.03 and it is 0.77 on left. I will have her return in 3 months with graft duplex and robina. Have written her for gabapentin to help with nocturnal burning pain, may have some component of discomfort from leg swelling, have given her script for compression wrap with foot piece to be worn daily and remove at night. She is to elevate legs at night.

## 2024-04-15 NOTE — PROGRESS NOTES
Clint Urena    OFFICE NOTE    DATE OF VISIT: 4/15/2024  PATIENT NAME: Rosana Aguiar  : 1950  MRN: [id number]  PRIMARY CARE PHYSICIAN: Carito Kohler MD  CARDIOLOGIST: Almas  REFERRING PROVIDER: No ref. provider found    CHIEF COMPLAINT   Chief Complaint   Patient presents with    Post-op Evaluation     Post op fem-pop. Patient c/o right leg pain and swelling       HISTORY OF PRESENT ILLNESS:  Rosana Aguiar is a 74 y.o. female who presents to clinic today after right femoral to above knee popliteal bypass using 8mm dacron graft. She did well after surgery but recently has developed significant swelling of the right leg especially her foot. She is able to ambulate no longer having cramps on the right leg calf but does have burning sensation intermittently and the swelling is causing her discomfort as well. She has not worn compression stockings yet. She is able to ambulate but it is difficult due to swelling. Her incisions have healed nicely.    ALLERGIES:  Review of patient's allergies indicates:   Allergen Reactions    Clindamycin Other (See Comments)    Neomycin-bacitracin-polymyxin Itching    Nitrofurantoin monohyd/m-cryst Other (See Comments)       PAST MEDICAL HISTORY:  Past Medical History:   Diagnosis Date    Atherosclerotic PVD with intermittent claudication 10/19/2022    Diabetes mellitus, type 2     BS didn't check 2023    High cholesterol     Hypertension     Macular degeneration        PAST SURGICAL HISTORY:  Past Surgical History:   Procedure Laterality Date    ANGIOGRAPHY OF LOWER EXTREMITY N/A 2024    Procedure: Angiogram Extremity Unilateral/poss pta right leg intervention;  Surgeon: Clint Urena MD;  Location: [facility] CATH LAB;  Service: Peripheral Vascular;  Laterality: N/A;  left common femoral access    APPENDECTOMY      CATARACT EXTRACTION Bilateral     CREATION OF FEMOROPOPLITEAL ARTERIAL BYPASS USING GRAFT Right 3/14/2024    Procedure: CREATION, BYPASS,  ARTERIAL, FEMORAL TO POPLITEAL, USING GRAFT;  Surgeon: Clint Urena MD;  Location: [facility];  Service: Peripheral Vascular;  Laterality: Right;    cyst removal from breast (Left)      EYE SURGERY  Cataract    HYSTERECTOMY      TONSILLECTOMY      TUBAL LIGATION  1975       SOCIAL HISTORY:   Social History     Tobacco Use    Smoking status: Every Day     Current packs/day: 1.00     Average packs/day: 1 pack/day for 62.3 years (62.3 ttl pk-yrs)     Types: Cigarettes     Start date: 1962     Passive exposure: Past    Smokeless tobacco: Never    Tobacco comments:     HOLD MIDNIGHT PRIOR TO SX   Substance Use Topics    Alcohol use: Yes     Comment: rarely; HOLD 72HRS    Drug use: Never       FAMILY HISTORY:  Family History   Problem Relation Name Age of Onset    Cancer Mother          female    Diabetes Mother      Macular degeneration Mother      Alcohol abuse Father      Cancer Daughter          colon    Diabetes Maternal Uncle      Heart disease Neg Hx      Stroke Neg Hx         REVIEW OF SYSTEMS:  ROS  10 pt ros otherwise negative    PHYSICAL EXAM:  Vitals:    04/15/24 0941   BP: 120/60      Physical Exam      Vss  Gen nad alert oriented  Moderate pitting edema right foot, swelling in calf and distal thigh mild to moderate  Palpable pedal pulse right leg  Incisions healed very well medial thigh    VASCULAR LAB STUDIES:  No flow limiting hemodynamically significant lesions in the bypass graft, there is a small  Fluid collection seroma vs hematoma in the distal anastomosis    ASSESSMENT AND PLAN:  Hyperlipidemia  Medical therapy    Hypertension  Medical therapy    Intermittent claudication due to atherosclerosis of artery of extremity  S/p right femoral to above knee popliteal bypass with 8mm dacron graft. She is healing as expected no hemodynamically significant areas of stenosis within the bypass graft, ROBINA has improved to 1.03 and it is 0.77 on left. I will have her return in 3 months with graft duplex and robina.  Have written her for gabapentin to help with nocturnal burning pain, may have some component of discomfort from leg swelling, have given her script for compression wrap with foot piece to be worn daily and remove at night. She is to elevate legs at night.    Lymphedema  Has lymphedema involving her right leg likely due to the surgery. Have recommended compression stockings to be worn daily, I wrote for wraps with foot piece. She is to return in 3 months for symptom check. Recommended elevation. She has not worn compression stockings before.       Rosana was seen today for post-op evaluation.    Diagnoses and all orders for this visit:    Intermittent claudication due to atherosclerosis of artery of extremity    Lymphedema  -     CV Ultrasound doppler venous DVT leg right; Future    Hyperlipidemia, unspecified hyperlipidemia type    Primary hypertension        No follow-ups on file.        Clint Urena  [facility]  Vascular Surgery  [phone] (Clinic Number)

## 2024-04-16 LAB
LEFT ABI: 0.77
LEFT ARM BP: 133 MMHG
LEFT DORSALIS PEDIS: 102 MMHG
LEFT POSTERIOR TIBIAL: 103 MMHG
LEFT TBI: 0.61
LEFT TOE PRESSURE: 81 MMHG
OHS CV LOWER EXTREMITY GRAFT MEASUREMENTS - RIGHT - G1 - D: 58
OHS CV LOWER EXTREMITY GRAFT MEASUREMENTS - RIGHT - G1 - DA: 115
OHS CV LOWER EXTREMITY GRAFT MEASUREMENTS - RIGHT - G1 - M: 58
OHS CV LOWER EXTREMITY GRAFT MEASUREMENTS - RIGHT - G1 - P: 60
OHS CV LOWER EXTREMITY GRAFT MEASUREMENTS - RIGHT - G1 - PA: 220
RIGHT ABI: 1.03
RIGHT ARM BP: 126 MMHG
RIGHT CFA PSV: 281 CM/S
RIGHT DORSALIS PEDIS: 137 MMHG
RIGHT POPLITEAL PSV: 162 CM/S
RIGHT POSTERIOR TIBIAL: 131 MMHG
RIGHT TBI: 0.79
RIGHT TOE PRESSURE: 105 MMHG

## 2024-04-17 ENCOUNTER — PATIENT MESSAGE (OUTPATIENT)
Dept: VASCULAR SURGERY | Facility: CLINIC | Age: 74
End: 2024-04-17
Payer: MEDICARE

## 2024-04-18 ENCOUNTER — PATIENT MESSAGE (OUTPATIENT)
Dept: VASCULAR SURGERY | Facility: CLINIC | Age: 74
End: 2024-04-18
Payer: MEDICARE

## 2024-04-18 RX ORDER — GABAPENTIN 300 MG/1
CAPSULE ORAL
Qty: 81 CAPSULE | Refills: 0 | Status: SHIPPED | OUTPATIENT
Start: 2024-04-18 | End: 2024-05-19

## 2024-04-22 ENCOUNTER — TELEPHONE (OUTPATIENT)
Dept: INTERNAL MEDICINE | Facility: CLINIC | Age: 74
End: 2024-04-22
Payer: MEDICARE

## 2024-04-22 NOTE — TELEPHONE ENCOUNTER
----- Message from Zoe Esposito sent at 4/22/2024 10:31 AM CDT -----  Contact: Self  Type:  RX Refill Request    Who Called:  Patient  Refill or New Rx:  New Rx  RX Name and Strength:  Diabetic Meter and strips  How is the patient currently taking it? (ex. 1XDay):  As Directed  Is this a 30 day or 90 day RX:  90  Preferred Pharmacy with phone number:    [facility] - [place], [place] - [address]  [address] [address]  Phone: [phone] Fax: [phone]  Local or Mail Order:  Mail Order  Ordering Provider:  Dr Jose F Martinez Call Back Number:  [phone]  Additional Information:  Thank You

## 2024-04-25 DIAGNOSIS — E11.51 TYPE 2 DIABETES MELLITUS WITH DIABETIC PERIPHERAL ANGIOPATHY WITHOUT GANGRENE, WITH LONG-TERM CURRENT USE OF INSULIN: Primary | ICD-10-CM

## 2024-04-25 DIAGNOSIS — Z79.4 TYPE 2 DIABETES MELLITUS WITH DIABETIC PERIPHERAL ANGIOPATHY WITHOUT GANGRENE, WITH LONG-TERM CURRENT USE OF INSULIN: Primary | ICD-10-CM

## 2024-04-25 RX ORDER — INSULIN PUMP SYRINGE, 3 ML
EACH MISCELLANEOUS
Qty: 1 EACH | Refills: 0 | Status: SHIPPED | OUTPATIENT
Start: 2024-04-25 | End: 2025-04-25

## 2024-04-25 NOTE — TELEPHONE ENCOUNTER
----- Message from Jonathan Esposito sent at 4/25/2024 11:33 AM CDT -----  Contact: Rosana Pereyra is calling in regards to getting a call back. Please call back at [phone]                      Thanks  KT  
Called and spoke with patient. Patient states that she needs a new glucose meter and test strips. Says Fadumo instructed her to contact her doctor for a new prescription. Patient says the brand of meter does not matter, as long as it's covered through her insurance.     Patient also requesting a refill of Chantix. Says she started it a while back and it helped but then some things happened and she began smoking again. Would like to actively begin trying to quit smoking. If approved, can be sent to [facility] here on [facility].      LOV 1/22/24  
Care Due:                  Date            Visit Type   Department     Provider  --------------------------------------------------------------------------------                                EP -                              PRIMARY      [facility] INTERNAL  Last Visit: 01-      [facility] ([facility])   STEPHANIA Kohler                              EP -                              PRIMARY      [facility] INTERNAL  Next Visit: 07-      [facility] ([facility])   [facility]       Carito Kohler                                                            Last  Test          Frequency    Reason                     Performed    Due Date  --------------------------------------------------------------------------------    HBA1C.......  6 months...  insulin..................  01- 07-    Lipid Panel.  12 months..  rosuvastatin.............  07- 07-    [employer] Embedded Care Due Messages. Reference number: [id number].   4/25/2024 1:29:21 PM CDT  
none

## 2024-04-29 NOTE — TELEPHONE ENCOUNTER
No care due was identified.  [facility] Embedded Care Due Messages. Reference number: [id number].   4/29/2024 5:28:18 PM CDT

## 2024-04-30 RX ORDER — CARVEDILOL 6.25 MG/1
6.25 TABLET ORAL 2 TIMES DAILY WITH MEALS
Qty: 180 TABLET | Refills: 2 | Status: SHIPPED | OUTPATIENT
Start: 2024-04-30

## 2024-04-30 NOTE — TELEPHONE ENCOUNTER
Refill Decision Note   Rosana Aguira  is requesting a refill authorization.  Brief Assessment and Rationale for Refill:  Approve     Medication Therapy Plan:  Patient admitted for procedures. No ED visits or changes to therapy.      Extended chart review required: Yes   Comments:     Note composed:5:22 PM 04/30/2024

## 2024-05-03 ENCOUNTER — PATIENT MESSAGE (OUTPATIENT)
Dept: INTERNAL MEDICINE | Facility: CLINIC | Age: 74
End: 2024-05-03
Payer: MEDICARE

## 2024-05-03 DIAGNOSIS — F17.210 TOBACCO DEPENDENCE DUE TO CIGARETTES: ICD-10-CM

## 2024-05-03 DIAGNOSIS — F17.210 SMOKING GREATER THAN 30 PACK YEARS: ICD-10-CM

## 2024-05-03 NOTE — TELEPHONE ENCOUNTER
No care due was identified.  [facility] Embedded Care Due Messages. Reference number: [id number].   5/03/2024 3:14:14 PM CDT

## 2024-05-03 NOTE — TELEPHONE ENCOUNTER
See refill request. Pt reported she was no longer taking medication on 8/29/2023. Please advise.//ddw

## 2024-05-03 NOTE — TELEPHONE ENCOUNTER
Refill Routing Note   Medication(s) are not appropriate for processing by [facility] for the following reason(s):        Outside of protocol    [facility] action(s):  Route               Appointments  past 12m or future 3m with PCP    Date Provider   Last Visit   1/22/2024 Carito Kohler MD   Next Visit   7/24/2024 Carito Kohler MD   ED visits in past 90 days: 0        Note composed:4:12 PM 05/03/2024

## 2024-05-06 RX ORDER — VARENICLINE TARTRATE 0.5 (11)-1
KIT ORAL
Qty: 53 EACH | Refills: 0 | Status: SHIPPED | OUTPATIENT
Start: 2024-05-06

## 2024-05-22 ENCOUNTER — HOSPITAL ENCOUNTER (OUTPATIENT)
Dept: RADIOLOGY | Facility: HOSPITAL | Age: 74
Discharge: HOME | End: 2024-05-22
Attending: INTERNAL MEDICINE
Payer: MEDICARE

## 2024-05-22 DIAGNOSIS — F17.210 SMOKING GREATER THAN 30 PACK YEARS: ICD-10-CM

## 2024-05-22 PROCEDURE — 71271 CT THORAX LUNG CANCER SCR C-: CPT | Mod: 26,HCNC,, | Performed by: RADIOLOGY

## 2024-05-22 PROCEDURE — 71271 CT THORAX LUNG CANCER SCR C-: CPT | Mod: TC,HCNC

## 2024-05-24 ENCOUNTER — OFFICE VISIT (OUTPATIENT)
Dept: PULMONOLOGY | Facility: CLINIC | Age: 74
End: 2024-05-24
Payer: MEDICARE

## 2024-05-24 ENCOUNTER — OFFICE VISIT (OUTPATIENT)
Dept: INTERNAL MEDICINE | Facility: CLINIC | Age: 74
End: 2024-05-24
Payer: MEDICARE

## 2024-05-24 VITALS
SYSTOLIC BLOOD PRESSURE: 121 MMHG | HEIGHT: 64 IN | DIASTOLIC BLOOD PRESSURE: 60 MMHG | BODY MASS INDEX: 28.71 KG/M2 | HEART RATE: 71 BPM | WEIGHT: 168.19 LBS | OXYGEN SATURATION: 98 % | RESPIRATION RATE: 20 BRPM

## 2024-05-24 VITALS
DIASTOLIC BLOOD PRESSURE: 60 MMHG | HEIGHT: 64 IN | BODY MASS INDEX: 28.76 KG/M2 | SYSTOLIC BLOOD PRESSURE: 121 MMHG | OXYGEN SATURATION: 98 % | HEART RATE: 71 BPM | WEIGHT: 168.44 LBS

## 2024-05-24 DIAGNOSIS — F17.200 NEEDS SMOKING CESSATION EDUCATION: ICD-10-CM

## 2024-05-24 DIAGNOSIS — I10 HYPERTENSION, UNSPECIFIED TYPE: ICD-10-CM

## 2024-05-24 DIAGNOSIS — M85.80 OSTEOPENIA, UNSPECIFIED LOCATION: ICD-10-CM

## 2024-05-24 DIAGNOSIS — F33.9 DEPRESSION, RECURRENT: ICD-10-CM

## 2024-05-24 DIAGNOSIS — J43.9 PULMONARY EMPHYSEMA, UNSPECIFIED EMPHYSEMA TYPE: ICD-10-CM

## 2024-05-24 DIAGNOSIS — I73.9 PVD (PERIPHERAL VASCULAR DISEASE): ICD-10-CM

## 2024-05-24 DIAGNOSIS — R91.1 PULMONARY NODULE: ICD-10-CM

## 2024-05-24 DIAGNOSIS — I25.10 CORONARY ARTERY CALCIFICATION: ICD-10-CM

## 2024-05-24 DIAGNOSIS — I65.29 STENOSIS OF CAROTID ARTERY, UNSPECIFIED LATERALITY: ICD-10-CM

## 2024-05-24 DIAGNOSIS — R91.1 NODULE OF RIGHT LUNG: ICD-10-CM

## 2024-05-24 DIAGNOSIS — Z00.00 ENCOUNTER FOR MEDICARE ANNUAL WELLNESS EXAM: Primary | ICD-10-CM

## 2024-05-24 DIAGNOSIS — Z72.0 TOBACCO USE: ICD-10-CM

## 2024-05-24 DIAGNOSIS — R26.9 ABNORMALITY OF GAIT AND MOBILITY: ICD-10-CM

## 2024-05-24 DIAGNOSIS — I25.84 CORONARY ARTERY CALCIFICATION: ICD-10-CM

## 2024-05-24 DIAGNOSIS — E04.2 MULTIPLE THYROID NODULES: ICD-10-CM

## 2024-05-24 DIAGNOSIS — I70.0 ATHEROSCLEROSIS OF AORTA: ICD-10-CM

## 2024-05-24 DIAGNOSIS — E11.69 HYPERLIPIDEMIA ASSOCIATED WITH TYPE 2 DIABETES MELLITUS: ICD-10-CM

## 2024-05-24 DIAGNOSIS — E78.5 HYPERLIPIDEMIA ASSOCIATED WITH TYPE 2 DIABETES MELLITUS: ICD-10-CM

## 2024-05-24 DIAGNOSIS — R91.1 PULMONARY NODULE: Primary | ICD-10-CM

## 2024-05-24 PROCEDURE — 3074F SYST BP LT 130 MM HG: CPT | Mod: CPTII,S$GLB,, | Performed by: INTERNAL MEDICINE

## 2024-05-24 PROCEDURE — 3288F FALL RISK ASSESSMENT DOCD: CPT | Mod: CPTII,S$GLB,, | Performed by: NURSE PRACTITIONER

## 2024-05-24 PROCEDURE — 1101F PT FALLS ASSESS-DOCD LE1/YR: CPT | Mod: CPTII,S$GLB,, | Performed by: NURSE PRACTITIONER

## 2024-05-24 PROCEDURE — 1158F ADVNC CARE PLAN TLK DOCD: CPT | Mod: CPTII,S$GLB,, | Performed by: NURSE PRACTITIONER

## 2024-05-24 PROCEDURE — 1126F AMNT PAIN NOTED NONE PRSNT: CPT | Mod: CPTII,S$GLB,, | Performed by: NURSE PRACTITIONER

## 2024-05-24 PROCEDURE — 3078F DIAST BP <80 MM HG: CPT | Mod: CPTII,S$GLB,, | Performed by: NURSE PRACTITIONER

## 2024-05-24 PROCEDURE — 3288F FALL RISK ASSESSMENT DOCD: CPT | Mod: CPTII,S$GLB,, | Performed by: INTERNAL MEDICINE

## 2024-05-24 PROCEDURE — 99999 PR PBB SHADOW E&M-EST. PATIENT-LVL V: CPT | Mod: PBBFAC,,, | Performed by: INTERNAL MEDICINE

## 2024-05-24 PROCEDURE — 3078F DIAST BP <80 MM HG: CPT | Mod: CPTII,S$GLB,, | Performed by: INTERNAL MEDICINE

## 2024-05-24 PROCEDURE — 3072F LOW RISK FOR RETINOPATHY: CPT | Mod: CPTII,S$GLB,, | Performed by: INTERNAL MEDICINE

## 2024-05-24 PROCEDURE — 1159F MED LIST DOCD IN RCRD: CPT | Mod: CPTII,S$GLB,, | Performed by: NURSE PRACTITIONER

## 2024-05-24 PROCEDURE — 99999 PR PBB SHADOW E&M-EST. PATIENT-LVL V: CPT | Mod: PBBFAC,,, | Performed by: NURSE PRACTITIONER

## 2024-05-24 PROCEDURE — 1158F ADVNC CARE PLAN TLK DOCD: CPT | Mod: CPTII,S$GLB,, | Performed by: INTERNAL MEDICINE

## 2024-05-24 PROCEDURE — 1160F RVW MEDS BY RX/DR IN RCRD: CPT | Mod: CPTII,S$GLB,, | Performed by: NURSE PRACTITIONER

## 2024-05-24 PROCEDURE — 3072F LOW RISK FOR RETINOPATHY: CPT | Mod: CPTII,S$GLB,, | Performed by: NURSE PRACTITIONER

## 2024-05-24 PROCEDURE — 99215 OFFICE O/P EST HI 40 MIN: CPT | Mod: S$GLB,,, | Performed by: INTERNAL MEDICINE

## 2024-05-24 PROCEDURE — 1170F FXNL STATUS ASSESSED: CPT | Mod: CPTII,S$GLB,, | Performed by: NURSE PRACTITIONER

## 2024-05-24 PROCEDURE — 1101F PT FALLS ASSESS-DOCD LE1/YR: CPT | Mod: CPTII,S$GLB,, | Performed by: INTERNAL MEDICINE

## 2024-05-24 PROCEDURE — G0439 PPPS, SUBSEQ VISIT: HCPCS | Mod: S$GLB,,, | Performed by: NURSE PRACTITIONER

## 2024-05-24 PROCEDURE — 3074F SYST BP LT 130 MM HG: CPT | Mod: CPTII,S$GLB,, | Performed by: NURSE PRACTITIONER

## 2024-05-24 NOTE — PATIENT INSTRUCTIONS
Counseling and Referral of Other Preventative  (Italic type indicates deductible and co-insurance are waived)    Patient Name: Rosana Aguiar  Today's Date: 5/24/2024    Health Maintenance       Date Due Completion Date    TETANUS VACCINE Never done ---    Shingles Vaccine (1 of 2) Never done ---    RSV Vaccine (Age 60+ and Pregnant patients) (1 - 1-dose 60+ series) Never done ---    COVID-19 Vaccine (3 - 2023-24 season) 01/22/2025 (Originally 9/1/2023) 4/3/2021    Diabetes Urine Screening 07/11/2024 7/11/2023    Lipid Panel 07/11/2024 7/11/2023    Hemoglobin A1c 07/22/2024 1/22/2024    Eye Exam 09/06/2024 9/6/2023    LDCT Lung Screen 05/22/2025 5/22/2024    DEXA Scan 10/13/2025 10/13/2022        No orders of the defined types were placed in this encounter.    The following information is provided to all patients.  This information is to help you find resources for any of the problems found today that may be affecting your health:                  Living healthy guide: www.[facility].[place].gov      Understanding Diabetes: www.diabetes.org      Eating healthy: www.cdc.gov/healthyweight      CDC home safety checklist: www.cdc.gov/steadi/patient.html      Agency on Aging: www.goea.[place].[facility]      Alcoholics anonymous (AA): www.aa.org      Physical Activity: www.star.nih.gov/id8konw      Tobacco use: www.quitwithusla.org

## 2024-05-24 NOTE — PROGRESS NOTES
Subjective:     Patient ID: Rosana Aguiar is a 74 y.o. female.    Chief Complaint:      HPI  74 y.o.  female presenting for evaluation of abnormal chest x-ray - pulmonary nodule on CT .  Chest x-ray showed small left lung nodule/calcified granuloma.   More than 30 pack-year smoking history still smoknig.  Complains of coughing and shortness on exertion.  Not able to use albuterol inhaler properly.  Chronic cough with sputum production.    https://reference.[employer]/calculator/4/solitary-pulmonary-nodule-malignancy-risk    Results for Solitary Pulmonary Nodule Malignancy Risk by QxMD  Results for Solitary Pulmonary Nodule Malignancy Risk by QxMD  Probability of Malignancy: 47.2 %  This probability is only an estimate and clinical assessment is required to further refine this estimation.  Answers calculated to formulate result:  1. Age? -- 74 Years  2. Smoker (current or previous)? -- Yes  3. Extra-thoracic cancer more than 5 years previous? -- No  4. Diameter? -- 15 mm  5. Upper Lobe? -- No  6. Spiculated? -- Yes  7. PET? -- Not Performed      May 24, 2024 at 10:54  Calculated at: https://qxmd.com/calculator_4/solitary-pulmonary-nodule-malignancy-risk  Get Calculate by [employer] App for iOS, Android and web at http:///calculate        Past Medical History:   Diagnosis Date    Atherosclerotic PVD with intermittent claudication 10/19/2022    Diabetes mellitus, type 2 2003    BS didn't check 09/06/2023    High cholesterol     Hypertension     Macular degeneration      Past Surgical History:   Procedure Laterality Date    ANGIOGRAPHY OF LOWER EXTREMITY N/A 02/09/2024    Procedure: Angiogram Extremity Unilateral/poss pta right leg intervention;  Surgeon: Clint Urena MD;  Location: [facility] CATH LAB;  Service: Peripheral Vascular;  Laterality: N/A;  left common femoral access    APPENDECTOMY      CATARACT EXTRACTION Bilateral     CREATION OF FEMOROPOPLITEAL ARTERIAL BYPASS USING GRAFT Right 3/14/2024    Procedure:  CREATION, BYPASS, ARTERIAL, FEMORAL TO POPLITEAL, USING GRAFT;  Surgeon: Clint Urena MD;  Location: [facility] OR;  Service: Peripheral Vascular;  Laterality: Right;    cyst removal from breast (Left)      EYE SURGERY  Cataract    HYSTERECTOMY      TONSILLECTOMY      TUBAL LIGATION  1975     Review of patient's allergies indicates:   Allergen Reactions    Clindamycin Other (See Comments)    Neomycin-bacitracin-polymyxin Itching    Nitrofurantoin monohyd/m-cryst Other (See Comments)     Current Outpatient Medications on File Prior to Visit   Medication Sig Dispense Refill    albuterol (VENTOLIN HFA) 90 mcg/actuation inhaler Inhale 2 puffs into the lungs every 6 (six) hours as needed for Wheezing. Rescue 18 g 11    amLODIPine (NORVASC) 10 MG tablet TAKE 1 TABLET ONE TIME DAILY 90 tablet 3    aspirin (ECOTRIN) 81 MG EC tablet Take 1 tablet (81 mg total) by mouth once daily. 90 tablet 3    blood sugar diagnostic Strp Check blood glucose levels two times a day 200 strip 2    blood-glucose meter kit Use as instructed 1 each 0    carvediloL (COREG) 6.25 MG tablet Take 1 tablet (6.25 mg total) by mouth 2 (two) times daily with meals. 180 tablet 2    cilostazoL (PLETAL) 50 MG Tab Take 1 tablet (50 mg total) by mouth 2 (two) times daily. 60 tablet 11    FLUoxetine 20 MG capsule Take 1 capsule (20 mg total) by mouth once daily. 90 capsule 3    fluticasone propionate (FLONASE) 50 mcg/actuation nasal spray 1 spray (50 mcg total) by Each Nostril route once daily. 16 g 2    HYDROcodone-acetaminophen (NORCO) 7.5-325 mg per tablet Take 1 tablet by mouth every 6 (six) hours as needed for Pain for up to 7 days. 20 tablet 0    ibuprofen (ADVIL,MOTRIN) 800 MG tablet Take 1 tablet (800 mg total) by mouth every 6 (six) hours if needed for mild pain for up to 10 days. 32 tablet 0    insulin glargine U-300 conc (TOUJEO MAX U-300 SOLOSTAR) 300 unit/mL (3 mL) insulin pen Inject 60 Units into the skin once daily. 3 pen 11    levocetirizine  "(XYZAL) 5 MG tablet Take 1 tablet (5 mg total) by mouth every evening. 30 tablet 11    losartan-hydrochlorothiazide 100-25 mg (HYZAAR) 100-25 mg per tablet TAKE 1 TABLET EVERY DAY (Patient taking differently: Take 1 tablet by mouth once daily.) 90 tablet 3    pen needle, diabetic (BD CAROL 2ND GEN PEN NEEDLE) 32 gauge x 5/32" Ndle 1 each by Misc.(Non-Drug; Combo Route) route once daily. 100 each 3    rosuvastatin (CRESTOR) 20 MG tablet Take 1 tablet (20 mg total) by mouth once daily. 90 tablet 3    tiotropium-olodateroL (STIOLTO RESPIMAT) 2.5-2.5 mcg/actuation Mist Inhale 2 puffs into the lungs once daily. Controller 4 g 11    varenicline (CHANTIX STARTING MONTH BOX) 0.5 mg (11)- 1 mg (42) tablet Take one 0.5mg tablet by mouth once daily x 3 days, THEN increase to one 0.5mg tablet by mouth twice daily x 4 days, THEN increase to one 1mg tablet by mouth twice daily 53 each 0    gabapentin (NEURONTIN) 300 MG capsule Take 1 capsule (300 mg total) by mouth every evening for 3 days, THEN 1 capsule (300 mg total) 2 (two) times daily for 3 days, THEN 1 capsule (300 mg total) 3 (three) times daily for 24 days. 81 capsule 0     No current facility-administered medications on file prior to visit.     Social History     Socioeconomic History    Marital status:    Tobacco Use    Smoking status: Every Day     Current packs/day: 1.00     Average packs/day: 1 pack/day for 62.4 years (62.4 ttl pk-yrs)     Types: Cigarettes     Start date: 1962     Passive exposure: Past    Smokeless tobacco: Never    Tobacco comments:     HOLD MIDNIGHT PRIOR TO SX   Substance and Sexual Activity    Alcohol use: Yes     Comment: rarely; HOLD 72HRS    Drug use: Never    Sexual activity: Not Currently     Partners: Male     Birth control/protection: None     Social Determinants of Health     Financial Resource Strain: Medium Risk (5/24/2024)    Overall Financial Resource Strain (CARDIA)     Difficulty of Paying Living Expenses: Somewhat hard "   Food Insecurity: No Food Insecurity (5/24/2024)    Hunger Vital Sign     Worried About Running Out of Food in the Last Year: Never true     Ran Out of Food in the Last Year: Never true   Transportation Needs: No Transportation Needs (5/24/2024)    PRAPARE - Transportation     Lack of Transportation (Medical): No     Lack of Transportation (Non-Medical): No   Physical Activity: Inactive (5/24/2024)    Exercise Vital Sign     Days of Exercise per Week: 0 days     Minutes of Exercise per Session: 0 min   Stress: Stress Concern Present (5/24/2024)    [employer] - Occupational Stress Questionnaire     Feeling of Stress : Rather much   Housing Stability: Low Risk  (5/24/2024)    Housing Stability Vital Sign     Unable to Pay for Housing in the Last Year: No     Homeless in the Last Year: No     Family History   Problem Relation Name Age of Onset    Cancer Mother          female    Diabetes Mother      Macular degeneration Mother      Alcohol abuse Father      Cancer Daughter          colon    Diabetes Maternal Uncle      Heart disease Neg Hx      Stroke Neg Hx         Review of Systems   Constitutional:  Positive for fatigue. Negative for fever.   HENT:  Positive for postnasal drip, rhinorrhea and congestion.    Eyes:  Negative for redness and itching.   Respiratory:  Positive for cough, sputum production, shortness of breath, dyspnea on extertion, use of rescue inhaler and Paroxysmal Nocturnal Dyspnea.    Cardiovascular:  Negative for chest pain, palpitations and leg swelling.   Genitourinary:  Negative for difficulty urinating and hematuria.   Endocrine:  Negative for cold intolerance and heat intolerance.    Skin:  Negative for rash.   Gastrointestinal:  Negative for nausea and abdominal pain.   Neurological:  Negative for dizziness, syncope, weakness and light-headedness.   Hematological:  Negative for adenopathy. Does not bruise/bleed easily.   Psychiatric/Behavioral:  Negative for  "sleep disturbance. The patient is not nervous/anxious.        Objective:      /60   Pulse 71   Resp 20   Ht 5' 4" (1.626 m)   Wt 76.3 kg (168 lb 3.4 oz)   SpO2 98%   BMI 28.87 kg/m²   Physical Exam  Vitals and nursing note reviewed.   Constitutional:       Appearance: She is well-developed.   HENT:      Head: Normocephalic and atraumatic.      Nose: Nose normal.      Mouth/Throat:      Pharynx: No oropharyngeal exudate.   Eyes:      Conjunctiva/sclera: Conjunctivae normal.      Pupils: Pupils are equal, round, and reactive to light.   Neck:      Thyroid: No thyromegaly.      Vascular: No JVD.      Trachea: No tracheal deviation.   Cardiovascular:      Rate and Rhythm: Normal rate and regular rhythm.      Heart sounds: Normal heart sounds.   Pulmonary:      Effort: Pulmonary effort is normal. No respiratory distress.      Breath sounds: Examination of the right-lower field reveals wheezing. Examination of the left-lower field reveals wheezing. Decreased breath sounds and wheezing present. No rhonchi or rales.   Chest:      Chest wall: No tenderness.   Abdominal:      General: Bowel sounds are normal.      Palpations: Abdomen is soft.   Musculoskeletal:         General: Normal range of motion.      Cervical back: Neck supple.   Lymphadenopathy:      Cervical: No cervical adenopathy.   Skin:     General: Skin is warm and dry.   Neurological:      Mental Status: She is alert and oriented to person, place, and time.       Personal Diagnostic Review  Chest CT reviewed        5/24/2024    11:31 AM   Pulmonary Studies Review   SpO2 98 %   Height 5' 4.17" (1.63 m)   Weight 76.4 kg (168 lb 6.9 oz)   BMI (Calculated) 28.8   Predicted Distance 255.21   Predicted Distance Meters (Calculated) 396.69 meters       CT Chest Lung Screening Low Dose  Narrative: Exam:  CT CHEST LUNG SCREENING LOW DOSE    COMPARISON:  05/29/2023    TECHNIQUE:  Axial CT imaging was performed of the chest utilizing a low-dose " "protocol.    Computed tomography dose index (CTDI):  3.24 mGy    Dose-length product (DLP):  110.26 mGy-cm    FINDINGS:  Interval enlargement of spiculated right lower lobe pulmonary nodule from 10 x 9 x 10 mm to 15 x 11 x 15 mm.  No new pulmonary nodule.  No effusion or pneumothorax.  Centrilobular emphysema.  Ill-defined upper lobe groundglass opacities.    Heart is normal in size with trace pericardial effusion.  Densely calcified thoracic aorta and coronary arteries.  No adenopathy.  Visualized abdominal organs unremarkable.  Osseous structures intact.  Impression:   Interval enlargement of spiculated right lower lobe pulmonary nodule from 10 to 15 mm in the greatest dimension.  Recommend follow-up PET scan and/or biopsy for further evaluation.    Lung-RADS Catagory:  4X - Suspicious - consultation advised - possible next steps Chest CT, tissue sampling and-or PET/CT.    Clinically significant non lung cancer finding:  None.    Prior Lung Cancer Modifier:  No history of prior lung cancer.    All CT scans at [this location] are performed using dose modulation techniques as appropriate to a performed exam including the following: Automated exposure control; adjustment of the mA and/or kV according to patient size (this includes techniques or standardized protocols for targeted exams where dose is matched to indication / reason for exam; i.e. extremities or head); use of iterative reconstruction technique.    Finalized on: 5/22/2024 2:30 PM By:  Kuldeep Nettles MD  [facility]# [id number]      2024-05-22 14:32:31.126    [facility]      Office Spirometry Results:         5/24/2024    11:31 AM 5/24/2024    10:00 AM 3/17/2024     7:36 AM 3/17/2024     4:41 AM 3/17/2024    12:06 AM 3/16/2024     7:28 PM 3/16/2024     4:26 PM   Pulmonary Function Tests   SpO2 98 % 98 % 95 % 96 % 94 % 93 % 94 %   Height 5' 4.17" (1.63 m) 5' 4" (1.626 m)        Weight 76.4 kg (168 lb 6.9 oz) 76.3 kg (168 lb 3.4 oz)        BMI (Calculated) 28.8 28.9      " "        5/24/2024    11:31 AM   Pulmonary Studies Review   SpO2 98 %   Height 5' 4.17" (1.63 m)   Weight 76.4 kg (168 lb 6.9 oz)   BMI (Calculated) 28.8   Predicted Distance 255.21   Predicted Distance Meters (Calculated) 396.69 meters           No results found for this or any previous visit (from the past 336 hour(s)).    Assessment:            Pulmonary nodule  -     NM PET CT FDG Skull Base to Mid Thigh; Future; Expected date: 05/24/2024    Nodule of right lung  -     NM PET CT FDG Skull Base to Mid Thigh; Future; Expected date: 05/24/2024          Outpatient Encounter Medications as of 5/24/2024   Medication Sig Dispense Refill    albuterol (VENTOLIN HFA) 90 mcg/actuation inhaler Inhale 2 puffs into the lungs every 6 (six) hours as needed for Wheezing. Rescue 18 g 11    amLODIPine (NORVASC) 10 MG tablet TAKE 1 TABLET ONE TIME DAILY 90 tablet 3    aspirin (ECOTRIN) 81 MG EC tablet Take 1 tablet (81 mg total) by mouth once daily. 90 tablet 3    blood sugar diagnostic Strp Check blood glucose levels two times a day 200 strip 2    blood-glucose meter kit Use as instructed 1 each 0    carvediloL (COREG) 6.25 MG tablet Take 1 tablet (6.25 mg total) by mouth 2 (two) times daily with meals. 180 tablet 2    cilostazoL (PLETAL) 50 MG Tab Take 1 tablet (50 mg total) by mouth 2 (two) times daily. 60 tablet 11    FLUoxetine 20 MG capsule Take 1 capsule (20 mg total) by mouth once daily. 90 capsule 3    fluticasone propionate (FLONASE) 50 mcg/actuation nasal spray 1 spray (50 mcg total) by Each Nostril route once daily. 16 g 2    HYDROcodone-acetaminophen (NORCO) 7.5-325 mg per tablet Take 1 tablet by mouth every 6 (six) hours as needed for Pain for up to 7 days. 20 tablet 0    ibuprofen (ADVIL,MOTRIN) 800 MG tablet Take 1 tablet (800 mg total) by mouth every 6 (six) hours if needed for mild pain for up to 10 days. 32 tablet 0    insulin glargine U-300 conc (TOUJEO MAX U-300 SOLOSTAR) 300 unit/mL (3 mL) insulin pen Inject 60 " "Units into the skin once daily. 3 pen 11    levocetirizine (XYZAL) 5 MG tablet Take 1 tablet (5 mg total) by mouth every evening. 30 tablet 11    losartan-hydrochlorothiazide 100-25 mg (HYZAAR) 100-25 mg per tablet TAKE 1 TABLET EVERY DAY (Patient taking differently: Take 1 tablet by mouth once daily.) 90 tablet 3    pen needle, diabetic (BD CAROL 2ND GEN PEN NEEDLE) 32 gauge x 5/32" Ndle 1 each by Misc.(Non-Drug; Combo Route) route once daily. 100 each 3    rosuvastatin (CRESTOR) 20 MG tablet Take 1 tablet (20 mg total) by mouth once daily. 90 tablet 3    tiotropium-olodateroL (STIOLTO RESPIMAT) 2.5-2.5 mcg/actuation Mist Inhale 2 puffs into the lungs once daily. Controller 4 g 11    varenicline (CHANTIX STARTING MONTH BOX) 0.5 mg (11)- 1 mg (42) tablet Take one 0.5mg tablet by mouth once daily x 3 days, THEN increase to one 0.5mg tablet by mouth twice daily x 4 days, THEN increase to one 1mg tablet by mouth twice daily 53 each 0    gabapentin (NEURONTIN) 300 MG capsule Take 1 capsule (300 mg total) by mouth every evening for 3 days, THEN 1 capsule (300 mg total) 2 (two) times daily for 3 days, THEN 1 capsule (300 mg total) 3 (three) times daily for 24 days. 81 capsule 0     No facility-administered encounter medications on file as of 5/24/2024.     Plan:       Requested Prescriptions      No prescriptions requested or ordered in this encounter     Problem List Items Addressed This Visit       Pulmonary nodule - Primary    Relevant Orders    NM PET CT FDG Skull Base to Mid Thigh     Other Visit Diagnoses       Nodule of right lung        Relevant Orders    NM PET CT FDG Skull Base to Mid Thigh               Follow up in about 2 weeks (around 6/7/2024) for PET on return.    MEDICAL DECISION MAKING: Moderate to high complexity.  Overall, the multiple problems listed are of moderate to high severity that may impact quality of life and activities of daily living. Side effects of medications, treatment plan as well as " options and alternatives reviewed and discussed with patient. There was counseling of patient concerning these issues.    Total time spent in counseling and coordination of care - 45  minutes of total time spent on the encounter, which includes face to face time and non-face to face time preparing to see the patient (eg, review of tests), Obtaining and/or reviewing separately obtained history, Documenting clinical information in the electronic or other health record, Independently interpreting results (not separately reported) and communicating results to the patient/family/caregiver, or Care coordination (not separately reported).    Time was used in discussion of prognosis, risks, benefits of treatment, instructions and compliance with regimen . Discussion with other physicians and/or health care providers - home health or for use of durable medical equipment (oxygen, nebulizers, CPAP, BiPAP) occurred.     Melolabial Interpolation Flap Text: A decision was made to reconstruct the defect utilizing an interpolation axial flap and a staged reconstruction.  A telfa template was made of the defect.  This telfa template was then used to outline the melolabial interpolation flap.  The donor area for the pedicle flap was then injected with anesthesia.  The flap was excised through the skin and subcutaneous tissue down to the layer of the underlying musculature.  The pedicle flap was carefully excised within this deep plane to maintain its blood supply.  The edges of the donor site were undermined.   The donor site was closed in a primary fashion.  The pedicle was then rotated into position and sutured.  Once the tube was sutured into place, adequate blood supply was confirmed with blanching and refill.  The pedicle was then wrapped with xeroform gauze and dressed appropriately with a telfa and gauze bandage to ensure continued blood supply and protect the attached pedicle.

## 2024-05-24 NOTE — PROGRESS NOTES
"  Rosana Aguiar presented for a  Medicare AWV and comprehensive Health Risk Assessment today. The following components were reviewed and updated:    Medical history  Family History  Social history  Allergies and Current Medications  Health Risk Assessment  Health Maintenance  Care Team         ** See Completed Assessments for Annual Wellness Visit within the encounter summary.**         The following assessments were completed:  Living Situation  CAGE  Depression Screening  Timed Get Up and Go-declined  Whisper Test  Cognitive Function Screening  Nutrition Screening  ADL Screening  PAQ Screening      Opioid documentation:      Patient does not have a current opioid prescription.        Vitals:    05/24/24 1131   BP: 121/60   Pulse: 71   SpO2: 98%   Weight: 76.4 kg (168 lb 6.9 oz)   Height: 5' 4.17" (1.63 m)     Body mass index is 28.76 kg/m².  Physical Exam  Vitals and nursing note reviewed.   Constitutional:       Appearance: She is well-developed.   HENT:      Head: Normocephalic.   Cardiovascular:      Rate and Rhythm: Normal rate and regular rhythm.      Heart sounds: Normal heart sounds.   Pulmonary:      Effort: Pulmonary effort is normal. No respiratory distress.      Breath sounds: Normal breath sounds.   Abdominal:      Palpations: Abdomen is soft. There is no mass.      Tenderness: There is no abdominal tenderness.   Musculoskeletal:         General: Normal range of motion.   Skin:     General: Skin is warm and dry.   Neurological:      Mental Status: She is alert and oriented to person, place, and time.      Motor: No abnormal muscle tone.   Psychiatric:         Speech: Speech normal.         Behavior: Behavior normal.               Diagnoses and health risks identified today and associated recommendations/orders:    1. Encounter for Medicare annual wellness exam  - Ambulatory Referral/Consult to Enhanced Annual Wellness Visit (eAWV)  She will discuss vaccine with pulmonary, as she is in process of " being worked up for lung cancer    2. Pulmonary emphysema, unspecified emphysema type  Ct 11/22  Reports she is at her respiratory baseline  Continue current treatment plan as previously prescribed with your  pulmonologist.     3. Atherosclerosis of aorta  Ct 11/22  Continue current treatment plan as previously prescribed with your  cardiologist and vascular.     4. PVD (peripheral vascular disease)  S/p intervention  Reports chronic leg pain on exertion, declines worsening  Red flag s/s discussed (denies any) and advised 911/ER if occur. Patient expressed understanding.    Advised to follow up with vascular for further evaluation and recommendations. Patient expressed understanding.      5. Hyperlipidemia associated with type 2 diabetes mellitus  A1c 6.7  Lipid-s/c  Continue current treatment plan as previously prescribed with your  pcp and cardiologist.     6. Depression, recurrent  PHQ 2-0  Reports stress and anxiety over trying to quit smoking  Upcoming PET scan to rule out lung cancer  Discussed counseling. Psychiatry department contact information given to patient.   Advised to follow up with PCP for further evaluation and recommendations. Patient expressed understanding.      7. Pulmonary nodule  Upcoming PET  Continue current treatment plan as previously prescribed with your  pulmonologist.     8. Tobacco use  Discussed the importance of smoking cessation and advised to quit smoking. Patient expressed understanding. Declines smoking cessation program.     9. Multiple thyroid nodules  US 1/22  Advised to follow up with PCP for further evaluation and recommendations. Patient expressed understanding.      10. Osteopenia, unspecified location  Dexa 10/22  Continue current treatment plan as previously prescribed with your  pcp     11. Hypertension, unspecified type  Stable. Continue current treatment plan as previously prescribed with your  pcp and cardiologist.     12. Stenosis of carotid artery, unspecified  laterality  US 2/24  Continue current treatment plan as previously prescribed with your  cardiologist.     13. Coronary artery calcification  Ct 11/22  Continue current treatment plan as previously prescribed with your  cardiologist.     14. Abnormality of gait and mobility  Declines timed get up and go test. Denies any falls in the last 12 months.    Fall precautions reviewed with patient. Advised to follow up with PCP for further recommendations. Patient expressed understanding.       [facility] financial resources information page given to patient.  Declines case management referral at this time.     Provided Rosana with a 5-10 year written screening schedule and personal prevention plan. Recommendations were developed using the USPSTF age appropriate recommendations. Education, counseling, and referrals were provided as needed. After Visit Summary printed and given to patient which includes a list of additional screenings\tests needed.    Follow up in about 1 year (around 5/24/2025) for awv.    Marina Modi NP  I offered to discuss advanced care planning, including how to pick a person who would make decisions for you if you were unable to make them for yourself, called a health care power of , and what kind of decisions you might make such as use of life sustaining treatments such as ventilators and tube feeding when faced with a life limiting illness recorded on a living will that they will need to know. (How you want to be cared for as you near the end of your natural life)     X Patient is interested in learning more about how to make advanced directives.  I provided them paperwork and offered to discuss this with them.

## 2024-05-31 RX ORDER — GABAPENTIN 300 MG/1
300 CAPSULE ORAL 3 TIMES DAILY
Qty: 90 CAPSULE | Refills: 11 | Status: SHIPPED | OUTPATIENT
Start: 2024-05-31 | End: 2025-05-31

## 2024-06-04 ENCOUNTER — TUMOR BOARD CONFERENCE (OUTPATIENT)
Dept: PULMONOLOGY | Facility: CLINIC | Age: 74
End: 2024-06-04
Payer: MEDICARE

## 2024-06-04 NOTE — PROGRESS NOTES
[facility]     Patient Name: Rosana Aguiar    MRN: [id number]    Date of Tumor Board: 06/04/2024    Diagnosis:  Pulmonary nodule    Referring Provider:  Mick Jeffrey MD    Present PCTP Providers: Bro Lacey MD, Melchor Daniel MD, GREG Lopez, Faviola Marion NP, Elizabeth LeJeune, NP, GREG An    Smoking hx: Current, >30 pack years      Diagnostic Work Up:    Nodify ID:   Pre-test- 47%, CDT- No significant level, XL2- Indeterminate  Imaging:  LDCT 5/2024- Compared to 5/2023-increase in RLL nodule from 10 to 15mm, spiculated       Board Recommendations:    PET          The Multidisciplinary Tumor Board (MTB) is intended to assist the treatment team in developing a coordinated, comprehensive management plan for the patient. The deliberations of the MTB are not intended and should not be assumed to indicate any particular course of treatment with regard to the diagnosis, treatment, or management of the patient. Deliberations made or treatment options explored by the MTB are not a substitute for the professional judgment of the treating physician in diagnosing and treating the patient in accordance with the appropriate standard of care, applicable regulations, and facility policies. The MTB shall not be deemed under any circumstance to prescribe, order, or require certain medical care or medical or diagnostic services. The treating physician will remain solely responsible for making all medical, diagnostic, or care decisions concerning the patient.

## 2024-06-07 ENCOUNTER — HOSPITAL ENCOUNTER (OUTPATIENT)
Dept: RADIOLOGY | Facility: HOSPITAL | Age: 74
Discharge: HOME | End: 2024-06-07
Attending: INTERNAL MEDICINE
Payer: MEDICARE

## 2024-06-07 DIAGNOSIS — R91.1 PULMONARY NODULE: ICD-10-CM

## 2024-06-07 DIAGNOSIS — R91.1 NODULE OF RIGHT LUNG: ICD-10-CM

## 2024-06-07 PROCEDURE — A9552 F18 FDG: HCPCS | Mod: HCNC | Performed by: INTERNAL MEDICINE

## 2024-06-07 PROCEDURE — 78815 PET IMAGE W/CT SKULL-THIGH: CPT | Mod: TC,HCNC

## 2024-06-07 PROCEDURE — 78815 PET IMAGE W/CT SKULL-THIGH: CPT | Mod: 26,HCNC,PI, | Performed by: RADIOLOGY

## 2024-06-07 RX ORDER — FLUDEOXYGLUCOSE F18 500 MCI/ML
11.41 INJECTION INTRAVENOUS
Status: COMPLETED | OUTPATIENT
Start: 2024-06-07 | End: 2024-06-07

## 2024-06-07 RX ADMIN — FLUDEOXYGLUCOSE F-18 11.41 MILLICURIE: 500 INJECTION INTRAVENOUS at 07:06

## 2024-06-11 ENCOUNTER — OFFICE VISIT (OUTPATIENT)
Dept: PULMONOLOGY | Facility: CLINIC | Age: 74
End: 2024-06-11
Payer: MEDICARE

## 2024-06-11 VITALS
BODY MASS INDEX: 27.95 KG/M2 | OXYGEN SATURATION: 97 % | WEIGHT: 163.69 LBS | RESPIRATION RATE: 18 BRPM | HEART RATE: 76 BPM | DIASTOLIC BLOOD PRESSURE: 50 MMHG | HEIGHT: 64 IN | SYSTOLIC BLOOD PRESSURE: 118 MMHG

## 2024-06-11 DIAGNOSIS — R91.1 PULMONARY NODULE: ICD-10-CM

## 2024-06-11 DIAGNOSIS — R09.02 EXERCISE HYPOXEMIA: ICD-10-CM

## 2024-06-11 DIAGNOSIS — J41.0 SIMPLE CHRONIC BRONCHITIS: ICD-10-CM

## 2024-06-11 DIAGNOSIS — J43.1 PANLOBULAR EMPHYSEMA: ICD-10-CM

## 2024-06-11 DIAGNOSIS — F17.210 NICOTINE DEPENDENCE, CIGARETTES, UNCOMPLICATED: Primary | ICD-10-CM

## 2024-06-11 PROCEDURE — 3078F DIAST BP <80 MM HG: CPT | Mod: HCNC,CPTII,S$GLB, | Performed by: INTERNAL MEDICINE

## 2024-06-11 PROCEDURE — 1101F PT FALLS ASSESS-DOCD LE1/YR: CPT | Mod: HCNC,CPTII,S$GLB, | Performed by: INTERNAL MEDICINE

## 2024-06-11 PROCEDURE — 1159F MED LIST DOCD IN RCRD: CPT | Mod: HCNC,CPTII,S$GLB, | Performed by: INTERNAL MEDICINE

## 2024-06-11 PROCEDURE — 99214 OFFICE O/P EST MOD 30 MIN: CPT | Mod: HCNC,S$GLB,, | Performed by: INTERNAL MEDICINE

## 2024-06-11 PROCEDURE — 3074F SYST BP LT 130 MM HG: CPT | Mod: HCNC,CPTII,S$GLB, | Performed by: INTERNAL MEDICINE

## 2024-06-11 PROCEDURE — 3072F LOW RISK FOR RETINOPATHY: CPT | Mod: HCNC,CPTII,S$GLB, | Performed by: INTERNAL MEDICINE

## 2024-06-11 PROCEDURE — 99999 PR PBB SHADOW E&M-EST. PATIENT-LVL IV: CPT | Mod: PBBFAC,HCNC,, | Performed by: INTERNAL MEDICINE

## 2024-06-11 PROCEDURE — 3288F FALL RISK ASSESSMENT DOCD: CPT | Mod: HCNC,CPTII,S$GLB, | Performed by: INTERNAL MEDICINE

## 2024-06-11 NOTE — PROGRESS NOTES
Subjective:     Patient ID: Rosana Aguiar is a 74 y.o. female.    Chief Complaint:      HPI  74 y.o.  female smoker presenting for evaluation of abnormal chest x-ray - pulmonary nodule on CT and review of PET scan   Chest x-ray showed small left lung nodule/calcified granuloma.   More than 30 pack-year smoking history still smoknig.  Complains of coughing and shortness on exertion.  Not able to use albuterol inhaler properly.  Chronic cough with sputum production.    https://reference.FirstRide/calculator/4/solitary-pulmonary-nodule-malignancy-risk    Results for Solitary Pulmonary Nodule Malignancy Risk by QxMD  Results for Solitary Pulmonary Nodule Malignancy Risk by QxMD  Probability of Malignancy: 47.2 %  This probability is only an estimate and clinical assessment is required to further refine this estimation.  Answers calculated to formulate result:  1. Age? -- 74 Years  2. Smoker (current or previous)? -- Yes  3. Extra-thoracic cancer more than 5 years previous? -- No  4. Diameter? -- 15 mm  5. Upper Lobe? -- No  6. Spiculated? -- Yes  7. PET? -- Not Performed      May 24, 2024 at 10:54  Calculated at: https://qxmd.com/calculator_4/solitary-pulmonary-nodule-malignancy-risk  Get Calculate by [employer] for iOS, Android and web at http://[email or web address]        Past Medical History:   Diagnosis Date    Atherosclerotic PVD with intermittent claudication 10/19/2022    Diabetes mellitus, type 2 2003    BS didn't check 09/06/2023    High cholesterol     Hypertension     Macular degeneration      Past Surgical History:   Procedure Laterality Date    ANGIOGRAPHY OF LOWER EXTREMITY N/A 02/09/2024    Procedure: Angiogram Extremity Unilateral/poss pta right leg intervention;  Surgeon: Clint Urena MD;  Location: [facility] CATH LAB;  Service: Peripheral Vascular;  Laterality: N/A;  left common femoral access    APPENDECTOMY      CATARACT EXTRACTION Bilateral     CREATION OF FEMOROPOPLITEAL ARTERIAL BYPASS USING GRAFT  Right 3/14/2024    Procedure: CREATION, BYPASS, ARTERIAL, FEMORAL TO POPLITEAL, USING GRAFT;  Surgeon: Clint Urena MD;  Location: [facility];  Service: Peripheral Vascular;  Laterality: Right;    cyst removal from breast (Left)      EYE SURGERY  Cataract    HYSTERECTOMY      TONSILLECTOMY      TUBAL LIGATION  1975     Review of patient's allergies indicates:   Allergen Reactions    Clindamycin Other (See Comments)    Neomycin-bacitracin-polymyxin Itching    Nitrofurantoin monohyd/m-cryst Other (See Comments)     Current Outpatient Medications on File Prior to Visit   Medication Sig Dispense Refill    albuterol (VENTOLIN HFA) 90 mcg/actuation inhaler Inhale 2 puffs into the lungs every 6 (six) hours as needed for Wheezing. Rescue 18 g 11    amLODIPine (NORVASC) 10 MG tablet TAKE 1 TABLET ONE TIME DAILY 90 tablet 3    aspirin (ECOTRIN) 81 MG EC tablet Take 1 tablet (81 mg total) by mouth once daily. 90 tablet 3    blood sugar diagnostic Strp Check blood glucose levels two times a day 200 strip 2    blood-glucose meter kit Use as instructed 1 each 0    carvediloL (COREG) 6.25 MG tablet Take 1 tablet (6.25 mg total) by mouth 2 (two) times daily with meals. 180 tablet 2    cilostazoL (PLETAL) 50 MG Tab Take 1 tablet (50 mg total) by mouth 2 (two) times daily. 60 tablet 11    FLUoxetine 20 MG capsule Take 1 capsule (20 mg total) by mouth once daily. 90 capsule 3    fluticasone propionate (FLONASE) 50 mcg/actuation nasal spray 1 spray (50 mcg total) by Each Nostril route once daily. 16 g 2    gabapentin (NEURONTIN) 300 MG capsule Take 1 capsule (300 mg total) by mouth 3 (three) times daily. 90 capsule 11    HYDROcodone-acetaminophen (NORCO) 7.5-325 mg per tablet Take 1 tablet by mouth every 6 (six) hours as needed for Pain for up to 7 days. 20 tablet 0    ibuprofen (ADVIL,MOTRIN) 800 MG tablet Take 1 tablet (800 mg total) by mouth every 6 (six) hours if needed for mild pain for up to 10 days. 32 tablet 0    insulin  "glargine U-300 conc (TOUJEO MAX U-300 SOLOSTAR) 300 unit/mL (3 mL) insulin pen Inject 60 Units into the skin once daily. 3 pen 11    levocetirizine (XYZAL) 5 MG tablet Take 1 tablet (5 mg total) by mouth every evening. 30 tablet 11    losartan-hydrochlorothiazide 100-25 mg (HYZAAR) 100-25 mg per tablet TAKE 1 TABLET EVERY DAY (Patient taking differently: Take 1 tablet by mouth once daily.) 90 tablet 3    pen needle, diabetic (BD CAROL 2ND GEN PEN NEEDLE) 32 gauge x 5/32" Ndle 1 each by Misc.(Non-Drug; Combo Route) route once daily. 100 each 3    rosuvastatin (CRESTOR) 20 MG tablet Take 1 tablet (20 mg total) by mouth once daily. 90 tablet 3    tiotropium-olodateroL (STIOLTO RESPIMAT) 2.5-2.5 mcg/actuation Mist Inhale 2 puffs into the lungs once daily. Controller 4 g 11    varenicline (CHANTIX STARTING MONTH BOX) 0.5 mg (11)- 1 mg (42) tablet Take one 0.5mg tablet by mouth once daily x 3 days, THEN increase to one 0.5mg tablet by mouth twice daily x 4 days, THEN increase to one 1mg tablet by mouth twice daily 53 each 0     No current facility-administered medications on file prior to visit.     Social History     Socioeconomic History    Marital status:    Tobacco Use    Smoking status: Every Day     Current packs/day: 1.00     Average packs/day: 1 pack/day for 62.4 years (62.4 ttl pk-yrs)     Types: Cigarettes     Start date: 1962     Passive exposure: Past    Smokeless tobacco: Never    Tobacco comments:     HOLD MIDNIGHT PRIOR TO SX   Substance and Sexual Activity    Alcohol use: Yes     Comment: rarely; HOLD 72HRS    Drug use: Never    Sexual activity: Not Currently     Partners: Male     Birth control/protection: None     Social Determinants of Health     Financial Resource Strain: Medium Risk (5/24/2024)    Overall Financial Resource Strain (CARDIA)     Difficulty of Paying Living Expenses: Somewhat hard   Food Insecurity: No Food Insecurity (5/24/2024)    Hunger Vital Sign     Worried About Running Out " of Food in the Last Year: Never true     Ran Out of Food in the Last Year: Never true   Transportation Needs: No Transportation Needs (5/24/2024)    PRAPARE - Transportation     Lack of Transportation (Medical): No     Lack of Transportation (Non-Medical): No   Physical Activity: Inactive (5/24/2024)    Exercise Vital Sign     Days of Exercise per Week: 0 days     Minutes of Exercise per Session: 0 min   Stress: Stress Concern Present (5/24/2024)    [employer] [place] [employer] - Occupational Stress Questionnaire     Feeling of Stress : Rather much   Housing Stability: Low Risk  (5/24/2024)    Housing Stability Vital Sign     Unable to Pay for Housing in the Last Year: No     Homeless in the Last Year: No     Family History   Problem Relation Name Age of Onset    Cancer Mother          female    Diabetes Mother      Macular degeneration Mother      Alcohol abuse Father      Cancer Daughter          colon    Diabetes Maternal Uncle      Heart disease Neg Hx      Stroke Neg Hx         Review of Systems   Constitutional:  Positive for fatigue. Negative for fever.   HENT:  Positive for postnasal drip, rhinorrhea and congestion.    Eyes:  Negative for redness and itching.   Respiratory:  Positive for cough, sputum production, shortness of breath, dyspnea on extertion, use of rescue inhaler and Paroxysmal Nocturnal Dyspnea.    Cardiovascular:  Negative for chest pain, palpitations and leg swelling.   Genitourinary:  Negative for difficulty urinating and hematuria.   Endocrine:  Negative for cold intolerance and heat intolerance.    Skin:  Negative for rash.   Gastrointestinal:  Negative for nausea and abdominal pain.   Neurological:  Negative for dizziness, syncope, weakness and light-headedness.   Hematological:  Negative for adenopathy. Does not bruise/bleed easily.   Psychiatric/Behavioral:  Negative for sleep disturbance. The patient is not nervous/anxious.        Objective:      BP (!) 118/50   Pulse 76   " Resp 18   Ht 5' 4" (1.626 m)   Wt 74.2 kg (163 lb 11.1 oz)   SpO2 97%   BMI 28.10 kg/m²   Physical Exam  Vitals and nursing note reviewed.   Constitutional:       Appearance: She is well-developed.   HENT:      Head: Normocephalic and atraumatic.      Nose: Nose normal.      Mouth/Throat:      Pharynx: No oropharyngeal exudate.   Eyes:      Conjunctiva/sclera: Conjunctivae normal.      Pupils: Pupils are equal, round, and reactive to light.   Neck:      Thyroid: No thyromegaly.      Vascular: No JVD.      Trachea: No tracheal deviation.   Cardiovascular:      Rate and Rhythm: Normal rate and regular rhythm.      Heart sounds: Normal heart sounds.   Pulmonary:      Effort: Pulmonary effort is normal. No respiratory distress.      Breath sounds: Examination of the right-lower field reveals wheezing. Examination of the left-lower field reveals wheezing. Decreased breath sounds and wheezing present. No rhonchi or rales.   Chest:      Chest wall: No tenderness.   Abdominal:      General: Bowel sounds are normal.      Palpations: Abdomen is soft.   Musculoskeletal:         General: Normal range of motion.      Cervical back: Neck supple.   Lymphadenopathy:      Cervical: No cervical adenopathy.   Skin:     General: Skin is warm and dry.   Neurological:      Mental Status: She is alert and oriented to person, place, and time.       Personal Diagnostic Review  Chest CT reviewed        6/11/2024     9:27 AM   Pulmonary Studies Review   SpO2 97 %   Height 5' 4" (1.626 m)   Weight 74.2 kg (163 lb 11.1 oz)   BMI (Calculated) 28.1   Predicted Distance 259.58   Predicted Distance Meters (Calculated) 400.77 meters       NM PET CT FDG Skull Base to Mid Thigh  Narrative: EXAMINATION:  NM PET CT FDG SKULL BASE TO MID THIGH    CLINICAL HISTORY:  Lung nodule, > 8mm; Solitary pulmonary nodule    TECHNIQUE:  11.41 mCi of F18-FDG was administered intravenously in the right antecubital fossa.  After an approximately 60 min " "distribution time, PET/CT images were acquired from the skull base to the mid thigh. Transmission images were acquired to correct for attenuation using a whole body low-dose CT scan without contrast with the arms positioned above the head. Glycemia at the time of injection was 77 mg/dL.    COMPARISON:  LDCT, 05/22/2024    FINDINGS:  Quality of the study: Adequate.    SUV max of the mediastinal blood pool is 2.8    Neck: No abnormal FDG avidity.  No lymphadenopathy or mass.    Chest: The right lower lobe pulmonary nodule has decreased in size to 7 mm and shows no FDG avidity.  No FDG avid pulmonary nodule is seen.  No pleural effusion.  No FDG avid mediastinal or hilar lymphadenopathy.    Abdomen/pelvis: No abnormal FDG avidity.  No ascites or lymphadenopathy or mass.    Skeletal structures: No abnormal FDG avidity.  No focal lytic or sclerotic lesion in the osseous structures.    Physiologic uptake of the tracer is present within the brain, salivary glands, myocardium, GI and  tracts.    Incidental CT findings: Coronary artery calcifications.  Impression: The right lower lobe pulmonary nodule is not FDG avid and is smaller compared to the prior chest CT.  No abnormal FDG avidity on this exam.    Electronically signed by: Ascencion Davidson MD  Date:    06/07/2024  Time:    17:04      Office Spirometry Results:         6/11/2024     9:27 AM 5/24/2024    11:31 AM 5/24/2024    10:00 AM 3/17/2024     7:36 AM 3/17/2024     4:41 AM 3/17/2024    12:06 AM 3/16/2024     7:28 PM   Pulmonary Function Tests   SpO2 97 % 98 % 98 % 95 % 96 % 94 % 93 %   Height 5' 4" (1.626 m) 5' 4.17" (1.63 m) 5' 4" (1.626 m)       Weight 74.2 kg (163 lb 11.1 oz) 76.4 kg (168 lb 6.9 oz) 76.3 kg (168 lb 3.4 oz)       BMI (Calculated) 28.1 28.8 28.9             6/11/2024     9:27 AM   Pulmonary Studies Review   SpO2 97 %   Height 5' 4" (1.626 m)   Weight 74.2 kg (163 lb 11.1 oz)   BMI (Calculated) 28.1   Predicted Distance 259.58   Predicted Distance " Meters (Calculated) 400.77 meters           No results found for this or any previous visit (from the past 336 hour(s)).    Assessment:            Nicotine dependence, cigarettes, uncomplicated  -     CT Chest Lung Screening Low Dose; Future; Expected date: 06/11/2024    Simple chronic bronchitis  -     Six Minute Walk Test to qualify for Home Oxygen; Future  -     CT Chest Lung Screening Low Dose; Future; Expected date: 06/11/2024  -     Complete PFT with bronchodilator; Future; Expected date: 06/11/2024    Panlobular emphysema  -     Six Minute Walk Test to qualify for Home Oxygen; Future  -     CT Chest Lung Screening Low Dose; Future; Expected date: 06/11/2024  -     Complete PFT with bronchodilator; Future; Expected date: 06/11/2024    Exercise hypoxemia  -     Six Minute Walk Test to qualify for Home Oxygen; Future  -     CT Chest Lung Screening Low Dose; Future; Expected date: 06/11/2024    Pulmonary nodule  -     CT Chest Lung Screening Low Dose; Future; Expected date: 06/11/2024            Outpatient Encounter Medications as of 6/11/2024   Medication Sig Dispense Refill    albuterol (VENTOLIN HFA) 90 mcg/actuation inhaler Inhale 2 puffs into the lungs every 6 (six) hours as needed for Wheezing. Rescue 18 g 11    amLODIPine (NORVASC) 10 MG tablet TAKE 1 TABLET ONE TIME DAILY 90 tablet 3    aspirin (ECOTRIN) 81 MG EC tablet Take 1 tablet (81 mg total) by mouth once daily. 90 tablet 3    blood sugar diagnostic Strp Check blood glucose levels two times a day 200 strip 2    blood-glucose meter kit Use as instructed 1 each 0    carvediloL (COREG) 6.25 MG tablet Take 1 tablet (6.25 mg total) by mouth 2 (two) times daily with meals. 180 tablet 2    cilostazoL (PLETAL) 50 MG Tab Take 1 tablet (50 mg total) by mouth 2 (two) times daily. 60 tablet 11    FLUoxetine 20 MG capsule Take 1 capsule (20 mg total) by mouth once daily. 90 capsule 3    fluticasone propionate (FLONASE) 50 mcg/actuation nasal spray 1 spray (50  "mcg total) by Each Nostril route once daily. 16 g 2    gabapentin (NEURONTIN) 300 MG capsule Take 1 capsule (300 mg total) by mouth 3 (three) times daily. 90 capsule 11    HYDROcodone-acetaminophen (NORCO) 7.5-325 mg per tablet Take 1 tablet by mouth every 6 (six) hours as needed for Pain for up to 7 days. 20 tablet 0    ibuprofen (ADVIL,MOTRIN) 800 MG tablet Take 1 tablet (800 mg total) by mouth every 6 (six) hours if needed for mild pain for up to 10 days. 32 tablet 0    insulin glargine U-300 conc (TOUJEO MAX U-300 SOLOSTAR) 300 unit/mL (3 mL) insulin pen Inject 60 Units into the skin once daily. 3 pen 11    levocetirizine (XYZAL) 5 MG tablet Take 1 tablet (5 mg total) by mouth every evening. 30 tablet 11    losartan-hydrochlorothiazide 100-25 mg (HYZAAR) 100-25 mg per tablet TAKE 1 TABLET EVERY DAY (Patient taking differently: Take 1 tablet by mouth once daily.) 90 tablet 3    pen needle, diabetic (BD CAROL 2ND GEN PEN NEEDLE) 32 gauge x 5/32" Ndle 1 each by Misc.(Non-Drug; Combo Route) route once daily. 100 each 3    rosuvastatin (CRESTOR) 20 MG tablet Take 1 tablet (20 mg total) by mouth once daily. 90 tablet 3    tiotropium-olodateroL (STIOLTO RESPIMAT) 2.5-2.5 mcg/actuation Mist Inhale 2 puffs into the lungs once daily. Controller 4 g 11    varenicline (CHANTIX STARTING MONTH BOX) 0.5 mg (11)- 1 mg (42) tablet Take one 0.5mg tablet by mouth once daily x 3 days, THEN increase to one 0.5mg tablet by mouth twice daily x 4 days, THEN increase to one 1mg tablet by mouth twice daily 53 each 0    gabapentin (NEURONTIN) 300 MG capsule Take 1 capsule (300 mg total) by mouth every evening for 3 days, THEN 1 capsule (300 mg total) 2 (two) times daily for 3 days, THEN 1 capsule (300 mg total) 3 (three) times daily for 24 days. 81 capsule 0     No facility-administered encounter medications on file as of 6/11/2024.     Plan:       Requested Prescriptions      No prescriptions requested or ordered in this encounter "     Problem List Items Addressed This Visit       Pulmonary nodule    Relevant Orders    CT Chest Lung Screening Low Dose     Other Visit Diagnoses       Nicotine dependence, cigarettes, uncomplicated    -  Primary    Relevant Orders    CT Chest Lung Screening Low Dose    Simple chronic bronchitis        Relevant Orders    Six Minute Walk Test to qualify for Home Oxygen    CT Chest Lung Screening Low Dose    Complete PFT with bronchodilator    Panlobular emphysema        Relevant Orders    Six Minute Walk Test to qualify for Home Oxygen    CT Chest Lung Screening Low Dose    Complete PFT with bronchodilator    Exercise hypoxemia        Relevant Orders    Six Minute Walk Test to qualify for Home Oxygen    CT Chest Lung Screening Low Dose                 Follow up in about 1 year (around 6/11/2025) for CT - on return visit, 6 min walk - on return, PFT -return.    MEDICAL DECISION MAKING: Moderate to high complexity.  Overall, the multiple problems listed are of moderate to high severity that may impact quality of life and activities of daily living. Side effects of medications, treatment plan as well as options and alternatives reviewed and discussed with patient. There was counseling of patient concerning these issues.    Total time spent in counseling and coordination of care - 45  minutes of total time spent on the encounter, which includes face to face time and non-face to face time preparing to see the patient (eg, review of tests), Obtaining and/or reviewing separately obtained history, Documenting clinical information in the electronic or other health record, Independently interpreting results (not separately reported) and communicating results to the patient/family/caregiver, or Care coordination (not separately reported).    Time was used in discussion of prognosis, risks, benefits of treatment, instructions and compliance with regimen . Discussion with other physicians and/or health care providers - home  health or for use of durable medical equipment (oxygen, nebulizers, CPAP, BiPAP) occurred.

## 2024-06-26 DIAGNOSIS — I10 HYPERTENSION, UNSPECIFIED TYPE: ICD-10-CM

## 2024-06-26 DIAGNOSIS — Z79.4 TYPE 2 DIABETES MELLITUS WITHOUT COMPLICATION, WITH LONG-TERM CURRENT USE OF INSULIN: ICD-10-CM

## 2024-06-26 DIAGNOSIS — E11.9 TYPE 2 DIABETES MELLITUS WITHOUT COMPLICATION, WITH LONG-TERM CURRENT USE OF INSULIN: ICD-10-CM

## 2024-06-26 RX ORDER — LOSARTAN POTASSIUM AND HYDROCHLOROTHIAZIDE 25; 100 MG/1; MG/1
TABLET ORAL
Qty: 90 TABLET | Refills: 1 | Status: SHIPPED | OUTPATIENT
Start: 2024-06-26

## 2024-06-26 NOTE — TELEPHONE ENCOUNTER
Care Due:                  Date            Visit Type   Department     Provider  --------------------------------------------------------------------------------                                EP -                              PRIMARY      [facility]  Last Visit: 01-      CARE ([facility])   MEDICINE       Carito Kohler  Next Visit: None Scheduled  None         None Found                                                            Last  Test          Frequency    Reason                     Performed    Due Date  --------------------------------------------------------------------------------    Office Visit  6 months...  varenicline..............  01- 07-    HBA1C.......  6 months...  insulin..................  01- 07-    Lipid Panel.  12 months..  rosuvastatin.............  07- 07-    [employer] Embedded Care Due Messages. Reference number: [id number].   6/26/2024 2:16:33 AM CDT

## 2024-06-26 NOTE — TELEPHONE ENCOUNTER
Refill Routing Note   Medication(s) are not appropriate for processing by [facility] for the following reason(s):        Required labs abnormal    [facility] action(s):  Defer   Requires labs : Yes      Medication Therapy Plan: 3/14/24 ed vist reason: Postoperative pain of extremity      Appointments  past 12m or future 3m with PCP    Date Provider   Last Visit   1/22/2024 Carito Kohler MD   Next Visit   7/24/2024 Carito Kohler MD   ED visits in past 90 days: 0        Note composed:8:43 AM 06/26/2024

## 2024-07-15 ENCOUNTER — OFFICE VISIT (OUTPATIENT)
Dept: VASCULAR SURGERY | Facility: CLINIC | Age: 74
End: 2024-07-15
Payer: MEDICARE

## 2024-07-15 ENCOUNTER — HOSPITAL ENCOUNTER (OUTPATIENT)
Dept: VASCULAR SURGERY | Facility: HOSPITAL | Age: 74
Discharge: HOME | End: 2024-07-15
Attending: STUDENT IN AN ORGANIZED HEALTH CARE EDUCATION/TRAINING PROGRAM
Payer: MEDICARE

## 2024-07-15 VITALS — HEART RATE: 67 BPM | SYSTOLIC BLOOD PRESSURE: 120 MMHG | DIASTOLIC BLOOD PRESSURE: 62 MMHG

## 2024-07-15 DIAGNOSIS — I10 PRIMARY HYPERTENSION: ICD-10-CM

## 2024-07-15 DIAGNOSIS — I70.219 INTERMITTENT CLAUDICATION DUE TO ATHEROSCLEROSIS OF ARTERY OF EXTREMITY: Primary | ICD-10-CM

## 2024-07-15 DIAGNOSIS — M79.2 NEUROPATHIC PAIN: ICD-10-CM

## 2024-07-15 DIAGNOSIS — I67.81 ACUTE CEREBROVASCULAR INSUFFICIENCY: ICD-10-CM

## 2024-07-15 DIAGNOSIS — E78.5 HYPERLIPIDEMIA, UNSPECIFIED HYPERLIPIDEMIA TYPE: ICD-10-CM

## 2024-07-15 DIAGNOSIS — I65.29 STENOSIS OF CAROTID ARTERY, UNSPECIFIED LATERALITY: ICD-10-CM

## 2024-07-15 DIAGNOSIS — I70.219 INTERMITTENT CLAUDICATION DUE TO ATHEROSCLEROSIS OF ARTERY OF EXTREMITY: ICD-10-CM

## 2024-07-15 LAB
LEFT ABI: 0.7
LEFT ARM BP: 137 MMHG
LEFT DORSALIS PEDIS: 90 MMHG
LEFT POSTERIOR TIBIAL: 97 MMHG
LEFT TBI: 0.58
LEFT TOE PRESSURE: 80 MMHG
OHS CV LOWER EXTREMITY GRAFT MEASUREMENTS - RIGHT - G1 - D: 74
OHS CV LOWER EXTREMITY GRAFT MEASUREMENTS - RIGHT - G1 - DA: 104
OHS CV LOWER EXTREMITY GRAFT MEASUREMENTS - RIGHT - G1 - M: 84
OHS CV LOWER EXTREMITY GRAFT MEASUREMENTS - RIGHT - G1 - P: 93
OHS CV LOWER EXTREMITY GRAFT MEASUREMENTS - RIGHT - G1 - PA: 188
RIGHT ABI: 1.06
RIGHT ARM BP: 138 MMHG
RIGHT CFA PSV: 293 CM/S
RIGHT DORSALIS PEDIS: 146 MMHG
RIGHT EXTERNAL ILLIAC PSV: 327 CM/S
RIGHT POPLITEAL PSV: 198 CM/S
RIGHT POSTERIOR TIBIAL: 138 MMHG
RIGHT TBI: 0.78
RIGHT TOE PRESSURE: 107 MMHG

## 2024-07-15 PROCEDURE — 3074F SYST BP LT 130 MM HG: CPT | Mod: HCNC,CPTII,S$GLB, | Performed by: STUDENT IN AN ORGANIZED HEALTH CARE EDUCATION/TRAINING PROGRAM

## 2024-07-15 PROCEDURE — 3072F LOW RISK FOR RETINOPATHY: CPT | Mod: HCNC,CPTII,S$GLB, | Performed by: STUDENT IN AN ORGANIZED HEALTH CARE EDUCATION/TRAINING PROGRAM

## 2024-07-15 PROCEDURE — 93922 UPR/L XTREMITY ART 2 LEVELS: CPT | Mod: HCNC

## 2024-07-15 PROCEDURE — 99214 OFFICE O/P EST MOD 30 MIN: CPT | Mod: HCNC,S$GLB,, | Performed by: STUDENT IN AN ORGANIZED HEALTH CARE EDUCATION/TRAINING PROGRAM

## 2024-07-15 PROCEDURE — 93922 UPR/L XTREMITY ART 2 LEVELS: CPT | Mod: 26,HCNC,, | Performed by: STUDENT IN AN ORGANIZED HEALTH CARE EDUCATION/TRAINING PROGRAM

## 2024-07-15 PROCEDURE — 1159F MED LIST DOCD IN RCRD: CPT | Mod: HCNC,CPTII,S$GLB, | Performed by: STUDENT IN AN ORGANIZED HEALTH CARE EDUCATION/TRAINING PROGRAM

## 2024-07-15 PROCEDURE — 93926 LOWER EXTREMITY STUDY: CPT | Mod: 26,HCNC,RT, | Performed by: STUDENT IN AN ORGANIZED HEALTH CARE EDUCATION/TRAINING PROGRAM

## 2024-07-15 PROCEDURE — 93926 LOWER EXTREMITY STUDY: CPT | Mod: HCNC,RT

## 2024-07-15 PROCEDURE — 3078F DIAST BP <80 MM HG: CPT | Mod: HCNC,CPTII,S$GLB, | Performed by: STUDENT IN AN ORGANIZED HEALTH CARE EDUCATION/TRAINING PROGRAM

## 2024-07-15 PROCEDURE — 1160F RVW MEDS BY RX/DR IN RCRD: CPT | Mod: HCNC,CPTII,S$GLB, | Performed by: STUDENT IN AN ORGANIZED HEALTH CARE EDUCATION/TRAINING PROGRAM

## 2024-07-15 PROCEDURE — 99999 PR PBB SHADOW E&M-EST. PATIENT-LVL III: CPT | Mod: PBBFAC,HCNC,, | Performed by: STUDENT IN AN ORGANIZED HEALTH CARE EDUCATION/TRAINING PROGRAM

## 2024-07-15 RX ORDER — HYDROCODONE BITARTRATE AND ACETAMINOPHEN 5; 325 MG/1; MG/1
1 TABLET ORAL EVERY 6 HOURS PRN
Qty: 12 TABLET | Refills: 0 | Status: SHIPPED | OUTPATIENT
Start: 2024-07-15

## 2024-07-15 NOTE — PROGRESS NOTES
Clint Urena    OFFICE NOTE    DATE OF VISIT: 7/15/2024  PATIENT NAME: Rosana Aguiar  : 1950  MRN: [id number]  PRIMARY CARE PHYSICIAN: Carito Kohler MD  CARDIOLOGIST:   REFERRING PROVIDER: No ref. provider found    CHIEF COMPLAINT   Chief Complaint   Patient presents with    Peripheral Vascular Disease     Patient in for follow up exams.       HISTORY OF PRESENT ILLNESS:  Rosana Aguiar is a 74 y.o. female who presents to clinic today she is now 3 months s/p right femoral above knee popliteal bypass for short distance claudication. She still is having tremendous pain in her legs. Even standing up will cause pain. She does not describe claudication symptoms anymore but rather pain upon standing or even manipulating the leg in some way. She has cut back on smoking but has not stopped. She does have warm and better color in the right leg compared to left.    ALLERGIES:  Review of patient's allergies indicates:   Allergen Reactions    Clindamycin Other (See Comments)    Neomycin-bacitracin-polymyxin Itching    Nitrofurantoin monohyd/m-cryst Other (See Comments)       PAST MEDICAL HISTORY:  Past Medical History:   Diagnosis Date    Atherosclerotic PVD with intermittent claudication 10/19/2022    Diabetes mellitus, type 2     BS didn't check 2023    High cholesterol     Hypertension     Macular degeneration        PAST SURGICAL HISTORY:  Past Surgical History:   Procedure Laterality Date    ANGIOGRAPHY OF LOWER EXTREMITY N/A 2024    Procedure: Angiogram Extremity Unilateral/poss pta right leg intervention;  Surgeon: Clint Urena MD;  Location: [facility] CATH [facility];  Service: Peripheral Vascular;  Laterality: N/A;  left common femoral access    APPENDECTOMY      CATARACT EXTRACTION Bilateral     CREATION OF FEMOROPOPLITEAL ARTERIAL BYPASS USING GRAFT Right 3/14/2024    Procedure: CREATION, BYPASS, ARTERIAL, FEMORAL TO POPLITEAL, USING GRAFT;  Surgeon: Clint Urena MD;  Location: [facility]  OR;  Service: Peripheral Vascular;  Laterality: Right;    cyst removal from breast (Left)      EYE SURGERY  Cataract    HYSTERECTOMY      TONSILLECTOMY      TUBAL LIGATION  1975       SOCIAL HISTORY:   Social History     Tobacco Use    Smoking status: Every Day     Current packs/day: 1.00     Average packs/day: 1 pack/day for 62.5 years (62.5 ttl pk-yrs)     Types: Cigarettes     Start date: 1962     Passive exposure: Past    Smokeless tobacco: Never    Tobacco comments:     HOLD MIDNIGHT PRIOR TO SX   Substance Use Topics    Alcohol use: Yes     Comment: rarely; HOLD 72HRS    Drug use: Never       FAMILY HISTORY:  Family History   Problem Relation Name Age of Onset    Cancer Mother          female    Diabetes Mother      Macular degeneration Mother      Alcohol abuse Father      Cancer Daughter          colon    Diabetes Maternal Uncle      Heart disease Neg Hx      Stroke Neg Hx         REVIEW OF SYSTEMS:  ROS  10 pt ros otherwise negative    PHYSICAL EXAM:  Vitals:    07/15/24 1033   BP: 120/62   Pulse: 67      Physical Exam      Vss  Gen nad alert oriented  Cv rrr  Lung ctab  And soft nt nd    VASCULAR LAB STUDIES:  EIA 327cm/s  In flow 293cm/s  Prox anast 188cm/s  Prox and distal graft no evidence of stenosis biphasic flow  ROBINA 1.06 right leg toe pressure 107  Left leg 0.70 with toe pessue 80    ASSESSMENT AND PLAN:  No problem-specific Assessment & Plan notes found for this encounter.      Rosana was seen today for peripheral vascular disease.    Diagnoses and all orders for this visit:    Neuropathic pain  -     Ambulatory referral/consult to Neurology; Future    Hyperlipidemia, unspecified hyperlipidemia type  -     Ankle Brachial Indices (ROBINA); Future  -     CV Ultrasound doppler arterial leg right; Future  -     CV Ultrasound Bilateral Doppler Carotid; Future    Primary hypertension    Stenosis of carotid artery, unspecified laterality    Acute cerebrovascular insufficiency  -     CV Ultrasound Bilateral  Doppler Carotid; Future        No follow-ups on file.        Clint Urena  [facility]  Vascular Surgery  [phone] (Clinic Number)

## 2024-07-15 NOTE — ASSESSMENT & PLAN NOTE
Patients ROBINA right leg has improved from 0.7 to 1.06 she still feels fatigue and pain in the right leg. I will send for neurology eval for possible neurogenic claudication. I will see her back in 3 months for symptom check. She is to wear compression stockings over the next few years

## 2024-07-20 NOTE — PROGRESS NOTES
"Subjective:      Patient ID: Rosana Aguiar is a 74 y.o. female.    Chief Complaint: Neuropathic Pain.     HPI 74 Years old Female with PMHx of CAD / HTN / HLD / DM others Medical issues   came for the evaluation and recommendation of  Neuropathic Pain.      Started: about 2 years.   Describes: " stick me like a needle ".   Timing: intermittent / constant.   Frequency: daily.   Pain:  3 to 5/ 10.   Location: Legs / Knees..   Family: Mother DM.   Medications: Crestor / Gabapentin / Pletal / ASA / Advil  Worsen: postural Tremors / walking increase Legs pain  Alleviated: resting.   Associated symptoms:   numbness / Knees / LBP / near falls / unstable / wabble / Tremors.   Triggers: walking.   Prodrome symptoms: none.          Referral by Vascular service - by pass on the right Leg / left leg pending.        No dizziness / no B and B incontinence / no shooting pain from neck and back      Review of Systems  Review of Systems   Musculoskeletal:  Positive for arthralgias, back pain and myalgias.   All other systems reviewed and are negative.    Objective:     Neurologic Exam     Mental Status   Oriented to person, place, and time.   Registration: recalls 3 of 3 objects. Recall at 5 minutes: recalls 2 of 3 objects. Follows 3 step commands.   Attention: normal. Concentration: normal.   Speech: speech is normal   Level of consciousness: alert  Knowledge: good. Able to perform simple calculations.   Able to name object. Able to read. Able to repeat. Able to write. Normal comprehension.     Cranial Nerves   Cranial nerves II through XII intact.     CN III, IV, VI   Pupils are equal, round, and reactive to light.    Motor Exam   Muscle bulk: normal (Antalgic)  Overall muscle tone: normal    Strength   Right neck flexion: 5/5  Left neck flexion: 5/5  Right neck extension: 5/5  Left neck extension: 5/5  Right deltoid: 5/5  Left deltoid: 5/5  Right biceps: 5/5  Left biceps: 5/5  Right triceps: 5/5  Left triceps: 5/5  Right " wrist flexion: 5/5  Left wrist flexion: 5/5  Right wrist extension: 5/5  Left wrist extension: 5/5  Right interossei: 5/5  Left interossei: 5/5  Right abdominals: 5/5  Left abdominals: 5/5  Right iliopsoas: 5/5  Left iliopsoas: 5/5  Right quadriceps: 5/5  Left quadriceps: 5/5  Right hamstrin/5  Left hamstrin/5  Right glutei: 5/5  Left glutei: 5/5  Right anterior tibial: 5/5  Left anterior tibial: 5/5  Right posterior tibial: 5/5  Left posterior tibial: 5/5  Right peroneal: 5/5  Left peroneal: 5/5  Right gastroc: 5/5  Left gastroc: 5/5Pain to pressure on the Legs muscles.      Sensory Exam   Light touch normal.   Vibration normal.   Right arm proprioception: normal  Left arm proprioception: normal  Right leg proprioception: decreased from ankle  Left leg proprioception: decreased from ankle  Pinprick normal.   Graphesthesia: normal  Stereognosis: normal    Gait, Coordination, and Reflexes     Gait  Gait: wide-based (Antalgic.)    Coordination   Romberg: positive  Finger to nose coordination: normal  Heel to shin coordination: normal  Tandem walking coordination: abnormal    Tremor   Resting tremor: absent  Intention tremor: present  Action tremor: left arm and right arm    Reflexes   Right brachioradialis: 1+  Left brachioradialis: 1+  Right biceps: 1+  Left biceps: 1+  Right triceps: 1+  Left triceps: 1+  Right patellar: 1+  Left patellar: 1+  Right achilles: 1+  Left achilles: 1+  Right : 1+  Left : 1+  Right plantar: normal  Left plantar: normal  Right Fish: absent  Left Fish: absent  Right ankle clonus: absent  Left ankle clonus: absent  Right pendular knee jerk: absent  Left pendular knee jerk: absent      Physical Exam  Vitals and nursing note reviewed.   Constitutional:       Appearance: Normal appearance. She is obese.   HENT:      Head: Normocephalic and atraumatic.      Right Ear: Tympanic membrane normal.      Left Ear: Tympanic membrane normal.      Nose: Nose normal.       Mouth/Throat:      Mouth: Mucous membranes are moist.      Pharynx: Oropharynx is clear.   Eyes:      Extraocular Movements: Extraocular movements intact.      Conjunctiva/sclera: Conjunctivae normal.      Pupils: Pupils are equal, round, and reactive to light.   Cardiovascular:      Rate and Rhythm: Normal rate and regular rhythm.      Pulses: Normal pulses.      Heart sounds: Normal heart sounds.   Pulmonary:      Effort: Pulmonary effort is normal.      Breath sounds: Normal breath sounds.   Abdominal:      General: Abdomen is flat. Bowel sounds are normal.      Palpations: Abdomen is soft.   Genitourinary:     Comments: Deferred.   Musculoskeletal:         General: Tenderness present. Normal range of motion.      Cervical back: Normal range of motion and neck supple.   Skin:     General: Skin is warm and dry.      Capillary Refill: Capillary refill takes less than 2 seconds.   Neurological:      Mental Status: She is alert and oriented to person, place, and time. Mental status is at baseline.      Cranial Nerves: Cranial nerves 2-12 are intact.      Coordination: Romberg Test abnormal. Finger-Nose-Finger Test and Heel to Shin Test normal.      Gait: Tandem walk abnormal.      Deep Tendon Reflexes:      Reflex Scores:       Tricep reflexes are 1+ on the right side and 1+ on the left side.       Bicep reflexes are 1+ on the right side and 1+ on the left side.       Brachioradialis reflexes are 1+ on the right side and 1+ on the left side.       Patellar reflexes are 1+ on the right side and 1+ on the left side.       Achilles reflexes are 1+ on the right side and 1+ on the left side.  Psychiatric:         Mood and Affect: Mood normal.         Speech: Speech normal.         Behavior: Behavior normal.         Thought Content: Thought content normal.         Judgment: Judgment normal.       Assessment:   74 Years old Female with PMHX as above came of the evaluation of  Neuropathic Pain.   - Myalgia.   - PVD.  -  Essential Tremors.   Plan:   Patient Neurological Assessment is remarkable for muscles pain.     Hold Crestor.     NCS/ EMG     ESR / CRP / Aldolase / CK / LDH.     Continue Gabapentin.     Labs: CBC / BMP - 2024 - non significant abnormalities.    Hgb A 1 C: 6.7 to 7.3 - last few months.     CUS: 70 % stenosis bilateral ICA ( 2022 ).     Please do not hesitate to contact me with any updates, questions or concerns.    Jh Villela MD.  General Neurologist.

## 2024-07-23 ENCOUNTER — TELEPHONE (OUTPATIENT)
Dept: PHYSICAL MEDICINE AND REHAB | Facility: CLINIC | Age: 74
End: 2024-07-23
Payer: MEDICARE

## 2024-07-23 ENCOUNTER — OFFICE VISIT (OUTPATIENT)
Dept: NEUROLOGY | Facility: CLINIC | Age: 74
End: 2024-07-23
Payer: MEDICARE

## 2024-07-23 ENCOUNTER — TELEPHONE (OUTPATIENT)
Dept: NEUROLOGY | Facility: CLINIC | Age: 74
End: 2024-07-23

## 2024-07-23 VITALS
HEIGHT: 64 IN | RESPIRATION RATE: 16 BRPM | OXYGEN SATURATION: 99 % | DIASTOLIC BLOOD PRESSURE: 53 MMHG | SYSTOLIC BLOOD PRESSURE: 120 MMHG | WEIGHT: 166 LBS | HEART RATE: 70 BPM | BODY MASS INDEX: 28.34 KG/M2

## 2024-07-23 DIAGNOSIS — Z79.4 TYPE 2 DIABETES MELLITUS WITHOUT COMPLICATION, WITH LONG-TERM CURRENT USE OF INSULIN: Primary | ICD-10-CM

## 2024-07-23 DIAGNOSIS — G72.0 STATIN MYOPATHY: Primary | ICD-10-CM

## 2024-07-23 DIAGNOSIS — G25.0 ESSENTIAL TREMOR: ICD-10-CM

## 2024-07-23 DIAGNOSIS — T46.6X5A STATIN MYOPATHY: Primary | ICD-10-CM

## 2024-07-23 DIAGNOSIS — E11.9 TYPE 2 DIABETES MELLITUS WITHOUT COMPLICATION, WITH LONG-TERM CURRENT USE OF INSULIN: Primary | ICD-10-CM

## 2024-07-23 PROCEDURE — 3072F LOW RISK FOR RETINOPATHY: CPT | Mod: HCNC,CPTII,S$GLB, | Performed by: PSYCHIATRY & NEUROLOGY

## 2024-07-23 PROCEDURE — 1160F RVW MEDS BY RX/DR IN RCRD: CPT | Mod: HCNC,CPTII,S$GLB, | Performed by: PSYCHIATRY & NEUROLOGY

## 2024-07-23 PROCEDURE — 3288F FALL RISK ASSESSMENT DOCD: CPT | Mod: HCNC,CPTII,S$GLB, | Performed by: PSYCHIATRY & NEUROLOGY

## 2024-07-23 PROCEDURE — 99204 OFFICE O/P NEW MOD 45 MIN: CPT | Mod: HCNC,S$GLB,, | Performed by: PSYCHIATRY & NEUROLOGY

## 2024-07-23 PROCEDURE — 3078F DIAST BP <80 MM HG: CPT | Mod: HCNC,CPTII,S$GLB, | Performed by: PSYCHIATRY & NEUROLOGY

## 2024-07-23 PROCEDURE — 1101F PT FALLS ASSESS-DOCD LE1/YR: CPT | Mod: HCNC,CPTII,S$GLB, | Performed by: PSYCHIATRY & NEUROLOGY

## 2024-07-23 PROCEDURE — 99999 PR PBB SHADOW E&M-EST. PATIENT-LVL V: CPT | Mod: PBBFAC,HCNC,, | Performed by: PSYCHIATRY & NEUROLOGY

## 2024-07-23 PROCEDURE — 3074F SYST BP LT 130 MM HG: CPT | Mod: HCNC,CPTII,S$GLB, | Performed by: PSYCHIATRY & NEUROLOGY

## 2024-07-23 PROCEDURE — 1125F AMNT PAIN NOTED PAIN PRSNT: CPT | Mod: HCNC,CPTII,S$GLB, | Performed by: PSYCHIATRY & NEUROLOGY

## 2024-07-23 PROCEDURE — 1159F MED LIST DOCD IN RCRD: CPT | Mod: HCNC,CPTII,S$GLB, | Performed by: PSYCHIATRY & NEUROLOGY

## 2024-07-23 RX ORDER — INSULIN GLARGINE 300 [IU]/ML
60 INJECTION, SOLUTION SUBCUTANEOUS DAILY
Qty: 3 PEN | Refills: 11 | Status: SHIPPED | OUTPATIENT
Start: 2024-07-23 | End: 2025-07-23

## 2024-07-23 NOTE — TELEPHONE ENCOUNTER
No care due was identified.  [facility] Embedded Care Due Messages. Reference number: [id number].   7/23/2024 8:41:43 AM CDT

## 2024-07-24 ENCOUNTER — OFFICE VISIT (OUTPATIENT)
Dept: INTERNAL MEDICINE | Facility: CLINIC | Age: 74
End: 2024-07-24
Payer: MEDICARE

## 2024-07-24 ENCOUNTER — LAB VISIT (OUTPATIENT)
Dept: LAB | Facility: HOSPITAL | Age: 74
End: 2024-07-24
Attending: PSYCHIATRY & NEUROLOGY
Payer: MEDICARE

## 2024-07-24 VITALS
HEART RATE: 77 BPM | DIASTOLIC BLOOD PRESSURE: 54 MMHG | SYSTOLIC BLOOD PRESSURE: 122 MMHG | BODY MASS INDEX: 28.71 KG/M2 | TEMPERATURE: 97 F | HEIGHT: 64 IN | OXYGEN SATURATION: 98 % | RESPIRATION RATE: 12 BRPM | WEIGHT: 168.19 LBS

## 2024-07-24 DIAGNOSIS — E08.311 DIABETES MELLITUS DUE TO UNDERLYING CONDITION WITH RETINOPATHY AND MACULAR EDEMA, WITH LONG-TERM CURRENT USE OF INSULIN, UNSPECIFIED LATERALITY, UNSPECIFIED RETINOPATHY SEVERITY: ICD-10-CM

## 2024-07-24 DIAGNOSIS — M79.604 PAIN IN BOTH LOWER EXTREMITIES: ICD-10-CM

## 2024-07-24 DIAGNOSIS — R91.1 NODULE OF RIGHT LUNG: ICD-10-CM

## 2024-07-24 DIAGNOSIS — I25.10 CORONARY ARTERY CALCIFICATION: ICD-10-CM

## 2024-07-24 DIAGNOSIS — Z79.4 DIABETES MELLITUS DUE TO UNDERLYING CONDITION WITH RETINOPATHY AND MACULAR EDEMA, WITH LONG-TERM CURRENT USE OF INSULIN, UNSPECIFIED LATERALITY, UNSPECIFIED RETINOPATHY SEVERITY: ICD-10-CM

## 2024-07-24 DIAGNOSIS — E11.51 TYPE 2 DIABETES MELLITUS WITH DIABETIC PERIPHERAL ANGIOPATHY WITHOUT GANGRENE, WITH LONG-TERM CURRENT USE OF INSULIN: ICD-10-CM

## 2024-07-24 DIAGNOSIS — I73.9 PVD (PERIPHERAL VASCULAR DISEASE): ICD-10-CM

## 2024-07-24 DIAGNOSIS — I70.0 ABDOMINAL AORTIC ATHEROSCLEROSIS: ICD-10-CM

## 2024-07-24 DIAGNOSIS — T46.6X5A STATIN MYOPATHY: ICD-10-CM

## 2024-07-24 DIAGNOSIS — I25.84 CORONARY ARTERY CALCIFICATION: ICD-10-CM

## 2024-07-24 DIAGNOSIS — I65.29 STENOSIS OF CAROTID ARTERY, UNSPECIFIED LATERALITY: ICD-10-CM

## 2024-07-24 DIAGNOSIS — Z79.4 TYPE 2 DIABETES MELLITUS WITH DIABETIC PERIPHERAL ANGIOPATHY WITHOUT GANGRENE, WITH LONG-TERM CURRENT USE OF INSULIN: ICD-10-CM

## 2024-07-24 DIAGNOSIS — G72.0 STATIN MYOPATHY: ICD-10-CM

## 2024-07-24 DIAGNOSIS — Z79.4 TYPE 2 DIABETES MELLITUS WITH DIABETIC PERIPHERAL ANGIOPATHY WITHOUT GANGRENE, WITH LONG-TERM CURRENT USE OF INSULIN: Primary | ICD-10-CM

## 2024-07-24 DIAGNOSIS — M79.605 PAIN IN BOTH LOWER EXTREMITIES: ICD-10-CM

## 2024-07-24 DIAGNOSIS — E11.51 TYPE 2 DIABETES MELLITUS WITH DIABETIC PERIPHERAL ANGIOPATHY WITHOUT GANGRENE, WITH LONG-TERM CURRENT USE OF INSULIN: Primary | ICD-10-CM

## 2024-07-24 LAB
ALBUMIN SERPL BCP-MCNC: 3.5 G/DL (ref 3.5–5.2)
ALP SERPL-CCNC: 81 U/L (ref 55–135)
ALT SERPL W/O P-5'-P-CCNC: 15 U/L (ref 10–44)
ANION GAP SERPL CALC-SCNC: 10 MMOL/L (ref 8–16)
AST SERPL-CCNC: 17 U/L (ref 10–40)
BASOPHILS # BLD AUTO: 0.06 K/UL (ref 0–0.2)
BASOPHILS NFR BLD: 0.8 % (ref 0–1.9)
BILIRUB SERPL-MCNC: 0.3 MG/DL (ref 0.1–1)
BUN SERPL-MCNC: 12 MG/DL (ref 8–23)
CALCIUM SERPL-MCNC: 8.9 MG/DL (ref 8.7–10.5)
CHLORIDE SERPL-SCNC: 95 MMOL/L (ref 95–110)
CHOLEST SERPL-MCNC: 118 MG/DL (ref 120–199)
CHOLEST/HDLC SERPL: 2.2 {RATIO} (ref 2–5)
CK SERPL-CCNC: 75 U/L (ref 20–180)
CO2 SERPL-SCNC: 25 MMOL/L (ref 23–29)
CREAT SERPL-MCNC: 1 MG/DL (ref 0.5–1.4)
CRP SERPL-MCNC: 1.6 MG/L (ref 0–8.2)
DIFFERENTIAL METHOD BLD: ABNORMAL
EOSINOPHIL # BLD AUTO: 0.2 K/UL (ref 0–0.5)
EOSINOPHIL NFR BLD: 2.6 % (ref 0–8)
ERYTHROCYTE [DISTWIDTH] IN BLOOD BY AUTOMATED COUNT: 13.7 % (ref 11.5–14.5)
ERYTHROCYTE [SEDIMENTATION RATE] IN BLOOD BY PHOTOMETRIC METHOD: 23 MM/HR (ref 0–36)
EST. GFR  (NO RACE VARIABLE): 58.4 ML/MIN/1.73 M^2
ESTIMATED AVG GLUCOSE: 134 MG/DL (ref 68–131)
GLUCOSE SERPL-MCNC: 101 MG/DL (ref 70–110)
HBA1C MFR BLD: 6.3 % (ref 4–5.6)
HCT VFR BLD AUTO: 36.2 % (ref 37–48.5)
HDLC SERPL-MCNC: 54 MG/DL (ref 40–75)
HDLC SERPL: 45.8 % (ref 20–50)
HGB BLD-MCNC: 12.1 G/DL (ref 12–16)
IMM GRANULOCYTES # BLD AUTO: 0.03 K/UL (ref 0–0.04)
IMM GRANULOCYTES NFR BLD AUTO: 0.4 % (ref 0–0.5)
LDH SERPL L TO P-CCNC: 183 U/L (ref 110–260)
LDLC SERPL CALC-MCNC: 47 MG/DL (ref 63–159)
LYMPHOCYTES # BLD AUTO: 1.7 K/UL (ref 1–4.8)
LYMPHOCYTES NFR BLD: 21.8 % (ref 18–48)
MCH RBC QN AUTO: 30.5 PG (ref 27–31)
MCHC RBC AUTO-ENTMCNC: 33.4 G/DL (ref 32–36)
MCV RBC AUTO: 91 FL (ref 82–98)
MONOCYTES # BLD AUTO: 0.6 K/UL (ref 0.3–1)
MONOCYTES NFR BLD: 8.3 % (ref 4–15)
NEUTROPHILS # BLD AUTO: 5 K/UL (ref 1.8–7.7)
NEUTROPHILS NFR BLD: 66.1 % (ref 38–73)
NONHDLC SERPL-MCNC: 64 MG/DL
NRBC BLD-RTO: 0 /100 WBC
PLATELET # BLD AUTO: 359 K/UL (ref 150–450)
PMV BLD AUTO: 9 FL (ref 9.2–12.9)
POTASSIUM SERPL-SCNC: 3.2 MMOL/L (ref 3.5–5.1)
PROT SERPL-MCNC: 6.8 G/DL (ref 6–8.4)
RBC # BLD AUTO: 3.97 M/UL (ref 4–5.4)
SODIUM SERPL-SCNC: 130 MMOL/L (ref 136–145)
TRIGL SERPL-MCNC: 85 MG/DL (ref 30–150)
WBC # BLD AUTO: 7.61 K/UL (ref 3.9–12.7)

## 2024-07-24 PROCEDURE — 1100F PTFALLS ASSESS-DOCD GE2>/YR: CPT | Mod: HCNC,CPTII,S$GLB, | Performed by: FAMILY MEDICINE

## 2024-07-24 PROCEDURE — 82085 ASSAY OF ALDOLASE: CPT | Mod: HCNC | Performed by: PSYCHIATRY & NEUROLOGY

## 2024-07-24 PROCEDURE — 99999 PR PBB SHADOW E&M-EST. PATIENT-LVL IV: CPT | Mod: PBBFAC,HCNC,, | Performed by: FAMILY MEDICINE

## 2024-07-24 PROCEDURE — G2211 COMPLEX E/M VISIT ADD ON: HCPCS | Mod: HCNC,S$GLB,, | Performed by: FAMILY MEDICINE

## 2024-07-24 PROCEDURE — 99214 OFFICE O/P EST MOD 30 MIN: CPT | Mod: HCNC,S$GLB,, | Performed by: FAMILY MEDICINE

## 2024-07-24 PROCEDURE — 80061 LIPID PANEL: CPT | Mod: HCNC | Performed by: FAMILY MEDICINE

## 2024-07-24 PROCEDURE — 3288F FALL RISK ASSESSMENT DOCD: CPT | Mod: HCNC,CPTII,S$GLB, | Performed by: FAMILY MEDICINE

## 2024-07-24 PROCEDURE — 80053 COMPREHEN METABOLIC PANEL: CPT | Mod: HCNC | Performed by: FAMILY MEDICINE

## 2024-07-24 PROCEDURE — 82550 ASSAY OF CK (CPK): CPT | Mod: HCNC | Performed by: PSYCHIATRY & NEUROLOGY

## 2024-07-24 PROCEDURE — 85651 RBC SED RATE NONAUTOMATED: CPT | Mod: HCNC | Performed by: PSYCHIATRY & NEUROLOGY

## 2024-07-24 PROCEDURE — 3078F DIAST BP <80 MM HG: CPT | Mod: HCNC,CPTII,S$GLB, | Performed by: FAMILY MEDICINE

## 2024-07-24 PROCEDURE — 36415 COLL VENOUS BLD VENIPUNCTURE: CPT | Mod: HCNC | Performed by: PSYCHIATRY & NEUROLOGY

## 2024-07-24 PROCEDURE — 86140 C-REACTIVE PROTEIN: CPT | Mod: HCNC | Performed by: PSYCHIATRY & NEUROLOGY

## 2024-07-24 PROCEDURE — 83036 HEMOGLOBIN GLYCOSYLATED A1C: CPT | Mod: HCNC | Performed by: FAMILY MEDICINE

## 2024-07-24 PROCEDURE — 1125F AMNT PAIN NOTED PAIN PRSNT: CPT | Mod: HCNC,CPTII,S$GLB, | Performed by: FAMILY MEDICINE

## 2024-07-24 PROCEDURE — 83615 LACTATE (LD) (LDH) ENZYME: CPT | Mod: HCNC | Performed by: PSYCHIATRY & NEUROLOGY

## 2024-07-24 PROCEDURE — 3074F SYST BP LT 130 MM HG: CPT | Mod: HCNC,CPTII,S$GLB, | Performed by: FAMILY MEDICINE

## 2024-07-24 PROCEDURE — 1160F RVW MEDS BY RX/DR IN RCRD: CPT | Mod: HCNC,CPTII,S$GLB, | Performed by: FAMILY MEDICINE

## 2024-07-24 PROCEDURE — 3072F LOW RISK FOR RETINOPATHY: CPT | Mod: HCNC,CPTII,S$GLB, | Performed by: FAMILY MEDICINE

## 2024-07-24 PROCEDURE — 85025 COMPLETE CBC W/AUTO DIFF WBC: CPT | Mod: HCNC | Performed by: FAMILY MEDICINE

## 2024-07-24 PROCEDURE — 1159F MED LIST DOCD IN RCRD: CPT | Mod: HCNC,CPTII,S$GLB, | Performed by: FAMILY MEDICINE

## 2024-07-24 NOTE — PROGRESS NOTES
Rosana Aguiar  07/24/2024  [id number]    Carito Kohler MD  Patient Care Team:  Carito Kohler MD as PCP - General (Family Medicine)  Jonathan Coburn MD as Consulting Physician (Interventional Cardiology)  Yifan Murillo OD as Consulting Physician (Optometry)  Clint Urena MD as Consulting Physician (Vascular Surgery)  iMck Jeffrey MD as Consulting Physician (Pulmonary Disease)          Visit Type:a scheduled routine follow-up visit    Chief Complaint:  Chief Complaint   Patient presents with    Diabetes     6M F/U       History of Present Illness:  74 year old  Follow up    Recently Saw Neuro  Muscle pains, Neurology  Advised to hold crestor to see if resolves  Contiunue Gabapentin  EMG ordered    She has known PVD, saw Dr. Coburn  On Statin and Pletal.  Had ABove knee poplieteal bypass 4 months ago. Still with pain.  (In smoking cessation)  VASCULAR LAB STUDIES:  EIA 327cm/s  In flow 293cm/s  Prox anast 188cm/s  Prox and distal graft no evidence of stenosis biphasic flow  ROBINA 1.06 right leg toe pressure 107  Left leg 0.70 with toe pessue 80    Patient has genetic family associated with Nichols Sundrome.  Daughter had colon cancer.  I ordered colon cancer screening on her, she never completed recent colonoscopy.    FOllowed with Pulm  Nodule.  PET done in June  FINDINGS:  Quality of the study: Adequate.     SUV max of the mediastinal blood pool is 2.8     Neck: No abnormal FDG avidity.  No lymphadenopathy or mass.    Chest: The right lower lobe pulmonary nodule has decreased in size to 7 mm and shows no FDG avidity.  No FDG avid pulmonary nodule is seen.  No pleural effusion.  No FDG avid mediastinal or hilar lymphadenopathy.     Abdomen/pelvis: No abnormal FDG avidity.  No ascites or lymphadenopathy or mass.     Skeletal structures: No abnormal FDG avidity.  No focal lytic or sclerotic lesion in the osseous structures.     Physiologic uptake of the tracer is present within the brain, salivary  glands, myocardium, GI and  tracts.     Incidental CT findings: Coronary artery calcifications.     Impression:     The right lower lobe pulmonary nodule is not FDG avid and is smaller compared to the prior chest CT.  No abnormal FDG avidity on this exam.     Cards- nuclear stress done in March of this year.      DM  Lab Results   Component Value Date    HGBA1C 6.7 (H) 01/22/2024     On TOujeo now. 60 units.   DOing about the same with sugars.          History:  Past Medical History:   Diagnosis Date    Atherosclerotic PVD with intermittent claudication 10/19/2022    Diabetes mellitus, type 2 2003    BS didn't check 09/06/2023    High cholesterol     Hypertension     Macular degeneration      Past Surgical History:   Procedure Laterality Date    ANGIOGRAPHY OF LOWER EXTREMITY N/A 02/09/2024    Procedure: Angiogram Extremity Unilateral/poss pta right leg intervention;  Surgeon: Clint Urena MD;  Location: [facility] CATH LAB;  Service: Peripheral Vascular;  Laterality: N/A;  left common femoral access    APPENDECTOMY      CATARACT EXTRACTION Bilateral     CREATION OF FEMOROPOPLITEAL ARTERIAL BYPASS USING GRAFT Right 3/14/2024    Procedure: CREATION, BYPASS, ARTERIAL, FEMORAL TO POPLITEAL, USING GRAFT;  Surgeon: Clint Urena MD;  Location: [facility] OR;  Service: Peripheral Vascular;  Laterality: Right;    cyst removal from breast (Left)      EYE SURGERY  Cataract    HYSTERECTOMY      TONSILLECTOMY      TUBAL LIGATION  1975     Family History   Problem Relation Name Age of Onset    Cancer Mother          female    Diabetes Mother      Macular degeneration Mother      Alcohol abuse Father      Cancer Daughter          colon    Diabetes Maternal Uncle      Heart disease Neg Hx      Stroke Neg Hx       Social History     Socioeconomic History    Marital status:    Tobacco Use    Smoking status: Every Day     Current packs/day: 1.00     Average packs/day: 1 pack/day for 62.6 years (62.6 ttl pk-yrs)     Types:  Cigarettes     Start date: 1962     Passive exposure: Past    Smokeless tobacco: Never    Tobacco comments:     HOLD MIDNIGHT PRIOR TO SX   Substance and Sexual Activity    Alcohol use: Yes     Comment: rarely; HOLD 72HRS    Drug use: Never    Sexual activity: Not Currently     Partners: Male     Birth control/protection: None     Social Determinants of Health     Financial Resource Strain: Medium Risk (5/24/2024)    Overall Financial Resource Strain (CARDIA)     Difficulty of Paying Living Expenses: Somewhat hard   Food Insecurity: No Food Insecurity (5/24/2024)    Hunger Vital Sign     Worried About Running Out of Food in the Last Year: Never true     Ran Out of Food in the Last Year: Never true   Transportation Needs: No Transportation Needs (5/24/2024)    PRAPARE - Transportation     Lack of Transportation (Medical): No     Lack of Transportation (Non-Medical): No   Physical Activity: Inactive (5/24/2024)    Exercise Vital Sign     Days of Exercise per Week: 0 days     Minutes of Exercise per Session: 0 min   Stress: Stress Concern Present (5/24/2024)    [employer] - Occupational Stress Questionnaire     Feeling of Stress : Rather much   Housing Stability: Low Risk  (5/24/2024)    Housing Stability Vital Sign     Unable to Pay for Housing in the Last Year: No     Homeless in the Last Year: No     Patient Active Problem List   Diagnosis    Hyperlipidemia    Hypertension    Type 2 diabetes mellitus with diabetic peripheral angiopathy without gangrene, with long-term current use of insulin    Thyroid nodule    Asymptomatic microscopic hematuria    Statin myopathy    Bruit of left carotid artery    Abdominal aortic atherosclerosis    Grief    Decreased ROM of right shoulder    Shoulder weakness    Intermittent claudication due to atherosclerosis of artery of extremity    Coronary artery calcification    Carotid stenosis    Osteopenia    Depression, recurrent    Pulmonary nodule     Diabetes mellitus due to underlying condition with retinopathy and macular edema, with long-term current use of insulin, unspecified laterality, unspecified retinopathy severity    Lymphedema    Nodule of right lung     Review of patient's allergies indicates:   Allergen Reactions    Clindamycin Other (See Comments)    Neomycin-bacitracin-polymyxin Itching    Nitrofurantoin monohyd/m-cryst Other (See Comments)       The following were reviewed at this visit: active problem list, medication list, allergies, family history, social history, and health maintenance.    Medications:  Current Outpatient Medications on File Prior to Visit   Medication Sig Dispense Refill    albuterol (VENTOLIN HFA) 90 mcg/actuation inhaler Inhale 2 puffs into the lungs every 6 (six) hours as needed for Wheezing. Rescue 18 g 11    amLODIPine (NORVASC) 10 MG tablet TAKE 1 TABLET EVERY DAY 90 tablet 0    aspirin (ECOTRIN) 81 MG EC tablet Take 1 tablet (81 mg total) by mouth once daily. 90 tablet 3    blood sugar diagnostic Strp Check blood glucose levels two times a day 200 strip 2    blood-glucose meter kit Use as instructed 1 each 0    carvediloL (COREG) 6.25 MG tablet Take 1 tablet (6.25 mg total) by mouth 2 (two) times daily with meals. 180 tablet 2    cilostazoL (PLETAL) 50 MG Tab Take 1 tablet (50 mg total) by mouth 2 (two) times daily. 60 tablet 11    FLUoxetine 20 MG capsule Take 1 capsule (20 mg total) by mouth once daily. 90 capsule 3    fluticasone propionate (FLONASE) 50 mcg/actuation nasal spray 1 spray (50 mcg total) by Each Nostril route once daily. 16 g 2    gabapentin (NEURONTIN) 300 MG capsule Take 1 capsule (300 mg total) by mouth 3 (three) times daily. 90 capsule 11    HYDROcodone-acetaminophen (NORCO) 5-325 mg per tablet Take 1 tablet by mouth every 6 (six) hours as needed for Pain. 12 tablet 0    HYDROcodone-acetaminophen (NORCO) 7.5-325 mg per tablet Take 1 tablet by mouth every 6 (six) hours as needed for Pain for up to  "7 days. 20 tablet 0    ibuprofen (ADVIL,MOTRIN) 800 MG tablet Take 1 tablet (800 mg total) by mouth every 6 (six) hours if needed for mild pain for up to 10 days. 32 tablet 0    insulin glargine U-300 conc (TOUJEO MAX U-300 SOLOSTAR) 300 unit/mL (3 mL) insulin pen Inject 60 Units into the skin once daily. 3 Pen 11    levocetirizine (XYZAL) 5 MG tablet Take 1 tablet (5 mg total) by mouth every evening. 30 tablet 11    losartan-hydrochlorothiazide 100-25 mg (HYZAAR) 100-25 mg per tablet TAKE 1 TABLET EVERY DAY 90 tablet 1    pen needle, diabetic (BD CAROL 2ND GEN PEN NEEDLE) 32 gauge x 5/32" Ndle 1 each by Misc.(Non-Drug; Combo Route) route once daily. 100 each 3    rosuvastatin (CRESTOR) 20 MG tablet Take 1 tablet (20 mg total) by mouth once daily. 90 tablet 3    tiotropium-olodateroL (STIOLTO RESPIMAT) 2.5-2.5 mcg/actuation Mist Inhale 2 puffs into the lungs once daily. Controller 4 g 11     No current facility-administered medications on file prior to visit.       Medications have been reviewed and reconciled with patient at this visit.  Barriers to medications reviewed with patient.    Adverse reactions to current medications reviewed with patient..    Over the counter medications reviewed and reconciled with patient.    Exam:  Wt Readings from Last 3 Encounters:   07/24/24 76.3 kg (168 lb 3.4 oz)   07/23/24 75.3 kg (166 lb 0.1 oz)   06/11/24 74.2 kg (163 lb 11.1 oz)     Temp Readings from Last 3 Encounters:   07/24/24 97.2 °F (36.2 °C) (Tympanic)   03/17/24 97.6 °F (36.4 °C) (Oral)   02/09/24 98 °F (36.7 °C) (Temporal)     BP Readings from Last 3 Encounters:   07/24/24 (!) 122/54   07/23/24 (!) 120/53   07/15/24 120/62     Pulse Readings from Last 3 Encounters:   07/24/24 77   07/23/24 70   07/15/24 67     Body mass index is 28.87 kg/m².      Review of Systems   Constitutional: Negative.  Negative for chills and fever.   HENT: Negative.  Negative for congestion, sinus pain and sore throat.    Eyes:  Negative for " blurred vision and double vision.   Respiratory:  Negative for cough, sputum production, shortness of breath and wheezing.    Cardiovascular:  Negative for chest pain, palpitations and leg swelling.   Gastrointestinal:  Negative for abdominal pain, constipation, diarrhea, heartburn, nausea and vomiting.   Genitourinary: Negative.    Musculoskeletal:  Positive for joint pain.   Skin: Negative.  Negative for rash.   Neurological: Negative.    Endo/Heme/Allergies: Negative.  Negative for polydipsia. Does not bruise/bleed easily.   Psychiatric/Behavioral:  Negative for depression and substance abuse.      Physical Exam  Nursing note reviewed.   Pulmonary:      Effort: Pulmonary effort is normal. No respiratory distress.   Neurological:      Mental Status: She is alert and oriented to person, place, and time.   Psychiatric:         Mood and Affect: Mood normal.         Behavior: Behavior normal.         Thought Content: Thought content normal.         Judgment: Judgment normal.         Laboratory Reviewed ({Yes)  Lab Results   Component Value Date    WBC 9.36 03/17/2024    HGB 10.2 (L) 03/17/2024    HCT 29.4 (L) 03/17/2024     03/17/2024    CHOL 107 (L) 07/11/2023    TRIG 83 07/11/2023    HDL 45 07/11/2023    ALT 14 03/15/2024    AST 14 03/15/2024     (L) 03/17/2024    K 3.4 (L) 03/17/2024    CL 97 03/17/2024    CREATININE 0.9 03/17/2024    BUN 12 03/17/2024    CO2 27 03/17/2024    TSH 1.285 01/06/2022    HGBA1C 6.7 (H) 01/22/2024       Rosana was seen today for diabetes.    Diagnoses and all orders for this visit:    Type 2 diabetes mellitus with diabetic peripheral angiopathy without gangrene, with long-term current use of insulin  -     Comprehensive Metabolic Panel; Future  -     Lipid Panel; Future  -     Hemoglobin A1C; Future  -     Microalbumin/Creatinine Ratio, Urine; Future    Abdominal aortic atherosclerosis  -     evolocumab (REPATHA SURECLICK) 140 mg/mL PnIj; Inject 1 mL (140 mg total) into the  skin every 14 (fourteen) days.    Stenosis of carotid artery, unspecified laterality  -     evolocumab (REPATHA SURECLICK) 140 mg/mL PnIj; Inject 1 mL (140 mg total) into the skin every 14 (fourteen) days.    Coronary artery calcification  -     Comprehensive Metabolic Panel; Future  -     Lipid Panel; Future  -     evolocumab (REPATHA SURECLICK) 140 mg/mL PnIj; Inject 1 mL (140 mg total) into the skin every 14 (fourteen) days.    Diabetes mellitus due to underlying condition with retinopathy and macular edema, with long-term current use of insulin, unspecified laterality, unspecified retinopathy severity  -     Comprehensive Metabolic Panel; Future  -     Lipid Panel; Future  -     Hemoglobin A1C; Future    Nodule of right lung  -     CBC Auto Differential; Future    PVD (peripheral vascular disease)  -     Lipid Panel; Future  -     evolocumab (REPATHA SURECLICK) 140 mg/mL PnIj; Inject 1 mL (140 mg total) into the skin every 14 (fourteen) days.    Pain in both lower extremities    EMG for leg pain              Care Plan/Goals: Reviewed    Goals    None         Follow up: Follow up in about 6 months (around 1/24/2025).    After visit summary was printed and given to patient upon discharge today.  Patient goals and care plan are included in After Visit Summary.

## 2024-07-25 ENCOUNTER — LAB VISIT (OUTPATIENT)
Dept: LAB | Facility: HOSPITAL | Age: 74
End: 2024-07-25
Attending: FAMILY MEDICINE
Payer: MEDICARE

## 2024-07-25 ENCOUNTER — TELEPHONE (OUTPATIENT)
Dept: INTERNAL MEDICINE | Facility: CLINIC | Age: 74
End: 2024-07-25
Payer: MEDICARE

## 2024-07-25 DIAGNOSIS — E11.51 TYPE 2 DIABETES MELLITUS WITH DIABETIC PERIPHERAL ANGIOPATHY WITHOUT GANGRENE, WITH LONG-TERM CURRENT USE OF INSULIN: ICD-10-CM

## 2024-07-25 DIAGNOSIS — Z79.4 TYPE 2 DIABETES MELLITUS WITH DIABETIC PERIPHERAL ANGIOPATHY WITHOUT GANGRENE, WITH LONG-TERM CURRENT USE OF INSULIN: Primary | ICD-10-CM

## 2024-07-25 DIAGNOSIS — Z79.4 TYPE 2 DIABETES MELLITUS WITH DIABETIC PERIPHERAL ANGIOPATHY WITHOUT GANGRENE, WITH LONG-TERM CURRENT USE OF INSULIN: ICD-10-CM

## 2024-07-25 DIAGNOSIS — E11.51 TYPE 2 DIABETES MELLITUS WITH DIABETIC PERIPHERAL ANGIOPATHY WITHOUT GANGRENE, WITH LONG-TERM CURRENT USE OF INSULIN: Primary | ICD-10-CM

## 2024-07-25 LAB
ALBUMIN/CREAT UR: 11.8 UG/MG (ref 0–30)
CREAT UR-MCNC: 119 MG/DL (ref 15–325)
MICROALBUMIN UR DL<=1MG/L-MCNC: 14 UG/ML

## 2024-07-25 PROCEDURE — 82043 UR ALBUMIN QUANTITATIVE: CPT | Mod: HCNC | Performed by: FAMILY MEDICINE

## 2024-07-26 LAB — ALDOLASE SERPL-CCNC: 2.9 U/L (ref 1.2–7.6)

## 2024-07-26 RX ORDER — POTASSIUM CHLORIDE 20 MEQ/1
20 TABLET, EXTENDED RELEASE ORAL DAILY
Qty: 3 TABLET | Refills: 0 | Status: SHIPPED | OUTPATIENT
Start: 2024-07-26

## 2024-08-08 DIAGNOSIS — J41.0 SIMPLE CHRONIC BRONCHITIS: ICD-10-CM

## 2024-08-09 RX ORDER — TIOTROPIUM BROMIDE AND OLODATEROL 3.124; 2.736 UG/1; UG/1
2 SPRAY, METERED RESPIRATORY (INHALATION) DAILY
Qty: 12 G | Refills: 3 | Status: SHIPPED | OUTPATIENT
Start: 2024-08-09

## 2024-08-16 ENCOUNTER — OFFICE VISIT (OUTPATIENT)
Dept: PHYSICAL MEDICINE AND REHAB | Facility: CLINIC | Age: 74
End: 2024-08-16
Payer: MEDICARE

## 2024-08-16 VITALS — WEIGHT: 168.19 LBS | RESPIRATION RATE: 13 BRPM | HEIGHT: 64 IN | BODY MASS INDEX: 28.71 KG/M2

## 2024-08-16 DIAGNOSIS — G72.0 STATIN MYOPATHY: ICD-10-CM

## 2024-08-16 DIAGNOSIS — T46.6X5A STATIN MYOPATHY: ICD-10-CM

## 2024-08-16 DIAGNOSIS — M54.16 LUMBAR RADICULOPATHY: Primary | ICD-10-CM

## 2024-08-16 PROCEDURE — 99999 PR PBB SHADOW E&M-EST. PATIENT-LVL III: CPT | Mod: PBBFAC,HCNC,, | Performed by: PHYSICAL MEDICINE & REHABILITATION

## 2024-08-16 NOTE — PROGRESS NOTES
[facility]   [address] [facility]  [place] [address]  Phone: [phone]        Full Name: Rosana Aguiar YOB: 1950  Patient ID: [id number]      Visit Date: 8/16/2024 08:43  Age: 74 Years 6 Months Old  Examining Physician: Kaila Reyes M.D.  Referring Physician:   Reason for Referral: lower extr      Chief Complaint   Patient presents with    Numbness     legs       HPI: This is a 74 y.o.  female being seen in clinic today for evaluation of chronic numbness/tingling in her legs with weakness and limited ROM.  She had vascular workup and procedure in the right leg, but still has pain and symptoms.  Standing/walking has become difficulty. At rest she can have spasms/cramping. At times she has achy pain across her low back. She has known statin myopathy and has discontinued her medication a month ago.    History obtained from patient    Past family, medical, social, and surgical history reviewed in chart    Review of Systems:     General- denies lethargy, weight change, fever, chills  Head/neck- denies swallowing difficulties  ENT- denies hearing changes  Cardiovascular-denies chest pain  Pulmonary- denies shortness of breath  GI- denies constipation or bowel incontinence  - denies bladder incontinence  Skin- denies wounds or rashes  Musculoskeletal- denies weakness, + pain  Neurologic- + numbness and tingling  Psychiatric- +depression, denies anxiety  Lymphatic-denies swelling  Endocrine- denies hypoglycemic symptoms/+DM history  All other pertinent systems negative     Physical Examination:  General: Well developed, well nourished female, NAD  HEENT:NCAT EOMI bilaterally   Pulmonary:Normal respirations    Spinal Examination: CERVICAL  Active ROM is within normal limits.  Inspection: No deformity of spinal alignment.     Spinal Examination: LUMBAR or THORACIC  Active ROM is limited at endranges  Inspection: No deformity of spinal alignment.    Palpation: No vertebral  tenderness to percussion.    Slr neg bilaterally    Bilateral Upper and Lower Extremities:  Pulses are 2+ at radial bilaterally.  Shoulder/Elbow/Wrist/Hand ROM   Hip/Knee/Ankle PROM wnl  Bilateral Extremities show normal capillary refill.  No signs of cyanosis, rubor, edema, skin changes, or dysvascular changes of appendages.  Nails appear intact.    Neurological Exam:  Cranial Nerves:  II-XII grossly intact    Manual Muscle Testing: (Motor 5=normal)  4/5 strength bilateral lower extremities except 3+/5 right hip flexor    No focal atrophy is noted of either upper or lower extremity.    Bilateral Reflexes:  Fish's response is absent bilaterally.  No clonus at knee or ankle.    Sensation: tested to light touch  - intact in legs except hypersensitivity at right lower ext greater than left    Gait: slow short stride, near shuffling limited rom and ankle and knee-worse on right      Entire procedure explained to patient prior to proceeding.  Verbal consent obtained    SNC      Nerve / Sites Rec. Site Onset Lat Peak Lat Amp Segments Distance Velocity     ms ms µV  mm m/s   R Sural - Ankle (Calf)      Calf Ankle 2.9 3.9 11.1 Calf - Ankle 140 49   R Superficial peroneal - Ankle      Lat leg Ankle 2.8 3.4 18.8 Lat leg - Ankle 140 51       MNC      Nerve / Sites Muscle Latency Amplitude Duration Rel Amp Segments Distance Lat Diff Velocity     ms mV ms %  mm ms m/s   R Peroneal - EDB      Ankle EDB 4.4 2.8 7.1 100 Ankle - EDB 80        Fibular head EDB 10.1 2.7 8.0 96.6 Fibular head - Ankle 295 5.7 52      Pop fossa EDB 12.0 2.6 7.2 95.7 Pop fossa - Fibular head 100 1.9 52   R Tibial - AH      Ankle AH 4.7 9.9 4.0 100 Ankle - Ankle 80        Pop fossa AH 14.0 5.6 5.1 57.2 Pop fossa - Ankle 410 9.2 44       EMG         EMG Summary Table     Spontaneous MUAP Recruitment   Muscle IA Fib PSW Fasc Other Dur. Dur Amp Dur Polys Pattern Effort   R. Rectus femoris N None None None . _NFT_ _NFT_ sl dec sl dec N N Max   R. Biceps  femoris (short head) N None None None . _NFT_ _NFT_ sl dec sl dec N sl red Max   R. Tibialis anterior N None None None . _NFT_ _NFT_ N N N sl red Max   R. Gastrocnemius (Medial head) Incr None 1+ None . _NFT_ _NFT_ N N 1+ sl red Max   R. Extensor digitorum brevis Incr 1+ None None . _NFT_ _NFT_ N N 1+ sl red Max   L. Rectus femoris N None None None . _NFT_ _NFT_ N sl dec N N Max   L. Extensor digitorum brevis N None None None . _NFT_ _NFT_ N Sl Incr 1+ sl red Max   L. Abductor hallucis Incr 1+ None None . _NFT_ _NFT_ N N 1+ Reduced Max                                INTERPRETATION  -Right superficial peroneal sensory nerve conduction study showed normal peak latency and amplitude  -Right sural sensory nerve conduction study showed normal peak latency and amplitude  -Right peroneal motor nerve conduction study showed normal latency, amplitude, and conduction velocity  -Right tibial motor nerve conduction study showed normal latency, amplitude, and conduction velocity  -Needle EMG examination performed to above mentioned muscles       IMPRESSION  ABNORMAL study  2. There is electrodiagnostic evidence of an acute on chronic radiculopathy of the bilateral S1 and right L5 nerve roots and a chronic radiculopathy of the left L5 nerve root. There is evidence of a residual mild statin induced myopathy affecting proximal musculature greater than distal    PLAN  Discussed in detail for greater than 30 minutes about diagnosis and treatment plan    1. Follow up with referring provider: Dr. Jh Villela-Cam*  2. Handouts on lumbar radic and exercise provided. Rec referral to IPM to consider ESIs and neuropathic medications. Consider PT to address lower ext strengthening, gait, and fall prevention  3. This study is good for one year. If symptoms worsen or do not improve, please re-consult.    Kaila Reyse M.D.  Physical Medicine and Rehab

## 2024-08-27 ENCOUNTER — TELEPHONE (OUTPATIENT)
Dept: NEUROLOGY | Facility: CLINIC | Age: 74
End: 2024-08-27
Payer: MEDICARE

## 2024-08-27 DIAGNOSIS — M54.16 LUMBAR RADICULOPATHY: Primary | ICD-10-CM

## 2024-08-27 NOTE — TELEPHONE ENCOUNTER
----- Message from Margaret Wilson LPN sent at 8/27/2024  1:50 PM CDT -----  Contact: Rosana    ----- Message -----  From: Briana Alicia  Sent: 8/27/2024   1:42 PM CDT  To: Eric Wayne is requesting a call back in regards to wanting an update on the referral to pain management that was discussed during the last appointment. Please give her a call back at [phone]  
Called pt in regard to message. She stated that Dr. Reyes mentioned her going to the pain management. I told her I would get with her staff and Ms. Felice Rizzo to see what needs to be done. She verbalized understanding.   
Hey hey, this patient said that Dr. RHOADES mentioned pain management after her study was done. Was she supposed to put in the referral, or have the patient discuss it with ms. Viveros and Dr. LUGO?        
loss

## 2024-08-28 ENCOUNTER — TELEPHONE (OUTPATIENT)
Dept: NEUROLOGY | Facility: CLINIC | Age: 74
End: 2024-08-28
Payer: MEDICARE

## 2024-08-28 NOTE — TELEPHONE ENCOUNTER
Called pt to advise that Dr. Villela placed a referral to pain management for her. I let her know that someone from that dept will call her to schedule. She expressed her gratitude and verbalized understanding.

## 2024-08-29 DIAGNOSIS — I10 HYPERTENSION, UNSPECIFIED TYPE: ICD-10-CM

## 2024-08-29 RX ORDER — AMLODIPINE BESYLATE 10 MG/1
10 TABLET ORAL DAILY
Qty: 90 TABLET | Refills: 3 | Status: SHIPPED | OUTPATIENT
Start: 2024-08-29

## 2024-08-29 NOTE — TELEPHONE ENCOUNTER
Refill Decision Note   Rosana Stefania  is requesting a refill authorization.  Brief Assessment and Rationale for Refill:  Approve     Medication Therapy Plan:         Comments:     Note composed:1:28 PM 08/29/2024

## 2024-08-29 NOTE — TELEPHONE ENCOUNTER
No care due was identified.  [facility] Embedded Care Due Messages. Reference number: [id number].   8/29/2024 1:01:50 AM CDT

## 2024-09-10 ENCOUNTER — OFFICE VISIT (OUTPATIENT)
Dept: PAIN MEDICINE | Facility: CLINIC | Age: 74
End: 2024-09-10
Payer: MEDICARE

## 2024-09-10 ENCOUNTER — HOSPITAL ENCOUNTER (OUTPATIENT)
Dept: RADIOLOGY | Facility: HOSPITAL | Age: 74
Discharge: HOME | End: 2024-09-10
Attending: ANESTHESIOLOGY
Payer: MEDICARE

## 2024-09-10 VITALS
SYSTOLIC BLOOD PRESSURE: 165 MMHG | BODY MASS INDEX: 28.31 KG/M2 | HEIGHT: 64 IN | WEIGHT: 165.81 LBS | RESPIRATION RATE: 16 BRPM | HEART RATE: 71 BPM | DIASTOLIC BLOOD PRESSURE: 71 MMHG

## 2024-09-10 DIAGNOSIS — M47.816 LUMBAR SPONDYLOSIS: ICD-10-CM

## 2024-09-10 DIAGNOSIS — E08.311 DIABETES MELLITUS DUE TO UNDERLYING CONDITION WITH RETINOPATHY AND MACULAR EDEMA, WITH LONG-TERM CURRENT USE OF INSULIN, UNSPECIFIED LATERALITY, UNSPECIFIED RETINOPATHY SEVERITY: ICD-10-CM

## 2024-09-10 DIAGNOSIS — M51.36 DDD (DEGENERATIVE DISC DISEASE), LUMBAR: ICD-10-CM

## 2024-09-10 DIAGNOSIS — M54.16 LUMBAR RADICULOPATHY, CHRONIC: ICD-10-CM

## 2024-09-10 DIAGNOSIS — M54.16 LUMBAR RADICULOPATHY: Primary | ICD-10-CM

## 2024-09-10 DIAGNOSIS — Z79.4 DIABETES MELLITUS DUE TO UNDERLYING CONDITION WITH RETINOPATHY AND MACULAR EDEMA, WITH LONG-TERM CURRENT USE OF INSULIN, UNSPECIFIED LATERALITY, UNSPECIFIED RETINOPATHY SEVERITY: ICD-10-CM

## 2024-09-10 DIAGNOSIS — I89.0 LYMPHEDEMA: ICD-10-CM

## 2024-09-10 DIAGNOSIS — I70.219 INTERMITTENT CLAUDICATION DUE TO ATHEROSCLEROSIS OF ARTERY OF EXTREMITY: ICD-10-CM

## 2024-09-10 DIAGNOSIS — M54.9 DORSALGIA, UNSPECIFIED: ICD-10-CM

## 2024-09-10 DIAGNOSIS — M54.16 LUMBAR RADICULOPATHY: ICD-10-CM

## 2024-09-10 PROCEDURE — 72100 X-RAY EXAM L-S SPINE 2/3 VWS: CPT | Mod: TC,HCNC,PN

## 2024-09-10 PROCEDURE — 3288F FALL RISK ASSESSMENT DOCD: CPT | Mod: HCNC,CPTII,S$GLB, | Performed by: ANESTHESIOLOGY

## 2024-09-10 PROCEDURE — 3077F SYST BP >= 140 MM HG: CPT | Mod: HCNC,CPTII,S$GLB, | Performed by: ANESTHESIOLOGY

## 2024-09-10 PROCEDURE — 99204 OFFICE O/P NEW MOD 45 MIN: CPT | Mod: HCNC,S$GLB,, | Performed by: ANESTHESIOLOGY

## 2024-09-10 PROCEDURE — 3078F DIAST BP <80 MM HG: CPT | Mod: HCNC,CPTII,S$GLB, | Performed by: ANESTHESIOLOGY

## 2024-09-10 PROCEDURE — 3072F LOW RISK FOR RETINOPATHY: CPT | Mod: HCNC,CPTII,S$GLB, | Performed by: ANESTHESIOLOGY

## 2024-09-10 PROCEDURE — 72100 X-RAY EXAM L-S SPINE 2/3 VWS: CPT | Mod: 26,HCNC,, | Performed by: RADIOLOGY

## 2024-09-10 PROCEDURE — 99999 PR PBB SHADOW E&M-EST. PATIENT-LVL IV: CPT | Mod: PBBFAC,HCNC,, | Performed by: ANESTHESIOLOGY

## 2024-09-10 PROCEDURE — 1159F MED LIST DOCD IN RCRD: CPT | Mod: HCNC,CPTII,S$GLB, | Performed by: ANESTHESIOLOGY

## 2024-09-10 PROCEDURE — 1101F PT FALLS ASSESS-DOCD LE1/YR: CPT | Mod: HCNC,CPTII,S$GLB, | Performed by: ANESTHESIOLOGY

## 2024-09-10 PROCEDURE — 1125F AMNT PAIN NOTED PAIN PRSNT: CPT | Mod: HCNC,CPTII,S$GLB, | Performed by: ANESTHESIOLOGY

## 2024-09-10 NOTE — PROGRESS NOTES
New Patient Interventional Pain Note (Initial Visit)    Referring Physician: Rashawn Dumont*    PCP: Carito Kohler MD    Chief Complaint:     Chief Complaint   Patient presents with    Low-back Pain     Patient has pain in lower back that radiates down in the legs and feet, sometimes in hips.  Pain level 7/10        SUBJECTIVE:    Rosana Aguiar is a 74 y.o. female who presents to the clinic for the evaluation of lower back pain.   Patient reports 2 year history of worsening lower back pain.  Patient denies any significant traumas to her lower back.  Patient has had previous grafting of vessels in her right lower extremity.  Patient denies any previous surgeries in her lumbar spine.  Patient does report over the last 2 years she has been helping her  who was diagnosed with cancer and has been lifting him more than usual  Lower back pain described as stabbing aching pain that starts in the midline of the lower back.  This pain then radiates along the posterior aspects down her bilateral lower extremities, right-greater-than-left, to the distal calf.  Pain is worse with prolonged walking and standing, better with relaxation and elevation of her foot.  Pain is currently rated as 7/10. Denies any fevers, chills, changes in gait, saddle anesthesia, or bowel and bladder incontinence    Patient denies any fevers, chills, changes in gait, saddle anesthesia, or bowel and bladder incontinence.      Non-Pharmacologic Treatments:  Physical Therapy/Home Exercise: yes  Ice/Heat:yes  TENS: no  Acupuncture: no  Massage: yes  Chiropractic: yes        Previous Pain Medications:  NSAIDs, Tylenol, muscle relaxers, neuropathics, opioids, topicals       report:  Reviewed and consistent with medication use as prescribed.    Pain Procedures:   None    Imaging:     Results for orders placed during the hospital encounter of 07/11/22    X-Ray Lumbar Spine AP And Lateral    Narrative  EXAMINATION:  XR LUMBAR SPINE AP  AND LATERAL    CLINICAL HISTORY:  Weakness    TECHNIQUE:  AP, lateral and spot images were performed of the lumbar spine.    COMPARISON:  None    FINDINGS:  Osteopenia.  Vertebral body heights maintained.  No spondylolisthesis.  Disc spaces maintained.  Lower lumbar spine facet arthropathy.  No acute osseous abnormality.  Prominent atherosclerotic vascular calcification with distal abdominal aortic ectasia.    Impression  Degenerative findings      Electronically signed by: Quinton Cardenas MD  Date:    07/11/2022  Time:    12:43          Past Medical History:   Diagnosis Date    Atherosclerotic PVD with intermittent claudication 10/19/2022    Diabetes mellitus, type 2 2003    BS didn't check 09/06/2023    High cholesterol     Hypertension     Macular degeneration      Past Surgical History:   Procedure Laterality Date    ANGIOGRAPHY OF LOWER EXTREMITY N/A 02/09/2024    Procedure: Angiogram Extremity Unilateral/poss pta right leg intervention;  Surgeon: Clint Urena MD;  Location: [facility] CATH LAB;  Service: Peripheral Vascular;  Laterality: N/A;  left common femoral access    APPENDECTOMY      CATARACT EXTRACTION Bilateral     CREATION OF FEMOROPOPLITEAL ARTERIAL BYPASS USING GRAFT Right 3/14/2024    Procedure: CREATION, BYPASS, ARTERIAL, FEMORAL TO POPLITEAL, USING GRAFT;  Surgeon: Clint Urena MD;  Location: [facility] OR;  Service: Peripheral Vascular;  Laterality: Right;    cyst removal from breast (Left)      EYE SURGERY  Cataract    HYSTERECTOMY      TONSILLECTOMY      TUBAL LIGATION  1975     Social History     Socioeconomic History    Marital status:    Tobacco Use    Smoking status: Every Day     Current packs/day: 1.00     Average packs/day: 1 pack/day for 62.7 years (62.7 ttl pk-yrs)     Types: Cigarettes     Start date: 1962     Passive exposure: Past    Smokeless tobacco: Never    Tobacco comments:     HOLD MIDNIGHT PRIOR TO SX   Substance and Sexual Activity    Alcohol use: Yes     Comment:  rarely; HOLD 72HRS    Drug use: Never    Sexual activity: Not Currently     Partners: Male     Birth control/protection: None     Social Determinants of Health     Financial Resource Strain: Medium Risk (5/24/2024)    Overall Financial Resource Strain (CARDIA)     Difficulty of Paying Living Expenses: Somewhat hard   Food Insecurity: No Food Insecurity (5/24/2024)    Hunger Vital Sign     Worried About Running Out of Food in the Last Year: Never true     Ran Out of Food in the Last Year: Never true   Transportation Needs: No Transportation Needs (5/24/2024)    PRAPARE - Transportation     Lack of Transportation (Medical): No     Lack of Transportation (Non-Medical): No   Physical Activity: Inactive (5/24/2024)    Exercise Vital Sign     Days of Exercise per Week: 0 days     Minutes of Exercise per Session: 0 min   Stress: Stress Concern Present (5/24/2024)    [employer] - Occupational Stress Questionnaire     Feeling of Stress : Rather much   Housing Stability: Low Risk  (5/24/2024)    Housing Stability Vital Sign     Unable to Pay for Housing in the Last Year: No     Homeless in the Last Year: No     Family History   Problem Relation Name Age of Onset    Cancer Mother          female    Diabetes Mother      Macular degeneration Mother      Alcohol abuse Father      Cancer Daughter          colon    Diabetes Maternal Uncle      Heart disease Neg Hx      Stroke Neg Hx         Review of patient's allergies indicates:   Allergen Reactions    Clindamycin Other (See Comments)    Neomycin-bacitracin-polymyxin Itching    Nitrofurantoin monohyd/m-cryst Other (See Comments)       Current Outpatient Medications   Medication Sig    albuterol (VENTOLIN HFA) 90 mcg/actuation inhaler Inhale 2 puffs into the lungs every 6 (six) hours as needed for Wheezing. Rescue    amLODIPine (NORVASC) 10 MG tablet Take 1 tablet (10 mg total) by mouth once daily.    aspirin (ECOTRIN) 81 MG EC tablet Take 1 tablet  "(81 mg total) by mouth once daily.    blood sugar diagnostic Strp Check blood glucose levels two times a day    blood-glucose meter kit Use as instructed    carvediloL (COREG) 6.25 MG tablet Take 1 tablet (6.25 mg total) by mouth 2 (two) times daily with meals.    cilostazoL (PLETAL) 50 MG Tab Take 1 tablet (50 mg total) by mouth 2 (two) times daily.    evolocumab (REPATHA SURECLICK) 140 mg/mL PnIj Inject 1 mL (140 mg total) into the skin every 14 (fourteen) days.    FLUoxetine 20 MG capsule Take 1 capsule (20 mg total) by mouth once daily.    fluticasone propionate (FLONASE) 50 mcg/actuation nasal spray 1 spray (50 mcg total) by Each Nostril route once daily.    gabapentin (NEURONTIN) 300 MG capsule Take 1 capsule (300 mg total) by mouth 3 (three) times daily.    HYDROcodone-acetaminophen (NORCO) 5-325 mg per tablet Take 1 tablet by mouth every 6 (six) hours as needed for Pain.    HYDROcodone-acetaminophen (NORCO) 7.5-325 mg per tablet Take 1 tablet by mouth every 6 (six) hours as needed for Pain for up to 7 days.    ibuprofen (ADVIL,MOTRIN) 800 MG tablet Take 1 tablet (800 mg total) by mouth every 6 (six) hours if needed for mild pain for up to 10 days.    insulin glargine U-300 conc (TOUJEO MAX U-300 SOLOSTAR) 300 unit/mL (3 mL) insulin pen Inject 60 Units into the skin once daily.    levocetirizine (XYZAL) 5 MG tablet Take 1 tablet (5 mg total) by mouth every evening.    losartan-hydrochlorothiazide 100-25 mg (HYZAAR) 100-25 mg per tablet TAKE 1 TABLET EVERY DAY    pen needle, diabetic (BD CAROL 2ND GEN PEN NEEDLE) 32 gauge x 5/32" Ndle 1 each by Misc.(Non-Drug; Combo Route) route once daily.    potassium chloride SA (K-DUR,KLOR-CON) 20 MEQ tablet Take 1 tablet (20 mEq total) by mouth once daily.    tiotropium-olodateroL (STIOLTO RESPIMAT) 2.5-2.5 mcg/actuation Mist Inhale 2 puffs into the lungs once daily - Controller    rosuvastatin (CRESTOR) 20 MG tablet Take 1 tablet (20 mg total) by mouth once daily.     No " "current facility-administered medications for this visit.         ROS  Review of Systems   Constitutional:  Negative for chills, diaphoresis, fatigue and fever.   HENT:  Negative for ear discharge, ear pain, rhinorrhea, trouble swallowing and voice change.    Respiratory:  Positive for shortness of breath. Negative for chest tightness, wheezing and stridor.    Cardiovascular:  Positive for leg swelling. Negative for chest pain.   Gastrointestinal:  Negative for blood in stool, diarrhea, nausea and vomiting.   Endocrine: Negative for cold intolerance and heat intolerance.   Genitourinary:  Positive for urgency. Negative for dysuria and hematuria.   Musculoskeletal:  Positive for arthralgias, back pain, gait problem, joint swelling and myalgias. Negative for neck pain and neck stiffness.   Skin:  Negative for rash.   Neurological:  Positive for tremors, weakness and numbness. Negative for seizures, speech difficulty, light-headedness and headaches.   Hematological:  Does not bruise/bleed easily.   Psychiatric/Behavioral:  Negative for agitation, confusion and suicidal ideas. The patient is nervous/anxious.             OBJECTIVE:  BP (!) 165/71   Pulse 71   Resp 16   Ht 5' 4" (1.626 m)   Wt 75.2 kg (165 lb 12.6 oz)   BMI 28.46 kg/m²         Physical Exam  Constitutional:       General: She is not in acute distress.     Appearance: Normal appearance. She is not ill-appearing.   HENT:      Head: Normocephalic and atraumatic.      Nose: No congestion or rhinorrhea.   Eyes:      Extraocular Movements: Extraocular movements intact.      Pupils: Pupils are equal, round, and reactive to light.   Pulmonary:      Effort: Pulmonary effort is normal.   Abdominal:      General: Abdomen is flat.      Palpations: Abdomen is soft.   Skin:     General: Skin is warm and dry.      Capillary Refill: Capillary refill takes 2 to 3 seconds.   Neurological:      General: No focal deficit present.      Mental Status: She is alert and " oriented to person, place, and time.      Sensory: No sensory deficit.      Motor: Weakness present. No abnormal muscle tone.      Gait: Gait abnormal.      Deep Tendon Reflexes:      Reflex Scores:       Patellar reflexes are 2+ on the right side and 2+ on the left side.       Achilles reflexes are 1+ on the right side and 1+ on the left side.     Comments: 4/5 strength in bilateral dorsiflexion   Psychiatric:         Mood and Affect: Mood normal.         Behavior: Behavior normal.         Thought Content: Thought content normal.           Musculoskeletal:        Lumbar Exam  Incision: no  Pain with Flexion: yes  Pain with Extension: yes  ROM:  Decreased  Paraspinous TTP:  Positive bilaterally  Facet TTP:  L5-S1  Facet Loading:  Positive bilaterally  SLR:  Positive on the right at 75°  SIJ TTP:  Positive bilaterally  GLORIA:  Positive on the right      LABS:  Lab Results   Component Value Date    WBC 7.61 07/24/2024    HGB 12.1 07/24/2024    HCT 36.2 (L) 07/24/2024    MCV 91 07/24/2024     07/24/2024       CMP  Sodium   Date Value Ref Range Status   07/24/2024 130 (L) 136 - 145 mmol/L Final     Potassium   Date Value Ref Range Status   07/24/2024 3.2 (L) 3.5 - 5.1 mmol/L Final     Chloride   Date Value Ref Range Status   07/24/2024 95 95 - 110 mmol/L Final     CO2   Date Value Ref Range Status   07/24/2024 25 23 - 29 mmol/L Final     Glucose   Date Value Ref Range Status   07/24/2024 101 70 - 110 mg/dL Final     BUN   Date Value Ref Range Status   07/24/2024 12 8 - 23 mg/dL Final     Creatinine   Date Value Ref Range Status   07/24/2024 1.0 0.5 - 1.4 mg/dL Final     Calcium   Date Value Ref Range Status   07/24/2024 8.9 8.7 - 10.5 mg/dL Final     Total Protein   Date Value Ref Range Status   07/24/2024 6.8 6.0 - 8.4 g/dL Final     Albumin   Date Value Ref Range Status   07/24/2024 3.5 3.5 - 5.2 g/dL Final     Total Bilirubin   Date Value Ref Range Status   07/24/2024 0.3 0.1 - 1.0 mg/dL Final     Comment:      For infants and newborns, interpretation of results should be based  on gestational age, weight and in agreement with clinical  observations.    Premature Infant recommended reference ranges:  Up to 24 hours.............<8.0 mg/dL  Up to 48 hours............<12.0 mg/dL  3-5 days..................<15.0 mg/dL  6-29 days.................<15.0 mg/dL       Alkaline Phosphatase   Date Value Ref Range Status   07/24/2024 81 55 - 135 U/L Final     AST   Date Value Ref Range Status   07/24/2024 17 10 - 40 U/L Final     ALT   Date Value Ref Range Status   07/24/2024 15 10 - 44 U/L Final     Anion Gap   Date Value Ref Range Status   07/24/2024 10 8 - 16 mmol/L Final     eGFR if    Date Value Ref Range Status   07/11/2022 >60.0 >60 mL/min/1.73 m^2 Final     eGFR if non    Date Value Ref Range Status   07/11/2022 >60.0 >60 mL/min/1.73 m^2 Final     Comment:     Calculation used to obtain the estimated glomerular filtration  rate (eGFR) is the CKD-EPI equation.          Lab Results   Component Value Date    HGBA1C 6.3 (H) 07/24/2024             ASSESSMENT:       74 y.o. year old female with lower back pain, consistent with     1. Lumbar radiculopathy  X-Ray Lumbar Spine 2 Or 3 Views    MRI Lumbar Spine Without Contrast      2. Intermittent claudication due to atherosclerosis of artery of extremity        3. Diabetes mellitus due to underlying condition with retinopathy and macular edema, with long-term current use of insulin, unspecified laterality, unspecified retinopathy severity        4. Lymphedema        5. Lumbar spondylosis        6. DDD (degenerative disc disease), lumbar        7. Dorsalgia, unspecified        8. 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  Pain appears to be consistent with lumbar radiculopathy.  Can not rule out overlapping diabetic neuropathy and vascular claudication.    - Anticoagulation use:   Yes aspirin and pletal    - Medications:  - continue gabapentin 300 mg 3 times a day      - Therapy:   Patient has completed 5 sessions of formal physical therapy with increased pain.  We will hold on physical therapy until pain is more controlled    - Imaging/Diagnostic:  - previous x-rays of lumbar spine reveal diffuse facet hypertrophy with loss of disc height at L4-5  - we will obtain MRI lumbar spine without contrast to further evaluate lower back pain with right lumbar radiculopathy that has continued despite over 8 weeks of conservative therapy    - Consults:   - continue follow up with Neurology for tremors of her lower extremity  - continue follow up with vascular surgery for vascular claudication      - Patient Questions: Answered all of the patient's questions regarding diagnosis, therapy, and treatment     This condition does not require this patient to take time off of work, and the primary goal of our Pain Management services is to improve the patient's functional capacity.     - Follow up visit: return to clinic after MRI        The above plan and management options were discussed at length with patient. Patient is in agreement with the above and verbalized understanding.    I discussed the goals of interventional chronic pain management with the patient on today's visit.  I explained the utility of injections for diagnostic and therapeutic purposes.  We  discussed a multimodal approach to pain including treating the patient's given worst pain at any given time.  We will use a systematic approach to addressing pain.  We will also adopt a multimodal approach that includes injections, adjuvant medications, physical therapy, at times psychiatry.  There may be a limited role for opioid use intermittently in the treatment of pain, more particularly for acute pain although no one approach can be used as a sole treatment modality.    I emphasized the importance of regular exercise, core strengthening and stretching, diet and weight loss as a cornerstone of long-term pain management.      Kranthi Grubbs MD  Interventional Pain Management  [facility]    Future Appointments   Date Time Provider Department Center   9/11/2024  9:40 AM Felice Rizzo NP [facility] NEURO [facility]   10/21/2024  8:30 AM [facility]   10/21/2024  9:00 AM [facility] CVT VASCULAR ULTRASOUND V CVTVU [facility]   10/21/2024  9:30 AM Clint Urena MD [facility] VASSGY [facility]   10/21/2024  9:30 AM [facility] CVT VASCULAR ULTRASOUND VH CVTVU [facility]   5/26/2025  9:00 AM [facility] CT1 LIMIT 500 LBS [facility] CT SCAN [facility]   5/26/2025  9:15 AM PULMONARY LAB, [facility] PULMFUN [facility]   5/26/2025 10:[facility]   5/26/2025 10:40 AM Mick Jeffrey MD [facility] PULMSVC [facility]           Disclaimer:  This note was prepared using voice recognition system and is likely to have sound alike errors that may have been overlooked even after proof reading.  Please call me with any questions    I spent a total of 45 minutes on the day of the visit.  This includes face to face time and non-face to face time preparing to see the patient (eg, review of tests), obtaining and/or reviewing separately obtained history, documenting clinical information in the electronic or other health record, independently interpreting results and communicating  results to the patient/family/caregiver, or care coordinator.

## 2024-09-15 ENCOUNTER — HOSPITAL ENCOUNTER (OUTPATIENT)
Dept: RADIOLOGY | Facility: HOSPITAL | Age: 74
Discharge: HOME | End: 2024-09-15
Attending: ANESTHESIOLOGY
Payer: MEDICARE

## 2024-09-15 DIAGNOSIS — M54.16 LUMBAR RADICULOPATHY: ICD-10-CM

## 2024-09-15 PROCEDURE — 72148 MRI LUMBAR SPINE W/O DYE: CPT | Mod: TC,HCNC

## 2024-09-15 PROCEDURE — 72148 MRI LUMBAR SPINE W/O DYE: CPT | Mod: 26,HCNC,, | Performed by: RADIOLOGY

## 2024-09-17 ENCOUNTER — E-CONSULT (OUTPATIENT)
Dept: CARDIOLOGY | Facility: CLINIC | Age: 74
End: 2024-09-17
Payer: MEDICARE

## 2024-09-17 ENCOUNTER — OFFICE VISIT (OUTPATIENT)
Dept: PAIN MEDICINE | Facility: CLINIC | Age: 74
End: 2024-09-17
Payer: MEDICARE

## 2024-09-17 VITALS
WEIGHT: 168 LBS | DIASTOLIC BLOOD PRESSURE: 68 MMHG | SYSTOLIC BLOOD PRESSURE: 115 MMHG | HEART RATE: 72 BPM | HEIGHT: 64 IN | RESPIRATION RATE: 16 BRPM | BODY MASS INDEX: 28.68 KG/M2

## 2024-09-17 DIAGNOSIS — I70.219 INTERMITTENT CLAUDICATION DUE TO ATHEROSCLEROSIS OF ARTERY OF EXTREMITY: ICD-10-CM

## 2024-09-17 DIAGNOSIS — Z79.4 TYPE 2 DIABETES MELLITUS WITH DIABETIC PERIPHERAL ANGIOPATHY WITHOUT GANGRENE, WITH LONG-TERM CURRENT USE OF INSULIN: ICD-10-CM

## 2024-09-17 DIAGNOSIS — M47.816 LUMBAR SPONDYLOSIS: ICD-10-CM

## 2024-09-17 DIAGNOSIS — I65.29 STENOSIS OF CAROTID ARTERY, UNSPECIFIED LATERALITY: ICD-10-CM

## 2024-09-17 DIAGNOSIS — E11.51 TYPE 2 DIABETES MELLITUS WITH DIABETIC PERIPHERAL ANGIOPATHY WITHOUT GANGRENE, WITH LONG-TERM CURRENT USE OF INSULIN: ICD-10-CM

## 2024-09-17 DIAGNOSIS — E78.00 PURE HYPERCHOLESTEROLEMIA: Primary | ICD-10-CM

## 2024-09-17 DIAGNOSIS — I25.10 CORONARY ARTERY CALCIFICATION: ICD-10-CM

## 2024-09-17 DIAGNOSIS — I70.0 ABDOMINAL AORTIC ATHEROSCLEROSIS: ICD-10-CM

## 2024-09-17 DIAGNOSIS — I25.84 CORONARY ARTERY CALCIFICATION: ICD-10-CM

## 2024-09-17 DIAGNOSIS — M54.16 LUMBAR RADICULOPATHY: Primary | ICD-10-CM

## 2024-09-17 DIAGNOSIS — Z79.01 ANTICOAGULATED: Primary | ICD-10-CM

## 2024-09-17 DIAGNOSIS — I10 PRIMARY HYPERTENSION: ICD-10-CM

## 2024-09-17 PROCEDURE — 99999 PR PBB SHADOW E&M-EST. PATIENT-LVL IV: CPT | Mod: PBBFAC,HCNC,, | Performed by: ANESTHESIOLOGY

## 2024-09-17 PROCEDURE — 3288F FALL RISK ASSESSMENT DOCD: CPT | Mod: HCNC,CPTII,S$GLB, | Performed by: ANESTHESIOLOGY

## 2024-09-17 PROCEDURE — G2211 COMPLEX E/M VISIT ADD ON: HCPCS | Mod: HCNC,S$GLB,, | Performed by: ANESTHESIOLOGY

## 2024-09-17 PROCEDURE — 3078F DIAST BP <80 MM HG: CPT | Mod: HCNC,CPTII,S$GLB, | Performed by: ANESTHESIOLOGY

## 2024-09-17 PROCEDURE — 3074F SYST BP LT 130 MM HG: CPT | Mod: HCNC,CPTII,S$GLB, | Performed by: ANESTHESIOLOGY

## 2024-09-17 PROCEDURE — 3072F LOW RISK FOR RETINOPATHY: CPT | Mod: HCNC,CPTII,S$GLB, | Performed by: ANESTHESIOLOGY

## 2024-09-17 PROCEDURE — 1125F AMNT PAIN NOTED PAIN PRSNT: CPT | Mod: HCNC,CPTII,S$GLB, | Performed by: ANESTHESIOLOGY

## 2024-09-17 PROCEDURE — 1101F PT FALLS ASSESS-DOCD LE1/YR: CPT | Mod: HCNC,CPTII,S$GLB, | Performed by: ANESTHESIOLOGY

## 2024-09-17 PROCEDURE — 99214 OFFICE O/P EST MOD 30 MIN: CPT | Mod: HCNC,S$GLB,, | Performed by: ANESTHESIOLOGY

## 2024-09-17 PROCEDURE — 1159F MED LIST DOCD IN RCRD: CPT | Mod: HCNC,CPTII,S$GLB, | Performed by: ANESTHESIOLOGY

## 2024-09-17 RX ORDER — TIZANIDINE 4 MG/1
4 TABLET ORAL NIGHTLY PRN
Qty: 30 TABLET | Refills: 1 | Status: SHIPPED | OUTPATIENT
Start: 2024-09-17

## 2024-09-17 NOTE — CONSULTS
[facility]  Response for E-Consult     Patient Name: Rosana Aguiar  MRN: [id number]  Primary Care Provider: Carito Kohler MD   Requesting Provider: Kranthi Grubbs MD  Consults    Recommendation: Pt can hold aspirin and pletal for 5-7 days before procedure.    Contingency that warrants a repeat eConsult or referral: Pt is a 73 y/o female with PMhx for PAD, HTN, HLp, DM referred for anticoagulation recommendation before lumbar epidural. Pt last seen in [facility] clinic 8-23 with stable CV sx.Pt can hold aspirin and pletal for 5-7 days before procedure.    Total time of Consultation: 5 minute    I did not speak to the requesting provider verbally about this.     *This eConsult is based on the clinical data available to me and is furnished without benefit of a physical examination. The eConsult will need to be interpreted in light of any clinical issues or changes in patient status not available to me at the time of filing this eConsults. Significant changes in patient condition or level of acuity should result in immediate formal consultation and reevaluation. Please alert me if you have further questions.    Thank you for this eConsult referral.     Volodymyr Clark MD  [facility]

## 2024-09-17 NOTE — PROGRESS NOTES
Interventional Pain Progress Note       Referring Physician: No ref. provider found    PCP: Carito Kohler MD    Chief Complaint:     Chief Complaint   Patient presents with    Low-back Pain     Patient has pain in lower back that radiates into hips and down both legs to feet.  Pain level 9/10        SUBJECTIVE:    Interval History (09/17/2024):      Patient returns to clinic to review MRI of lumbar spine.  Since last being seen, patient reports worsening of axial low back pain.  Pain described as a throbbing aching pain diffusely throughout the lumbar spine.  Patient reports continued bilateral lower extremity pain in his well.  Pain is typically worse on the right compared to the left.  Pain is worse with prolonged standing and walking, better with sitting down.  Pain is currently rated an 8/10. Denies any fevers, chills, changes in gait, saddle anesthesia, or bowel and bladder incontinence        Initial HPI:     Rosana Aguiar is a 74 y.o. female who presents to the clinic for the evaluation of lower back pain.   Patient reports 2 year history of worsening lower back pain.  Patient denies any significant traumas to her lower back.  Patient has had previous grafting of vessels in her right lower extremity.  Patient denies any previous surgeries in her lumbar spine.  Patient does report over the last 2 years she has been helping her  who was diagnosed with cancer and has been lifting him more than usual  Lower back pain described as stabbing aching pain that starts in the midline of the lower back.  This pain then radiates along the posterior aspects down her bilateral lower extremities, right-greater-than-left, to the distal calf.  Pain is worse with prolonged walking and standing, better with relaxation and elevation of her foot.  Pain is currently rated as 7/10. Denies any fevers, chills, changes in gait, saddle anesthesia, or bowel and bladder incontinence    Patient denies any fevers, chills,  changes in gait, saddle anesthesia, or bowel and bladder incontinence.      Non-Pharmacologic Treatments:  Physical Therapy/Home Exercise: yes  Ice/Heat:yes  TENS: no  Acupuncture: no  Massage: yes  Chiropractic: yes        Previous Pain Medications:  NSAIDs, Tylenol, muscle relaxers, neuropathics, opioids, topicals       report:  Reviewed and consistent with medication use as prescribed.    Pain Procedures:   None    Imaging:     Results for orders placed during the hospital encounter of 09/15/24    MRI Lumbar Spine Without Contrast    Narrative  EXAMINATION:  MRI LUMBAR SPINE WITHOUT CONTRAST    CLINICAL HISTORY:  Radiculopathy, lumbar regionLow back pain, symptoms persist with > 6wks conservative treatment;Lumbar radiculopathy, symptoms persist with conservative treatment;    TECHNIQUE:  Standard multiplanar noncontrast MRI sequences of the lumbar spine.    COMPARISON:  None    FINDINGS:  The distal cord and conus reveal normal signal and morphology.    The lumbar vertebra reveal normal alignment, shape and signal intensity.    T12-L1: Minor disc desiccation.    L1-2:     Minor disc desiccation.    L2-3:     Minor disc desiccation.    L3-4:     Minor disc desiccation with minor facet arthrosis.    L4-5:     Mild disc desiccation with mild disc bulge.  Mild bilateral hypertrophic facet arthrosis.    L5-S1:    Mild disc desiccation and disc bulge.  Mild to moderate hypertrophic facet arthrosis.    Impression  Mild degenerative findings as above.  No significant central or foraminal stenosis.      Electronically signed by: Adam Gaspar MD  Date:    09/16/2024  Time:    07:46         Results for orders placed during the hospital encounter of 07/11/22    X-Ray Lumbar Spine AP And Lateral    Narrative  EXAMINATION:  XR LUMBAR SPINE AP AND LATERAL    CLINICAL HISTORY:  Weakness    TECHNIQUE:  AP, lateral and spot images were performed of the lumbar spine.    COMPARISON:  None    FINDINGS:  Osteopenia.  Vertebral  body heights maintained.  No spondylolisthesis.  Disc spaces maintained.  Lower lumbar spine facet arthropathy.  No acute osseous abnormality.  Prominent atherosclerotic vascular calcification with distal abdominal aortic ectasia.    Impression  Degenerative findings      Electronically signed by: Quinton Cardenas MD  Date:    07/11/2022  Time:    12:43          Past Medical History:   Diagnosis Date    Atherosclerotic PVD with intermittent claudication 10/19/2022    Diabetes mellitus, type 2 2003    BS didn't check 09/06/2023    High cholesterol     Hypertension     Macular degeneration      Past Surgical History:   Procedure Laterality Date    ANGIOGRAPHY OF LOWER EXTREMITY N/A 02/09/2024    Procedure: Angiogram Extremity Unilateral/poss pta right leg intervention;  Surgeon: Clint Urena MD;  Location: [facility] CATH LAB;  Service: Peripheral Vascular;  Laterality: N/A;  left common femoral access    APPENDECTOMY      CATARACT EXTRACTION Bilateral     CREATION OF FEMOROPOPLITEAL ARTERIAL BYPASS USING GRAFT Right 3/14/2024    Procedure: CREATION, BYPASS, ARTERIAL, FEMORAL TO POPLITEAL, USING GRAFT;  Surgeon: Clint Urena MD;  Location: [facility] OR;  Service: Peripheral Vascular;  Laterality: Right;    cyst removal from breast (Left)      EYE SURGERY  Cataract    HYSTERECTOMY      TONSILLECTOMY      TUBAL LIGATION  1975     Social History     Socioeconomic History    Marital status:    Tobacco Use    Smoking status: Every Day     Current packs/day: 1.00     Average packs/day: 1 pack/day for 62.7 years (62.7 ttl pk-yrs)     Types: Cigarettes     Start date: 1962     Passive exposure: Past    Smokeless tobacco: Never    Tobacco comments:     HOLD MIDNIGHT PRIOR TO SX   Substance and Sexual Activity    Alcohol use: Yes     Comment: rarely; HOLD 72HRS    Drug use: Never    Sexual activity: Not Currently     Partners: Male     Birth control/protection: None     Social Determinants of Health     Financial Resource  Strain: Medium Risk (5/24/2024)    Overall Financial Resource Strain (CARDIA)     Difficulty of Paying Living Expenses: Somewhat hard   Food Insecurity: No Food Insecurity (5/24/2024)    Hunger Vital Sign     Worried About Running Out of Food in the Last Year: Never true     Ran Out of Food in the Last Year: Never true   Transportation Needs: No Transportation Needs (5/24/2024)    PRAPARE - Transportation     Lack of Transportation (Medical): No     Lack of Transportation (Non-Medical): No   Physical Activity: Inactive (5/24/2024)    Exercise Vital Sign     Days of Exercise per Week: 0 days     Minutes of Exercise per Session: 0 min   Stress: Stress Concern Present (5/24/2024)    [employer] - Occupational Stress Questionnaire     Feeling of Stress : Rather much   Housing Stability: Low Risk  (5/24/2024)    Housing Stability Vital Sign     Unable to Pay for Housing in the Last Year: No     Homeless in the Last Year: No     Family History   Problem Relation Name Age of Onset    Cancer Mother          female    Diabetes Mother      Macular degeneration Mother      Alcohol abuse Father      Cancer Daughter          colon    Diabetes Maternal Uncle      Heart disease Neg Hx      Stroke Neg Hx         Review of patient's allergies indicates:   Allergen Reactions    Clindamycin Other (See Comments)    Neomycin-bacitracin-polymyxin Itching    Nitrofurantoin monohyd/m-cryst Other (See Comments)       Current Outpatient Medications   Medication Sig    albuterol (VENTOLIN HFA) 90 mcg/actuation inhaler Inhale 2 puffs into the lungs every 6 (six) hours as needed for Wheezing. Rescue    amLODIPine (NORVASC) 10 MG tablet Take 1 tablet (10 mg total) by mouth once daily.    aspirin (ECOTRIN) 81 MG EC tablet Take 1 tablet (81 mg total) by mouth once daily.    blood sugar diagnostic Strp Check blood glucose levels two times a day    blood-glucose meter kit Use as instructed    carvediloL (COREG) 6.25 MG  "tablet Take 1 tablet (6.25 mg total) by mouth 2 (two) times daily with meals.    cilostazoL (PLETAL) 50 MG Tab Take 1 tablet (50 mg total) by mouth 2 (two) times daily.    evolocumab (REPATHA SURECLICK) 140 mg/mL PnIj Inject 1 mL (140 mg total) into the skin every 14 (fourteen) days.    FLUoxetine 20 MG capsule Take 1 capsule (20 mg total) by mouth once daily.    fluticasone propionate (FLONASE) 50 mcg/actuation nasal spray 1 spray (50 mcg total) by Each Nostril route once daily.    gabapentin (NEURONTIN) 300 MG capsule Take 1 capsule (300 mg total) by mouth 3 (three) times daily.    HYDROcodone-acetaminophen (NORCO) 5-325 mg per tablet Take 1 tablet by mouth every 6 (six) hours as needed for Pain.    HYDROcodone-acetaminophen (NORCO) 7.5-325 mg per tablet Take 1 tablet by mouth every 6 (six) hours as needed for Pain for up to 7 days.    ibuprofen (ADVIL,MOTRIN) 800 MG tablet Take 1 tablet (800 mg total) by mouth every 6 (six) hours if needed for mild pain for up to 10 days.    insulin glargine U-300 conc (TOUJEO MAX U-300 SOLOSTAR) 300 unit/mL (3 mL) insulin pen Inject 60 Units into the skin once daily.    levocetirizine (XYZAL) 5 MG tablet Take 1 tablet (5 mg total) by mouth every evening.    losartan-hydrochlorothiazide 100-25 mg (HYZAAR) 100-25 mg per tablet TAKE 1 TABLET EVERY DAY    pen needle, diabetic (BD CAROL 2ND GEN PEN NEEDLE) 32 gauge x 5/32" Ndle 1 each by Misc.(Non-Drug; Combo Route) route once daily.    potassium chloride SA (K-DUR,KLOR-CON) 20 MEQ tablet Take 1 tablet (20 mEq total) by mouth once daily.    tiotropium-olodateroL (STIOLTO RESPIMAT) 2.5-2.5 mcg/actuation Mist Inhale 2 puffs into the lungs once daily - Controller    rosuvastatin (CRESTOR) 20 MG tablet Take 1 tablet (20 mg total) by mouth once daily.    tiZANidine (ZANAFLEX) 4 MG tablet Take 1 tablet (4 mg total) by mouth nightly as needed (Back Pain).     No current facility-administered medications for this visit.         ROS  Review of " "Systems   Constitutional:  Negative for chills, diaphoresis, fatigue and fever.   HENT:  Negative for ear discharge, ear pain, rhinorrhea, trouble swallowing and voice change.    Respiratory:  Negative for chest tightness, wheezing and stridor.    Cardiovascular:  Positive for leg swelling. Negative for chest pain.   Gastrointestinal:  Negative for blood in stool, diarrhea, nausea and vomiting.   Endocrine: Negative for cold intolerance and heat intolerance.   Genitourinary:  Positive for urgency. Negative for dysuria and hematuria.   Musculoskeletal:  Positive for arthralgias, back pain, gait problem, joint swelling and myalgias. Negative for neck pain and neck stiffness.   Skin:  Negative for rash.   Neurological:  Positive for tremors, weakness and numbness. Negative for seizures, speech difficulty, light-headedness and headaches.   Hematological:  Does not bruise/bleed easily.   Psychiatric/Behavioral:  Negative for agitation, confusion and suicidal ideas. The patient is nervous/anxious.             OBJECTIVE:  /68   Pulse 72   Resp 16   Ht 5' 4" (1.626 m)   Wt 76.2 kg (168 lb)   BMI 28.84 kg/m²         Physical Exam  Constitutional:       General: She is not in acute distress.     Appearance: Normal appearance. She is not ill-appearing.   HENT:      Head: Normocephalic and atraumatic.      Nose: No congestion or rhinorrhea.   Eyes:      Extraocular Movements: Extraocular movements intact.      Pupils: Pupils are equal, round, and reactive to light.   Pulmonary:      Effort: Pulmonary effort is normal.   Abdominal:      General: Abdomen is flat.      Palpations: Abdomen is soft.   Skin:     General: Skin is warm and dry.      Capillary Refill: Capillary refill takes 2 to 3 seconds.   Neurological:      General: No focal deficit present.      Mental Status: She is alert and oriented to person, place, and time.      Sensory: No sensory deficit.      Motor: Weakness present. No abnormal muscle tone.      " Gait: Gait abnormal.      Deep Tendon Reflexes:      Reflex Scores:       Patellar reflexes are 2+ on the right side and 2+ on the left side.       Achilles reflexes are 1+ on the right side and 1+ on the left side.     Comments: 4/5 strength in bilateral dorsiflexion   Psychiatric:         Mood and Affect: Mood normal.         Behavior: Behavior normal.         Thought Content: Thought content normal.           Musculoskeletal:        Lumbar Exam  Incision: no  Pain with Flexion: yes  Pain with Extension: yes  ROM:  Decreased  Paraspinous TTP:  Positive bilaterally  Facet TTP:  L5-S1  Facet Loading:  Positive bilaterally  SLR:  Positive on the right at 75°  SIJ TTP:  Positive bilaterally  GLORIA:  Positive on the right      LABS:  Lab Results   Component Value Date    WBC 7.61 07/24/2024    HGB 12.1 07/24/2024    HCT 36.2 (L) 07/24/2024    MCV 91 07/24/2024     07/24/2024       CMP  Sodium   Date Value Ref Range Status   07/24/2024 130 (L) 136 - 145 mmol/L Final     Potassium   Date Value Ref Range Status   07/24/2024 3.2 (L) 3.5 - 5.1 mmol/L Final     Chloride   Date Value Ref Range Status   07/24/2024 95 95 - 110 mmol/L Final     CO2   Date Value Ref Range Status   07/24/2024 25 23 - 29 mmol/L Final     Glucose   Date Value Ref Range Status   07/24/2024 101 70 - 110 mg/dL Final     BUN   Date Value Ref Range Status   07/24/2024 12 8 - 23 mg/dL Final     Creatinine   Date Value Ref Range Status   07/24/2024 1.0 0.5 - 1.4 mg/dL Final     Calcium   Date Value Ref Range Status   07/24/2024 8.9 8.7 - 10.5 mg/dL Final     Total Protein   Date Value Ref Range Status   07/24/2024 6.8 6.0 - 8.4 g/dL Final     Albumin   Date Value Ref Range Status   07/24/2024 3.5 3.5 - 5.2 g/dL Final     Total Bilirubin   Date Value Ref Range Status   07/24/2024 0.3 0.1 - 1.0 mg/dL Final     Comment:     For infants and newborns, interpretation of results should be based  on gestational age, weight and in agreement with  clinical  observations.    Premature Infant recommended reference ranges:  Up to 24 hours.............<8.0 mg/dL  Up to 48 hours............<12.0 mg/dL  3-5 days..................<15.0 mg/dL  6-29 days.................<15.0 mg/dL       Alkaline Phosphatase   Date Value Ref Range Status   07/24/2024 81 55 - 135 U/L Final     AST   Date Value Ref Range Status   07/24/2024 17 10 - 40 U/L Final     ALT   Date Value Ref Range Status   07/24/2024 15 10 - 44 U/L Final     Anion Gap   Date Value Ref Range Status   07/24/2024 10 8 - 16 mmol/L Final     eGFR if    Date Value Ref Range Status   07/11/2022 >60.0 >60 mL/min/1.73 m^2 Final     eGFR if non    Date Value Ref Range Status   07/11/2022 >60.0 >60 mL/min/1.73 m^2 Final     Comment:     Calculation used to obtain the estimated glomerular filtration  rate (eGFR) is the CKD-EPI equation.          Lab Results   Component Value Date    HGBA1C 6.3 (H) 07/24/2024             ASSESSMENT:       74 y.o. year old female with lower back pain, consistent with     1. Lumbar radiculopathy  tiZANidine (ZANAFLEX) 4 MG tablet    IR TAISHA Lumbar w/ Img    Case Request-RAD/Other Procedure Area: Lumbar L4/L5 IL TAISHA    Stop Pletal 2 days before procedure      2. Intermittent claudication due to atherosclerosis of artery of extremity  tiZANidine (ZANAFLEX) 4 MG tablet      3. Lumbar spondylosis  tiZANidine (ZANAFLEX) 4 MG tablet        Lumbar radiculopathy  -     tiZANidine (ZANAFLEX) 4 MG tablet; Take 1 tablet (4 mg total) by mouth nightly as needed (Back Pain).  Dispense: 30 tablet; Refill: 1  -     IR TAISHA Lumbar w/ Img; Future; Expected date: 09/17/2024  -     Case Request-RAD/Other Procedure Area: Lumbar L4/L5 IL TAISHA    Stop Pletal 2 days before procedure    Intermittent claudication due to atherosclerosis of artery of extremity  -     tiZANidine (ZANAFLEX) 4 MG tablet; Take 1 tablet (4 mg total) by mouth nightly as needed (Back Pain).  Dispense: 30 tablet;  Refill: 1    Lumbar spondylosis  -     tiZANidine (ZANAFLEX) 4 MG tablet; Take 1 tablet (4 mg total) by mouth nightly as needed (Back Pain).  Dispense: 30 tablet; Refill: 1             PLAN:   - Interventions:   - schedule patient for L4-5 interlaminar epidural steroid injection for lumbar radiculopathy.  We will obtain clearance from Dr. Coburn to hold Pletal 2 days before procedure.  Patient may continue aspirin.  - Can consider bilateral L4-5 and L5-S1 medial branch block in the future for axial lower back pain.  - can not rule out overlapping vascular claudication as cause of bilateral lower extremity pain.  Can consider spinal cord stimulation in the future    - Anticoagulation use:   Yes aspirin and pletal    - Medications:   - start tizanidine 4 mg at night p.r.n.  - continue gabapentin 300 mg 3 times a day      - Therapy:   Patient has completed 5 sessions of formal physical therapy with increased pain.  We will hold on physical therapy until pain is more controlled    - Imaging/Diagnostic:   - MRI lumbar spine reviewed.  There is broad-based disc bulge present at L4-5 with right-greater-than-left foraminal stenosis.  There was mild-to-moderate facet hypertrophy at L4-5 and L5-S1.   - EMG and nerve conduction study of bilateral lower extremities that has evidence of bilateral acute on chronic S1 and L5 radiculopathy  - previous x-rays of lumbar spine reveal diffuse facet hypertrophy with loss of disc height at L4-5    - Consults:   - continue follow up with Neurology for tremors of her lower extremity  - continue follow up with vascular surgery for vascular claudication      - Patient Questions: Answered all of the patient's questions regarding diagnosis, therapy, and treatment     This condition does not require this patient to take time off of work, and the primary goal of our Pain Management services is to improve the patient's functional capacity.     - Follow up visit: return to clinic procedure    Visit  today included increased complexity associated with the care of the episodic problem of chronic pain which was addressed and continue to manage the longitudinal care of the patient due to the serious and/or complex managed problem(s) listed above.      The above plan and management options were discussed at length with patient. Patient is in agreement with the above and verbalized understanding.    I discussed the goals of interventional chronic pain management with the patient on today's visit.  I explained the utility of injections for diagnostic and therapeutic purposes.  We discussed a multimodal approach to pain including treating the patient's given worst pain at any given time.  We will use a systematic approach to addressing pain.  We will also adopt a multimodal approach that includes injections, adjuvant medications, physical therapy, at times psychiatry.  There may be a limited role for opioid use intermittently in the treatment of pain, more particularly for acute pain although no one approach can be used as a sole treatment modality.    I emphasized the importance of regular exercise, core strengthening and stretching, diet and weight loss as a cornerstone of long-term pain management.      Kranthi Grubbs MD  Interventional Pain Management  [facility]    Future Appointments   Date Time Provider Department Center   10/21/2024  8:30 AM [facility] CVT VASCULAR ULTRASOUND [facility]   10/21/2024  9:00 AM [facility] CVT VASCULAR ULTRASOUND [facility]   10/21/2024  9:30 AM Clint Urena MD [facility] VASS[facility]   10/21/2024  9:30 AM [facility] CVT VASCULAR ULTRASOUND [facility]   5/26/2025  9:00 AM [facility] CT1 LIMIT 500 LBS V CT SCAN [facility]   5/26/2025  9:15 AM PULMONARY LAB, [facility] [facility]   5/26/2025 10:00 AM PULMONARY LAB, [place] [facility]   5/26/2025 10:40 AM Mick Jeffrey MD [facility] PUL[facility]           Disclaimer:  This note was  prepared using voice recognition system and is likely to have sound alike errors that may have been overlooked even after proof reading.  Please call me with any questions    No LOS data to display  This includes face to face time and non-face to face time preparing to see the patient (eg, review of tests), obtaining and/or reviewing separately obtained history, documenting clinical information in the electronic or other health record, independently interpreting results and communicating results to the patient/family/caregiver, or care coordinator.

## 2024-09-18 ENCOUNTER — TELEPHONE (OUTPATIENT)
Dept: PAIN MEDICINE | Facility: CLINIC | Age: 74
End: 2024-09-18
Payer: MEDICARE

## 2024-09-19 NOTE — PRE-PROCEDURE INSTRUCTIONS
Spoke with patient regarding procedure scheduled on 10.3    Arrival time 1015     Has patient been sick with fever or on antibiotics within the last 7 days? No     Does the patient have any open wounds, sores or rashes? No     Does the patient have any recent fractures? no     Has patient received a vaccination within the last 7 days? No     Received the COVID vaccination? yes     Has the patient stopped all medications as directed? HOLD PLETAL 2 DAYS PRIOR TO PROCEDURE. CONTINUE ASA PER MD. CARDIAC CLEARANCE OBTAINED FROM DR MORFIN ON 9.17.      Does patient have a pacemaker, defibrillator, or implantable stimulator? No     Does the patient have a ride to and from procedure and someone reliable to remain with patient?  DAUGHTER     Is the patient diabetic? YES     Does the patient have sleep apnea? Or use O2 at home? no     Is the patient receiving sedation? Yes      Is the patient instructed to remain NPO beginning at midnight the night before their procedure? yes     Procedure location confirmed with patient? Yes     Covid- Denies signs/symptoms. Instructed to notify PAT/MD if any changes.

## 2024-10-03 ENCOUNTER — HOSPITAL ENCOUNTER (OUTPATIENT)
Facility: HOSPITAL | Age: 74
Discharge: HOME | End: 2024-10-03
Attending: ANESTHESIOLOGY | Admitting: ANESTHESIOLOGY
Payer: MEDICARE

## 2024-10-03 VITALS
HEART RATE: 64 BPM | TEMPERATURE: 97 F | RESPIRATION RATE: 18 BRPM | SYSTOLIC BLOOD PRESSURE: 163 MMHG | WEIGHT: 162.94 LBS | HEIGHT: 64 IN | BODY MASS INDEX: 27.82 KG/M2 | OXYGEN SATURATION: 95 % | DIASTOLIC BLOOD PRESSURE: 69 MMHG

## 2024-10-03 DIAGNOSIS — M54.16 LUMBAR RADICULOPATHY: ICD-10-CM

## 2024-10-03 LAB — POCT GLUCOSE: 117 MG/DL (ref 70–110)

## 2024-10-03 PROCEDURE — 25500020 PHARM REV CODE 255: Mod: HCNC | Performed by: ANESTHESIOLOGY

## 2024-10-03 PROCEDURE — 63600175 PHARM REV CODE 636 W HCPCS: Mod: HCNC | Performed by: ANESTHESIOLOGY

## 2024-10-03 RX ORDER — MIDAZOLAM HYDROCHLORIDE 1 MG/ML
INJECTION, SOLUTION INTRAMUSCULAR; INTRAVENOUS
Status: DISCONTINUED | OUTPATIENT
Start: 2024-10-03 | End: 2024-10-03 | Stop reason: HOSPADM

## 2024-10-03 RX ORDER — BETAMETHASONE SODIUM PHOSPHATE AND BETAMETHASONE ACETATE 3; 3 MG/ML; MG/ML
INJECTION, SUSPENSION INTRA-ARTICULAR; INTRALESIONAL; INTRAMUSCULAR; SOFT TISSUE
Status: DISCONTINUED | OUTPATIENT
Start: 2024-10-03 | End: 2024-10-03 | Stop reason: HOSPADM

## 2024-10-03 RX ORDER — FENTANYL CITRATE 50 UG/ML
INJECTION, SOLUTION INTRAMUSCULAR; INTRAVENOUS
Status: DISCONTINUED | OUTPATIENT
Start: 2024-10-03 | End: 2024-10-03 | Stop reason: HOSPADM

## 2024-10-03 RX ORDER — LIDOCAINE HYDROCHLORIDE 10 MG/ML
INJECTION, SOLUTION EPIDURAL; INFILTRATION; INTRACAUDAL; PERINEURAL
Status: DISCONTINUED | OUTPATIENT
Start: 2024-10-03 | End: 2024-10-03 | Stop reason: HOSPADM

## 2024-10-03 RX ORDER — ONDANSETRON HYDROCHLORIDE 2 MG/ML
4 INJECTION, SOLUTION INTRAVENOUS ONCE AS NEEDED
Status: DISCONTINUED | OUTPATIENT
Start: 2024-10-03 | End: 2024-10-03 | Stop reason: HOSPADM

## 2024-10-03 NOTE — DISCHARGE SUMMARY
Discharge Note  Short Stay      SUMMARY     Admit Date: 10/3/2024    Attending Physician: Kranthi Grubbs MD        Discharge Physician: Kranthi Grubbs MD        Discharge Date: 10/3/2024 2:18 PM    Procedure(s) (LRB):  Lumbar L4/L5 IL TAISHA  Stop Pletal 2 days before procedure (N/A)    Final Diagnosis: Lumbar radiculopathy [M54.16]    Disposition: Home or self care    Patient Instructions:   Discharge Medication List as of 10/3/2024 10:55 AM        CONTINUE these medications which have NOT CHANGED    Details   albuterol (VENTOLIN HFA) 90 mcg/actuation inhaler Inhale 2 puffs into the lungs every 6 (six) hours as needed for Wheezing. Rescue, Starting Mon 2/27/2023, Normal      amLODIPine (NORVASC) 10 MG tablet Take 1 tablet (10 mg total) by mouth once daily., Starting Thu 8/29/2024, Normal      aspirin (ECOTRIN) 81 MG EC tablet Take 1 tablet (81 mg total) by mouth once daily., Starting Mon 1/22/2024, Until Tue 1/21/2025, Normal      blood sugar diagnostic Strp Check blood glucose levels two times a day, Normal      blood-glucose meter kit Use as instructed, Normal      carvediloL (COREG) 6.25 MG tablet Take 1 tablet (6.25 mg total) by mouth 2 (two) times daily with meals., Starting Tue 4/30/2024, Normal      cilostazoL (PLETAL) 50 MG Tab Take 1 tablet (50 mg total) by mouth 2 (two) times daily., Starting Tue 10/17/2023, Until Tue 10/22/2024, Normal      evolocumab (REPATHA SURECLICK) 140 mg/mL PnIj Inject 1 mL (140 mg total) into the skin every 14 (fourteen) days., Starting Wed 7/24/2024, Normal      FLUoxetine 20 MG capsule Take 1 capsule (20 mg total) by mouth once daily., Starting Mon 1/22/2024, Until Tue 1/21/2025, Normal      fluticasone propionate (FLONASE) 50 mcg/actuation nasal spray 1 spray (50 mcg total) by Each Nostril route once daily., Starting Thu 3/16/2023, Normal      gabapentin (NEURONTIN) 300 MG capsule Take 1 capsule (300 mg total) by mouth 3 (three) times daily., Starting Fri 5/31/2024, Until  "Sat 5/31/2025, Normal      HYDROcodone-acetaminophen (NORCO) 5-325 mg per tablet Take 1 tablet by mouth every 6 (six) hours as needed for Pain., Starting Mon 7/15/2024, Normal      HYDROcodone-acetaminophen (NORCO) 7.5-325 mg per tablet Take 1 tablet by mouth every 6 (six) hours as needed for Pain for up to 7 days., Starting Tue 3/26/2024, Normal      ibuprofen (ADVIL,MOTRIN) 800 MG tablet Take 1 tablet (800 mg total) by mouth every 6 (six) hours if needed for mild pain for up to 10 days., Starting Fri 12/15/2023, Normal      insulin glargine U-300 conc (TOUJEO MAX U-300 SOLOSTAR) 300 unit/mL (3 mL) insulin pen Inject 60 Units into the skin once daily., Starting Tue 7/23/2024, Until Wed 7/23/2025, Normal      levocetirizine (XYZAL) 5 MG tablet Take 1 tablet (5 mg total) by mouth every evening., Starting Tue 2/13/2024, Until Wed 2/12/2025, Normal      losartan-hydrochlorothiazide 100-25 mg (HYZAAR) 100-25 mg per tablet TAKE 1 TABLET EVERY DAY, Normal      pen needle, diabetic (BD CAROL 2ND GEN PEN NEEDLE) 32 gauge x 5/32" Ndle 1 each by Misc.(Non-Drug; Combo Route) route once daily., Starting Fri 12/29/2023, Until Sat 12/28/2024, Normal      potassium chloride SA (K-DUR,KLOR-CON) 20 MEQ tablet Take 1 tablet (20 mEq total) by mouth once daily., Starting Fri 7/26/2024, Normal      rosuvastatin (CRESTOR) 20 MG tablet Take 1 tablet (20 mg total) by mouth once daily., Starting Thu 7/20/2023, Until Tue 8/13/2024, Normal      tiotropium-olodateroL (STIOLTO RESPIMAT) 2.5-2.5 mcg/actuation Mist Inhale 2 puffs into the lungs once daily - Controller, Starting Fri 8/9/2024, Normal      tiZANidine (ZANAFLEX) 4 MG tablet Take 1 tablet (4 mg total) by mouth nightly as needed (Back Pain)., Starting Tue 9/17/2024, Normal                 Discharge Diagnosis: Lumbar radiculopathy [M54.16]  Condition on Discharge: Stable with no complications to procedure   Diet on Discharge: Same as before.  Activity: as per instruction " sheet.  Discharge to: Home with a responsible adult.  Follow up: 2-4 weeks       Please call the office at [phone] if you experience any weakness or loss of sensation, fever > 101.5, pain uncontrolled with oral medications, persistent nausea/vomiting/or diarrhea, redness or drainage from the incisions, or any other worrisome concerns. If physician on call was not reached or could not communicate with our office for any reason please go to the nearest emergency department

## 2024-10-03 NOTE — H&P
HPI  Patient presenting for Procedure(s) (LRB):  Lumbar L4/L5 IL TAISHA  Stop Pletal 2 days before procedure (N/A)     Patient on Anti-coagulation Yes, Pletal, stopped two days before the procedure    No health changes since previous encounter    Past Medical History:   Diagnosis Date    Atherosclerotic PVD with intermittent claudication 10/19/2022    Diabetes mellitus, type 2 2003    BS didn't check 09/06/2023    High cholesterol     Hypertension     Macular degeneration      Past Surgical History:   Procedure Laterality Date    ANGIOGRAPHY OF LOWER EXTREMITY N/A 02/09/2024    Procedure: Angiogram Extremity Unilateral/poss pta right leg intervention;  Surgeon: Clint Urena MD;  Location: [facility] CATH LAB;  Service: Peripheral Vascular;  Laterality: N/A;  left common femoral access    APPENDECTOMY      CATARACT EXTRACTION Bilateral     CREATION OF FEMOROPOPLITEAL ARTERIAL BYPASS USING GRAFT Right 3/14/2024    Procedure: CREATION, BYPASS, ARTERIAL, FEMORAL TO POPLITEAL, USING GRAFT;  Surgeon: Clint Urena MD;  Location: [facility] OR;  Service: Peripheral Vascular;  Laterality: Right;    cyst removal from breast (Left)      EYE SURGERY  Cataract    HYSTERECTOMY      TONSILLECTOMY      TUBAL LIGATION  1975     Review of patient's allergies indicates:   Allergen Reactions    Clindamycin Other (See Comments)    Neomycin-bacitracin-polymyxin Itching    Nitrofurantoin monohyd/m-cryst Other (See Comments)        No current facility-administered medications on file prior to encounter.     Current Outpatient Medications on File Prior to Encounter   Medication Sig Dispense Refill    albuterol (VENTOLIN HFA) 90 mcg/actuation inhaler Inhale 2 puffs into the lungs every 6 (six) hours as needed for Wheezing. Rescue 18 g 11    amLODIPine (NORVASC) 10 MG tablet Take 1 tablet (10 mg total) by mouth once daily. 90 tablet 3    aspirin (ECOTRIN) 81 MG EC tablet Take 1 tablet (81 mg total) by mouth once daily. 90 tablet 3    carvediloL  (COREG) 6.25 MG tablet Take 1 tablet (6.25 mg total) by mouth 2 (two) times daily with meals. 180 tablet 2    evolocumab (REPATHA SURECLICK) 140 mg/mL PnIj Inject 1 mL (140 mg total) into the skin every 14 (fourteen) days. 1 mL 3    FLUoxetine 20 MG capsule Take 1 capsule (20 mg total) by mouth once daily. 90 capsule 3    fluticasone propionate (FLONASE) 50 mcg/actuation nasal spray 1 spray (50 mcg total) by Each Nostril route once daily. 16 g 2    gabapentin (NEURONTIN) 300 MG capsule Take 1 capsule (300 mg total) by mouth 3 (three) times daily. 90 capsule 11    HYDROcodone-acetaminophen (NORCO) 7.5-325 mg per tablet Take 1 tablet by mouth every 6 (six) hours as needed for Pain for up to 7 days. 20 tablet 0    insulin glargine U-300 conc (TOUJEO MAX U-300 SOLOSTAR) 300 unit/mL (3 mL) insulin pen Inject 60 Units into the skin once daily. 3 Pen 11    levocetirizine (XYZAL) 5 MG tablet Take 1 tablet (5 mg total) by mouth every evening. 30 tablet 11    losartan-hydrochlorothiazide 100-25 mg (HYZAAR) 100-25 mg per tablet TAKE 1 TABLET EVERY DAY 90 tablet 1    potassium chloride SA (K-DUR,KLOR-CON) 20 MEQ tablet Take 1 tablet (20 mEq total) by mouth once daily. 3 tablet 0    tiotropium-olodateroL (STIOLTO RESPIMAT) 2.5-2.5 mcg/actuation Mist Inhale 2 puffs into the lungs once daily - Controller 12 g 3    tiZANidine (ZANAFLEX) 4 MG tablet Take 1 tablet (4 mg total) by mouth nightly as needed (Back Pain). 30 tablet 1    blood sugar diagnostic Strp Check blood glucose levels two times a day 200 strip 2    blood-glucose meter kit Use as instructed 1 each 0    cilostazoL (PLETAL) 50 MG Tab Take 1 tablet (50 mg total) by mouth 2 (two) times daily. 60 tablet 11    HYDROcodone-acetaminophen (NORCO) 5-325 mg per tablet Take 1 tablet by mouth every 6 (six) hours as needed for Pain. 12 tablet 0    ibuprofen (ADVIL,MOTRIN) 800 MG tablet Take 1 tablet (800 mg total) by mouth every 6 (six) hours if needed for mild pain for up to 10  "days. 32 tablet 0    pen needle, diabetic (BD CAROL 2ND GEN PEN NEEDLE) 32 gauge x 5/32" Ndle 1 each by Misc.(Non-Drug; Combo Route) route once daily. 100 each 3    rosuvastatin (CRESTOR) 20 MG tablet Take 1 tablet (20 mg total) by mouth once daily. 90 tablet 3        PMHx, PSHx, Allergies, Medications reviewed in epic    ROS negative except pain complaints in HPI    OBJECTIVE:    BP (!) 183/75 (BP Location: Right arm, Patient Position: Sitting)   Pulse 67   Temp 96.5 °F (35.8 °C) (Temporal)   Resp 16   Ht 5' 4" (1.626 m)   Wt 73.9 kg (162 lb 14.7 oz)   SpO2 98%   Breastfeeding No   BMI 27.97 kg/m²     PHYSICAL EXAMINATION:    GENERAL: Well appearing, in no acute distress, alert and oriented x3.  PSYCH:  Mood and affect appropriate.  SKIN: Skin color, texture, turgor normal, no rashes or lesions which will impact the procedure.  CV: RRR with palpation of the radial artery.  PULM: No evidence of respiratory difficulty, symmetric chest rise. Clear to auscultation.  NEURO: Cranial nerves grossly intact.    Plan:    Proceed with procedure as planned Procedure(s) (LRB):  Lumbar L4/L5 IL TAISHA  Stop Pletal 2 days before procedure (N/A)    Kranthi Grubbs MD  10/03/2024            "

## 2024-10-03 NOTE — PLAN OF CARE
Patient recovered to baseline.  All bandaids remain clean,dry, intact. Pt verbalized understanding of the d/c instructions. Pt voiced no complaints. Pt stood at side of bed, walked steps with no new motor deficit. Neuro intact. Discharged home to designated  at this time.

## 2024-10-03 NOTE — DISCHARGE INSTRUCTIONS

## 2024-10-21 ENCOUNTER — OFFICE VISIT (OUTPATIENT)
Dept: VASCULAR SURGERY | Facility: CLINIC | Age: 74
End: 2024-10-21
Payer: MEDICARE

## 2024-10-21 ENCOUNTER — HOSPITAL ENCOUNTER (OUTPATIENT)
Dept: VASCULAR SURGERY | Facility: HOSPITAL | Age: 74
Discharge: HOME | End: 2024-10-21
Attending: STUDENT IN AN ORGANIZED HEALTH CARE EDUCATION/TRAINING PROGRAM
Payer: MEDICARE

## 2024-10-21 VITALS — SYSTOLIC BLOOD PRESSURE: 155 MMHG | DIASTOLIC BLOOD PRESSURE: 70 MMHG

## 2024-10-21 DIAGNOSIS — E78.5 HYPERLIPIDEMIA, UNSPECIFIED HYPERLIPIDEMIA TYPE: ICD-10-CM

## 2024-10-21 DIAGNOSIS — I65.29 STENOSIS OF CAROTID ARTERY, UNSPECIFIED LATERALITY: ICD-10-CM

## 2024-10-21 DIAGNOSIS — I67.81 ACUTE CEREBROVASCULAR INSUFFICIENCY: ICD-10-CM

## 2024-10-21 DIAGNOSIS — I70.219 INTERMITTENT CLAUDICATION DUE TO ATHEROSCLEROSIS OF ARTERY OF EXTREMITY: ICD-10-CM

## 2024-10-21 DIAGNOSIS — I10 PRIMARY HYPERTENSION: ICD-10-CM

## 2024-10-21 DIAGNOSIS — M54.16 LUMBAR RADICULOPATHY: ICD-10-CM

## 2024-10-21 DIAGNOSIS — I73.9 CLAUDICATION IN PERIPHERAL VASCULAR DISEASE: Primary | ICD-10-CM

## 2024-10-21 LAB
LEFT ABI: 0.67
LEFT ARM BP: 119 MMHG
LEFT ARM SYSTOLIC BLOOD PRESSURE: 119 MMHG
LEFT CCA DIST DIAS: 0 CM/S
LEFT CCA DIST SYS: 113 CM/S
LEFT CCA MID DIAS: 0 CM/S
LEFT CCA MID SYS: 86 CM/S
LEFT CCA PROX DIAS: 0 CM/S
LEFT CCA PROX SYS: 70 CM/S
LEFT DORSALIS PEDIS: 84 MMHG
LEFT ECA DIAS: 13 CM/S
LEFT ECA SYS: 176 CM/S
LEFT ICA DIST DIAS: 23 CM/S
LEFT ICA DIST SYS: 96 CM/S
LEFT ICA MID DIAS: 14 CM/S
LEFT ICA MID SYS: 124 CM/S
LEFT ICA PROX DIAS: 8 CM/S
LEFT ICA PROX SYS: 176 CM/S
LEFT POSTERIOR TIBIAL: 83 MMHG
LEFT TBI: 0.54
LEFT TOE PRESSURE: 67 MMHG
LEFT VERTEBRAL DIAS: 0 CM/S
LEFT VERTEBRAL SYS: 36 CM/S
OHS CV CAROTID RIGHT ICA EDV HIGHEST: 16
OHS CV CAROTID ULTRASOUND LEFT ICA/CCA RATIO: 1.56
OHS CV CAROTID ULTRASOUND RIGHT ICA/CCA RATIO: 1.39
OHS CV LOWER EXTREMITY GRAFT MEASUREMENTS - RIGHT - G1 - D: 75
OHS CV LOWER EXTREMITY GRAFT MEASUREMENTS - RIGHT - G1 - DA: 90
OHS CV LOWER EXTREMITY GRAFT MEASUREMENTS - RIGHT - G1 - M: 79
OHS CV LOWER EXTREMITY GRAFT MEASUREMENTS - RIGHT - G1 - P: 132
OHS CV LOWER EXTREMITY GRAFT MEASUREMENTS - RIGHT - G1 - PA: 229
OHS CV PV CAROTID LEFT HIGHEST CCA: 113
OHS CV PV CAROTID LEFT HIGHEST ICA: 176
OHS CV PV CAROTID RIGHT HIGHEST CCA: 114
OHS CV PV CAROTID RIGHT HIGHEST ICA: 138
OHS CV US CAROTID LEFT HIGHEST EDV: 23
RIGHT ABI: 1
RIGHT ARM BP: 125 MMHG
RIGHT ARM SYSTOLIC BLOOD PRESSURE: 125 MMHG
RIGHT CCA DIST DIAS: 3 CM/S
RIGHT CCA DIST SYS: 99 CM/S
RIGHT CCA MID DIAS: 5 CM/S
RIGHT CCA MID SYS: 103 CM/S
RIGHT CCA PROX DIAS: 0 CM/S
RIGHT CCA PROX SYS: 114 CM/S
RIGHT DORSALIS PEDIS: 125 MMHG
RIGHT ECA DIAS: 3 CM/S
RIGHT ECA SYS: 218 CM/S
RIGHT ICA DIST DIAS: 16 CM/S
RIGHT ICA DIST SYS: 103 CM/S
RIGHT ICA MID DIAS: 10 CM/S
RIGHT ICA MID SYS: 92 CM/S
RIGHT ICA PROX DIAS: 14 CM/S
RIGHT ICA PROX SYS: 138 CM/S
RIGHT POSTERIOR TIBIAL: 118 MMHG
RIGHT TBI: 0.73
RIGHT TOE PRESSURE: 91 MMHG
RIGHT VERTEBRAL DIAS: 13 CM/S
RIGHT VERTEBRAL SYS: 77 CM/S

## 2024-10-21 PROCEDURE — 3078F DIAST BP <80 MM HG: CPT | Mod: HCNC,CPTII,S$GLB, | Performed by: STUDENT IN AN ORGANIZED HEALTH CARE EDUCATION/TRAINING PROGRAM

## 2024-10-21 PROCEDURE — 3072F LOW RISK FOR RETINOPATHY: CPT | Mod: HCNC,CPTII,S$GLB, | Performed by: STUDENT IN AN ORGANIZED HEALTH CARE EDUCATION/TRAINING PROGRAM

## 2024-10-21 PROCEDURE — 93922 UPR/L XTREMITY ART 2 LEVELS: CPT | Mod: 26,HCNC,, | Performed by: STUDENT IN AN ORGANIZED HEALTH CARE EDUCATION/TRAINING PROGRAM

## 2024-10-21 PROCEDURE — 1159F MED LIST DOCD IN RCRD: CPT | Mod: HCNC,CPTII,S$GLB, | Performed by: STUDENT IN AN ORGANIZED HEALTH CARE EDUCATION/TRAINING PROGRAM

## 2024-10-21 PROCEDURE — 1160F RVW MEDS BY RX/DR IN RCRD: CPT | Mod: HCNC,CPTII,S$GLB, | Performed by: STUDENT IN AN ORGANIZED HEALTH CARE EDUCATION/TRAINING PROGRAM

## 2024-10-21 PROCEDURE — 93926 LOWER EXTREMITY STUDY: CPT | Mod: HCNC,RT

## 2024-10-21 PROCEDURE — 93926 LOWER EXTREMITY STUDY: CPT | Mod: 26,HCNC,RT, | Performed by: STUDENT IN AN ORGANIZED HEALTH CARE EDUCATION/TRAINING PROGRAM

## 2024-10-21 PROCEDURE — 99999 PR PBB SHADOW E&M-EST. PATIENT-LVL III: CPT | Mod: PBBFAC,HCNC,, | Performed by: STUDENT IN AN ORGANIZED HEALTH CARE EDUCATION/TRAINING PROGRAM

## 2024-10-21 PROCEDURE — 93922 UPR/L XTREMITY ART 2 LEVELS: CPT | Mod: HCNC

## 2024-10-21 PROCEDURE — 93880 EXTRACRANIAL BILAT STUDY: CPT | Mod: HCNC

## 2024-10-21 PROCEDURE — 3077F SYST BP >= 140 MM HG: CPT | Mod: HCNC,CPTII,S$GLB, | Performed by: STUDENT IN AN ORGANIZED HEALTH CARE EDUCATION/TRAINING PROGRAM

## 2024-10-21 PROCEDURE — 99214 OFFICE O/P EST MOD 30 MIN: CPT | Mod: HCNC,S$GLB,, | Performed by: STUDENT IN AN ORGANIZED HEALTH CARE EDUCATION/TRAINING PROGRAM

## 2024-10-21 NOTE — ASSESSMENT & PLAN NOTE
Her right leg zahra are normalized now, left leg still has moderate disease zahra 0.67 on left and 1.00 on right, toe pressure 91 on right foot. She denies claudication but pain with walking and pain with standing, lumbar radiculopathy. Also has moderate leg swelling right leg, will attempt to get her compression stockings 20-30mmhg compression, bring back in 3 months to check symptom progress. Still me told her they wont accept her insurance I will try to call them and discuss

## 2024-10-21 NOTE — PROGRESS NOTES
Clint Urena    OFFICE NOTE    DATE OF VISIT: 10/21/2024  PATIENT NAME: Rosana Aguiar  : 1950  MRN: [id number]  PRIMARY CARE PHYSICIAN: Carito Kohler MD  CARDIOLOGIST:   REFERRING PROVIDER: No ref. provider found    CHIEF COMPLAINT   Chief Complaint   Patient presents with    Claudication     Patient in for follow up exam.       HISTORY OF PRESENT ILLNESS:  Rosana Aguiar is a 74 y.o. female who presents to clinic today underwent right fem above knee bypass with dacron for disabling claudication. Claudication has resoled but she has persistent pain and worsening pain right leg and leg swelling. Her symptoms now appear to be more related to her spine. She sees dr Grubbs pain intervention. Has not seen neurosurgeon yet. Still smokes and sits most of the day.     ALLERGIES:  Review of patient's allergies indicates:   Allergen Reactions    Clindamycin Other (See Comments)    Neomycin-bacitracin-polymyxin Itching    Nitrofurantoin monohyd/m-cryst Other (See Comments)       PAST MEDICAL HISTORY:  Past Medical History:   Diagnosis Date    Atherosclerotic PVD with intermittent claudication 10/19/2022    Diabetes mellitus, type 2     BS didn't check 2023    High cholesterol     Hypertension     Macular degeneration        PAST SURGICAL HISTORY:  Past Surgical History:   Procedure Laterality Date    ANGIOGRAPHY OF LOWER EXTREMITY N/A 2024    Procedure: Angiogram Extremity Unilateral/poss pta right leg intervention;  Surgeon: Clint Urena MD;  Location: [facility] CATH LAB;  Service: Peripheral Vascular;  Laterality: N/A;  left common femoral access    APPENDECTOMY      CATARACT EXTRACTION Bilateral     CREATION OF FEMOROPOPLITEAL ARTERIAL BYPASS USING GRAFT Right 3/14/2024    Procedure: CREATION, BYPASS, ARTERIAL, FEMORAL TO POPLITEAL, USING GRAFT;  Surgeon: Clint Urena MD;  Location: [facility] OR;  Service: Peripheral Vascular;  Laterality: Right;    cyst removal from breast (Left)       EPIDURAL STEROID INJECTION N/A 10/3/2024    Procedure: Lumbar L4/L5 IL TAISHA  Stop Pletal 2 days before procedure;  Surgeon: Kranthi Grubbs MD;  Location: [facility];  Service: Pain Management;  Laterality: N/A;    EYE SURGERY  Cataract    HYSTERECTOMY      TONSILLECTOMY      TUBAL LIGATION  1975       SOCIAL HISTORY:   Social History     Tobacco Use    Smoking status: Every Day     Current packs/day: 1.00     Average packs/day: 1 pack/day for 62.8 years (62.8 ttl pk-yrs)     Types: Cigarettes     Start date: 1962     Passive exposure: Past    Smokeless tobacco: Never    Tobacco comments:     HOLD MIDNIGHT PRIOR TO SX   Substance Use Topics    Alcohol use: Yes     Comment: rarely; HOLD 72HRS    Drug use: Never       FAMILY HISTORY:  Family History   Problem Relation Name Age of Onset    Cancer Mother          female    Diabetes Mother      Macular degeneration Mother      Alcohol abuse Father      Cancer Daughter          colon    Diabetes Maternal Uncle      Heart disease Neg Hx      Stroke Neg Hx         REVIEW OF SYSTEMS:  ROS  10 pt ros otherwise negative    PHYSICAL EXAM:  Vitals:    10/21/24 0910   BP: (!) 155/70      Physical Exam      Vss  Gen nad alert oriented  Cv rrr  lung ctab  Abd soft nt nd  B/L feet warm, appear well perfused, there is moderate swelling right leg distal thigh and onward    VASCULAR LAB STUDIES:  40-59% stenosis right  60-79% stenosis left  Zahra 1.00 right leg toe pressure 91  Left leg 0.64    ASSESSMENT AND PLAN:  Lumbar radiculopathy  Scheduled to see dr grubbs in few weeks I did suggest she obtain referral for neurosurgeon as well given the severity of her symptoms.     Intermittent claudication due to atherosclerosis of artery of extremity  Her right leg zahra are normalized now, left leg still has moderate disease zahra 0.67 on left and 1.00 on right, toe pressure 91 on right foot. She denies claudication but pain with walking and pain with standing, lumbar radiculopathy.  Also has moderate leg swelling right leg, will attempt to get her compression stockings 20-30mmhg compression, bring back in 3 months to check symptom progress. Still me told her they wont accept her insurance I will try to call them and discuss    Carotid stenosis  60-79% stenosis left  40-59% stenosis on right  Moderate disease b/l no indication for intervention repeat test in 6 months    Hypertension  Medical management    Rosana was seen today for claudication.    Diagnoses and all orders for this visit:    Claudication in peripheral vascular disease  -     Ankle Brachial Indices (ROBINA); Future    Lumbar radiculopathy    Intermittent claudication due to atherosclerosis of artery of extremity    Stenosis of carotid artery, unspecified laterality    Primary hypertension        Follow up in about 3 months (around 1/21/2025).        Clint Urena  [facility]  Vascular Surgery  [phone] (Clinic Number)

## 2024-10-21 NOTE — ASSESSMENT & PLAN NOTE
60-79% stenosis left  40-59% stenosis on right  Moderate disease b/l no indication for intervention repeat test in 6 months

## 2024-10-23 DIAGNOSIS — E78.5 HYPERLIPIDEMIA, UNSPECIFIED HYPERLIPIDEMIA TYPE: ICD-10-CM

## 2024-10-23 DIAGNOSIS — I70.219 ATHEROSCLEROTIC PVD WITH INTERMITTENT CLAUDICATION: ICD-10-CM

## 2024-10-23 DIAGNOSIS — E11.51 TYPE 2 DIABETES MELLITUS WITH DIABETIC PERIPHERAL ANGIOPATHY WITHOUT GANGRENE, WITH LONG-TERM CURRENT USE OF INSULIN: ICD-10-CM

## 2024-10-23 DIAGNOSIS — Z79.4 TYPE 2 DIABETES MELLITUS WITH DIABETIC PERIPHERAL ANGIOPATHY WITHOUT GANGRENE, WITH LONG-TERM CURRENT USE OF INSULIN: ICD-10-CM

## 2024-10-23 RX ORDER — HYDROCODONE BITARTRATE AND ACETAMINOPHEN 5; 325 MG/1; MG/1
1 TABLET ORAL EVERY 6 HOURS PRN
Qty: 12 TABLET | Refills: 0 | Status: CANCELLED | OUTPATIENT
Start: 2024-10-23

## 2024-10-23 RX ORDER — CILOSTAZOL 50 MG/1
50 TABLET ORAL 2 TIMES DAILY
Qty: 60 TABLET | Refills: 11 | Status: SHIPPED | OUTPATIENT
Start: 2024-10-23 | End: 2025-10-23

## 2024-10-31 NOTE — OP NOTE
Lumbar Interlaminar Epidural Steroid Injection under Fluoroscopic Guidance.     INFORMED CONSENT: The procedure, risks, benefits and options were discussed with patient. There are no contraindications to the procedure. The patient expressed understanding and agreed to proceed. The personnel performing the procedure was discussed.    Date of procedure 10/31/2024    Time-out taken to identify patient and procedure side prior to starting the procedure.                     PROCEDURE:   L4/5 Interlaminar Epidural Steroid Injection Under Fluoroscopic Guidance.     Pre-Op diagnosis: Lumbar radiculopathy [M54.16]    Post-Op diagnosis: Lumbar radiculopathy [M54.16]    PHYSICIAN: Kranthi Grubbs MD    ASSISTANTS: None     ESTIMATED BLOOD LOSS: none.     COMPLICATIONS: none.     SPECIMENS: none    Sedation: Conscious sedation provided by M.D    SEDATION MEDICATIONS: local/IV sedation: Versed 2 mg and fentanyl 50 mcg IV.  Conscious sedation ordered by MD.  Patient reevaluated and sedation administered by MD and monitored by RN.  Total sedation time was less than 10 min.      TECHNIQUE: With the patient laying in a prone position, the area was prepped and draped in the usual sterile fashion using ChloraPrep and a fenestrated drape. 1% lidocaine was given using a 27-gauge needle by raising a wheal and going down to the hub of the needle over the L4/5 interlaminar space.  The interlaminar space was then approached with a 3.5 inch 18-gauge Touhy needle was introduced under fluoroscopic guidance in the AP and Lateral view. Once the Ligamentum flavum was encountered loss of resistance to saline was used to enter the epidural space. With positive loss of resistance and negative CSF or Blood, 3mL contrast dye Omnipaque (300mg/ml) was injected to confirm placement and there was no vascular runoff. 2ml of Betamethasone PF 6mg/ml and 3ml of Lidocaine PF 1%. Displacement of the radiopaque contrast after injection of the medication  confirmed that the medication went into the area of the epidural space.  The patient tolerated the procedure well.       The patient was monitored for approximately 30 minutes after the procedure.  Patient was given post procedure and discharge instructions to follow at home.  The patient was discharged in a stable condition

## 2024-11-08 ENCOUNTER — OFFICE VISIT (OUTPATIENT)
Dept: PAIN MEDICINE | Facility: CLINIC | Age: 74
End: 2024-11-08
Payer: MEDICARE

## 2024-11-08 VITALS
DIASTOLIC BLOOD PRESSURE: 69 MMHG | SYSTOLIC BLOOD PRESSURE: 137 MMHG | WEIGHT: 166.25 LBS | BODY MASS INDEX: 28.38 KG/M2 | HEIGHT: 64 IN | HEART RATE: 65 BPM | RESPIRATION RATE: 17 BRPM

## 2024-11-08 DIAGNOSIS — M54.16 LUMBAR RADICULOPATHY: ICD-10-CM

## 2024-11-08 DIAGNOSIS — M47.816 LUMBAR SPONDYLOSIS: Primary | ICD-10-CM

## 2024-11-08 DIAGNOSIS — I89.0 LYMPHEDEMA: ICD-10-CM

## 2024-11-08 DIAGNOSIS — I70.219 INTERMITTENT CLAUDICATION DUE TO ATHEROSCLEROSIS OF ARTERY OF EXTREMITY: ICD-10-CM

## 2024-11-08 DIAGNOSIS — E08.311 DIABETES MELLITUS DUE TO UNDERLYING CONDITION WITH RETINOPATHY AND MACULAR EDEMA, WITH LONG-TERM CURRENT USE OF INSULIN, UNSPECIFIED LATERALITY, UNSPECIFIED RETINOPATHY SEVERITY: ICD-10-CM

## 2024-11-08 DIAGNOSIS — Z79.4 DIABETES MELLITUS DUE TO UNDERLYING CONDITION WITH RETINOPATHY AND MACULAR EDEMA, WITH LONG-TERM CURRENT USE OF INSULIN, UNSPECIFIED LATERALITY, UNSPECIFIED RETINOPATHY SEVERITY: ICD-10-CM

## 2024-11-08 PROCEDURE — 99999 PR PBB SHADOW E&M-EST. PATIENT-LVL V: CPT | Mod: PBBFAC,HCNC,, | Performed by: ANESTHESIOLOGY

## 2024-11-08 NOTE — PROGRESS NOTES
Interventional Pain Progress Note       Referring Physician: No ref. provider found    PCP: Carito Kohler MD    Chief Complaint:     Chief Complaint   Patient presents with    Follow-up     Lumbar TAISHA follow-up.  Patient received 0% relief from TAISHA (pt received 100% for the 1 day only). Patient has pain in the lower back.  Pain level 6/10        SUBJECTIVE:    Interval History (11/08/2024):      Patient reports 100% relief for 1 day after L4-5 interlaminar epidural steroid injection on 10/03/2024. Patient reports after this 1 day pain immediately returned.  Patient reports continued lower back pain that is described as a throbbing aching pain in the band across the lower back.  Patient also reports aching throbbing pain in the bilateral calves, right-greater-than-left, but reports that this pain is not connected to her lower back.  Patient reports that pain does not radiate to her bilateral lower extremities.  Pain is worse with extension and prolonged standing, better with sitting down and ice and heat.  Pain is currently rated as 7/10. Denies any fevers, chills, changes in gait, saddle anesthesia, or bowel and bladder incontinence        Interval History (09/17/2024):      Patient returns to clinic to review MRI of lumbar spine.  Since last being seen, patient reports worsening of axial low back pain.  Pain described as a throbbing aching pain diffusely throughout the lumbar spine.  Patient reports continued bilateral lower extremity pain in his well.  Pain is typically worse on the right compared to the left.  Pain is worse with prolonged standing and walking, better with sitting down.  Pain is currently rated an 8/10. Denies any fevers, chills, changes in gait, saddle anesthesia, or bowel and bladder incontinence        Initial HPI:     Rosana Aguiar is a 74 y.o. female who presents to the clinic for the evaluation of lower back pain.   Patient reports 2 year history of worsening lower back pain.   Patient denies any significant traumas to her lower back.  Patient has had previous grafting of vessels in her right lower extremity.  Patient denies any previous surgeries in her lumbar spine.  Patient does report over the last 2 years she has been helping her  who was diagnosed with cancer and has been lifting him more than usual  Lower back pain described as stabbing aching pain that starts in the midline of the lower back.  This pain then radiates along the posterior aspects down her bilateral lower extremities, right-greater-than-left, to the distal calf.  Pain is worse with prolonged walking and standing, better with relaxation and elevation of her foot.  Pain is currently rated as 7/10. Denies any fevers, chills, changes in gait, saddle anesthesia, or bowel and bladder incontinence    Patient denies any fevers, chills, changes in gait, saddle anesthesia, or bowel and bladder incontinence.      Non-Pharmacologic Treatments:  Physical Therapy/Home Exercise: yes  Ice/Heat:yes  TENS: no  Acupuncture: no  Massage: yes  Chiropractic: yes        Previous Pain Medications:  NSAIDs, Tylenol, muscle relaxers, neuropathics, opioids, topicals       report:  Reviewed and consistent with medication use as prescribed.    Pain Procedures:   - 10/03/2024: L4-5 interlaminar epidural steroid injection, monitor% relief for 1 day followed by returned to baseline pain    Imaging:     Results for orders placed during the hospital encounter of 09/15/24    MRI Lumbar Spine Without Contrast    Narrative  EXAMINATION:  MRI LUMBAR SPINE WITHOUT CONTRAST    CLINICAL HISTORY:  Radiculopathy, lumbar regionLow back pain, symptoms persist with > 6wks conservative treatment;Lumbar radiculopathy, symptoms persist with conservative treatment;    TECHNIQUE:  Standard multiplanar noncontrast MRI sequences of the lumbar spine.    COMPARISON:  None    FINDINGS:  The distal cord and conus reveal normal signal and morphology.    The lumbar  vertebra reveal normal alignment, shape and signal intensity.    T12-L1: Minor disc desiccation.    L1-2:     Minor disc desiccation.    L2-3:     Minor disc desiccation.    L3-4:     Minor disc desiccation with minor facet arthrosis.    L4-5:     Mild disc desiccation with mild disc bulge.  Mild bilateral hypertrophic facet arthrosis.    L5-S1:    Mild disc desiccation and disc bulge.  Mild to moderate hypertrophic facet arthrosis.    Impression  Mild degenerative findings as above.  No significant central or foraminal stenosis.      Electronically signed by: Adam Gaspar MD  Date:    09/16/2024  Time:    07:46         Results for orders placed during the hospital encounter of 07/11/22    X-Ray Lumbar Spine AP And Lateral    Narrative  EXAMINATION:  XR LUMBAR SPINE AP AND LATERAL    CLINICAL HISTORY:  Weakness    TECHNIQUE:  AP, lateral and spot images were performed of the lumbar spine.    COMPARISON:  None    FINDINGS:  Osteopenia.  Vertebral body heights maintained.  No spondylolisthesis.  Disc spaces maintained.  Lower lumbar spine facet arthropathy.  No acute osseous abnormality.  Prominent atherosclerotic vascular calcification with distal abdominal aortic ectasia.    Impression  Degenerative findings      Electronically signed by: Quinton Cardenas MD  Date:    07/11/2022  Time:    12:43          Past Medical History:   Diagnosis Date    Atherosclerotic PVD with intermittent claudication 10/19/2022    Diabetes mellitus, type 2 2003    BS didn't check 09/06/2023    High cholesterol     Hypertension     Macular degeneration      Past Surgical History:   Procedure Laterality Date    ANGIOGRAPHY OF LOWER EXTREMITY N/A 02/09/2024    Procedure: Angiogram Extremity Unilateral/poss pta right leg intervention;  Surgeon: Clint Urena MD;  Location: [facility] CATH LAB;  Service: Peripheral Vascular;  Laterality: N/A;  left common femoral access    APPENDECTOMY      CATARACT EXTRACTION Bilateral     CREATION OF  FEMOROPOPLITEAL ARTERIAL BYPASS USING GRAFT Right 3/14/2024    Procedure: CREATION, BYPASS, ARTERIAL, FEMORAL TO POPLITEAL, USING GRAFT;  Surgeon: Clint Urena MD;  Location: [facility] OR;  Service: Peripheral Vascular;  Laterality: Right;    cyst removal from breast (Left)      EPIDURAL STEROID INJECTION N/A 10/3/2024    Procedure: Lumbar L4/L5 IL TAISHA  Stop Pletal 2 days before procedure;  Surgeon: Kranthi Grubbs MD;  Location: [facility];  Service: Pain Management;  Laterality: N/A;    EYE SURGERY  Cataract    HYSTERECTOMY      TONSILLECTOMY      TUBAL LIGATION  1975     Social History     Socioeconomic History    Marital status:    Tobacco Use    Smoking status: Every Day     Current packs/day: 1.00     Average packs/day: 1 pack/day for 62.9 years (62.9 ttl pk-yrs)     Types: Cigarettes     Start date: 1962     Passive exposure: Past    Smokeless tobacco: Never    Tobacco comments:     HOLD MIDNIGHT PRIOR TO SX   Substance and Sexual Activity    Alcohol use: Yes     Comment: rarely; HOLD 72HRS    Drug use: Never    Sexual activity: Not Currently     Partners: Male     Birth control/protection: None     Social Drivers of Health     Financial Resource Strain: Medium Risk (5/24/2024)    Overall Financial Resource Strain (CARDIA)     Difficulty of Paying Living Expenses: Somewhat hard   Food Insecurity: No Food Insecurity (5/24/2024)    Hunger Vital Sign     Worried About Running Out of Food in the Last Year: Never true     Ran Out of Food in the Last Year: Never true   Transportation Needs: No Transportation Needs (5/24/2024)    PRAPARE - Transportation     Lack of Transportation (Medical): No     Lack of Transportation (Non-Medical): No   Physical Activity: Inactive (5/24/2024)    Exercise Vital Sign     Days of Exercise per Week: 0 days     Minutes of Exercise per Session: 0 min   Stress: Stress Concern Present (5/24/2024)    [employer] [place] [employer] - Occupational Stress Questionnaire      Feeling of Stress : Rather much   Housing Stability: Low Risk  (5/24/2024)    Housing Stability Vital Sign     Unable to Pay for Housing in the Last Year: No     Homeless in the Last Year: No     Family History   Problem Relation Name Age of Onset    Cancer Mother          female    Diabetes Mother      Macular degeneration Mother      Alcohol abuse Father      Cancer Daughter          colon    Diabetes Maternal Uncle      Heart disease Neg Hx      Stroke Neg Hx         Review of patient's allergies indicates:   Allergen Reactions    Clindamycin Other (See Comments)    Neomycin-bacitracin-polymyxin Itching    Nitrofurantoin monohyd/m-cryst Other (See Comments)       Current Outpatient Medications   Medication Sig    albuterol (VENTOLIN HFA) 90 mcg/actuation inhaler Inhale 2 puffs into the lungs every 6 (six) hours as needed for Wheezing. Rescue    amLODIPine (NORVASC) 10 MG tablet Take 1 tablet (10 mg total) by mouth once daily.    aspirin (ECOTRIN) 81 MG EC tablet Take 1 tablet (81 mg total) by mouth once daily.    blood sugar diagnostic Strp Check blood glucose levels two times a day    blood-glucose meter kit Use as instructed    carvediloL (COREG) 6.25 MG tablet Take 1 tablet (6.25 mg total) by mouth 2 (two) times daily with meals.    cilostazoL (PLETAL) 50 MG Tab Take 1 tablet (50 mg total) by mouth 2 (two) times daily.    evolocumab (REPATHA SURECLICK) 140 mg/mL PnIj Inject 1 mL (140 mg total) into the skin every 14 (fourteen) days.    FLUoxetine 20 MG capsule Take 1 capsule (20 mg total) by mouth once daily.    fluticasone propionate (FLONASE) 50 mcg/actuation nasal spray 1 spray (50 mcg total) by Each Nostril route once daily.    gabapentin (NEURONTIN) 300 MG capsule Take 1 capsule (300 mg total) by mouth 3 (three) times daily.    HYDROcodone-acetaminophen (NORCO) 5-325 mg per tablet Take 1 tablet by mouth every 6 (six) hours as needed for Pain.    HYDROcodone-acetaminophen (NORCO) 7.5-325 mg per tablet  "Take 1 tablet by mouth every 6 (six) hours as needed for Pain for up to 7 days.    ibuprofen (ADVIL,MOTRIN) 800 MG tablet Take 1 tablet (800 mg total) by mouth every 6 (six) hours if needed for mild pain for up to 10 days.    insulin glargine U-300 conc (TOUJEO MAX U-300 SOLOSTAR) 300 unit/mL (3 mL) insulin pen Inject 60 Units into the skin once daily.    levocetirizine (XYZAL) 5 MG tablet Take 1 tablet (5 mg total) by mouth every evening.    losartan-hydrochlorothiazide 100-25 mg (HYZAAR) 100-25 mg per tablet TAKE 1 TABLET EVERY DAY    pen needle, diabetic (BD CAROL 2ND GEN PEN NEEDLE) 32 gauge x 5/32" Ndle 1 each by Misc.(Non-Drug; Combo Route) route once daily.    potassium chloride SA (K-DUR,KLOR-CON) 20 MEQ tablet Take 1 tablet (20 mEq total) by mouth once daily.    tiotropium-olodateroL (STIOLTO RESPIMAT) 2.5-2.5 mcg/actuation Mist Inhale 2 puffs into the lungs once daily - Controller    tiZANidine (ZANAFLEX) 4 MG tablet Take 1 tablet (4 mg total) by mouth nightly as needed (Back Pain).    rosuvastatin (CRESTOR) 20 MG tablet Take 1 tablet (20 mg total) by mouth once daily.     No current facility-administered medications for this visit.         ROS  Review of Systems   Constitutional:  Negative for chills, diaphoresis, fatigue and fever.   HENT:  Negative for ear discharge, ear pain, rhinorrhea, trouble swallowing and voice change.    Respiratory:  Negative for chest tightness, wheezing and stridor.    Cardiovascular:  Positive for leg swelling. Negative for chest pain.   Gastrointestinal:  Negative for blood in stool, diarrhea, nausea and vomiting.   Endocrine: Negative for cold intolerance and heat intolerance.   Genitourinary:  Positive for urgency. Negative for dysuria and hematuria.   Musculoskeletal:  Positive for arthralgias, back pain, gait problem, joint swelling and myalgias. Negative for neck pain and neck stiffness.   Skin:  Negative for rash.   Neurological:  Positive for tremors, weakness and " "numbness. Negative for seizures, speech difficulty, light-headedness and headaches.   Hematological:  Does not bruise/bleed easily.   Psychiatric/Behavioral:  Negative for agitation, confusion and suicidal ideas. The patient is nervous/anxious.             OBJECTIVE:  /69   Pulse 65   Resp 17   Ht 5' 4" (1.626 m)   Wt 75.4 kg (166 lb 3.6 oz)   BMI 28.53 kg/m²         Physical Exam  Constitutional:       General: She is not in acute distress.     Appearance: Normal appearance. She is not ill-appearing.   HENT:      Head: Normocephalic and atraumatic.      Nose: No congestion or rhinorrhea.   Eyes:      Extraocular Movements: Extraocular movements intact.      Pupils: Pupils are equal, round, and reactive to light.   Pulmonary:      Effort: Pulmonary effort is normal.   Abdominal:      General: Abdomen is flat.      Palpations: Abdomen is soft.   Skin:     General: Skin is warm and dry.      Capillary Refill: Capillary refill takes 2 to 3 seconds.   Neurological:      General: No focal deficit present.      Mental Status: She is alert and oriented to person, place, and time.      Sensory: No sensory deficit.      Motor: Weakness present. No abnormal muscle tone.      Gait: Gait abnormal.      Deep Tendon Reflexes:      Reflex Scores:       Patellar reflexes are 2+ on the right side and 2+ on the left side.       Achilles reflexes are 1+ on the right side and 1+ on the left side.     Comments: 4/5 strength in bilateral dorsiflexion   Psychiatric:         Mood and Affect: Mood normal.         Behavior: Behavior normal.         Thought Content: Thought content normal.           Musculoskeletal:        Lumbar Exam  Incision: no  Pain with Flexion: yes  Pain with Extension: yes  ROM:  Decreased  Paraspinous TTP:  Positive bilaterally  Facet TTP:  L5-S1  Facet Loading:  Positive bilaterally  SLR:  Negative bilaterally  SIJ TTP:  Negative bilaterally  GLORIA:  Negative bilateral      LABS:  Lab Results   Component " Value Date    WBC 7.61 07/24/2024    HGB 12.1 07/24/2024    HCT 36.2 (L) 07/24/2024    MCV 91 07/24/2024     07/24/2024       CMP  Sodium   Date Value Ref Range Status   07/24/2024 130 (L) 136 - 145 mmol/L Final     Potassium   Date Value Ref Range Status   07/24/2024 3.2 (L) 3.5 - 5.1 mmol/L Final     Chloride   Date Value Ref Range Status   07/24/2024 95 95 - 110 mmol/L Final     CO2   Date Value Ref Range Status   07/24/2024 25 23 - 29 mmol/L Final     Glucose   Date Value Ref Range Status   07/24/2024 101 70 - 110 mg/dL Final     BUN   Date Value Ref Range Status   07/24/2024 12 8 - 23 mg/dL Final     Creatinine   Date Value Ref Range Status   07/24/2024 1.0 0.5 - 1.4 mg/dL Final     Calcium   Date Value Ref Range Status   07/24/2024 8.9 8.7 - 10.5 mg/dL Final     Total Protein   Date Value Ref Range Status   07/24/2024 6.8 6.0 - 8.4 g/dL Final     Albumin   Date Value Ref Range Status   07/24/2024 3.5 3.5 - 5.2 g/dL Final     Total Bilirubin   Date Value Ref Range Status   07/24/2024 0.3 0.1 - 1.0 mg/dL Final     Comment:     For infants and newborns, interpretation of results should be based  on gestational age, weight and in agreement with clinical  observations.    Premature Infant recommended reference ranges:  Up to 24 hours.............<8.0 mg/dL  Up to 48 hours............<12.0 mg/dL  3-5 days..................<15.0 mg/dL  6-29 days.................<15.0 mg/dL       Alkaline Phosphatase   Date Value Ref Range Status   07/24/2024 81 55 - 135 U/L Final     AST   Date Value Ref Range Status   07/24/2024 17 10 - 40 U/L Final     ALT   Date Value Ref Range Status   07/24/2024 15 10 - 44 U/L Final     Anion Gap   Date Value Ref Range Status   07/24/2024 10 8 - 16 mmol/L Final     eGFR if    Date Value Ref Range Status   07/11/2022 >60.0 >60 mL/min/1.73 m^2 Final     eGFR if non    Date Value Ref Range Status   07/11/2022 >60.0 >60 mL/min/1.73 m^2 Final     Comment:      Calculation used to obtain the estimated glomerular filtration  rate (eGFR) is the CKD-EPI equation.          Lab Results   Component Value Date    HGBA1C 6.3 (H) 07/24/2024             ASSESSMENT:       74 y.o. year old female with lower back pain, consistent with     1. Lumbar spondylosis  IR Facet Inj Lumbar 2nd Vert Bilateral    IR Facet Inj Lumbar 1st Vert Bi    Case Request-RAD/Other Procedure Area: Bilateral L4/L5 and L5/S1 MBB      2. Intermittent claudication due to atherosclerosis of artery of extremity        3. Lymphedema        4. Diabetes mellitus due to underlying condition with retinopathy and macular edema, with long-term current use of insulin, unspecified laterality, unspecified retinopathy severity        5. Lumbar radiculopathy          Lumbar spondylosis  -     IR Facet Inj Lumbar 2nd Vert Bilateral; Future; Expected date: 11/08/2024  -     IR Facet Inj Lumbar 1st Vert Bi; Future; Expected date: 11/08/2024  -     Case Request-RAD/Other Procedure Area: Bilateral L4/L5 and L5/S1 MBB    Intermittent claudication due to atherosclerosis of artery of extremity    Lymphedema    Diabetes mellitus due to underlying condition with retinopathy and macular edema, with long-term current use of insulin, unspecified laterality, unspecified retinopathy severity    Lumbar radiculopathy             PLAN:   - Interventions:    - schedule patient for bilateral L4-5 and L5-S1 medial branch block for axial low back pain that appears to be facet mediated.  If patient has significant relief we will proceed with 2nd diagnostic block and RFA  - - 10/03/2024: L4-5 interlaminar epidural steroid injection, monitor% relief for 1 day followed by returned to baseline pain  - Anticoagulation use:   Yes aspirin and pletal    - Medications:   - continue tizanidine 4 mg at night p.r.n.  - continue gabapentin 300 mg 3 times a day      - Therapy:   Patient has completed 5 sessions of formal physical therapy with increased pain.  We  will hold on physical therapy until pain is more controlled    - Imaging/Diagnostic:   - MRI lumbar spine reviewed.  There is broad-based disc bulge present at L4-5 with right-greater-than-left foraminal stenosis.  There was mild-to-moderate facet hypertrophy at L4-5 and L5-S1.   - EMG and nerve conduction study of bilateral lower extremities that has evidence of bilateral acute on chronic S1 and L5 radiculopathy  - previous x-rays of lumbar spine reveal diffuse facet hypertrophy with loss of disc height at L4-5    - Consults:   - continue follow up with Neurology for tremors of her lower extremity  - continue follow up with vascular surgery for vascular claudication      - Patient Questions: Answered all of the patient's questions regarding diagnosis, therapy, and treatment     This condition does not require this patient to take time off of work, and the primary goal of our Pain Management services is to improve the patient's functional capacity.     - Follow up visit: Patient will be called after procedure to see if they are a candidate for 2nd diagnostic MBB and RFA      Visit today included increased complexity associated with the care of the episodic problem of chronic pain which was addressed and continue to manage the longitudinal care of the patient due to the serious and/or complex managed problem(s) listed above.      The above plan and management options were discussed at length with patient. Patient is in agreement with the above and verbalized understanding.    I discussed the goals of interventional chronic pain management with the patient on today's visit.  I explained the utility of injections for diagnostic and therapeutic purposes.  We discussed a multimodal approach to pain including treating the patient's given worst pain at any given time.  We will use a systematic approach to addressing pain.  We will also adopt a multimodal approach that includes injections, adjuvant medications, physical  therapy, at times psychiatry.  There may be a limited role for opioid use intermittently in the treatment of pain, more particularly for acute pain although no one approach can be used as a sole treatment modality.    I emphasized the importance of regular exercise, core strengthening and stretching, diet and weight loss as a cornerstone of long-term pain management.      Kranthi Grubbs MD  Interventional Pain Management  [facility]    Future Appointments   Date Time Provider Department Center   1/27/2025 10:00 AM [facility] CVT VASCULAR ULTRASOUND [facility] CVTVU [facility]   1/27/2025 10:45 AM Clint Urena MD [facility] VASSGY [facility]   5/26/2025  9:00 AM [facility] CT1 LIMIT 500 LBS [facility] CT SCAN [facility]   5/26/2025  9:15 AM PULMONARY LAB, [facility] PULMFUN [facility]   5/26/2025 10:00 AM PULMONARY LAB, [facility] PULMFUN [facility]   5/26/2025 10:40 AM Mick Jeffrey MD [facility] PULMSVC [facility]           Disclaimer:  This note was prepared using voice recognition system and is likely to have sound alike errors that may have been overlooked even after proof reading.  Please call me with any questions    I spent a total of 20 minutes on the day of the visit.  This includes face to face time and non-face to face time preparing to see the patient (eg, review of tests), obtaining and/or reviewing separately obtained history, documenting clinical information in the electronic or other health record, independently interpreting results and communicating results to the patient/family/caregiver, or care coordinator.

## 2024-11-13 NOTE — PRE-PROCEDURE INSTRUCTIONS
Spoke with patient regarding procedure scheduled on 11.25     Arrival time 0745     Has patient been sick with fever or on antibiotics within the last 7 days? No     Does the patient have any open wounds, sores or rashes? No     Does the patient have any recent fractures? no     Has patient received a vaccination within the last 7 days? No     Received the COVID vaccination? yes     Has the patient stopped all medications as directed? na     Does patient have a pacemaker, defibrillator, or implantable stimulator? No     Does the patient have a ride to and from procedure and someone reliable to remain with patient?  DAUGHTER      Is the patient diabetic? YES     Does the patient have sleep apnea? Or use O2 at home? no     Is the patient receiving sedation? yes     Is the patient instructed to remain NPO beginning at midnight the night before their procedure? yes     Procedure location confirmed with patient? Yes     Covid- Denies signs/symptoms. Instructed to notify PAT/MD if any changes.

## 2024-11-20 DIAGNOSIS — I10 HYPERTENSION, UNSPECIFIED TYPE: ICD-10-CM

## 2024-11-20 DIAGNOSIS — Z79.4 TYPE 2 DIABETES MELLITUS WITHOUT COMPLICATION, WITH LONG-TERM CURRENT USE OF INSULIN: ICD-10-CM

## 2024-11-20 DIAGNOSIS — E11.9 TYPE 2 DIABETES MELLITUS WITHOUT COMPLICATION, WITH LONG-TERM CURRENT USE OF INSULIN: ICD-10-CM

## 2024-11-20 RX ORDER — LOSARTAN POTASSIUM AND HYDROCHLOROTHIAZIDE 25; 100 MG/1; MG/1
TABLET ORAL
Qty: 90 TABLET | Refills: 1 | Status: SHIPPED | OUTPATIENT
Start: 2024-11-20

## 2024-11-20 NOTE — TELEPHONE ENCOUNTER
Refill Routing Note   Medication(s) are not appropriate for processing by [facility] for the following reason(s):        Required labs abnormal    [facility] action(s):  Defer     Requires labs : Yes             Appointments  past 12m or future 3m with PCP    Date Provider   Last Visit   7/24/2024 Carito Kohler MD   Next Visit   Visit date not found Carito Kohler MD   ED visits in past 90 days: 0        Note composed:10:18 AM 11/20/2024

## 2024-11-20 NOTE — TELEPHONE ENCOUNTER
Care Due:                  Date            Visit Type   Department     Provider  --------------------------------------------------------------------------------                                EP -                              PRIMARY      [facility] INTERNAL  Last Visit: 07-      CARE ([facility])   MEDICINE       Carito Kohler  Next Visit: None Scheduled  None         None Found                                                            Last  Test          Frequency    Reason                     Performed    Due Date  --------------------------------------------------------------------------------    HBA1C.......  6 months...  insulin..................  07- 01-    [facility] Embedded Care Due Messages. Reference number: [id number].   11/20/2024 5:26:19 AM CST

## 2024-11-25 ENCOUNTER — HOSPITAL ENCOUNTER (OUTPATIENT)
Facility: HOSPITAL | Age: 74
Discharge: HOME | End: 2024-11-25
Attending: ANESTHESIOLOGY | Admitting: ANESTHESIOLOGY
Payer: MEDICARE

## 2024-11-25 VITALS
BODY MASS INDEX: 28.47 KG/M2 | HEIGHT: 64 IN | RESPIRATION RATE: 16 BRPM | DIASTOLIC BLOOD PRESSURE: 62 MMHG | HEART RATE: 65 BPM | SYSTOLIC BLOOD PRESSURE: 143 MMHG | WEIGHT: 166.75 LBS | TEMPERATURE: 96 F | OXYGEN SATURATION: 95 %

## 2024-11-25 DIAGNOSIS — M47.816 LUMBAR SPONDYLOSIS: Primary | ICD-10-CM

## 2024-11-25 LAB — POCT GLUCOSE: 78 MG/DL (ref 70–110)

## 2024-11-25 PROCEDURE — 82962 GLUCOSE BLOOD TEST: CPT | Mod: HCNC | Performed by: ANESTHESIOLOGY

## 2024-11-25 PROCEDURE — 64493 INJ PARAVERT F JNT L/S 1 LEV: CPT | Mod: 50,HCNC,, | Performed by: ANESTHESIOLOGY

## 2024-11-25 PROCEDURE — 64493 INJ PARAVERT F JNT L/S 1 LEV: CPT | Mod: 50,HCNC | Performed by: ANESTHESIOLOGY

## 2024-11-25 PROCEDURE — 64494 INJ PARAVERT F JNT L/S 2 LEV: CPT | Mod: 50,HCNC | Performed by: ANESTHESIOLOGY

## 2024-11-25 PROCEDURE — 64494 INJ PARAVERT F JNT L/S 2 LEV: CPT | Mod: 50,HCNC,, | Performed by: ANESTHESIOLOGY

## 2024-11-25 PROCEDURE — 63600175 PHARM REV CODE 636 W HCPCS: Mod: JZ,JG,HCNC | Performed by: ANESTHESIOLOGY

## 2024-11-25 RX ORDER — ONDANSETRON HYDROCHLORIDE 2 MG/ML
4 INJECTION, SOLUTION INTRAVENOUS ONCE AS NEEDED
Status: DISCONTINUED | OUTPATIENT
Start: 2024-11-25 | End: 2024-11-25 | Stop reason: HOSPADM

## 2024-11-25 RX ORDER — BUPIVACAINE HYDROCHLORIDE 5 MG/ML
INJECTION, SOLUTION EPIDURAL; INTRACAUDAL
Status: DISCONTINUED | OUTPATIENT
Start: 2024-11-25 | End: 2024-11-25 | Stop reason: HOSPADM

## 2024-11-25 RX ORDER — MIDAZOLAM HYDROCHLORIDE 1 MG/ML
INJECTION, SOLUTION INTRAMUSCULAR; INTRAVENOUS
Status: DISCONTINUED | OUTPATIENT
Start: 2024-11-25 | End: 2024-11-25 | Stop reason: HOSPADM

## 2024-11-25 RX ORDER — FENTANYL CITRATE 50 UG/ML
INJECTION, SOLUTION INTRAMUSCULAR; INTRAVENOUS
Status: DISCONTINUED | OUTPATIENT
Start: 2024-11-25 | End: 2024-11-25 | Stop reason: HOSPADM

## 2024-11-25 NOTE — OP NOTE
LUMBAR Medial Branch Block Under Fluoroscopy,  Bilateral L4/5 and L5/S1    INFORMED CONSENT: The procedure, risks, benefits and options were discussed with patient. There are no contraindications to the procedure. The patient expressed understanding and agreed to proceed. The personnel performing the procedure was discussed.    Date of procedure 11/25/2024    Time-out taken to identify patient and procedure side prior to starting the procedure.                                                                PROCEDURE:  Bilateral medial branch block at the transverse processes at the level of L4, L5, and the sacral ala    Pre Procedure diagnosis:    Lumbar spondylosis [M47.816]  1. Lumbar spondylosis        Post-Procedure diagnosis:   same    PHYSICIAN: Kranthi Grubbs MD  ASSISTANTS: None    MEDICATIONS INJECTED: 0.5% bupivicane, 1mL at each level    LOCAL ANESTHETIC USED: Lidocaine, 1%, 1ml at each level    ESTIMATED BLOOD LOSS:  None.    COMPLICATIONS:  None.    Sedation: Conscious sedation provided by M.D    SEDATION MEDICATIONS: local/IV sedation: Versed 2 mg and fentanyl 25 mcg IV.  Conscious sedation ordered by MD.  Patient reevaluated and sedation administered by MD and monitored by RN.  Total sedation time was less than 10 min.    Total sedation time was <10 min      TECHNIQUE: Laying in a prone position, the patient was prepped and draped in the usual sterile fashion using ChloraPrep and fenestrated drape.  The level was determined under fluoroscopic guidance.  Local anesthetic was given by going down to the hub of the 27-gauge 1.25in needle and raising a wheel.  A 25-gauge 3.5inch needle was introduced to the anatomic local of the medial branch at each of the above levels using fluoroscopy in the AP, oblique, and lateral views.  After negative aspiration, medication was injected slowly. The patient tolerated the procedure well.       The patient was monitored after the procedure.  Patient was given post  procedure and discharge instructions to follow at home.  We will see the patient back in two weeks or the patient may call to inform of status. The patient was discharged in a stable condition

## 2024-11-25 NOTE — DISCHARGE INSTRUCTIONS

## 2024-11-25 NOTE — DISCHARGE SUMMARY
Discharge Note  Short Stay      SUMMARY     Admit Date: 11/25/2024    Attending Physician: Kranthi Grubbs MD        Discharge Physician: Kranthi Grubbs MD        Discharge Date: 11/25/2024 8:56 AM    Procedure(s) (LRB):  Bilateral L4/L5 and L5/S1 MBB (Bilateral)    Final Diagnosis: Lumbar spondylosis [M47.816]    Disposition: Home or self care    Patient Instructions:   Current Discharge Medication List        CONTINUE these medications which have NOT CHANGED    Details   amLODIPine (NORVASC) 10 MG tablet Take 1 tablet (10 mg total) by mouth once daily.  Qty: 90 tablet, Refills: 3    Comments: Please inactivate all prior scripts with same name and strength including any scripts on hold.  Associated Diagnoses: Hypertension, unspecified type      carvediloL (COREG) 6.25 MG tablet Take 1 tablet (6.25 mg total) by mouth 2 (two) times daily with meals.  Qty: 180 tablet, Refills: 2      insulin glargine U-300 conc (TOUJEO MAX U-300 SOLOSTAR) 300 unit/mL (3 mL) insulin pen Inject 60 Units into the skin once daily.  Qty: 3 Pen, Refills: 11    Associated Diagnoses: Type 2 diabetes mellitus without complication, with long-term current use of insulin      losartan-hydrochlorothiazide 100-25 mg (HYZAAR) 100-25 mg per tablet TAKE 1 TABLET EVERY DAY  Qty: 90 tablet, Refills: 1    Comments: .  Associated Diagnoses: Type 2 diabetes mellitus without complication, with long-term current use of insulin; Hypertension, unspecified type      albuterol (VENTOLIN HFA) 90 mcg/actuation inhaler Inhale 2 puffs into the lungs every 6 (six) hours as needed for Wheezing. Rescue  Qty: 18 g, Refills: 11    Associated Diagnoses: Simple chronic bronchitis      aspirin (ECOTRIN) 81 MG EC tablet Take 1 tablet (81 mg total) by mouth once daily.  Qty: 90 tablet, Refills: 3    Associated Diagnoses: Abdominal aortic atherosclerosis; Stenosis of carotid artery, unspecified laterality      blood sugar diagnostic Strp Check blood glucose levels two  times a day  Qty: 200 strip, Refills: 2    Associated Diagnoses: Type 2 diabetes mellitus with diabetic peripheral angiopathy without gangrene, with long-term current use of insulin      blood-glucose meter kit Use as instructed  Qty: 1 each, Refills: 0    Associated Diagnoses: Type 2 diabetes mellitus with diabetic peripheral angiopathy without gangrene, with long-term current use of insulin      cilostazoL (PLETAL) 50 MG Tab Take 1 tablet (50 mg total) by mouth 2 (two) times daily.  Qty: 60 tablet, Refills: 11    Associated Diagnoses: Hyperlipidemia, unspecified hyperlipidemia type; Type 2 diabetes mellitus with diabetic peripheral angiopathy without gangrene, with long-term current use of insulin; Atherosclerotic PVD with intermittent claudication      evolocumab (REPATHA SURECLICK) 140 mg/mL PnIj Inject 1 mL (140 mg total) into the skin every 14 (fourteen) days.  Qty: 1 mL, Refills: 3    Associated Diagnoses: Abdominal aortic atherosclerosis; Stenosis of carotid artery, unspecified laterality; Coronary artery calcification; PVD (peripheral vascular disease)      FLUoxetine 20 MG capsule Take 1 capsule (20 mg total) by mouth once daily.  Qty: 90 capsule, Refills: 3    Associated Diagnoses: Depression, recurrent      fluticasone propionate (FLONASE) 50 mcg/actuation nasal spray 1 spray (50 mcg total) by Each Nostril route once daily.  Qty: 16 g, Refills: 2    Associated Diagnoses: Post-nasal drip      gabapentin (NEURONTIN) 300 MG capsule Take 1 capsule (300 mg total) by mouth 3 (three) times daily.  Qty: 90 capsule, Refills: 11      HYDROcodone-acetaminophen (NORCO) 5-325 mg per tablet Take 1 tablet by mouth every 6 (six) hours as needed for Pain.  Qty: 12 tablet, Refills: 0    Comments: Quantity prescribed more than 7 day supply? No      HYDROcodone-acetaminophen (NORCO) 7.5-325 mg per tablet Take 1 tablet by mouth every 6 (six) hours as needed for Pain for up to 7 days.  Qty: 20 tablet, Refills: 0    Comments:  "Quantity prescribed more than 7 day supply? Press F2 and select one:[id number]        ibuprofen (ADVIL,MOTRIN) 800 MG tablet Take 1 tablet (800 mg total) by mouth every 6 (six) hours if needed for mild pain for up to 10 days.  Qty: 32 tablet, Refills: 0      levocetirizine (XYZAL) 5 MG tablet Take 1 tablet (5 mg total) by mouth every evening.  Qty: 30 tablet, Refills: 11    Associated Diagnoses: Post-nasal drip      pen needle, diabetic (BD CAROL 2ND GEN PEN NEEDLE) 32 gauge x 5/32" Ndle 1 each by Misc.(Non-Drug; Combo Route) route once daily.  Qty: 100 each, Refills: 3    Associated Diagnoses: Type 2 diabetes mellitus without complication, with long-term current use of insulin      potassium chloride SA (K-DUR,KLOR-CON) 20 MEQ tablet Take 1 tablet (20 mEq total) by mouth once daily.  Qty: 3 tablet, Refills: 0      rosuvastatin (CRESTOR) 20 MG tablet Take 1 tablet (20 mg total) by mouth once daily.  Qty: 90 tablet, Refills: 3    Associated Diagnoses: Type 2 diabetes mellitus with diabetic peripheral angiopathy without gangrene, with long-term current use of insulin; Hyperlipidemia, unspecified hyperlipidemia type; Atherosclerotic PVD with intermittent claudication      tiotropium-olodateroL (STIOLTO RESPIMAT) 2.5-2.5 mcg/actuation Mist Inhale 2 puffs into the lungs once daily - Controller  Qty: 12 g, Refills: 3    Associated Diagnoses: Simple chronic bronchitis      tiZANidine (ZANAFLEX) 4 MG tablet Take 1 tablet (4 mg total) by mouth nightly as needed (Back Pain).  Qty: 30 tablet, Refills: 1    Associated Diagnoses: Lumbar radiculopathy; Intermittent claudication due to atherosclerosis of artery of extremity; Lumbar spondylosis                 Discharge Diagnosis: Lumbar spondylosis [M47.816]  Condition on Discharge: Stable with no complications to procedure   Diet on Discharge: Same as before.  Activity: as per instruction sheet.  Discharge to: Home with a responsible adult.  Follow up: 2-4 weeks       Please call the " office at [phone] if you experience any weakness or loss of sensation, fever > 101.5, pain uncontrolled with oral medications, persistent nausea/vomiting/or diarrhea, redness or drainage from the incisions, or any other worrisome concerns. If physician on call was not reached or could not communicate with our office for any reason please go to the nearest emergency department

## 2024-11-25 NOTE — H&P
HPI  Patient presenting for Procedure(s) (LRB):  Bilateral L4/L5 and L5/S1 MBB (Bilateral)     Patient on Anti-coagulation No    No health changes since previous encounter    Past Medical History:   Diagnosis Date    Atherosclerotic PVD with intermittent claudication 10/19/2022    Diabetes mellitus, type 2 2003    BS didn't check 09/06/2023    High cholesterol     Hypertension     Macular degeneration      Past Surgical History:   Procedure Laterality Date    ANGIOGRAPHY OF LOWER EXTREMITY N/A 02/09/2024    Procedure: Angiogram Extremity Unilateral/poss pta right leg intervention;  Surgeon: Clint Urena MD;  Location: [facility] CATH LAB;  Service: Peripheral Vascular;  Laterality: N/A;  left common femoral access    APPENDECTOMY      CATARACT EXTRACTION Bilateral     CREATION OF FEMOROPOPLITEAL ARTERIAL BYPASS USING GRAFT Right 3/14/2024    Procedure: CREATION, BYPASS, ARTERIAL, FEMORAL TO POPLITEAL, USING GRAFT;  Surgeon: Clint Urena MD;  Location: [facility] OR;  Service: Peripheral Vascular;  Laterality: Right;    cyst removal from breast (Left)      EPIDURAL STEROID INJECTION N/A 10/3/2024    Procedure: Lumbar L4/L5 IL TAISHA  Stop Pletal 2 days before procedure;  Surgeon: Kranthi Grubbs MD;  Location: [facility] PAIN MGT;  Service: Pain Management;  Laterality: N/A;    EYE SURGERY  Cataract    HYSTERECTOMY      TONSILLECTOMY      TUBAL LIGATION  1975     Review of patient's allergies indicates:   Allergen Reactions    Clindamycin Other (See Comments)    Neomycin-bacitracin-polymyxin Itching    Nitrofurantoin monohyd/m-cryst Other (See Comments)        No current facility-administered medications on file prior to encounter.     Current Outpatient Medications on File Prior to Encounter   Medication Sig Dispense Refill    amLODIPine (NORVASC) 10 MG tablet Take 1 tablet (10 mg total) by mouth once daily. 90 tablet 3    carvediloL (COREG) 6.25 MG tablet Take 1 tablet (6.25 mg total) by mouth 2 (two) times daily with meals.  "180 tablet 2    insulin glargine U-300 conc (TOUJEO MAX U-300 SOLOSTAR) 300 unit/mL (3 mL) insulin pen Inject 60 Units into the skin once daily. 3 Pen 11    albuterol (VENTOLIN HFA) 90 mcg/actuation inhaler Inhale 2 puffs into the lungs every 6 (six) hours as needed for Wheezing. Rescue 18 g 11    aspirin (ECOTRIN) 81 MG EC tablet Take 1 tablet (81 mg total) by mouth once daily. 90 tablet 3    blood sugar diagnostic Strp Check blood glucose levels two times a day 200 strip 2    blood-glucose meter kit Use as instructed 1 each 0    cilostazoL (PLETAL) 50 MG Tab Take 1 tablet (50 mg total) by mouth 2 (two) times daily. 60 tablet 11    evolocumab (REPATHA SURECLICK) 140 mg/mL PnIj Inject 1 mL (140 mg total) into the skin every 14 (fourteen) days. 1 mL 3    FLUoxetine 20 MG capsule Take 1 capsule (20 mg total) by mouth once daily. 90 capsule 3    fluticasone propionate (FLONASE) 50 mcg/actuation nasal spray 1 spray (50 mcg total) by Each Nostril route once daily. 16 g 2    gabapentin (NEURONTIN) 300 MG capsule Take 1 capsule (300 mg total) by mouth 3 (three) times daily. 90 capsule 11    HYDROcodone-acetaminophen (NORCO) 5-325 mg per tablet Take 1 tablet by mouth every 6 (six) hours as needed for Pain. 12 tablet 0    HYDROcodone-acetaminophen (NORCO) 7.5-325 mg per tablet Take 1 tablet by mouth every 6 (six) hours as needed for Pain for up to 7 days. 20 tablet 0    ibuprofen (ADVIL,MOTRIN) 800 MG tablet Take 1 tablet (800 mg total) by mouth every 6 (six) hours if needed for mild pain for up to 10 days. 32 tablet 0    levocetirizine (XYZAL) 5 MG tablet Take 1 tablet (5 mg total) by mouth every evening. 30 tablet 11    pen needle, diabetic (BD CAROL 2ND GEN PEN NEEDLE) 32 gauge x 5/32" Ndle 1 each by Misc.(Non-Drug; Combo Route) route once daily. 100 each 3    potassium chloride SA (K-DUR,KLOR-CON) 20 MEQ tablet Take 1 tablet (20 mEq total) by mouth once daily. 3 tablet 0    rosuvastatin (CRESTOR) 20 MG tablet Take 1 " "tablet (20 mg total) by mouth once daily. 90 tablet 3    tiotropium-olodateroL (STIOLTO RESPIMAT) 2.5-2.5 mcg/actuation Mist Inhale 2 puffs into the lungs once daily - Controller 12 g 3    tiZANidine (ZANAFLEX) 4 MG tablet Take 1 tablet (4 mg total) by mouth nightly as needed (Back Pain). 30 tablet 1        PMHx, PSHx, Allergies, Medications reviewed in epic    ROS negative except pain complaints in HPI    OBJECTIVE:    BP (!) 154/67 (BP Location: Right arm, Patient Position: Sitting)   Pulse 74   Temp 96.4 °F (35.8 °C) (Temporal)   Resp 16   Ht 5' 4" (1.626 m)   Wt 75.7 kg (166 lb 12.5 oz)   SpO2 96%   Breastfeeding No   BMI 28.63 kg/m²     PHYSICAL EXAMINATION:    GENERAL: Well appearing, in no acute distress, alert and oriented x3.  PSYCH:  Mood and affect appropriate.  SKIN: Skin color, texture, turgor normal, no rashes or lesions which will impact the procedure.  CV: RRR with palpation of the radial artery.  PULM: No evidence of respiratory difficulty, symmetric chest rise. Clear to auscultation.  NEURO: Cranial nerves grossly intact.    Plan:    Proceed with procedure as planned Procedure(s) (LRB):  Bilateral L4/L5 and L5/S1 MBB (Bilateral)    Kranthi Grubbs MD  11/25/2024            "

## 2024-11-26 ENCOUNTER — TELEPHONE (OUTPATIENT)
Dept: PAIN MEDICINE | Facility: CLINIC | Age: 74
End: 2024-11-26
Payer: MEDICARE

## 2024-11-26 DIAGNOSIS — I70.219 INTERMITTENT CLAUDICATION DUE TO ATHEROSCLEROSIS OF ARTERY OF EXTREMITY: ICD-10-CM

## 2024-11-26 DIAGNOSIS — E08.311 DIABETES MELLITUS DUE TO UNDERLYING CONDITION WITH RETINOPATHY AND MACULAR EDEMA, WITH LONG-TERM CURRENT USE OF INSULIN, UNSPECIFIED LATERALITY, UNSPECIFIED RETINOPATHY SEVERITY: ICD-10-CM

## 2024-11-26 DIAGNOSIS — Z79.4 DIABETES MELLITUS DUE TO UNDERLYING CONDITION WITH RETINOPATHY AND MACULAR EDEMA, WITH LONG-TERM CURRENT USE OF INSULIN, UNSPECIFIED LATERALITY, UNSPECIFIED RETINOPATHY SEVERITY: ICD-10-CM

## 2024-11-26 DIAGNOSIS — M54.16 LUMBAR RADICULOPATHY: Primary | ICD-10-CM

## 2024-11-26 NOTE — TELEPHONE ENCOUNTER
Called and spoke to pt regarding Lumbar MBB done on 11/25 with Dr. Grubbs. Pt reports the following information:    1. What percentage of pain relief did you receive following the block, from 0-100%? Pt reports receiving 0% pain relief.    2. What was pain score the day before your procedure on a scale from 0-10?    3. What was pain score immediately after your procedure (up to 6 hours)  on a scale from 0-10?    4. When your pain returned, what was your pain score on a scale from 0-10?     5. How many hours did pain relief last following the block?       6. During this time, please describe in detail the activities you were able to do?    .[facility]          Pain Disability Index (PDI) Score Review:       No data to display

## 2025-01-27 ENCOUNTER — HOSPITAL ENCOUNTER (OUTPATIENT)
Dept: VASCULAR SURGERY | Facility: HOSPITAL | Age: 75
Discharge: HOME | End: 2025-01-27
Attending: STUDENT IN AN ORGANIZED HEALTH CARE EDUCATION/TRAINING PROGRAM
Payer: MEDICARE

## 2025-01-27 ENCOUNTER — OFFICE VISIT (OUTPATIENT)
Dept: VASCULAR SURGERY | Facility: CLINIC | Age: 75
End: 2025-01-27
Payer: MEDICARE

## 2025-01-27 DIAGNOSIS — R09.89 BRUIT OF LEFT CAROTID ARTERY: ICD-10-CM

## 2025-01-27 DIAGNOSIS — I10 PRIMARY HYPERTENSION: ICD-10-CM

## 2025-01-27 DIAGNOSIS — I65.23 BILATERAL CAROTID ARTERY STENOSIS: ICD-10-CM

## 2025-01-27 DIAGNOSIS — E78.5 HYPERLIPIDEMIA, UNSPECIFIED HYPERLIPIDEMIA TYPE: ICD-10-CM

## 2025-01-27 DIAGNOSIS — I65.29 STENOSIS OF CAROTID ARTERY, UNSPECIFIED LATERALITY: Primary | ICD-10-CM

## 2025-01-27 DIAGNOSIS — M54.16 LUMBAR RADICULOPATHY: ICD-10-CM

## 2025-01-27 DIAGNOSIS — I73.9 CLAUDICATION IN PERIPHERAL VASCULAR DISEASE: ICD-10-CM

## 2025-01-27 DIAGNOSIS — I73.9 PVD (PERIPHERAL VASCULAR DISEASE): ICD-10-CM

## 2025-01-27 DIAGNOSIS — I70.219 INTERMITTENT CLAUDICATION DUE TO ATHEROSCLEROSIS OF ARTERY OF EXTREMITY: ICD-10-CM

## 2025-01-27 LAB
LEFT ABI: 0.76
LEFT ARM BP: 140 MMHG
LEFT DORSALIS PEDIS: 94 MMHG
LEFT POSTERIOR TIBIAL: 107 MMHG
LEFT TBI: 0.73
LEFT TOE PRESSURE: 103 MMHG
RIGHT ABI: 1.02
RIGHT ARM BP: 141 MMHG
RIGHT DORSALIS PEDIS: 144 MMHG
RIGHT POSTERIOR TIBIAL: 136 MMHG
RIGHT TBI: 0.86
RIGHT TOE PRESSURE: 121 MMHG

## 2025-01-27 PROCEDURE — 1160F RVW MEDS BY RX/DR IN RCRD: CPT | Mod: HCNC,CPTII,S$GLB, | Performed by: STUDENT IN AN ORGANIZED HEALTH CARE EDUCATION/TRAINING PROGRAM

## 2025-01-27 PROCEDURE — 99214 OFFICE O/P EST MOD 30 MIN: CPT | Mod: HCNC,S$GLB,, | Performed by: STUDENT IN AN ORGANIZED HEALTH CARE EDUCATION/TRAINING PROGRAM

## 2025-01-27 PROCEDURE — 99999 PR PBB SHADOW E&M-EST. PATIENT-LVL I: CPT | Mod: PBBFAC,HCNC,, | Performed by: STUDENT IN AN ORGANIZED HEALTH CARE EDUCATION/TRAINING PROGRAM

## 2025-01-27 PROCEDURE — 93922 UPR/L XTREMITY ART 2 LEVELS: CPT | Mod: 26,HCNC,, | Performed by: STUDENT IN AN ORGANIZED HEALTH CARE EDUCATION/TRAINING PROGRAM

## 2025-01-27 PROCEDURE — 93922 UPR/L XTREMITY ART 2 LEVELS: CPT | Mod: HCNC

## 2025-01-27 PROCEDURE — 1159F MED LIST DOCD IN RCRD: CPT | Mod: HCNC,CPTII,S$GLB, | Performed by: STUDENT IN AN ORGANIZED HEALTH CARE EDUCATION/TRAINING PROGRAM

## 2025-01-27 NOTE — PROGRESS NOTES
Clint Urena    OFFICE NOTE    DATE OF VISIT: 2025  PATIENT NAME: Rosana Aguiar  : 1950  MRN: [id number]  PRIMARY CARE PHYSICIAN: Carito Kohler MD  CARDIOLOGIST:   REFERRING PROVIDER: No ref. provider found    CHIEF COMPLAINT   No chief complaint on file.      HISTORY OF PRESENT ILLNESS:  Rosana Aguiar is a 74 y.o. female who presents to clinic today s/p right fem above knee bypass, her zahra have improved dramatically on the right but she has had worsening pain. Has mild claudication symptoms left leg. Severe lifestyle limitation from pain in right leg which starts posterior thigh and radiates down the lateral and posterior side of her calf. She denies rest pain. She has no tissue loss. Underwent spine injection recently had 24 hours of full relief    ALLERGIES:  Review of patient's allergies indicates:   Allergen Reactions    Clindamycin Other (See Comments)    Neomycin-bacitracin-polymyxin Itching    Nitrofurantoin monohyd/m-cryst Other (See Comments)       PAST MEDICAL HISTORY:  Past Medical History:   Diagnosis Date    Atherosclerotic PVD with intermittent claudication 10/19/2022    Diabetes mellitus, type 2     BS didn't check 2023    High cholesterol     Hypertension     Macular degeneration        PAST SURGICAL HISTORY:  Past Surgical History:   Procedure Laterality Date    ANGIOGRAPHY OF LOWER EXTREMITY N/A 2024    Procedure: Angiogram Extremity Unilateral/poss pta right leg intervention;  Surgeon: Clint Urena MD;  Location: [facility] CATH LAB;  Service: Peripheral Vascular;  Laterality: N/A;  left common femoral access    APPENDECTOMY      CATARACT EXTRACTION Bilateral     CREATION OF FEMOROPOPLITEAL ARTERIAL BYPASS USING GRAFT Right 3/14/2024    Procedure: CREATION, BYPASS, ARTERIAL, FEMORAL TO POPLITEAL, USING GRAFT;  Surgeon: Clint Urena MD;  Location: [facility] OR;  Service: Peripheral Vascular;  Laterality: Right;    cyst removal from breast (Left)       EPIDURAL STEROID INJECTION N/A 10/3/2024    Procedure: Lumbar L4/L5 IL TAISHA  Stop Pletal 2 days before procedure;  Surgeon: Kranthi Grubbs MD;  Location: [facility] PAIN MGT;  Service: Pain Management;  Laterality: N/A;    EYE SURGERY  Cataract    HYSTERECTOMY      INJECTION OF ANESTHETIC AGENT AROUND MEDIAL BRANCH NERVES INNERVATING LUMBAR FACET JOINT Bilateral 11/25/2024    Procedure: Bilateral L4/L5 and L5/S1 MBB;  Surgeon: Kranthi Grubbs MD;  Location: [facility] PAIN MGT;  Service: Pain Management;  Laterality: Bilateral;    TONSILLECTOMY      TUBAL LIGATION  1975       SOCIAL HISTORY:   Social History     Tobacco Use    Smoking status: Every Day     Current packs/day: 1.00     Average packs/day: 1 pack/day for 63.1 years (63.1 ttl pk-yrs)     Types: Cigarettes     Start date: 1962     Passive exposure: Past    Smokeless tobacco: Never    Tobacco comments:     HOLD MIDNIGHT PRIOR TO SX   Substance Use Topics    Alcohol use: Yes     Comment: rarely; HOLD 72HRS    Drug use: Never       FAMILY HISTORY:  Family History   Problem Relation Name Age of Onset    Cancer Mother          female    Diabetes Mother      Macular degeneration Mother      Alcohol abuse Father      Cancer Daughter          colon    Diabetes Maternal Uncle      Heart disease Neg Hx      Stroke Neg Hx         REVIEW OF SYSTEMS:  ROS  10 pt ros otherwise negative    PHYSICAL EXAM:  There were no vitals filed for this visit.   Physical Exam      Vss  Gen nad alert oriented  Seems depressed  Abd soft nt nd  Leg swelling noted on right  No open wounds  Palpable pedal pulse right dp  Unable to palpate pulse left foot    VASCULAR LAB STUDIES:  ROBINA 1.02 right leg and 0.76 left leg.     ASSESSMENT AND PLAN:  Lumbar radiculopathy  Patient has been seen by dr grubbs who performed spinal injection she had pain relief for 24 hours and recurrence of symptoms she has had nerve conduction studies and is scheduled to see neurology. I have suggested she see  neurosurgeon to evaluate for radiculopathy, given the brief relief of symptoms she may benefit from a surgical evaluation.    Intermittent claudication due to atherosclerosis of artery of extremity  Right leg ROBINA normal. She has palpable pulse right foot. Has mild symptoms of claudication on left leg. Believe her pain in the right leg is largely neurogenic in nature. Have recommended to her to see neurology and attempt to get referral for neurosurgery eval. Will see her back in 6 months with robina and carotid  duplex    Bruit of left carotid artery  Will check on carotid stenosis in 6 month follow up. Continue aspirin and statin for risk factor modification. Recommend smoking cessation    Diagnoses and all orders for this visit:    Stenosis of carotid artery, unspecified laterality  -     CV Ultrasound Bilateral Doppler Carotid; Future    PVD (peripheral vascular disease)  -     CV Ankle Brachial Indices (ROBINA); Future    Bilateral carotid artery stenosis  -     CV Ultrasound Bilateral Doppler Carotid; Future    Lumbar radiculopathy    Intermittent claudication due to atherosclerosis of artery of extremity    Primary hypertension    Hyperlipidemia, unspecified hyperlipidemia type    Bruit of left carotid artery        No follow-ups on file.        Clint Urena  [facility]  Vascular Surgery  [phone] (Clinic Number)

## 2025-01-27 NOTE — ASSESSMENT & PLAN NOTE
Will check on carotid stenosis in 6 month follow up. Continue aspirin and statin for risk factor modification. Recommend smoking cessation

## 2025-01-27 NOTE — ASSESSMENT & PLAN NOTE
Right leg ROBINA normal. She has palpable pulse right foot. Has mild symptoms of claudication on left leg. Believe her pain in the right leg is largely neurogenic in nature. Have recommended to her to see neurology and attempt to get referral for neurosurgery eval. Will see her back in 6 months with robina and carotid  duplex

## 2025-01-27 NOTE — ASSESSMENT & PLAN NOTE
Patient has been seen by dr lacy who performed spinal injection she had pain relief for 24 hours and recurrence of symptoms she has had nerve conduction studies and is scheduled to see neurology. I have suggested she see neurosurgeon to evaluate for radiculopathy, given the brief relief of symptoms she may benefit from a surgical evaluation.

## 2025-01-28 ENCOUNTER — TELEPHONE (OUTPATIENT)
Dept: INTERNAL MEDICINE | Facility: CLINIC | Age: 75
End: 2025-01-28
Payer: MEDICARE

## 2025-01-28 NOTE — TELEPHONE ENCOUNTER
----- Message from Araseli sent at 1/28/2025  2:45 PM CST -----  Contact: MAHAD ANTHONY [[id number]]  .Type:  Patient Requesting Call    Who Called:MAHAD ANTHONY [[id number]]  Does the patient know what this is regarding?:Patient requesting a call back in regards to needing paperwork signed to re qualify for evolocumab (REPATHA SURECLICK) 140 mg/mL PnIj  Would the patient rather a call back or a response via [facility]? Call back  Best Call Back Number:.[phone] (home)    Additional Information:

## 2025-01-29 DIAGNOSIS — Z79.4 TYPE 2 DIABETES MELLITUS WITHOUT COMPLICATION, WITH LONG-TERM CURRENT USE OF INSULIN: ICD-10-CM

## 2025-01-29 DIAGNOSIS — F33.9 DEPRESSION, RECURRENT: ICD-10-CM

## 2025-01-29 DIAGNOSIS — E11.9 TYPE 2 DIABETES MELLITUS WITHOUT COMPLICATION, WITH LONG-TERM CURRENT USE OF INSULIN: ICD-10-CM

## 2025-01-29 RX ORDER — FLUOXETINE HYDROCHLORIDE 20 MG/1
20 CAPSULE ORAL DAILY
Qty: 90 CAPSULE | Refills: 1 | Status: SHIPPED | OUTPATIENT
Start: 2025-01-29 | End: 2026-01-29

## 2025-01-29 RX ORDER — PEN NEEDLE, DIABETIC 30 GX3/16"
1 NEEDLE, DISPOSABLE MISCELLANEOUS DAILY
Qty: 100 EACH | Refills: 3 | Status: SHIPPED | OUTPATIENT
Start: 2025-01-29 | End: 2026-01-29

## 2025-01-29 NOTE — TELEPHONE ENCOUNTER
Care Due:                  Date            Visit Type   Department     Provider  --------------------------------------------------------------------------------                                EP -                              PRIMARY      [facility] INTERNAL  Last Visit: 07-      CARE ([facility])   MEDICINE       Carito Kohler  Next Visit: None Scheduled  None         None Found                                                            Last  Test          Frequency    Reason                     Performed    Due Date  --------------------------------------------------------------------------------    HBA1C.......  6 months...  insulin..................  07- 01-    [facility] Embedded Care Due Messages. Reference number: [id number].   1/29/2025 4:59:11 PM CST

## 2025-01-30 RX ORDER — CARVEDILOL 6.25 MG/1
6.25 TABLET ORAL 2 TIMES DAILY WITH MEALS
Qty: 180 TABLET | Refills: 1 | Status: SHIPPED | OUTPATIENT
Start: 2025-01-30

## 2025-01-30 NOTE — TELEPHONE ENCOUNTER
Refill Routing Note   Medication(s) are not appropriate for processing by [facility] for the following reason(s):        Required vitals abnormal    [facility] action(s):  Approve  Defer     Requires labs : Yes             Appointments  past 12m or future 3m with PCP    Date Provider   Last Visit   7/24/2024 Carito Kohler MD   Next Visit   Visit date not found Carito Kohler MD   ED visits in past 90 days: 0        Note composed:8:38 PM 01/29/2025

## 2025-02-04 ENCOUNTER — TELEPHONE (OUTPATIENT)
Dept: INTERNAL MEDICINE | Facility: CLINIC | Age: 75
End: 2025-02-04
Payer: MEDICARE

## 2025-02-04 NOTE — TELEPHONE ENCOUNTER
"Faxed forms for [employer] to [employer] at [phone]    Your fax has been successfully sent to [phone] at [phone].    2/3/2025 8:05:48 AM Transmission Record   Sent to [phone] with remote ID "[email or web address]"   Result: (0/339;0/0) Success   Page record: 1 - 8   Elapsed time: 03:47 on channel 50    "

## 2025-02-05 NOTE — PROGRESS NOTES
"Subjective:       Patient ID: Rosana Aguiar is a 74 y.o. female.    Chief Complaint: No chief complaint on file.      HPI The patient presented on 07/ 2024 for evaluation of Myopathy.   Came with Her Daughter.   New issues: none.  Continue having pain - lower back / knees and legs muscles.     Review of Systems   Neurological:         " Muscles pain ".    All other systems reviewed and are negative.          Current Outpatient Medications:     albuterol (VENTOLIN HFA) 90 mcg/actuation inhaler, Inhale 2 puffs into the lungs every 6 (six) hours as needed for Wheezing. Rescue, Disp: 18 g, Rfl: 11    amLODIPine (NORVASC) 10 MG tablet, Take 1 tablet (10 mg total) by mouth once daily., Disp: 90 tablet, Rfl: 3    aspirin (ECOTRIN) 81 MG EC tablet, Take 1 tablet (81 mg total) by mouth once daily., Disp: 90 tablet, Rfl: 3    blood sugar diagnostic Strp, Check blood glucose levels two times a day, Disp: 200 strip, Rfl: 2    blood-glucose meter kit, Use as instructed, Disp: 1 each, Rfl: 0    carvediloL (COREG) 6.25 MG tablet, Take 1 tablet (6.25 mg total) by mouth 2 (two) times daily with meals., Disp: 180 tablet, Rfl: 1    cilostazoL (PLETAL) 50 MG Tab, Take 1 tablet (50 mg total) by mouth 2 (two) times daily., Disp: 60 tablet, Rfl: 11    evolocumab (REPATHA SURECLICK) 140 mg/mL PnIj, Inject 1 mL (140 mg total) into the skin every 14 (fourteen) days., Disp: 1 mL, Rfl: 3    FLUoxetine 20 MG capsule, Take 1 capsule (20 mg total) by mouth once daily., Disp: 90 capsule, Rfl: 1    fluticasone propionate (FLONASE) 50 mcg/actuation nasal spray, 1 spray (50 mcg total) by Each Nostril route once daily., Disp: 16 g, Rfl: 2    gabapentin (NEURONTIN) 300 MG capsule, Take 1 capsule (300 mg total) by mouth 3 (three) times daily., Disp: 90 capsule, Rfl: 11    HYDROcodone-acetaminophen (NORCO) 5-325 mg per tablet, Take 1 tablet by mouth every 6 (six) hours as needed for Pain., Disp: 12 tablet, Rfl: 0    HYDROcodone-acetaminophen (NORCO) " "7.5-325 mg per tablet, Take 1 tablet by mouth every 6 (six) hours as needed for Pain for up to 7 days., Disp: 20 tablet, Rfl: 0    ibuprofen (ADVIL,MOTRIN) 800 MG tablet, Take 1 tablet (800 mg total) by mouth every 6 (six) hours if needed for mild pain for up to 10 days., Disp: 32 tablet, Rfl: 0    insulin glargine U-300 conc (TOUJEO MAX U-300 SOLOSTAR) 300 unit/mL (3 mL) insulin pen, Inject 60 Units into the skin once daily., Disp: 3 Pen, Rfl: 11    levocetirizine (XYZAL) 5 MG tablet, Take 1 tablet (5 mg total) by mouth every evening., Disp: 30 tablet, Rfl: 11    losartan-hydrochlorothiazide 100-25 mg (HYZAAR) 100-25 mg per tablet, TAKE 1 TABLET EVERY DAY, Disp: 90 tablet, Rfl: 1    pen needle, diabetic (BD ULTRA-FINE CAROL PEN NEEDLE) 32 gauge x 5/32" Ndle, 1 each by Misc.(Non-Drug; Combo Route) route once daily., Disp: 100 each, Rfl: 3    potassium chloride SA (K-DUR,KLOR-CON) 20 MEQ tablet, Take 1 tablet (20 mEq total) by mouth once daily., Disp: 3 tablet, Rfl: 0    rosuvastatin (CRESTOR) 20 MG tablet, Take 1 tablet (20 mg total) by mouth once daily., Disp: 90 tablet, Rfl: 3    tiotropium-olodateroL (STIOLTO RESPIMAT) 2.5-2.5 mcg/actuation Mist, Inhale 2 puffs into the lungs once daily - Controller, Disp: 12 g, Rfl: 3    tiZANidine (ZANAFLEX) 4 MG tablet, Take 1 tablet (4 mg total) by mouth nightly as needed (Back Pain)., Disp: 30 tablet, Rfl: 1    Past Medical History:   Diagnosis Date    Atherosclerotic PVD with intermittent claudication 10/19/2022    Diabetes mellitus, type 2 2003    BS didn't check 09/06/2023    High cholesterol     Hypertension     Macular degeneration      Past Surgical History:   Procedure Laterality Date    ANGIOGRAPHY OF LOWER EXTREMITY N/A 02/09/2024    Procedure: Angiogram Extremity Unilateral/poss pta right leg intervention;  Surgeon: Clint Urena MD;  Location: [facility] CATH LAB;  Service: Peripheral Vascular;  Laterality: N/A;  left common femoral access    APPENDECTOMY      " CATARACT EXTRACTION Bilateral     CREATION OF FEMOROPOPLITEAL ARTERIAL BYPASS USING GRAFT Right 3/14/2024    Procedure: CREATION, BYPASS, ARTERIAL, FEMORAL TO POPLITEAL, USING GRAFT;  Surgeon: Clint Urena MD;  Location: [facility] OR;  Service: Peripheral Vascular;  Laterality: Right;    cyst removal from breast (Left)      EPIDURAL STEROID INJECTION N/A 10/3/2024    Procedure: Lumbar L4/L5 IL TAISHA  Stop Pletal 2 days before procedure;  Surgeon: Kranthi Grubbs MD;  Location: [facility] PAIN T;  Service: Pain Management;  Laterality: N/A;    EYE SURGERY  Cataract    HYSTERECTOMY      INJECTION OF ANESTHETIC AGENT AROUND MEDIAL BRANCH NERVES INNERVATING LUMBAR FACET JOINT Bilateral 11/25/2024    Procedure: Bilateral L4/L5 and L5/S1 MBB;  Surgeon: Kranthi Grubbs MD;  Location: [facility];  Service: Pain Management;  Laterality: Bilateral;    TONSILLECTOMY      TUBAL LIGATION  1975     Social History     Socioeconomic History    Marital status:    Tobacco Use    Smoking status: Every Day     Current packs/day: 1.00     Average packs/day: 1 pack/day for 63.1 years (63.1 ttl pk-yrs)     Types: Cigarettes     Start date: 1962     Passive exposure: Past    Smokeless tobacco: Never    Tobacco comments:     HOLD MIDNIGHT PRIOR TO SX   Substance and Sexual Activity    Alcohol use: Yes     Comment: rarely; HOLD 72HRS    Drug use: Never    Sexual activity: Not Currently     Partners: Male     Birth control/protection: None     Social Drivers of Health     Financial Resource Strain: Medium Risk (5/24/2024)    Overall Financial Resource Strain (CARDIA)     Difficulty of Paying Living Expenses: Somewhat hard   Food Insecurity: No Food Insecurity (5/24/2024)    Hunger Vital Sign     Worried About Running Out of Food in the Last Year: Never true     Ran Out of Food in the Last Year: Never true   Transportation Needs: No Transportation Needs (5/24/2024)    PRAPARE - Transportation     Lack of Transportation (Medical): No      Lack of Transportation (Non-Medical): No   Physical Activity: Inactive (5/24/2024)    Exercise Vital Sign     Days of Exercise per Week: 0 days     Minutes of Exercise per Session: 0 min   Stress: Stress Concern Present (5/24/2024)    [place] [employer] - Occupational Stress Questionnaire     Feeling of Stress : Rather much   Housing Stability: Low Risk  (5/24/2024)    Housing Stability Vital Sign     Unable to Pay for Housing in the Last Year: No     Homeless in the Last Year: No     Past/Current Medical/Surgical History, Past/Current Social History,   Past/Current Family History and Past/Current Medications were reviewed in detail.    Objective:     VITAL SIGNS WERE REVIEWED    GENERAL APPEARANCE:     The patient looks comfortable.    BMI    No signs of respiratory distress.    Normal breathing pattern.    No dysmorphic features    Normal eye contact.       GENERAL MEDICAL EXAM:    HEENT:  Head is atraumatic normocephalic.     FUNDOSCOPIC (OPHTHALMOSCOPIC) EXAMINATION showed no disc edema (papilledema).      NECK: No JVD. No visible lesions or goiters.     CHEST-CARDIOPULMONARY: No cyanosis. No tachypnea. Normal respiratory effort.    EQVIGAZ-[id number]-[id number]: No jaundice. No stomas or lesions. No visible hernias. No catheters.     SKIN, HAIR, NAILS: No pathognomonic skin rash.No neurofibromatosis. No visible lesions.  No stigmata of autoimmune disease. No clubbing.    LIMBS: No varicose veins. No visible swelling.    MUSCULOSKELETAL: No visible deformities.No visible lesions.    Neurological Exam  Mental Status  Alert. Oriented to person, place, time and situation. Recent and remote memory are intact. Able to copy figure. Clock drawing is normal. Speech is normal. Language is fluent with no aphasia. Attention and concentration are normal. Fund of knowledge is appropriate for level of education. Apraxia absent.    Cranial Nerves  CN II: Visual acuity is normal. Visual fields  full to confrontation.  CN III, IV, VI: Extraocular movements intact bilaterally. Bilateral ptosis. Pupils equal round and reactive to light bilaterally.  CN V: Facial sensation is normal.  CN VII: Full and symmetric facial movement.  CN VIII: Hearing is normal.  CN IX, X: Palate elevates symmetrically. Normal gag reflex.  CN XI: Shoulder shrug strength is normal.  CN XII: Tongue midline without atrophy or fasciculations.    Motor  Normal muscle bulk throughout. No fasciculations present. Normal muscle tone. The following abnormal movements were seen: Strength is 5/5 throughout all four extremities.  Antalgic gait. .    Sensory  Sensation is intact to light touch, pinprick, vibration and proprioception in all four extremities.    Reflexes                                            Right                      Left  Brachioradialis                    2+                         2+  Biceps                                 2+                         2+  Triceps                                2+                         2+  Finger flex                           2+                         2+  Hamstring                            2+                         2+  Patellar                                2+                         2+  Achilles                                2+                         2+  Right Plantar: downgoing  Left Plantar: downgoing    Right pathological reflexes: Ritu's absent. Ankle clonus absent.  Left pathological reflexes: Ritu's absent. Ankle clonus absent.   Right palmomental absent. Left palmomental absent.    Gait  Casual gait: Narrow stance. Reduced stride length. Antalgic gait. Reduced right arm swing. Reduced left arm swing. Toe walking abnormality: Heel walking abnormality: Tandem gait abnormality: Romberg is present. Abnormal pull test. Unable to rise from chair without using arms.  Heel / Toes / Tandem walking no possible due to pain. .        Lab Results   Component Value Date    WBC  "7.61 07/24/2024    HGB 12.1 07/24/2024    HCT 36.2 (L) 07/24/2024    MCV 91 07/24/2024     07/24/2024     No results found for: "[id number]"    Lab Results   Component Value Date    TSH 1.285 01/06/2022    Z4CGWHX 99 01/06/2022    FREET4 0.78 01/06/2022     No results found in the last 24 hours.    LABORATORY EVALUATION    RADIOLOGY EVALUATION     NEUROPHYSIOLOGY EVALUATION     PATHOLOGY EVALUATION      NEUROCOGNITIVE AND NEUROPSYCHOLOGY EVALUATION     Reviewed the neuroimaging independently     Assessment:   74 Years old Female with PMHX as above came of the evaluation of  Neuropathic Pain.   - Myalgia.   - PVD.  - Essential Tremors.   Plan:   Continue having pain - mostly Legs / tenderness to muscles pressure.   Upon questioning concur with fatigue and tiredness.   Bilateral Ptosis - She indicated " my whole life has been like that " / denied diplopia.      Acetylcholine antibodies.   MM ? - denied anyone with such a problems in the Family.   ESR / CRP / Vit D   Refused PT program - discussed the risk of falls and Hip Fx.     Had Vascular Sx - recent s/p right Femoral above knee bypass.      Hold Crestor.      NCS/ EMG - 2024 -  There is electrodiagnostic evidence of an acute on chronic radiculopathy of the bilateral S1 and right L5 nerve roots and a chronic radiculopathy of the left L5 nerve root.   There is evidence of a residual mild statin induced myopathy affecting proximal musculature greater than distal     ESR / CRP / Aldolase / CK / LDH - 2024 - non significant abnormalities.   I do not see any evidence of Inflammatory Myopathy.      Continue Gabapentin.      Hgb A 1 C: 6.7 to 7.3 - last few months.      CUS: 70 % stenosis bilateral ICA ( 2022 ).      MEDICAL/SURGICAL COMORBIDITIES     All relevant medical comorbidities noted and managed by primary care physician and medical care team.      HEALTHY LIFESTYLE AND PREVENTATIVE CARE    The patient to adhere to the age-appropriate health maintenance " guidelines including screening tests and vaccinations.   The patient to adhere to  healthy lifestyle, optimal weight, exercise, healthy diet,   good sleep hygiene and avoiding drugs including smoking, alcohol and recreational drugs.    RTC: 3 months.     Please do not hesitate to contact me with any updates, questions or concerns.    I spent a total of 25 minutes on the day of the visit.This includes face to face time and non-face to face time preparing to see the patient (eg, review of tests),   obtaining and/or reviewing separately obtained history, documenting clinical information in the electronic or other health record,   independently interpreting results and communicating results to the patient/family/caregiver, or care coordinator.    Jh Dumont MD  General Neurology.

## 2025-02-11 ENCOUNTER — LAB VISIT (OUTPATIENT)
Dept: LAB | Facility: HOSPITAL | Age: 75
End: 2025-02-11
Attending: PSYCHIATRY & NEUROLOGY
Payer: MEDICARE

## 2025-02-11 ENCOUNTER — OFFICE VISIT (OUTPATIENT)
Dept: NEUROLOGY | Facility: CLINIC | Age: 75
End: 2025-02-11
Payer: MEDICARE

## 2025-02-11 VITALS
BODY MASS INDEX: 28.68 KG/M2 | DIASTOLIC BLOOD PRESSURE: 66 MMHG | WEIGHT: 168 LBS | SYSTOLIC BLOOD PRESSURE: 148 MMHG | HEIGHT: 64 IN | HEART RATE: 67 BPM

## 2025-02-11 DIAGNOSIS — M54.16 LUMBAR RADICULOPATHY: ICD-10-CM

## 2025-02-11 DIAGNOSIS — E08.311 DIABETES MELLITUS DUE TO UNDERLYING CONDITION WITH RETINOPATHY AND MACULAR EDEMA, WITH LONG-TERM CURRENT USE OF INSULIN, UNSPECIFIED LATERALITY, UNSPECIFIED RETINOPATHY SEVERITY: ICD-10-CM

## 2025-02-11 DIAGNOSIS — T46.6X5A STATIN MYOPATHY: ICD-10-CM

## 2025-02-11 DIAGNOSIS — Z79.4 DIABETES MELLITUS DUE TO UNDERLYING CONDITION WITH RETINOPATHY AND MACULAR EDEMA, WITH LONG-TERM CURRENT USE OF INSULIN, UNSPECIFIED LATERALITY, UNSPECIFIED RETINOPATHY SEVERITY: ICD-10-CM

## 2025-02-11 DIAGNOSIS — G72.0 STATIN MYOPATHY: ICD-10-CM

## 2025-02-11 DIAGNOSIS — I70.219 INTERMITTENT CLAUDICATION DUE TO ATHEROSCLEROSIS OF ARTERY OF EXTREMITY: ICD-10-CM

## 2025-02-11 DIAGNOSIS — E55.9 VITAMIN D DEFICIENCY: ICD-10-CM

## 2025-02-11 DIAGNOSIS — E55.9 VITAMIN D DEFICIENCY: Primary | ICD-10-CM

## 2025-02-11 LAB
CRP SERPL-MCNC: 2 MG/L (ref 0–8.2)
ERYTHROCYTE [SEDIMENTATION RATE] IN BLOOD BY PHOTOMETRIC METHOD: 36 MM/HR (ref 0–36)

## 2025-02-11 PROCEDURE — 83516 IMMUNOASSAY NONANTIBODY: CPT | Mod: HCNC | Performed by: PSYCHIATRY & NEUROLOGY

## 2025-02-11 PROCEDURE — 99999 PR PBB SHADOW E&M-EST. PATIENT-LVL V: CPT | Mod: PBBFAC,HCNC,, | Performed by: PSYCHIATRY & NEUROLOGY

## 2025-02-11 PROCEDURE — 83516 IMMUNOASSAY NONANTIBODY: CPT | Mod: 59,HCNC | Performed by: PSYCHIATRY & NEUROLOGY

## 2025-02-11 PROCEDURE — 86041 ACETYLCHOLN RCPTR BNDNG ANTB: CPT | Mod: HCNC | Performed by: PSYCHIATRY & NEUROLOGY

## 2025-02-11 PROCEDURE — 85652 RBC SED RATE AUTOMATED: CPT | Mod: HCNC | Performed by: PSYCHIATRY & NEUROLOGY

## 2025-02-11 PROCEDURE — 86140 C-REACTIVE PROTEIN: CPT | Mod: HCNC | Performed by: PSYCHIATRY & NEUROLOGY

## 2025-02-12 DIAGNOSIS — R09.82 POST-NASAL DRIP: ICD-10-CM

## 2025-02-13 RX ORDER — LEVOCETIRIZINE DIHYDROCHLORIDE 5 MG/1
5 TABLET, FILM COATED ORAL NIGHTLY
Qty: 30 TABLET | Refills: 11 | Status: SHIPPED | OUTPATIENT
Start: 2025-02-13 | End: 2026-02-13

## 2025-02-14 LAB — ACHR BLOCK AB/ACHR TOTAL SFR SER: 0 % (ref 0–26)

## 2025-02-15 LAB — ACHR MOD AB/ACHR TOTAL SFR SER: 0 %

## 2025-02-18 LAB — ACHR BIND AB SER-SCNC: 0 NMOL/L

## 2025-02-19 ENCOUNTER — TELEPHONE (OUTPATIENT)
Dept: INTERNAL MEDICINE | Facility: CLINIC | Age: 75
End: 2025-02-19
Payer: MEDICARE

## 2025-02-19 DIAGNOSIS — I65.29 STENOSIS OF CAROTID ARTERY, UNSPECIFIED LATERALITY: ICD-10-CM

## 2025-02-19 DIAGNOSIS — I70.0 ABDOMINAL AORTIC ATHEROSCLEROSIS: ICD-10-CM

## 2025-02-19 DIAGNOSIS — I25.10 CORONARY ARTERY CALCIFICATION: ICD-10-CM

## 2025-02-19 DIAGNOSIS — I73.9 PVD (PERIPHERAL VASCULAR DISEASE): ICD-10-CM

## 2025-02-19 NOTE — TELEPHONE ENCOUNTER
Called patient and she states that the company is now requesting we do a prior authorization.    PA initiated.

## 2025-02-19 NOTE — TELEPHONE ENCOUNTER
----- Message from Summer sent at 2/19/2025  1:59 PM CST -----  Contact: Rosana  Type:  Patient Returning CallWho Called: Rosana Message for Patient: NurseWhat this is regarding?:evolocumab (REPATHA SURECLICK) 140 mg/mL PnIj   [[phone]]Would the patient rather a call back or a response via [facility]? Call back Best Call Back Number: [phone] Information: She is needing a auth sheet

## 2025-03-25 DIAGNOSIS — I73.9 PVD (PERIPHERAL VASCULAR DISEASE): ICD-10-CM

## 2025-03-25 DIAGNOSIS — I70.0 ABDOMINAL AORTIC ATHEROSCLEROSIS: ICD-10-CM

## 2025-03-25 DIAGNOSIS — I25.10 CORONARY ARTERY CALCIFICATION: ICD-10-CM

## 2025-03-25 DIAGNOSIS — I65.29 STENOSIS OF CAROTID ARTERY, UNSPECIFIED LATERALITY: ICD-10-CM

## 2025-03-25 NOTE — TELEPHONE ENCOUNTER
No care due was identified.  [facility] Embedded Care Due Messages. Reference number: [id number].   3/25/2025 2:20:17 PM CDT

## 2025-03-25 NOTE — TELEPHONE ENCOUNTER
----- Message from Winsome sent at 3/25/2025  1:26 PM CDT -----  Contact: MAHAD ANTHONY [[id number]]  .Type:  RX Refill RequestWho Called:  MAHAD ANTHONY [[id number]]Refill or New Rx: refillRX Name and Strength: evolocumab (REPATHA SURECLICK) 140 mg/mL PnIjHow is the patient currently taking it? (ex. 1XDay): as prescribedIs this a 30 day or 90 day RX: 90Preferred Pharmacy with phone number[facility]: localOrdering Provider: Mynor the patient rather a call back or a response via [facility]? [place] Call Back Number: .[phone] (home) Additional Information:

## 2025-03-26 ENCOUNTER — PATIENT MESSAGE (OUTPATIENT)
Dept: INTERNAL MEDICINE | Facility: CLINIC | Age: 75
End: 2025-03-26
Payer: MEDICARE

## 2025-03-26 NOTE — TELEPHONE ENCOUNTER
Refill Routing Note   Medication(s) are not appropriate for processing by [facility] for the following reason(s):      Medication outside of protocol    [facility] action(s):    Care Due:  None identified                Appointments  past 12m or future 3m with PCP    Date Provider   Last Visit   7/24/2024 Carito Kohler MD   Next Visit   Visit date not found Carito Kohler MD   ED visits in past 90 days: 0        Note composed:8:41 PM 03/25/2025

## 2025-04-16 DIAGNOSIS — Z79.4 TYPE 2 DIABETES MELLITUS WITHOUT COMPLICATION, WITH LONG-TERM CURRENT USE OF INSULIN: ICD-10-CM

## 2025-04-16 DIAGNOSIS — I10 HYPERTENSION, UNSPECIFIED TYPE: ICD-10-CM

## 2025-04-16 DIAGNOSIS — E11.9 TYPE 2 DIABETES MELLITUS WITHOUT COMPLICATION, WITH LONG-TERM CURRENT USE OF INSULIN: ICD-10-CM

## 2025-04-16 RX ORDER — LOSARTAN POTASSIUM AND HYDROCHLOROTHIAZIDE 25; 100 MG/1; MG/1
1 TABLET ORAL
Qty: 90 TABLET | Refills: 0 | Status: SHIPPED | OUTPATIENT
Start: 2025-04-16

## 2025-04-16 NOTE — TELEPHONE ENCOUNTER
Refill Routing Note   Medication(s) are not appropriate for processing by [facility] for the following reason(s):        Non-participating provider    [facility] action(s):  Route               Appointments  past 12m or future 3m with PCP    Date Provider   Last Visit   Visit date not found Ana Davidson NP   Next Visit   Visit date not found Ana Davidson NP   ED visits in past 90 days: 0        Note composed:11:15 AM 04/16/2025

## 2025-04-24 ENCOUNTER — PATIENT MESSAGE (OUTPATIENT)
Dept: ADMINISTRATIVE | Facility: HOSPITAL | Age: 75
End: 2025-04-24
Payer: MEDICARE

## 2025-05-21 DIAGNOSIS — I65.29 STENOSIS OF CAROTID ARTERY, UNSPECIFIED LATERALITY: ICD-10-CM

## 2025-05-21 DIAGNOSIS — I70.0 ABDOMINAL AORTIC ATHEROSCLEROSIS: ICD-10-CM

## 2025-05-21 DIAGNOSIS — I25.10 CORONARY ARTERY CALCIFICATION: ICD-10-CM

## 2025-05-21 DIAGNOSIS — I73.9 PVD (PERIPHERAL VASCULAR DISEASE): ICD-10-CM

## 2025-05-21 RX ORDER — EVOLOCUMAB 140 MG/ML
140 INJECTION, SOLUTION SUBCUTANEOUS
Qty: 4 ML | Refills: 5 | Status: SHIPPED | OUTPATIENT
Start: 2025-05-21

## 2025-05-21 NOTE — TELEPHONE ENCOUNTER
No care due was identified.  [facility] Embedded Care Due Messages. Reference number: [id number].   5/21/2025 10:37:44 AM CDT

## 2025-05-21 NOTE — TELEPHONE ENCOUNTER
Repatha needs to be sent to the [facility].  Pended medication and pharmacy updated.

## 2025-05-23 ENCOUNTER — TELEPHONE (OUTPATIENT)
Dept: INTERNAL MEDICINE | Facility: CLINIC | Age: 75
End: 2025-05-23
Payer: MEDICARE

## 2025-05-26 ENCOUNTER — OFFICE VISIT (OUTPATIENT)
Dept: PULMONOLOGY | Facility: CLINIC | Age: 75
End: 2025-05-26
Attending: INTERNAL MEDICINE
Payer: MEDICARE

## 2025-05-26 ENCOUNTER — RESULTS FOLLOW-UP (OUTPATIENT)
Dept: PULMONOLOGY | Facility: CLINIC | Age: 75
End: 2025-05-26

## 2025-05-26 ENCOUNTER — HOSPITAL ENCOUNTER (OUTPATIENT)
Dept: RADIOLOGY | Facility: HOSPITAL | Age: 75
Discharge: HOME | End: 2025-05-26
Attending: INTERNAL MEDICINE
Payer: MEDICARE

## 2025-05-26 VITALS
BODY MASS INDEX: 28.68 KG/M2 | WEIGHT: 168 LBS | HEIGHT: 64 IN | SYSTOLIC BLOOD PRESSURE: 146 MMHG | DIASTOLIC BLOOD PRESSURE: 60 MMHG | HEIGHT: 64 IN | HEART RATE: 65 BPM | WEIGHT: 168 LBS | RESPIRATION RATE: 21 BRPM | OXYGEN SATURATION: 96 % | BODY MASS INDEX: 28.68 KG/M2

## 2025-05-26 DIAGNOSIS — R91.1 NODULE OF UPPER LOBE OF RIGHT LUNG: Primary | ICD-10-CM

## 2025-05-26 DIAGNOSIS — J43.1 PANLOBULAR EMPHYSEMA: ICD-10-CM

## 2025-05-26 DIAGNOSIS — R91.1 PULMONARY NODULE: ICD-10-CM

## 2025-05-26 DIAGNOSIS — J41.0 SIMPLE CHRONIC BRONCHITIS: ICD-10-CM

## 2025-05-26 DIAGNOSIS — R91.1 NODULE OF RIGHT LUNG: ICD-10-CM

## 2025-05-26 DIAGNOSIS — F17.210 NICOTINE DEPENDENCE, CIGARETTES, UNCOMPLICATED: ICD-10-CM

## 2025-05-26 DIAGNOSIS — R09.02 EXERCISE HYPOXEMIA: ICD-10-CM

## 2025-05-26 LAB
BRPFT: ABNORMAL
DLCO SINGLE BREATH LLN: 14.53
DLCO SINGLE BREATH PRE REF: 52.4 %
DLCO SINGLE BREATH REF: 20.26
DLCOC SBVA LLN: 2.68
DLCOC SBVA REF: 4.1
DLCOC SINGLE BREATH LLN: 14.53
DLCOC SINGLE BREATH REF: 20.26
DLCOVA LLN: 2.68
DLCOVA PRE REF: 72.5 %
DLCOVA PRE: 2.97 ML/(MIN*MMHG*L) (ref 2.68–5.53)
DLCOVA REF: 4.1
ERV LLN: -16449.46
ERV PRE REF: 89.9 %
ERV REF: 0.54
FEF 25 75 CHG: -0 %
FEF 25 75 LLN: 0.94
FEF 25 75 POST REF: 66.5 %
FEF 25 75 PRE REF: 66.5 %
FEF 25 75 REF: 2.33
FET100 CHG: 3.7 %
FEV1 CHG: -1 %
FEV1 FVC CHG: 1.1 %
FEV1 FVC LLN: 63
FEV1 FVC POST REF: 99.6 %
FEV1 FVC PRE REF: 98.5 %
FEV1 FVC REF: 77
FEV1 LLN: 1.42
FEV1 POST REF: 89.1 %
FEV1 PRE REF: 90 %
FEV1 REF: 2
FRCPLETH LLN: 1.9
FRCPLETH PREREF: 0.4 %
FRCPLETH REF: 2.72
FVC CHG: -2 %
FVC LLN: 1.86
FVC POST REF: 88.5 %
FVC PRE REF: 90.3 %
FVC REF: 2.62
IVC PRE: 2.24 L (ref 1.86–3.43)
IVC SINGLE BREATH LLN: 1.86
IVC SINGLE BREATH PRE REF: 85.4 %
IVC SINGLE BREATH REF: 2.62
MVV LLN: 64
MVV PRE REF: 61.2 %
MVV REF: 76
PEF CHG: 14 %
PEF LLN: 3.36
PEF POST REF: 50.7 %
PEF PRE REF: 44.5 %
PEF REF: 5.07
POST FEF 25 75: 1.55 L/S (ref 0.94–3.73)
POST FET 100: 6.35 SEC
POST FEV1 FVC: 76.96 % (ref 63.02–89.64)
POST FEV1: 1.79 L (ref 1.42–2.56)
POST FVC: 2.32 L (ref 1.86–3.43)
POST PEF: 2.57 L/S (ref 3.36–6.78)
PRE DLCO: 10.62 ML/(MIN*MMHG) (ref 14.53–25.99)
PRE ERV: 0.49 L (ref -16449.46–16450.54)
PRE FEF 25 75: 1.55 L/S (ref 0.94–3.73)
PRE FET 100: 6.12 SEC
PRE FEV1 FVC: 76.15 % (ref 63.02–89.64)
PRE FEV1: 1.8 L (ref 1.42–2.56)
PRE FRC PL: 0.01 L (ref 1.9–3.54)
PRE FVC: 2.37 L (ref 1.86–3.43)
PRE MVV: 46.45 L/MIN (ref 64.47–87.22)
PRE PEF: 2.26 L/S (ref 3.36–6.78)
PRE RV: -0.48 L (ref 1.6–2.75)
PRE TLC: 1.89 L (ref 3.95–5.93)
RAW LLN: 3.06
RAW PRE REF: 739.2 %
RAW PRE: 22.61 CMH2O*S/L (ref 3.06–3.06)
RAW REF: 3.06
RV LLN: 1.6
RV PRE REF: -22 %
RV REF: 2.17
TLC LLN: 3.95
TLC PRE REF: 38.3 %
TLC REF: 4.94
VA PRE: 3.57 L (ref 4.79–4.79)
VA SINGLE BREATH LLN: 4.79
VA SINGLE BREATH PRE REF: 74.6 %
VA SINGLE BREATH REF: 4.79
VC LLN: 1.86
VC PRE REF: 90.3 %
VC PRE: 2.37 L (ref 1.86–3.43)
VC REF: 2.62

## 2025-05-26 PROCEDURE — 99215 OFFICE O/P EST HI 40 MIN: CPT | Mod: 25,S$GLB,, | Performed by: INTERNAL MEDICINE

## 2025-05-26 PROCEDURE — 1159F MED LIST DOCD IN RCRD: CPT | Mod: CPTII,S$GLB,, | Performed by: INTERNAL MEDICINE

## 2025-05-26 PROCEDURE — 99999 PR PBB SHADOW E&M-EST. PATIENT-LVL I: CPT | Mod: PBBFAC,HCNC,,

## 2025-05-26 PROCEDURE — 71271 CT THORAX LUNG CANCER SCR C-: CPT | Mod: 26,HCNC,, | Performed by: RADIOLOGY

## 2025-05-26 PROCEDURE — 1101F PT FALLS ASSESS-DOCD LE1/YR: CPT | Mod: CPTII,S$GLB,, | Performed by: INTERNAL MEDICINE

## 2025-05-26 PROCEDURE — 3077F SYST BP >= 140 MM HG: CPT | Mod: CPTII,S$GLB,, | Performed by: INTERNAL MEDICINE

## 2025-05-26 PROCEDURE — 3078F DIAST BP <80 MM HG: CPT | Mod: CPTII,S$GLB,, | Performed by: INTERNAL MEDICINE

## 2025-05-26 PROCEDURE — 99999 PR PBB SHADOW E&M-EST. PATIENT-LVL IV: CPT | Mod: PBBFAC,HCNC,, | Performed by: INTERNAL MEDICINE

## 2025-05-26 PROCEDURE — 71271 CT THORAX LUNG CANCER SCR C-: CPT | Mod: TC,HCNC

## 2025-05-26 PROCEDURE — 3288F FALL RISK ASSESSMENT DOCD: CPT | Mod: CPTII,S$GLB,, | Performed by: INTERNAL MEDICINE

## 2025-05-26 RX ORDER — ALBUTEROL SULFATE 90 UG/1
2 INHALANT RESPIRATORY (INHALATION) EVERY 6 HOURS PRN
Qty: 18 G | Refills: 11 | Status: SHIPPED | OUTPATIENT
Start: 2025-05-26

## 2025-05-26 RX ORDER — TIOTROPIUM BROMIDE AND OLODATEROL 3.124; 2.736 UG/1; UG/1
2 SPRAY, METERED RESPIRATORY (INHALATION) DAILY
Qty: 12 G | Refills: 3 | Status: SHIPPED | OUTPATIENT
Start: 2025-05-26

## 2025-05-26 NOTE — PROCEDURES
"[facility] [place] 3rdFl  Six Minute Walk     SUMMARY     Ordering Provider:    Interpreting Provider:   Performing nurse/tech/RT: RAGHU Baker RRT  Diagnosis:  (Simple Chronic Bronchitis, Exercise Hypoxemia, Panlobular Emphysema)  Height: 5' 4" (162.6 cm)  Weight: 76.2 kg (168 lb)  BMI (Calculated): 28.8                Phase Oxygen Assessment Supplemental O2 Heart   Rate Blood Pressure Jose Armando Dyspnea Scale Rating   Resting 98 % Room Air 66 bpm 117/56 0   Exercise        Minute        1 98 % Room Air 81 bpm     2 97 % Room Air 83 bpm     3 97 % Room Air 82 bpm     4 97 % Room Air 86 bpm     5 97 % Room Air 91 bpm     6  98 % Room Air 95 bpm 187/76 2   Recovery        Minute        1 98 % Room Air 85 bpm     2 99 % Room Air 71 bpm     3 99 % Room Air 80 bpm     4 98 % Room Air 66 bpm 157/67 1     Six Minute Walk Summary  6MWT Status: completed without stopping  Patient Reported: Dyspnea, Lightheadedness, Leg/hip pain (Back Pain)     Interpretation:  Did the patient stop or pause?: No                                         Total Time Walked (Calculated): 360 seconds  Final Partial Lap Distance (feet): 75 feet  Total Distance Meters (Calculated): 144.78 meters  Predicted Distance Meters (Calculated): 390.52 meters  Percentage of Predicted (Calculated): 37.07  Peak VO2 (Calculated): 8.32  Mets: 2.38  Has The Patient Had a Previous Six Minute Walk Test?: Yes       Previous 6MWT Results  Has The Patient Had a Previous Six Minute Walk Test?: Yes  Date of Previous Test: 12/01/22  Total Time Walked: 318 seconds  Total Distance (meters): 259.08  Predicted Distance (meters): 412.22 meters  Percentage of Predicted: 62.85  Percent Change (Calculated): 0.44      Interpretation:  Total distance walked in six minutes is severely reduced indicating a reduction in overall  functional capacity. The patient did not meet criteria for supplemental oxygen prescription.    Clinical correlation suggested.  [] Mild " exercise-induced hypoxemia described as an arterial oxygen saturation of 93-95% (or 3-4% less than at rest),  [] Moderate exercise-induced hypoxemia as 89-93%  [] Severe exercise induced hypoxemia as < 89% O2 saturation.  Medicare Criteria for oxygen prescription comments:   When arterial oxygen saturation is at or below 88% during exercise on room air (severe exercise induced hypoxemia) then the patient falls under Medicare Group 1 criteria for supplemental oxygen prescription.  Details about Medicare Group Criteria coverage can be found at http://[email or web address].[facility].gov/manuals/downloads/     Mick Jeffrey MD

## 2025-05-26 NOTE — PROGRESS NOTES
"Subjective:       Patient ID: Rosana Aguiar is a 75 y.o. female.    Chief Complaint: No chief complaint on file.    HPI The patient presented on 02/ 2025 for evaluation of Myopathy.   Came with Her Daughter.   New issues: none.   Continue having pain - lower back / knees and legs muscles.   No seeing Pain Management.     Review of Systems   Neurological:         " Muscle pain ".    All other systems reviewed and are negative.        Current Medications[1]    Past Medical History:   Diagnosis Date    Atherosclerotic PVD with intermittent claudication 10/19/2022    Diabetes mellitus, type 2 2003    BS didn't check 09/06/2023    High cholesterol     Hypertension     Macular degeneration        Past Surgical History:   Procedure Laterality Date    ANGIOGRAPHY OF LOWER EXTREMITY N/A 02/09/2024    Procedure: Angiogram Extremity Unilateral/poss pta right leg intervention;  Surgeon: Clint Urena MD;  Location: [facility] CATH LAB;  Service: Peripheral Vascular;  Laterality: N/A;  left common femoral access    APPENDECTOMY      CATARACT EXTRACTION Bilateral     CREATION OF FEMOROPOPLITEAL ARTERIAL BYPASS USING GRAFT Right 3/14/2024    Procedure: CREATION, BYPASS, ARTERIAL, FEMORAL TO POPLITEAL, USING GRAFT;  Surgeon: Clint Urena MD;  Location: [facility] OR;  Service: Peripheral Vascular;  Laterality: Right;    cyst removal from breast (Left)      EPIDURAL STEROID INJECTION N/A 10/3/2024    Procedure: Lumbar L4/L5 IL TAISHA  Stop Pletal 2 days before procedure;  Surgeon: Kranthi Grubbs MD;  Location: [facility] PAIN MGT;  Service: Pain Management;  Laterality: N/A;    EYE SURGERY  Cataract    HYSTERECTOMY      INJECTION OF ANESTHETIC AGENT AROUND MEDIAL BRANCH NERVES INNERVATING LUMBAR FACET JOINT Bilateral 11/25/2024    Procedure: Bilateral L4/L5 and L5/S1 MBB;  Surgeon: Kranthi Grubbs MD;  Location: [facility] PAIN MGT;  Service: Pain Management;  Laterality: Bilateral;    TONSILLECTOMY      TUBAL LIGATION  1975     Social " History[2]    Past/Current Medical/Surgical History, Past/Current Social History,   Past/Current Family History and Past/Current Medications were reviewed in detail.    Objective:     VITAL SIGNS WERE REVIEWED    GENERAL APPEARANCE:     The patient looks comfortable.    BMI    No signs of respiratory distress.    Normal breathing pattern.    No dysmorphic features    Normal eye contact.       GENERAL MEDICAL EXAM:    HEENT:  Head is atraumatic normocephalic.     FUNDOSCOPIC (OPHTHALMOSCOPIC) EXAMINATION showed no disc edema (papilledema).      NECK: No JVD. No visible lesions or goiters.     CHEST-CARDIOPULMONARY: No cyanosis. No tachypnea. Normal respiratory effort.    KUHLYVU-[id number]-ORCYIBUOLQ: No jaundice. No stomas or lesions. No visible hernias. No catheters.     SKIN, HAIR, NAILS: No pathognomonic skin rash.No neurofibromatosis.   No visible lesions.No stigmata of autoimmune disease. No clubbing.    LIMBS: No varicose veins. No visible swelling.    MUSCULOSKELETAL: No visible deformities.No visible lesions.    Neurological Exam  Mental Status  Alert. Oriented to person, place, time and situation. Recent and remote memory are intact. Able to copy figure. Speech is normal. Language is fluent with no aphasia. Attention and concentration are normal.    Cranial Nerves  CN II: Visual acuity is normal. Visual fields full to confrontation.  CN III, IV, VI: Extraocular movements intact bilaterally. Normal lids and orbits bilaterally. Pupils equal round and reactive to light bilaterally.  CN V: Facial sensation is normal.  CN VII: Full and symmetric facial movement.  CN VIII: Hearing is normal.  CN IX, X: Palate elevates symmetrically. Normal gag reflex.  CN XI: Shoulder shrug strength is normal.  CN XII: Tongue midline without atrophy or fasciculations.    Motor   Strength is 5/5 throughout all four extremities.    Sensory  Sensation is intact to light touch, pinprick, vibration and proprioception in all four  extremities. No right-sided hemispatial neglect. No left-sided hemispatial neglect.    Reflexes                                            Right                      Left  Brachioradialis                    2+                         2+  Biceps                                 2+                         2+  Triceps                                2+                         2+  Finger flex                           2+                         2+  Hamstring                            2+                         2+  Patellar                                2+                         2+  Achilles                                2+                         2+  Right Plantar: downgoing  Left Plantar: downgoing    Right pathological reflexes: Ritu's absent.  Left pathological reflexes: Ritu's absent.    Coordination    Finger-to-nose, rapid alternating movements and heel-to-shin normal bilaterally without dysmetria.    Gait  Casual gait: Normal stance. Reduced stride length. Antalgic gait. Reduced right arm swing. Reduced left arm swing.        Lab Results   Component Value Date    WBC 7.61 07/24/2024    HGB 12.1 07/24/2024    HCT 36.2 (L) 07/24/2024    MCV 91 07/24/2024     07/24/2024     Sodium   Date Value Ref Range Status   07/24/2024 130 (L) 136 - 145 mmol/L Final     Potassium   Date Value Ref Range Status   07/24/2024 3.2 (L) 3.5 - 5.1 mmol/L Final     Chloride   Date Value Ref Range Status   07/24/2024 95 95 - 110 mmol/L Final     CO2   Date Value Ref Range Status   07/24/2024 25 23 - 29 mmol/L Final     Glucose   Date Value Ref Range Status   07/24/2024 101 70 - 110 mg/dL Final     BUN   Date Value Ref Range Status   07/24/2024 12 8 - 23 mg/dL Final     Creatinine   Date Value Ref Range Status   07/24/2024 1.0 0.5 - 1.4 mg/dL Final     Calcium   Date Value Ref Range Status   07/24/2024 8.9 8.7 - 10.5 mg/dL Final     Total Protein   Date Value Ref Range Status   07/24/2024 6.8 6.0 - 8.4 g/dL Final  "    Albumin   Date Value Ref Range Status   07/24/2024 3.5 3.5 - 5.2 g/dL Final     Total Bilirubin   Date Value Ref Range Status   07/24/2024 0.3 0.1 - 1.0 mg/dL Final     Comment:     For infants and newborns, interpretation of results should be based  on gestational age, weight and in agreement with clinical  observations.    Premature Infant recommended reference ranges:  Up to 24 hours.............<8.0 mg/dL  Up to 48 hours............<12.0 mg/dL  3-5 days..................<15.0 mg/dL  6-29 days.................<15.0 mg/dL       Alkaline Phosphatase   Date Value Ref Range Status   07/24/2024 81 55 - 135 U/L Final     AST   Date Value Ref Range Status   07/24/2024 17 10 - 40 U/L Final     ALT   Date Value Ref Range Status   07/24/2024 15 10 - 44 U/L Final     Anion Gap   Date Value Ref Range Status   07/24/2024 10 8 - 16 mmol/L Final     eGFR if    Date Value Ref Range Status   07/11/2022 >60.0 >60 mL/min/1.73 m^2 Final     eGFR if non    Date Value Ref Range Status   07/11/2022 >60.0 >60 mL/min/1.73 m^2 Final     Comment:     Calculation used to obtain the estimated glomerular filtration  rate (eGFR) is the CKD-EPI equation.        No results found for: "HUCNWJFJ07"    Lab Results   Component Value Date    TSH 1.285 01/06/2022    W3VOKEJ 99 01/06/2022    FREET4 0.78 01/06/2022     No results found in the last 24 hours.    LABORATORY EVALUATION    RADIOLOGY EVALUATION     NEUROPHYSIOLOGY EVALUATION     PATHOLOGY EVALUATION      NEUROCOGNITIVE AND NEUROPSYCHOLOGY EVALUATION     Reviewed the neuroimaging independently     Assessment:   74 Years old Female with PMHX as above came of the evaluation of  Neuropathic Pain.   - Myalgia.   - PVD.  - Essential Tremors.   - Lumbar Radiculopathy.   Plan:   Stable.  Doing exercise - mostly Legs with a new machine at home.     Increase Neurontin 300 mg - to 600 mg PO BID / TID.  GFR: 58 ( > 60 ).  SE discussed.     NSAID's PRN / Neurontin for " "the Pain.     Bilateral Ptosis - She indicated " my whole life has been like that " / denied diplopia.      Acetylcholine antibodies - wnp.    MM ? - denied anyone with such a problems in the Family.   ESR / CRP - wnp.    Refused PT program - discussed the risk of falls and Hip Fx.     Had Vascular Sx - recent s/p right Femoral above knee bypass.      Hold Crestor.      NCS/ EMG - 2024 -  There is electrodiagnostic evidence of an acute on chronic radiculopathy of the bilateral S1 and right L5 nerve roots and a chronic radiculopathy of the left L5 nerve root.   There is evidence of a residual mild statin induced myopathy affecting proximal musculature greater than distal.     MRI Lumbar - 2024 - non significant stenosis.      ESR / CRP / Aldolase / CK / LDH - 2024 - non significant abnormalities.   I do not see any evidence of Inflammatory Myopathy.     Hgb A 1 C: 6.7 to 7.3 - last few months.      CUS: 70 % stenosis bilateral ICA ( 2022 ).     MEDICAL/SURGICAL COMORBIDITIES     All relevant medical comorbidities noted and managed by primary care physician and medical care team.      HEALTHY LIFESTYLE AND PREVENTATIVE CARE    The patient to adhere to the age-appropriate health maintenance guidelines including screening tests and vaccinations.   The patient to adhere to  healthy lifestyle, optimal weight, exercise, healthy diet,   good sleep hygiene and avoiding drugs including smoking, alcohol and recreational drugs.    RTC: 6 months.     Please do not hesitate to contact me with any updates, questions or concerns.    I spent a total of 20 minutes on the day of the visit.This includes face to face time and non-face to face time preparing to see the patient (eg, review of tests),   obtaining and/or reviewing separately obtained history, documenting clinical information in the electronic or other health record,   independently interpreting results and communicating results to the patient/family/caregiver, or care " coordinator.    Jh Dumont MD  General Neurology.       [1]   Current Outpatient Medications:     albuterol (VENTOLIN HFA) 90 mcg/actuation inhaler, Inhale 2 puffs into the lungs every 6 (six) hours as needed for Wheezing. Rescue, Disp: 18 g, Rfl: 11    amLODIPine (NORVASC) 10 MG tablet, Take 1 tablet (10 mg total) by mouth once daily., Disp: 90 tablet, Rfl: 3    aspirin (ECOTRIN) 81 MG EC tablet, Take 1 tablet (81 mg total) by mouth once daily., Disp: 90 tablet, Rfl: 3    blood sugar diagnostic Strp, Check blood glucose levels two times a day, Disp: 200 strip, Rfl: 2    blood-glucose meter kit, Use as instructed, Disp: 1 each, Rfl: 0    carvediloL (COREG) 6.25 MG tablet, Take 1 tablet (6.25 mg total) by mouth 2 (two) times daily with meals., Disp: 180 tablet, Rfl: 1    cilostazoL (PLETAL) 50 MG Tab, Take 1 tablet (50 mg total) by mouth 2 (two) times daily., Disp: 60 tablet, Rfl: 11    evolocumab (REPATHA SURECLICK) 140 mg/mL PnIj, Inject 1 mL (140 mg total) into the skin every 14 (fourteen) days., Disp: 4 mL, Rfl: 5    FLUoxetine 20 MG capsule, Take 1 capsule (20 mg total) by mouth once daily., Disp: 90 capsule, Rfl: 1    fluticasone propionate (FLONASE) 50 mcg/actuation nasal spray, 1 spray (50 mcg total) by Each Nostril route once daily., Disp: 16 g, Rfl: 2    gabapentin (NEURONTIN) 300 MG capsule, Take 1 capsule (300 mg total) by mouth 3 (three) times daily., Disp: 90 capsule, Rfl: 11    HYDROcodone-acetaminophen (NORCO) 7.5-325 mg per tablet, Take 1 tablet by mouth every 6 (six) hours as needed for Pain for up to 7 days., Disp: 20 tablet, Rfl: 0    insulin glargine U-300 conc (TOUJEO MAX U-300 SOLOSTAR) 300 unit/mL (3 mL) insulin pen, Inject 60 Units into the skin once daily., Disp: 3 Pen, Rfl: 11    levocetirizine (XYZAL) 5 MG tablet, Take 1 tablet (5 mg total) by mouth every evening., Disp: 30 tablet, Rfl: 11    losartan-hydrochlorothiazide 100-25 mg (HYZAAR) 100-25 mg per tablet, TAKE 1  "TABLET EVERY DAY, Disp: 90 tablet, Rfl: 0    pen needle, diabetic (BD ULTRA-FINE CAROL PEN NEEDLE) 32 gauge x 5/32" Ndle, 1 each by Misc.(Non-Drug; Combo Route) route once daily., Disp: 100 each, Rfl: 3    potassium chloride SA (K-DUR,KLOR-CON) 20 MEQ tablet, Take 1 tablet (20 mEq total) by mouth once daily., Disp: 3 tablet, Rfl: 0    rosuvastatin (CRESTOR) 20 MG tablet, Take 1 tablet (20 mg total) by mouth once daily., Disp: 90 tablet, Rfl: 3    tiotropium-olodateroL (STIOLTO RESPIMAT) 2.5-2.5 mcg/actuation Mist, Inhale 2 puffs into the lungs once daily - Controller, Disp: 12 g, Rfl: 3    tiZANidine (ZANAFLEX) 4 MG tablet, Take 1 tablet (4 mg total) by mouth nightly as needed (Back Pain)., Disp: 30 tablet, Rfl: 1  [2]   Social History  Socioeconomic History    Marital status:    Tobacco Use    Smoking status: Every Day     Current packs/day: 1.00     Average packs/day: 1 pack/day for 63.4 years (63.4 ttl pk-yrs)     Types: Cigarettes     Start date: 1962     Passive exposure: Past    Smokeless tobacco: Never    Tobacco comments:     HOLD MIDNIGHT PRIOR TO SX   Substance and Sexual Activity    Alcohol use: Not Currently     Comment: rarely; HOLD 72HRS    Drug use: Never    Sexual activity: Not Currently     Partners: Male     Birth control/protection: None     Social Drivers of Health     Financial Resource Strain: Medium Risk (5/24/2024)    Overall Financial Resource Strain (CARDIA)     Difficulty of Paying Living Expenses: Somewhat hard   Food Insecurity: No Food Insecurity (5/24/2024)    Hunger Vital Sign     Worried About Running Out of Food in the Last Year: Never true     Ran Out of Food in the Last Year: Never true   Transportation Needs: No Transportation Needs (5/24/2024)    PRAPARE - Transportation     Lack of Transportation (Medical): No     Lack of Transportation (Non-Medical): No   Physical Activity: Inactive (5/24/2024)    Exercise Vital Sign     Days of Exercise per Week: 0 days     Minutes of " Exercise per Session: 0 min   Stress: Stress Concern Present (5/24/2024)    [employer] - Occupational Stress Questionnaire     Feeling of Stress : Rather much   Housing Stability: Unknown (5/24/2024)    Housing Stability Vital Sign     Unable to Pay for Housing in the Last Year: No     Homeless in the Last Year: No

## 2025-05-26 NOTE — PROGRESS NOTES
Subjective:     Patient ID: Rosana Aguiar is a 75 y.o. female.    Chief Complaint:  Abnormal right upper lobe nodule on CT    HPI  75 y.o.  female smoker presenting for evaluation of abnormal chest x-ray - pulmonary nodule on CT  Chest x-ray showed small left lung nodule/calcified granuloma on prior CT scan of May of 2024..   More than 30 pack-year smoking history still smoking.  Complains of coughing and shortness on exertion.  Not able to use albuterol inhaler properly.  Chronic cough with sputum production.    Lung Cancer Screening Program:  Patient was counseled concerning benefits and limitations of lung cancer screening. There was shared decision making which included the following elements:    Determination of beneficiary eligibility including age, absence of signs or symptoms of lung cancer, a specific calculation of cigarette smoking pack-years; and if a former smoker, the number of years since quitting;  Shared decision making to include benefits and harms of screening, follow-up diagnostic testing, over-diagnosis, false positive rate, and total radiation exposure;  Counseling on the importance of adherence to annual lung cancer LDCT screening, impact of comorbidities and ability or willingness to undergo diagnosis and treatment;   Counseling on the importance of maintaining cigarette smoking abstinence if former smoker; or the importance of smoking cessation if current smoker and, if appropriate, furnishing of information about tobacco cessation interventions.    Last CT scan of the chest performed on 05/22/2024.      Past Medical History:   Diagnosis Date    Atherosclerotic PVD with intermittent claudication 10/19/2022    Diabetes mellitus, type 2 2003    BS didn't check 09/06/2023    High cholesterol     Hypertension     Macular degeneration      Past Surgical History:   Procedure Laterality Date    ANGIOGRAPHY OF LOWER EXTREMITY N/A 02/09/2024    Procedure: Angiogram Extremity Unilateral/poss pta  right leg intervention;  Surgeon: Clint Urena MD;  Location: [facility] CATH LAB;  Service: Peripheral Vascular;  Laterality: N/A;  left common femoral access    APPENDECTOMY      CATARACT EXTRACTION Bilateral     CREATION OF FEMOROPOPLITEAL ARTERIAL BYPASS USING GRAFT Right 3/14/2024    Procedure: CREATION, BYPASS, ARTERIAL, FEMORAL TO POPLITEAL, USING GRAFT;  Surgeon: Clnit Urena MD;  Location: [facility] OR;  Service: Peripheral Vascular;  Laterality: Right;    cyst removal from breast (Left)      EPIDURAL STEROID INJECTION N/A 10/3/2024    Procedure: Lumbar L4/L5 IL TAISHA  Stop Pletal 2 days before procedure;  Surgeon: Kranthi Grubbs MD;  Location: [facility] PAIN MGT;  Service: Pain Management;  Laterality: N/A;    EYE SURGERY  Cataract    HYSTERECTOMY      INJECTION OF ANESTHETIC AGENT AROUND MEDIAL BRANCH NERVES INNERVATING LUMBAR FACET JOINT Bilateral 11/25/2024    Procedure: Bilateral L4/L5 and L5/S1 MBB;  Surgeon: Kranthi Grubbs MD;  Location: [facility] PAIN MGT;  Service: Pain Management;  Laterality: Bilateral;    TONSILLECTOMY      TUBAL LIGATION  1975     Review of patient's allergies indicates:   Allergen Reactions    Clindamycin Other (See Comments)    Neomycin-bacitracin-polymyxin Itching    Nitrofurantoin monohyd/m-cryst Other (See Comments)     Current Outpatient Medications on File Prior to Visit   Medication Sig Dispense Refill    amLODIPine (NORVASC) 10 MG tablet Take 1 tablet (10 mg total) by mouth once daily. 90 tablet 3    blood sugar diagnostic Strp Check blood glucose levels two times a day 200 strip 2    carvediloL (COREG) 6.25 MG tablet Take 1 tablet (6.25 mg total) by mouth 2 (two) times daily with meals. 180 tablet 1    cilostazoL (PLETAL) 50 MG Tab Take 1 tablet (50 mg total) by mouth 2 (two) times daily. 60 tablet 11    evolocumab (REPATHA SURECLICK) 140 mg/mL PnIj Inject 1 mL (140 mg total) into the skin every 14 (fourteen) days. 4 mL 5    FLUoxetine 20 MG capsule Take 1 capsule (20 mg  "total) by mouth once daily. 90 capsule 1    fluticasone propionate (FLONASE) 50 mcg/actuation nasal spray 1 spray (50 mcg total) by Each Nostril route once daily. 16 g 2    gabapentin (NEURONTIN) 300 MG capsule Take 1 capsule (300 mg total) by mouth 3 (three) times daily. 90 capsule 11    HYDROcodone-acetaminophen (NORCO) 7.5-325 mg per tablet Take 1 tablet by mouth every 6 (six) hours as needed for Pain for up to 7 days. 20 tablet 0    insulin glargine U-300 conc (TOUJEO MAX U-300 SOLOSTAR) 300 unit/mL (3 mL) insulin pen Inject 60 Units into the skin once daily. 3 Pen 11    levocetirizine (XYZAL) 5 MG tablet Take 1 tablet (5 mg total) by mouth every evening. 30 tablet 11    losartan-hydrochlorothiazide 100-25 mg (HYZAAR) 100-25 mg per tablet TAKE 1 TABLET EVERY DAY 90 tablet 0    pen needle, diabetic (BD ULTRA-FINE CAROL PEN NEEDLE) 32 gauge x 5/32" Ndle 1 each by Misc.(Non-Drug; Combo Route) route once daily. 100 each 3    potassium chloride SA (K-DUR,KLOR-CON) 20 MEQ tablet Take 1 tablet (20 mEq total) by mouth once daily. 3 tablet 0    tiZANidine (ZANAFLEX) 4 MG tablet Take 1 tablet (4 mg total) by mouth nightly as needed (Back Pain). 30 tablet 1    [DISCONTINUED] albuterol (VENTOLIN HFA) 90 mcg/actuation inhaler Inhale 2 puffs into the lungs every 6 (six) hours as needed for Wheezing. Rescue 18 g 11    [DISCONTINUED] tiotropium-olodateroL (STIOLTO RESPIMAT) 2.5-2.5 mcg/actuation Mist Inhale 2 puffs into the lungs once daily - Controller 12 g 3    aspirin (ECOTRIN) 81 MG EC tablet Take 1 tablet (81 mg total) by mouth once daily. 90 tablet 3    blood-glucose meter kit Use as instructed 1 each 0    rosuvastatin (CRESTOR) 20 MG tablet Take 1 tablet (20 mg total) by mouth once daily. 90 tablet 3     No current facility-administered medications on file prior to visit.     Social History     Socioeconomic History    Marital status:    Tobacco Use    Smoking status: Every Day     Current packs/day: 1.00     " Average packs/day: 1 pack/day for 63.4 years (63.4 ttl pk-yrs)     Types: Cigarettes     Start date: 1962     Passive exposure: Past    Smokeless tobacco: Never    Tobacco comments:     HOLD MIDNIGHT PRIOR TO SX   Substance and Sexual Activity    Alcohol use: Not Currently     Comment: rarely; HOLD 72HRS    Drug use: Never    Sexual activity: Not Currently     Partners: Male     Birth control/protection: None     Social Drivers of Health     Financial Resource Strain: Medium Risk (5/24/2024)    Overall Financial Resource Strain (CARDIA)     Difficulty of Paying Living Expenses: Somewhat hard   Food Insecurity: No Food Insecurity (5/24/2024)    Hunger Vital Sign     Worried About Running Out of Food in the Last Year: Never true     Ran Out of Food in the Last Year: Never true   Transportation Needs: No Transportation Needs (5/24/2024)    PRAPARE - Transportation     Lack of Transportation (Medical): No     Lack of Transportation (Non-Medical): No   Physical Activity: Inactive (5/24/2024)    Exercise Vital Sign     Days of Exercise per Week: 0 days     Minutes of Exercise per Session: 0 min   Stress: Stress Concern Present (5/24/2024)    [employer] - Occupational Stress Questionnaire     Feeling of Stress : Rather much   Housing Stability: Unknown (5/24/2024)    Housing Stability Vital Sign     Unable to Pay for Housing in the Last Year: No     Homeless in the Last Year: No     Family History   Problem Relation Name Age of Onset    Cancer Mother          female    Diabetes Mother      Macular degeneration Mother      Alcohol abuse Father      Cancer Daughter          colon    Diabetes Maternal Uncle      Heart disease Neg Hx      Stroke Neg Hx         Review of Systems   Constitutional:  Positive for fatigue. Negative for fever.   HENT:  Positive for postnasal drip, rhinorrhea and congestion.    Eyes:  Negative for redness and itching.   Respiratory:  Positive for cough, sputum  "production, shortness of breath, dyspnea on extertion, use of rescue inhaler and Paroxysmal Nocturnal Dyspnea.    Cardiovascular:  Negative for chest pain, palpitations and leg swelling.   Genitourinary:  Negative for difficulty urinating and hematuria.   Endocrine:  Negative for cold intolerance and heat intolerance.    Skin:  Negative for rash.   Gastrointestinal:  Negative for nausea and abdominal pain.   Neurological:  Negative for dizziness, syncope, weakness and light-headedness.   Hematological:  Negative for adenopathy. Does not bruise/bleed easily.   Psychiatric/Behavioral:  Negative for sleep disturbance. The patient is not nervous/anxious.        Objective:      BP (!) 146/60   Pulse 65   Resp (!) 21   Ht 5' 4" (1.626 m)   Wt 76.2 kg (168 lb)   SpO2 96%   BMI 28.84 kg/m²   Physical Exam  Vitals and nursing note reviewed.   Constitutional:       Appearance: She is well-developed.   HENT:      Head: Normocephalic and atraumatic.      Nose: Nose normal.      Mouth/Throat:      Pharynx: No oropharyngeal exudate.   Eyes:      Conjunctiva/sclera: Conjunctivae normal.      Pupils: Pupils are equal, round, and reactive to light.   Neck:      Thyroid: No thyromegaly.      Vascular: No JVD.      Trachea: No tracheal deviation.   Cardiovascular:      Rate and Rhythm: Normal rate and regular rhythm.      Heart sounds: Normal heart sounds.   Pulmonary:      Effort: Pulmonary effort is normal. No respiratory distress.      Breath sounds: Examination of the right-lower field reveals wheezing. Examination of the left-lower field reveals wheezing. Decreased breath sounds and wheezing present. No rhonchi or rales.   Chest:      Chest wall: No tenderness.   Abdominal:      General: Bowel sounds are normal.      Palpations: Abdomen is soft.   Musculoskeletal:         General: Normal range of motion.      Cervical back: Neck supple.   Lymphadenopathy:      Cervical: No cervical adenopathy.   Skin:     General: Skin is " warm and dry.   Neurological:      Mental Status: She is alert and oriented to person, place, and time.       Personal Diagnostic Review  Chest CT reviewed  Results for orders placed or performed during the hospital encounter of 05/26/25 (from the past 2160 hours)   CT Chest Lung Screening Low Dose    Narrative    Exam:  CT CHEST LUNG SCREENING LOW DOSE    REASON FOR STUDY: Tobacco smoking history of at least 20 pack years    COMPARISON: 05/22/2024, 05/29/2023, 02/24/2023    TECHNIQUE: Axial CT imaging was performed of the chest utilizing a low-dose protocol.    Computed tomography dose index (CTDI):  3.26 mGy  Dose-length product (DLP):  114.21 mGy-cm    All CT scans at [this location] are performed using dose modulation techniques as appropriate to a performed exam including the following: Automated exposure control; adjustment of the mA and/or kV according to patient size (this includes techniques or standardized protocols for targeted exams where dose is matched to indication / reason for exam; i.e. extremities or head); use of iterative reconstruction technique.    FINDINGS:    Spiculated pulmonary nodule within the right lower lobe now measures 9 x 12 mm as compared to 8 x 8 mm on 05/22/2024 and 4 x 10 mm on 11/30/2022.  Consider PET scan for further evaluation.  New pleural-based 7.2 x 6.3 mm pulmonary nodule posterior right upper lobe with minimal spiculation.  Finding also concerning for malignancy.     Few punctate pulmonary nodules within the left upper lobe with minimal associated groundglass opacity.  The largest measures 2 mm maximal dimension.  No significant change since prior examination.  Right lower lobe is clear.    Heart enlarged with severe calcification of the mitral valve.  Severe coronary artery calcification.  Aorta normal in caliber with severe atherosclerotic vascular calcification.    Visualized upper abdomen demonstrates no suspicious abnormality.  Severe atherosclerotic calcification  "the abdominal aorta, renal vasculature and mesenteric vasculature.  Large amount of stool noted within the visualized colon.        Impression    Lung-RADS Catagory:  4X - Suspicious - consultation advised - possible next steps Chest CT, tissue sampling and-or PET/CT.    Clinically significant non lung cancer finding:  None.    Prior Lung Cancer Modifier:  No history of prior lung cancer.          Finalized on: 5/26/2025 11:52 AM By:  Aquilino Box MD  [facility]# [id number]      2025-05-26 11:54:12.446     [facility]             5/26/2025    10:48 AM   Pulmonary Studies Review   SpO2 96 %   Height 5' 4" (1.626 m)   Weight 76.2 kg (168 lb)   BMI (Calculated) 28.8   Predicted Distance 249.38   Predicted Distance Meters (Calculated) 390.52 meters       CT Chest Lung Screening Low Dose  Narrative: Exam:  CT CHEST LUNG SCREENING LOW DOSE    REASON FOR STUDY: Tobacco smoking history of at least 20 pack years    COMPARISON: 05/22/2024, 05/29/2023, 02/24/2023    TECHNIQUE: Axial CT imaging was performed of the chest utilizing a low-dose protocol.    Computed tomography dose index (CTDI):  3.26 mGy  Dose-length product (DLP):  114.21 mGy-cm    All CT scans at [this location] are performed using dose modulation techniques as appropriate to a performed exam including the following: Automated exposure control; adjustment of the mA and/or kV according to patient size (this includes techniques or standardized protocols for targeted exams where dose is matched to indication / reason for exam; i.e. extremities or head); use of iterative reconstruction technique.    FINDINGS:    Spiculated pulmonary nodule within the right lower lobe now measures 9 x 12 mm as compared to 8 x 8 mm on 05/22/2024 and 4 x 10 mm on 11/30/2022.  Consider PET scan for further evaluation.  New pleural-based 7.2 x 6.3 mm pulmonary nodule posterior right upper lobe with minimal spiculation.  Finding also concerning for malignancy.     Few punctate pulmonary nodules " "within the left upper lobe with minimal associated groundglass opacity.  The largest measures 2 mm maximal dimension.  No significant change since prior examination.  Right lower lobe is clear.    Heart enlarged with severe calcification of the mitral valve.  Severe coronary artery calcification.  Aorta normal in caliber with severe atherosclerotic vascular calcification.    Visualized upper abdomen demonstrates no suspicious abnormality.  Severe atherosclerotic calcification the abdominal aorta, renal vasculature and mesenteric vasculature.  Large amount of stool noted within the visualized colon.  Impression: Lung-RADS Catagory:  4X - Suspicious - consultation advised - possible next steps Chest CT, tissue sampling and-or PET/CT.    Clinically significant non lung cancer finding:  None.    Prior Lung Cancer Modifier:  No history of prior lung cancer.    Finalized on: 5/26/2025 11:52 AM By:  Aquilino Box MD  [facility]# [id number]      2025-05-26 11:54:12.446     [facility]      Office Spirometry Results:         5/26/2025    10:48 AM 5/26/2025    10:02 AM 2/11/2025     8:48 AM 11/25/2024     9:03 AM 11/25/2024     8:58 AM 11/25/2024     8:53 AM 11/25/2024     8:48 AM   Pulmonary Function Tests   SpO2 96 %   95 % 94 % 96 % 95 %   Ordering Provider          Performing nurse/tech/RT  RAGHU Baker, RRT        Diagnosis  --        Height 5' 4" (1.626 m) 5' 4" (1.626 m) 5' 4" (1.626 m)       Weight 76.2 kg (168 lb) 76.2 kg (168 lb) 76.2 kg (167 lb 15.9 oz)       BMI (Calculated) 28.8 28.8 28.8       6MWT Status  completed without stopping        Patient Reported  Dyspnea;Lightheadedness;Leg/hip pain        Was O2 used?  No        6MW Distance walked (feet)  475 feet        Distance walked (meters)  144.78 meters        Did patient stop?  No        Type of assistive device(s) used?  no assistive devices        Oxygen Saturation  98 %        Supplemental Oxygen  Room Air        Heart Rate  66 bpm        Blood Pressure  " "117/56        Jose Armando Dyspnea Rating   nothing at all        Oxygen Saturation  98 %        Supplemental Oxygen  Room Air        Heart Rate  95 bpm        Blood Pressure  187/76        Jose Armando Dyspnea Rating   light        Recovery Time (seconds)  240 seconds        Oxygen Saturation  98 %        Supplemental Oxygen  Room Air        Heart Rate  66 bpm        Blood Pressure  157/67        Jose Armando Dyspnea Rating   very light        Is procedure ready for interpretation?  Yes        Oxygen Qualification?  No              5/26/2025    10:48 AM   Pulmonary Studies Review   SpO2 96 %   Height 5' 4" (1.626 m)   Weight 76.2 kg (168 lb)   BMI (Calculated) 28.8   Predicted Distance 249.38   Predicted Distance Meters (Calculated) 390.52 meters           No results found for this or any previous visit (from the past 2 weeks).  [facility]-Pulmonary Function 3rdFl  Six Minute Walk      SUMMARY      Ordering Provider:    Interpreting Provider:   Performing nurse/tech/RT: RAGHU Baker RRT  Diagnosis:  (Simple Chronic Bronchitis, Exercise Hypoxemia, Panlobular Emphysema)  Height: 5' 4" (162.6 cm)  Weight: 76.2 kg (168 lb)  BMI (Calculated): 28.8                                                                                 Phase Oxygen Assessment Supplemental O2 Heart   Rate Blood Pressure Jose Armando Dyspnea Scale Rating   Resting 98 % Room Air 66 bpm 117/56 0   Exercise             Minute             1 98 % Room Air 81 bpm       2 97 % Room Air 83 bpm       3 97 % Room Air 82 bpm       4 97 % Room Air 86 bpm       5 97 % Room Air 91 bpm       6  98 % Room Air 95 bpm 187/76 2   Recovery             Minute             1 98 % Room Air 85 bpm       2 99 % Room Air 71 bpm       3 99 % Room Air 80 bpm       4 98 % Room Air 66 bpm 157/67 1      Six Minute Walk Summary  6MWT Status: completed without stopping  Patient Reported: Dyspnea, Lightheadedness, Leg/hip pain (Back Pain)         Interpretation:  Did the patient stop or pause?: " No  Total Time Walked (Calculated): 360 seconds  Final Partial Lap Distance (feet): 75 feet  Total Distance Meters (Calculated): 144.78 meters  Predicted Distance Meters (Calculated): 390.52 meters  Percentage of Predicted (Calculated): 37.07  Peak VO2 (Calculated): 8.32  Mets: 2.38  Has The Patient Had a Previous Six Minute Walk Test?: Yes     Previous 6MWT Results  Has The Patient Had a Previous Six Minute Walk Test?: Yes  Date of Previous Test: 12/01/22  Total Time Walked: 318 seconds  Total Distance (meters): 259.08  Predicted Distance (meters): 412.22 meters  Percentage of Predicted: 62.85  Percent Change (Calculated): 0.44        Interpretation:  Total distance walked in six minutes is severely reduced indicating a reduction in overall  functional capacity. The patient did not meet criteria for supplemental oxygen prescription.     Clinical correlation suggested.  [] Mild exercise-induced hypoxemia described as an arterial oxygen saturation of 93-95% (or 3-4% less than at rest),  [] Moderate exercise-induced hypoxemia as 89-93%  [] Severe exercise induced hypoxemia as < 89% O2 saturation.  Medicare Criteria for oxygen prescription comments:   When arterial oxygen saturation is at or below 88% during exercise on room air (severe exercise induced hypoxemia) then the patient falls under Medicare Group 1 criteria for supplemental oxygen prescription.  Details about Medicare Group Criteria coverage can be found at http://www.cms.hhs.gov/manuals/downloads/      Mick Jeffrey MD      Assessment:            Nodule of upper lobe of right lung  -     NM PET CT FDG Skull Base to Mid Thigh; Future; Expected date: 05/26/2025    Simple chronic bronchitis  -     tiotropium-olodateroL (STIOLTO RESPIMAT) 2.5-2.5 mcg/actuation Mist; Inhale 2 puffs into the lungs once daily - Controller  Dispense: 12 g; Refill: 3  -     albuterol (VENTOLIN HFA) 90 mcg/actuation inhaler; Inhale 2 puffs into the lungs every 6 (six) hours as needed  "for Wheezing. Rescue  Dispense: 18 g; Refill: 11    Nodule of right lung              Outpatient Encounter Medications as of 5/26/2025   Medication Sig Dispense Refill    amLODIPine (NORVASC) 10 MG tablet Take 1 tablet (10 mg total) by mouth once daily. 90 tablet 3    blood sugar diagnostic Strp Check blood glucose levels two times a day 200 strip 2    carvediloL (COREG) 6.25 MG tablet Take 1 tablet (6.25 mg total) by mouth 2 (two) times daily with meals. 180 tablet 1    cilostazoL (PLETAL) 50 MG Tab Take 1 tablet (50 mg total) by mouth 2 (two) times daily. 60 tablet 11    evolocumab (REPATHA SURECLICK) 140 mg/mL PnIj Inject 1 mL (140 mg total) into the skin every 14 (fourteen) days. 4 mL 5    FLUoxetine 20 MG capsule Take 1 capsule (20 mg total) by mouth once daily. 90 capsule 1    fluticasone propionate (FLONASE) 50 mcg/actuation nasal spray 1 spray (50 mcg total) by Each Nostril route once daily. 16 g 2    gabapentin (NEURONTIN) 300 MG capsule Take 1 capsule (300 mg total) by mouth 3 (three) times daily. 90 capsule 11    HYDROcodone-acetaminophen (NORCO) 7.5-325 mg per tablet Take 1 tablet by mouth every 6 (six) hours as needed for Pain for up to 7 days. 20 tablet 0    insulin glargine U-300 conc (TOUJEO MAX U-300 SOLOSTAR) 300 unit/mL (3 mL) insulin pen Inject 60 Units into the skin once daily. 3 Pen 11    levocetirizine (XYZAL) 5 MG tablet Take 1 tablet (5 mg total) by mouth every evening. 30 tablet 11    losartan-hydrochlorothiazide 100-25 mg (HYZAAR) 100-25 mg per tablet TAKE 1 TABLET EVERY DAY 90 tablet 0    pen needle, diabetic (BD ULTRA-FINE CAROL PEN NEEDLE) 32 gauge x 5/32" Ndle 1 each by Misc.(Non-Drug; Combo Route) route once daily. 100 each 3    potassium chloride SA (K-DUR,KLOR-CON) 20 MEQ tablet Take 1 tablet (20 mEq total) by mouth once daily. 3 tablet 0    tiZANidine (ZANAFLEX) 4 MG tablet Take 1 tablet (4 mg total) by mouth nightly as needed (Back Pain). 30 tablet 1    [DISCONTINUED] albuterol " (VENTOLIN HFA) 90 mcg/actuation inhaler Inhale 2 puffs into the lungs every 6 (six) hours as needed for Wheezing. Rescue 18 g 11    [DISCONTINUED] tiotropium-olodateroL (STIOLTO RESPIMAT) 2.5-2.5 mcg/actuation Mist Inhale 2 puffs into the lungs once daily - Controller 12 g 3    albuterol (VENTOLIN HFA) 90 mcg/actuation inhaler Inhale 2 puffs into the lungs every 6 (six) hours as needed for Wheezing. Rescue 18 g 11    aspirin (ECOTRIN) 81 MG EC tablet Take 1 tablet (81 mg total) by mouth once daily. 90 tablet 3    blood-glucose meter kit Use as instructed 1 each 0    rosuvastatin (CRESTOR) 20 MG tablet Take 1 tablet (20 mg total) by mouth once daily. 90 tablet 3    tiotropium-olodateroL (STIOLTO RESPIMAT) 2.5-2.5 mcg/actuation Mist Inhale 2 puffs into the lungs once daily - Controller 12 g 3    [DISCONTINUED] evolocumab (REPATHA SURECLICK) 140 mg/mL PnIj Inject 1 mL (140 mg total) into the skin every 14 (fourteen) days. 1 mL 3    [DISCONTINUED] REPATHA SURECLICK 140 mg/mL PnIj INJECT 140MG UNDER THE SKIN EVERY 2 WEEKS 4 mL 5     No facility-administered encounter medications on file as of 5/26/2025.     Plan:       Requested Prescriptions     Signed Prescriptions Disp Refills    tiotropium-olodateroL (STIOLTO RESPIMAT) 2.5-2.5 mcg/actuation Mist 12 g 3     Sig: Inhale 2 puffs into the lungs once daily - Controller    albuterol (VENTOLIN HFA) 90 mcg/actuation inhaler 18 g 11     Sig: Inhale 2 puffs into the lungs every 6 (six) hours as needed for Wheezing. Rescue     Problem List Items Addressed This Visit       Nodule of right lung  -  upper lobe     Other Visit Diagnoses         Nodule of upper lobe of right lung    -  Primary    Relevant Orders    NM PET CT FDG Skull Base to Mid Thigh      Simple chronic bronchitis        Relevant Medications    tiotropium-olodateroL (STIOLTO RESPIMAT) 2.5-2.5 mcg/actuation Mist    albuterol (VENTOLIN HFA) 90 mcg/actuation inhaler                   Follow up in about 9 days (around  6/4/2025) for PET on return.    MEDICAL DECISION MAKING: Moderate to high complexity.  Overall, the multiple problems listed are of moderate to high severity that may impact quality of life and activities of daily living. Side effects of medications, treatment plan as well as options and alternatives reviewed and discussed with patient. There was counseling of patient concerning these issues.    Total time spent in counseling and coordination of care - 45  minutes of total time spent on the encounter, which includes face to face time and non-face to face time preparing to see the patient (eg, review of tests), Obtaining and/or reviewing separately obtained history, Documenting clinical information in the electronic or other health record, Independently interpreting results (not separately reported) and communicating results to the patient/family/caregiver, or Care coordination (not separately reported).    Time was used in discussion of prognosis, risks, benefits of treatment, instructions and compliance with regimen . Discussion with other physicians and/or health care providers - home health or for use of durable medical equipment (oxygen, nebulizers, CPAP, BiPAP) occurred.

## 2025-05-27 ENCOUNTER — RESULTS FOLLOW-UP (OUTPATIENT)
Dept: INTERNAL MEDICINE | Facility: CLINIC | Age: 75
End: 2025-05-27

## 2025-05-28 ENCOUNTER — LAB VISIT (OUTPATIENT)
Dept: LAB | Facility: HOSPITAL | Age: 75
End: 2025-05-28
Attending: FAMILY MEDICINE
Payer: MEDICARE

## 2025-05-28 ENCOUNTER — OFFICE VISIT (OUTPATIENT)
Dept: INTERNAL MEDICINE | Facility: CLINIC | Age: 75
End: 2025-05-28
Payer: MEDICARE

## 2025-05-28 VITALS
WEIGHT: 168.88 LBS | OXYGEN SATURATION: 95 % | HEIGHT: 64 IN | DIASTOLIC BLOOD PRESSURE: 60 MMHG | SYSTOLIC BLOOD PRESSURE: 136 MMHG | TEMPERATURE: 98 F | HEART RATE: 62 BPM | BODY MASS INDEX: 28.83 KG/M2

## 2025-05-28 DIAGNOSIS — E11.9 TYPE 2 DIABETES MELLITUS WITHOUT COMPLICATION, WITH LONG-TERM CURRENT USE OF INSULIN: ICD-10-CM

## 2025-05-28 DIAGNOSIS — Z13.31 POSITIVE DEPRESSION SCREENING: Chronic | ICD-10-CM

## 2025-05-28 DIAGNOSIS — E11.51 TYPE 2 DIABETES MELLITUS WITH DIABETIC PERIPHERAL ANGIOPATHY WITHOUT GANGRENE, WITH LONG-TERM CURRENT USE OF INSULIN: ICD-10-CM

## 2025-05-28 DIAGNOSIS — Z79.4 TYPE 2 DIABETES MELLITUS WITHOUT COMPLICATION, WITH LONG-TERM CURRENT USE OF INSULIN: ICD-10-CM

## 2025-05-28 DIAGNOSIS — I70.219 INTERMITTENT CLAUDICATION DUE TO ATHEROSCLEROSIS OF ARTERY OF EXTREMITY: ICD-10-CM

## 2025-05-28 DIAGNOSIS — T46.6X5A STATIN MYOPATHY: ICD-10-CM

## 2025-05-28 DIAGNOSIS — Z79.4 TYPE 2 DIABETES MELLITUS WITH DIABETIC PERIPHERAL ANGIOPATHY WITHOUT GANGRENE, WITH LONG-TERM CURRENT USE OF INSULIN: ICD-10-CM

## 2025-05-28 DIAGNOSIS — F32.1 MAJOR DEPRESSIVE DISORDER, SINGLE EPISODE, MODERATE: ICD-10-CM

## 2025-05-28 DIAGNOSIS — I10 PRIMARY HYPERTENSION: ICD-10-CM

## 2025-05-28 DIAGNOSIS — G72.0 STATIN MYOPATHY: ICD-10-CM

## 2025-05-28 DIAGNOSIS — R91.1 PULMONARY NODULE: ICD-10-CM

## 2025-05-28 DIAGNOSIS — N18.31 CHRONIC KIDNEY DISEASE, STAGE 3A: Primary | ICD-10-CM

## 2025-05-28 DIAGNOSIS — I10 HYPERTENSION, UNSPECIFIED TYPE: ICD-10-CM

## 2025-05-28 DIAGNOSIS — E78.5 HYPERLIPIDEMIA, UNSPECIFIED HYPERLIPIDEMIA TYPE: ICD-10-CM

## 2025-05-28 DIAGNOSIS — Z13.31 POSITIVE DEPRESSION SCREENING: ICD-10-CM

## 2025-05-28 DIAGNOSIS — F33.9 DEPRESSION, RECURRENT: ICD-10-CM

## 2025-05-28 LAB
ALBUMIN/CREAT UR: 46.8 UG/MG
CREAT UR-MCNC: 62 MG/DL (ref 15–325)
MICROALBUMIN UR-MCNC: 29 UG/ML (ref ?–5000)

## 2025-05-28 PROCEDURE — 1159F MED LIST DOCD IN RCRD: CPT | Mod: CPTII,S$GLB,, | Performed by: FAMILY MEDICINE

## 2025-05-28 PROCEDURE — 82570 ASSAY OF URINE CREATININE: CPT

## 2025-05-28 PROCEDURE — 1160F RVW MEDS BY RX/DR IN RCRD: CPT | Mod: CPTII,S$GLB,, | Performed by: FAMILY MEDICINE

## 2025-05-28 PROCEDURE — 3288F FALL RISK ASSESSMENT DOCD: CPT | Mod: CPTII,S$GLB,, | Performed by: FAMILY MEDICINE

## 2025-05-28 PROCEDURE — 3075F SYST BP GE 130 - 139MM HG: CPT | Mod: CPTII,S$GLB,, | Performed by: FAMILY MEDICINE

## 2025-05-28 PROCEDURE — G2211 COMPLEX E/M VISIT ADD ON: HCPCS | Mod: S$GLB,,, | Performed by: FAMILY MEDICINE

## 2025-05-28 PROCEDURE — 1125F AMNT PAIN NOTED PAIN PRSNT: CPT | Mod: CPTII,S$GLB,, | Performed by: FAMILY MEDICINE

## 2025-05-28 PROCEDURE — 99999 PR PBB SHADOW E&M-EST. PATIENT-LVL IV: CPT | Mod: PBBFAC,,, | Performed by: FAMILY MEDICINE

## 2025-05-28 PROCEDURE — 99214 OFFICE O/P EST MOD 30 MIN: CPT | Mod: S$GLB,,, | Performed by: FAMILY MEDICINE

## 2025-05-28 PROCEDURE — 1101F PT FALLS ASSESS-DOCD LE1/YR: CPT | Mod: CPTII,S$GLB,, | Performed by: FAMILY MEDICINE

## 2025-05-28 PROCEDURE — 3078F DIAST BP <80 MM HG: CPT | Mod: CPTII,S$GLB,, | Performed by: FAMILY MEDICINE

## 2025-05-28 RX ORDER — FLUOXETINE 20 MG/1
20 CAPSULE ORAL DAILY
Qty: 90 CAPSULE | Refills: 3 | Status: SHIPPED | OUTPATIENT
Start: 2025-05-28 | End: 2026-05-28

## 2025-05-28 RX ORDER — LOSARTAN POTASSIUM AND HYDROCHLOROTHIAZIDE 25; 100 MG/1; MG/1
1 TABLET ORAL DAILY
Qty: 90 TABLET | Refills: 3 | Status: SHIPPED | OUTPATIENT
Start: 2025-05-28

## 2025-05-28 RX ORDER — CARVEDILOL 6.25 MG/1
6.25 TABLET ORAL 2 TIMES DAILY WITH MEALS
Qty: 180 TABLET | Refills: 1 | Status: SHIPPED | OUTPATIENT
Start: 2025-05-28

## 2025-05-28 RX ORDER — AMLODIPINE BESYLATE 10 MG/1
10 TABLET ORAL DAILY
Qty: 90 TABLET | Refills: 3 | Status: SHIPPED | OUTPATIENT
Start: 2025-05-28

## 2025-05-28 RX ORDER — POTASSIUM CHLORIDE 20 MEQ/1
20 TABLET, EXTENDED RELEASE ORAL DAILY
Qty: 3 TABLET | Refills: 0 | Status: SHIPPED | OUTPATIENT
Start: 2025-05-28

## 2025-05-28 NOTE — PROGRESS NOTES
Rosana Aguiar  05/28/2025  [id number]    Carito Kohler MD  Patient Care Team:  Carito Kohler MD as PCP - General (Family Medicine)  Jonathan Coburn MD as Consulting Physician (Interventional Cardiology)  Yifan Murillo OD as Consulting Physician (Optometry)  Clint Urena MD as Consulting Physician (Vascular Surgery)  Mick Jeffrey MD as Consulting Physician (Pulmonary Disease)          Visit Type:a scheduled routine follow-up visit    Chief Complaint:  Chief Complaint   Patient presents with    Follow-up       History of Present Illness:    History of Present Illness    CHIEF COMPLAINT:  Ms. Aguiar presents today for follow-up.    RESPIRATORY:  She has a >30 pack-year smoking history and experiences chronic bronchitis with coughing, treated with Saloto and albuterol. CTs of the chest (5/22/2024, 5/26/2025) showed a suspicious area in her lung. She is following with Dr. Simon for pulmonary nodules.    CARDIOVASCULAR:  She has hypertension managed with Coreg 6.25mg twice daily, Hyzaar 125mg daily, and Amlodipine 10mg daily. She also has peripheral vascular disease with claudication, followed by Dr. Schmid and managed with Clopidogrel 75mg daily.    DIABETES:  She is treated with Toujeo insulin 60 units daily. HbA1c is 6.0 and blood sugar was 65.    MEDICATIONS:  She is unable to tolerate statins due to muscle aches as advised by her neurologist, and was subsequently started on Repatha. She takes potassium replacement.    LABS:  Sodium is chronically low at 130, potassium 3.4, and lipids 83. She has chronic kidney disease Stage III with eGFR of 58, present for six months.            The following were reviewed at this visit: active problem list, medication list, allergies, family history, social history, and health maintenance.  History:  Past Medical History:   Diagnosis Date    Atherosclerotic PVD with intermittent claudication 10/19/2022    Diabetes mellitus, type 2 2003    BS didn't check  09/06/2023    High cholesterol     Hypertension     Macular degeneration      Past Surgical History:   Procedure Laterality Date    ANGIOGRAPHY OF LOWER EXTREMITY N/A 02/09/2024    Procedure: Angiogram Extremity Unilateral/poss pta right leg intervention;  Surgeon: Clint Urena MD;  Location: [facility] CATH LAB;  Service: Peripheral Vascular;  Laterality: N/A;  left common femoral access    APPENDECTOMY      CATARACT EXTRACTION Bilateral     CREATION OF FEMOROPOPLITEAL ARTERIAL BYPASS USING GRAFT Right 3/14/2024    Procedure: CREATION, BYPASS, ARTERIAL, FEMORAL TO POPLITEAL, USING GRAFT;  Surgeon: Clint Urena MD;  Location: [facility] OR;  Service: Peripheral Vascular;  Laterality: Right;    cyst removal from breast (Left)      EPIDURAL STEROID INJECTION N/A 10/3/2024    Procedure: Lumbar L4/L5 IL TAISHA  Stop Pletal 2 days before procedure;  Surgeon: Kranthi Grubbs MD;  Location: [facility] PAIN MGT;  Service: Pain Management;  Laterality: N/A;    EYE SURGERY  Cataract    HYSTERECTOMY      INJECTION OF ANESTHETIC AGENT AROUND MEDIAL BRANCH NERVES INNERVATING LUMBAR FACET JOINT Bilateral 11/25/2024    Procedure: Bilateral L4/L5 and L5/S1 MBB;  Surgeon: Kranthi Grubbs MD;  Location: [facility] PAIN MGT;  Service: Pain Management;  Laterality: Bilateral;    TONSILLECTOMY      TUBAL LIGATION  1975     Problem List[1]    Medications:  Medications Ordered Prior to Encounter[2]    Medications have been reviewed and reconciled with patient at this visit.    Exam:  Wt Readings from Last 3 Encounters:   05/28/25 76.6 kg (168 lb 14 oz)   05/26/25 76.2 kg (168 lb)   05/26/25 76.2 kg (168 lb)     Temp Readings from Last 3 Encounters:   05/28/25 98.1 °F (36.7 °C) (Tympanic)   11/25/24 96.4 °F (35.8 °C) (Temporal)   10/03/24 96.5 °F (35.8 °C) (Temporal)     BP Readings from Last 3 Encounters:   05/28/25 136/60   05/26/25 (!) 146/60   02/11/25 (!) 148/66     Pulse Readings from Last 3 Encounters:   05/28/25 62   05/26/25 65   02/11/25 67      Body mass index is 28.99 kg/m².      Review of Systems   Constitutional: Negative.  Negative for chills and fever.   HENT: Negative.  Negative for congestion, sinus pain and sore throat.    Eyes:  Negative for blurred vision and double vision.   Respiratory:  Negative for cough, sputum production, shortness of breath and wheezing.    Cardiovascular:  Positive for leg swelling. Negative for chest pain and palpitations.   Gastrointestinal:  Negative for abdominal pain, constipation, diarrhea, heartburn, nausea and vomiting.   Genitourinary: Negative.    Musculoskeletal: Negative.    Skin: Negative.  Negative for rash.   Neurological: Negative.    Endo/Heme/Allergies: Negative.  Negative for polydipsia. Does not bruise/bleed easily.   Psychiatric/Behavioral:  Negative for depression and substance abuse.      Physical Exam  Nursing note reviewed.   Cardiovascular:      Rate and Rhythm: Normal rate and regular rhythm.   Pulmonary:      Effort: Pulmonary effort is normal. No respiratory distress.   Neurological:      Mental Status: She is alert and oriented to person, place, and time.   Psychiatric:         Mood and Affect: Mood normal.         Behavior: Behavior normal.         Thought Content: Thought content normal.         Judgment: Judgment normal.       Physical Exam             Laboratory Reviewed ({Yes)    Lab Results   Component Value Date    WBC 7.61 07/24/2024    HGB 12.1 07/24/2024    HCT 36.2 (L) 07/24/2024     07/24/2024    CHOL 160 05/26/2025    TRIG 108 05/26/2025    HDL 55 05/26/2025    ALT 12 05/26/2025    AST 14 05/26/2025     (L) 05/26/2025    K 3.4 (L) 05/26/2025    CL 96 05/26/2025    CREATININE 1.0 05/26/2025    BUN 14 05/26/2025    CO2 26 05/26/2025    TSH 1.285 01/06/2022    HGBA1C 6.0 (H) 05/26/2025       Assessment & Plan    N18.31 Chronic kidney disease, stage 3a  J44.9 Chronic obstructive pulmonary disease, unspecified  J42 Unspecified chronic bronchitis  R91.1 Solitary  pulmonary nodule  I10 Essential (primary) hypertension  I73.9 Peripheral vascular disease, unspecified  E11.649 Type 2 diabetes mellitus with hypoglycemia without coma  T46.6X5A Adverse effect of antihyperlipidemic and antiarteriosclerotic drugs, initial encounter  E87.1 Hypo-osmolality and hyponatremia  E87.6 Hypokalemia  F17.210 Nicotine dependence, cigarettes, uncomplicated    Reviewed recent CT chest showing suspicious node in upper right lung.  Chronic renal disease Stage III with EGFR of 58, stable for 6 months.  Diabetes well-controlled with HbA1c of 6.0.    Evaluated lipid management, noting intolerance to statins and current use of Repatha.    Considered cardiovascular risk factors, including HTN and smoking history.    Acknowledged ongoing management of PVD with Dr. Coburn or CVT vascular.     Chronic low sodium levels at 130.    CHRONIC KIDNEY DISEASE, STAGE 3A:  - Monitored patient's CKD stage 3a with EGFR of 58 (present for 6 months).  - Evaluated electrolytes showing chronically low sodium at 130 and potassium 3.4.  - Ordered urine microalbumin test today.  - Educated patient on importance of water hydration for kidney health and avoiding NSAIDs due to CKD.  - Will continue Repatha but hold Crestor.  - Follow up with hemoglobin A1C in 6 months.    CHRONIC BRONCHITIS:  - Continued current medication regimen of Salmeterol and albuterol for symptom management of chronic bronchitis coughing.    PULMONARY NODULE:  - Ms. Aguiar had a low-dose CT of the chest on 5/22/2024 and follow-up CT on 5/26/2025 showing a suspicious lung nodule.  - Ordered PET scan for further evaluation and referred patient to pulmonary specialist.    HYPERTENSION:  - Blood pressure is currently well-controlled on Carvedilol 6.25 mg twice daily, Losartan/Hydrochlorothiazide 125 mg daily, and Amlodipine 10 mg daily.  - Continued current antihypertensive medication regimen.  - Advised patient to remain on aspirin for blood pressure  management.    PERIPHERAL VASCULAR DISEASE:  - Continued Pletal for management of PVD with claudication.  She sees Dr. Urena July   She has back pain and leg pain.  She has had TAISHA in back    TYPE 2 DIABETES MELLITUS:  - Blood sugar was 65 and Hemoglobin A1C well controlled at 6.0.  - Continued insulin therapy with Toujeo 60 units daily.    ADVERSE EFFECT OF ANTIHYPERLIPIDEMIC DRUGS:  - Ms. Aguiar has history of statin intolerance causing muscle aches.  - Discontinued Rosuvastatin per neurologist's recommendation.    HYPONATREMIA:  - Sodium levels remain chronically low at 130.    HYPOKALEMIA:  - Potassium level is 3.4.  - Continued potassium replacement therapy.    TOBACCO USE:  - Ms. Aguiar has history of smoking with more than a 30-pack year history.     She has [facility] appt this week  Will discuss Memory.           Rosana was seen today for follow-up.    Diagnoses and all orders for this visit:    Chronic kidney disease, stage 3a  -     Microalbumin/Creatinine Ratio, Urine; Future    Hyperlipidemia, unspecified hyperlipidemia type  -     Comprehensive Metabolic Panel; Future    Primary hypertension  BP Fine.   Intermittent claudication due to atherosclerosis of artery of extremity  Dr. Pacheco     Pulmonary nodule  Follow up with Pulm.    Type 2 diabetes mellitus with diabetic peripheral angiopathy without gangrene, with long-term current use of insulin  -     Microalbumin/Creatinine Ratio, Urine; Future  -     Comprehensive Metabolic Panel; Future  -     Hemoglobin A1C; Future    Statin myopathy  -     Comprehensive Metabolic Panel; Future    Type 2 diabetes mellitus without complication, with long-term current use of insulin  -     losartan-hydrochlorothiazide 100-25 mg (HYZAAR) 100-25 mg per tablet; Take 1 tablet by mouth once daily.    Hypertension, unspecified type  -     losartan-hydrochlorothiazide 100-25 mg (HYZAAR) 100-25 mg per tablet; Take 1 tablet by mouth once daily.  -     amLODIPine (NORVASC) 10  MG tablet; Take 1 tablet (10 mg total) by mouth once daily.    Depression, recurrent  -     FLUoxetine 20 MG capsule; Take 1 capsule (20 mg total) by mouth once daily.  She has been on xanax in past.  She was referred to Psych. She doesn't want to see Psych.    Other orders  -     carvediloL (COREG) 6.25 MG tablet; Take 1 tablet (6.25 mg total) by mouth 2 (two) times daily with meals.  -     potassium chloride SA (K-DUR,KLOR-CON) 20 MEQ tablet; Take 1 tablet (20 mEq total) by mouth once daily.                Care Plan/Goals: Reviewed     Follow up: No follow-ups on file.    After visit summary was printed and given to patient upon discharge today.  Patient goals and care plan are included in After Visit Summary.    I have reviewed the positive depression score which warrants active treatment with psychotherapy and/or medications. ***       [1]   Patient Active Problem List  Diagnosis    Hyperlipidemia    Hypertension    Type 2 diabetes mellitus with diabetic peripheral angiopathy without gangrene, with long-term current use of insulin    Thyroid nodule    Asymptomatic microscopic hematuria    Statin myopathy    Bruit of left carotid artery    Abdominal aortic atherosclerosis    Grief    Decreased ROM of right shoulder    Shoulder weakness    Intermittent claudication due to atherosclerosis of artery of extremity    Coronary artery calcification    Carotid stenosis    Osteopenia    Depression, recurrent    Pulmonary nodule    Diabetes mellitus due to underlying condition with retinopathy and macular edema, with long-term current use of insulin, unspecified laterality, unspecified retinopathy severity    Lymphedema    Nodule of right lung  -  upper lobe    Lumbar radiculopathy    Chronic kidney disease, stage 3a   [2]   Current Outpatient Medications on File Prior to Visit   Medication Sig Dispense Refill    albuterol (VENTOLIN HFA) 90 mcg/actuation inhaler Inhale 2 puffs into the lungs every 6 (six) hours as needed  "for Wheezing. Rescue 18 g 11    aspirin (ECOTRIN) 81 MG EC tablet Take 1 tablet (81 mg total) by mouth once daily. 90 tablet 3    blood sugar diagnostic Strp Check blood glucose levels two times a day 200 strip 2    blood-glucose meter kit Use as instructed 1 each 0    cilostazoL (PLETAL) 50 MG Tab Take 1 tablet (50 mg total) by mouth 2 (two) times daily. 60 tablet 11    evolocumab (REPATHA SURECLICK) 140 mg/mL PnIj Inject 1 mL (140 mg total) into the skin every 14 (fourteen) days. 4 mL 5    fluticasone propionate (FLONASE) 50 mcg/actuation nasal spray 1 spray (50 mcg total) by Each Nostril route once daily. 16 g 2    gabapentin (NEURONTIN) 300 MG capsule Take 1 capsule (300 mg total) by mouth 3 (three) times daily. 90 capsule 11    HYDROcodone-acetaminophen (NORCO) 7.5-325 mg per tablet Take 1 tablet by mouth every 6 (six) hours as needed for Pain for up to 7 days. 20 tablet 0    insulin glargine U-300 conc (TOUJEO MAX U-300 SOLOSTAR) 300 unit/mL (3 mL) insulin pen Inject 60 Units into the skin once daily. 3 Pen 11    levocetirizine (XYZAL) 5 MG tablet Take 1 tablet (5 mg total) by mouth every evening. 30 tablet 11    pen needle, diabetic (BD ULTRA-FINE CAROL PEN NEEDLE) 32 gauge x 5/32" Ndle 1 each by Misc.(Non-Drug; Combo Route) route once daily. 100 each 3    rosuvastatin (CRESTOR) 20 MG tablet Take 1 tablet (20 mg total) by mouth once daily. 90 tablet 3    tiotropium-olodateroL (STIOLTO RESPIMAT) 2.5-2.5 mcg/actuation Mist Inhale 2 puffs into the lungs once daily - Controller 12 g 3    [DISCONTINUED] amLODIPine (NORVASC) 10 MG tablet Take 1 tablet (10 mg total) by mouth once daily. 90 tablet 3    [DISCONTINUED] carvediloL (COREG) 6.25 MG tablet Take 1 tablet (6.25 mg total) by mouth 2 (two) times daily with meals. 180 tablet 1    [DISCONTINUED] FLUoxetine 20 MG capsule Take 1 capsule (20 mg total) by mouth once daily. 90 capsule 1    [DISCONTINUED] losartan-hydrochlorothiazide 100-25 mg (HYZAAR) 100-25 mg per " tablet TAKE 1 TABLET EVERY DAY 90 tablet 0    [DISCONTINUED] potassium chloride SA (K-DUR,KLOR-CON) 20 MEQ tablet Take 1 tablet (20 mEq total) by mouth once daily. (Patient not taking: Reported on 5/28/2025) 3 tablet 0    [DISCONTINUED] tiZANidine (ZANAFLEX) 4 MG tablet Take 1 tablet (4 mg total) by mouth nightly as needed (Back Pain). (Patient not taking: Reported on 5/28/2025) 30 tablet 1     No current facility-administered medications on file prior to visit.

## 2025-05-30 ENCOUNTER — OFFICE VISIT (OUTPATIENT)
Dept: NEUROLOGY | Facility: CLINIC | Age: 75
End: 2025-05-30
Payer: MEDICARE

## 2025-05-30 VITALS
HEIGHT: 64 IN | HEART RATE: 64 BPM | SYSTOLIC BLOOD PRESSURE: 110 MMHG | WEIGHT: 168.88 LBS | DIASTOLIC BLOOD PRESSURE: 54 MMHG | BODY MASS INDEX: 28.83 KG/M2

## 2025-05-30 DIAGNOSIS — M54.16 LUMBAR RADICULOPATHY: Primary | ICD-10-CM

## 2025-05-30 PROCEDURE — 99999 PR PBB SHADOW E&M-EST. PATIENT-LVL III: CPT | Mod: PBBFAC,,, | Performed by: PSYCHIATRY & NEUROLOGY

## 2025-05-30 RX ORDER — GABAPENTIN 600 MG/1
600 TABLET ORAL 3 TIMES DAILY
Qty: 270 TABLET | Refills: 1 | Status: SHIPPED | OUTPATIENT
Start: 2025-05-30

## 2025-06-18 ENCOUNTER — HOSPITAL ENCOUNTER (OUTPATIENT)
Dept: RADIOLOGY | Facility: HOSPITAL | Age: 75
Discharge: HOME | End: 2025-06-18
Attending: INTERNAL MEDICINE
Payer: MEDICARE

## 2025-06-18 ENCOUNTER — OFFICE VISIT (OUTPATIENT)
Dept: PULMONOLOGY | Facility: CLINIC | Age: 75
End: 2025-06-18
Payer: MEDICARE

## 2025-06-18 VITALS
HEIGHT: 64 IN | HEART RATE: 64 BPM | OXYGEN SATURATION: 94 % | SYSTOLIC BLOOD PRESSURE: 134 MMHG | BODY MASS INDEX: 28.55 KG/M2 | WEIGHT: 167.25 LBS | RESPIRATION RATE: 18 BRPM | DIASTOLIC BLOOD PRESSURE: 50 MMHG

## 2025-06-18 DIAGNOSIS — R91.1 NODULE OF UPPER LOBE OF RIGHT LUNG: Primary | ICD-10-CM

## 2025-06-18 DIAGNOSIS — R91.1 NODULE OF UPPER LOBE OF RIGHT LUNG: ICD-10-CM

## 2025-06-18 DIAGNOSIS — R91.1 NODULE OF RIGHT LUNG: ICD-10-CM

## 2025-06-18 DIAGNOSIS — F17.218 CIGARETTE NICOTINE DEPENDENCE WITH OTHER NICOTINE-INDUCED DISORDER: ICD-10-CM

## 2025-06-18 PROCEDURE — 99999 PR PBB SHADOW E&M-EST. PATIENT-LVL V: CPT | Mod: PBBFAC,,, | Performed by: INTERNAL MEDICINE

## 2025-06-18 PROCEDURE — A9552 F18 FDG: HCPCS | Performed by: INTERNAL MEDICINE

## 2025-06-18 PROCEDURE — 1126F AMNT PAIN NOTED NONE PRSNT: CPT | Mod: CPTII,HCNC,S$GLB, | Performed by: INTERNAL MEDICINE

## 2025-06-18 PROCEDURE — 3288F FALL RISK ASSESSMENT DOCD: CPT | Mod: CPTII,HCNC,S$GLB, | Performed by: INTERNAL MEDICINE

## 2025-06-18 PROCEDURE — 78815 PET IMAGE W/CT SKULL-THIGH: CPT | Mod: 26,PS,, | Performed by: RADIOLOGY

## 2025-06-18 PROCEDURE — 1159F MED LIST DOCD IN RCRD: CPT | Mod: CPTII,HCNC,S$GLB, | Performed by: INTERNAL MEDICINE

## 2025-06-18 PROCEDURE — 99215 OFFICE O/P EST HI 40 MIN: CPT | Mod: HCNC,S$GLB,, | Performed by: INTERNAL MEDICINE

## 2025-06-18 PROCEDURE — 78815 PET IMAGE W/CT SKULL-THIGH: CPT | Mod: TC

## 2025-06-18 PROCEDURE — 1101F PT FALLS ASSESS-DOCD LE1/YR: CPT | Mod: CPTII,HCNC,S$GLB, | Performed by: INTERNAL MEDICINE

## 2025-06-18 PROCEDURE — 3078F DIAST BP <80 MM HG: CPT | Mod: CPTII,HCNC,S$GLB, | Performed by: INTERNAL MEDICINE

## 2025-06-18 PROCEDURE — 3075F SYST BP GE 130 - 139MM HG: CPT | Mod: CPTII,HCNC,S$GLB, | Performed by: INTERNAL MEDICINE

## 2025-06-18 PROCEDURE — 1160F RVW MEDS BY RX/DR IN RCRD: CPT | Mod: CPTII,HCNC,S$GLB, | Performed by: INTERNAL MEDICINE

## 2025-06-18 RX ORDER — DIPHENHYDRAMINE HCL 25 MG
4 CAPSULE ORAL
Qty: 200 EACH | Refills: 5 | Status: SHIPPED | OUTPATIENT
Start: 2025-06-18

## 2025-06-18 RX ORDER — FLUDEOXYGLUCOSE F18 500 MCI/ML
11.73 INJECTION INTRAVENOUS
Status: COMPLETED | OUTPATIENT
Start: 2025-06-18 | End: 2025-06-18

## 2025-06-18 RX ORDER — DIPHENHYDRAMINE HCL 25 MG
4 CAPSULE ORAL
Qty: 200 EACH | Refills: 5 | Status: SHIPPED | OUTPATIENT
Start: 2025-06-18 | End: 2025-06-18

## 2025-06-18 RX ADMIN — FLUDEOXYGLUCOSE F-18 11.73 MILLICURIE: 500 INJECTION INTRAVENOUS at 11:06

## 2025-06-18 NOTE — PROGRESS NOTES
Subjective:     Patient ID: Rosana Aguiar is a 75 y.o. female.    Chief Complaint:  Abnormal right upper lobe nodule on CT - PET positive    HPI  75 y.o.  female smoker presenting for evaluation of abnormal chest x-ray - pulmonary nodule on CT  Chest x-ray showed small left lung nodule/calcified granuloma on prior CT scan of May of 2024..   Now with a new right upper lobe nodule.  Patient had a PET scan today which demonstrated mild uptake.  More than 30 pack-year smoking history still smoking.  Complains of coughing and shortness on exertion.  Chronic cough with sputum production.      Last CT scan of the chest performed on 05/22/2024.  Abnormal PET scan    https://reference.[email or web address]/calculator/4/solitary-pulmonary-nodule-malignancy-risk    Results for Solitary Pulmonary Nodule Malignancy Risk by [employer]  Probability of Malignancy: 29.9 %  This probability is only an estimate and clinical assessment is required to further refine this estimation.  Answers calculated to formulate result:  1. Age? -- 75 Years  2. Smoker (current or previous)? -- Yes  3. Extra-thoracic cancer more than 5 years previous? -- No  4. Diameter? -- 7 mm  5. Upper Lobe? -- Yes  6. Spiculated? -- Yes  7. PET? -- Faint      June 18, 2025 at 15:08  Calculated at: https://qxmd.com/calculator_4/solitary-pulmonary-nodule-malignancy-risk  Get Calculate by [employer] for iOS, Android and web at http:///calculate          Past Medical History:   Diagnosis Date    Atherosclerotic PVD with intermittent claudication 10/19/2022    Diabetes mellitus, type 2 2003    BS didn't check 09/06/2023    High cholesterol     Hypertension     Macular degeneration      Past Surgical History:   Procedure Laterality Date    ANGIOGRAPHY OF LOWER EXTREMITY N/A 02/09/2024    Procedure: Angiogram Extremity Unilateral/poss pta right leg intervention;  Surgeon: Clint Urena MD;  Location: [facility] CATH LAB;  Service: Peripheral Vascular;  Laterality: N/A;  left common  femoral access    APPENDECTOMY      CATARACT EXTRACTION Bilateral     CREATION OF FEMOROPOPLITEAL ARTERIAL BYPASS USING GRAFT Right 3/14/2024    Procedure: CREATION, BYPASS, ARTERIAL, FEMORAL TO POPLITEAL, USING GRAFT;  Surgeon: Clint Urena MD;  Location: [facility] OR;  Service: Peripheral Vascular;  Laterality: Right;    cyst removal from breast (Left)      EPIDURAL STEROID INJECTION N/A 10/3/2024    Procedure: Lumbar L4/L5 IL TAISHA  Stop Pletal 2 days before procedure;  Surgeon: Kranthi Grubbs MD;  Location: [facility] PAIN MGT;  Service: Pain Management;  Laterality: N/A;    EYE SURGERY  Cataract    HYSTERECTOMY      INJECTION OF ANESTHETIC AGENT AROUND MEDIAL BRANCH NERVES INNERVATING LUMBAR FACET JOINT Bilateral 11/25/2024    Procedure: Bilateral L4/L5 and L5/S1 MBB;  Surgeon: Kranthi Grubbs MD;  Location: [facility] PAIN MGT;  Service: Pain Management;  Laterality: Bilateral;    TONSILLECTOMY      TUBAL LIGATION  1975     Review of patient's allergies indicates:   Allergen Reactions    Clindamycin Other (See Comments)    Crestor [rosuvastatin]      myopathy    Neomycin-bacitracin-polymyxin Itching    Nitrofurantoin monohyd/m-cryst Other (See Comments)     Current Outpatient Medications on File Prior to Visit   Medication Sig Dispense Refill    albuterol (VENTOLIN HFA) 90 mcg/actuation inhaler Inhale 2 puffs into the lungs every 6 (six) hours as needed for Wheezing. Rescue 18 g 11    amLODIPine (NORVASC) 10 MG tablet Take 1 tablet (10 mg total) by mouth once daily. 90 tablet 3    blood sugar diagnostic Strp Check blood glucose levels two times a day 200 strip 2    carvediloL (COREG) 6.25 MG tablet Take 1 tablet (6.25 mg total) by mouth 2 (two) times daily with meals. 180 tablet 1    cilostazoL (PLETAL) 50 MG Tab Take 1 tablet (50 mg total) by mouth 2 (two) times daily. 60 tablet 11    evolocumab (REPATHA SURECLICK) 140 mg/mL PnIj Inject 1 mL (140 mg total) into the skin every 14 (fourteen) days. 4 mL 5    FLUoxetine  "20 MG capsule Take 1 capsule (20 mg total) by mouth once daily. 90 capsule 3    fluticasone propionate (FLONASE) 50 mcg/actuation nasal spray 1 spray (50 mcg total) by Each Nostril route once daily. 16 g 2    gabapentin (NEURONTIN) 600 MG tablet Take 1 tablet (600 mg total) by mouth 3 (three) times daily. 270 tablet 1    HYDROcodone-acetaminophen (NORCO) 7.5-325 mg per tablet Take 1 tablet by mouth every 6 (six) hours as needed for Pain for up to 7 days. 20 tablet 0    insulin glargine U-300 conc (TOUJEO MAX U-300 SOLOSTAR) 300 unit/mL (3 mL) insulin pen Inject 60 Units into the skin once daily. 3 Pen 11    levocetirizine (XYZAL) 5 MG tablet Take 1 tablet (5 mg total) by mouth every evening. 30 tablet 11    losartan-hydrochlorothiazide 100-25 mg (HYZAAR) 100-25 mg per tablet Take 1 tablet by mouth once daily. 90 tablet 3    pen needle, diabetic (BD ULTRA-FINE CAROL PEN NEEDLE) 32 gauge x 5/32" Ndle 1 each by Misc.(Non-Drug; Combo Route) route once daily. 100 each 3    potassium chloride SA (K-DUR,KLOR-CON) 20 MEQ tablet Take 1 tablet (20 mEq total) by mouth once daily. 3 tablet 0    tiotropium-olodateroL (STIOLTO RESPIMAT) 2.5-2.5 mcg/actuation Mist Inhale 2 puffs into the lungs once daily - Controller 12 g 3    aspirin (ECOTRIN) 81 MG EC tablet Take 1 tablet (81 mg total) by mouth once daily. 90 tablet 3    blood-glucose meter kit Use as instructed 1 each 0     Current Facility-Administered Medications on File Prior to Visit   Medication Dose Route Frequency Provider Last Rate Last Admin    [COMPLETED] fludeoxyglucose F-18 injection 11.73 millicurie  11.73 millicurie Intravenous ONCE PRN Mick Jeffrey MD   11.73 millicurie at 06/18/25 1116     Social History     Socioeconomic History    Marital status:    Tobacco Use    Smoking status: Every Day     Current packs/day: 1.00     Average packs/day: 1 pack/day for 63.5 years (63.5 ttl pk-yrs)     Types: Cigarettes     Start date: 1962     Passive exposure: " Past    Smokeless tobacco: Never    Tobacco comments:     HOLD MIDNIGHT PRIOR TO SX   Substance and Sexual Activity    Alcohol use: Not Currently     Comment: rarely; HOLD 72HRS    Drug use: Never    Sexual activity: Not Currently     Partners: Male     Birth control/protection: None     Social Drivers of Health     Financial Resource Strain: Medium Risk (5/24/2024)    Overall Financial Resource Strain (CARDIA)     Difficulty of Paying Living Expenses: Somewhat hard   Food Insecurity: No Food Insecurity (5/24/2024)    Hunger Vital Sign     Worried About Running Out of Food in the Last Year: Never true     Ran Out of Food in the Last Year: Never true   Transportation Needs: No Transportation Needs (5/24/2024)    PRAPARE - Transportation     Lack of Transportation (Medical): No     Lack of Transportation (Non-Medical): No   Physical Activity: Inactive (5/24/2024)    Exercise Vital Sign     Days of Exercise per Week: 0 days     Minutes of Exercise per Session: 0 min   Stress: Stress Concern Present (5/24/2024)    [employer] [place] [employer] - Occupational Stress Questionnaire     Feeling of Stress : Rather much   Housing Stability: Unknown (5/24/2024)    Housing Stability Vital Sign     Unable to Pay for Housing in the Last Year: No     Homeless in the Last Year: No     Family History   Problem Relation Name Age of Onset    Cancer Mother          female    Diabetes Mother      Macular degeneration Mother      Alcohol abuse Father      Cancer Daughter          colon    Diabetes Maternal Uncle      Heart disease Neg Hx      Stroke Neg Hx         Review of Systems   Constitutional:  Positive for fatigue. Negative for fever.   HENT:  Positive for postnasal drip, rhinorrhea and congestion.    Eyes:  Negative for redness and itching.   Respiratory:  Positive for cough, sputum production, shortness of breath, dyspnea on extertion, use of rescue inhaler and Paroxysmal Nocturnal Dyspnea.    Cardiovascular:   "Negative for chest pain, palpitations and leg swelling.   Genitourinary:  Negative for difficulty urinating and hematuria.   Endocrine:  Negative for cold intolerance and heat intolerance.    Skin:  Negative for rash.   Gastrointestinal:  Negative for nausea and abdominal pain.   Neurological:  Negative for dizziness, syncope, weakness and light-headedness.   Hematological:  Negative for adenopathy. Does not bruise/bleed easily.   Psychiatric/Behavioral:  Negative for sleep disturbance. The patient is not nervous/anxious.        Objective:      BP (!) 134/50   Pulse 64   Resp 18   Ht 5' 4" (1.626 m)   Wt 75.8 kg (167 lb 3.5 oz)   SpO2 (!) 94%   BMI 28.70 kg/m²   Physical Exam  Vitals and nursing note reviewed.   Constitutional:       Appearance: She is well-developed.   HENT:      Head: Normocephalic and atraumatic.      Nose: Nose normal.      Mouth/Throat:      Pharynx: No oropharyngeal exudate.   Eyes:      Conjunctiva/sclera: Conjunctivae normal.      Pupils: Pupils are equal, round, and reactive to light.   Neck:      Thyroid: No thyromegaly.      Vascular: No JVD.      Trachea: No tracheal deviation.   Cardiovascular:      Rate and Rhythm: Normal rate and regular rhythm.      Heart sounds: Normal heart sounds.   Pulmonary:      Effort: Pulmonary effort is normal. No respiratory distress.      Breath sounds: Examination of the right-lower field reveals wheezing. Examination of the left-lower field reveals wheezing. Decreased breath sounds and wheezing present. No rhonchi or rales.   Chest:      Chest wall: No tenderness.   Abdominal:      General: Bowel sounds are normal.      Palpations: Abdomen is soft.   Musculoskeletal:         General: Normal range of motion.      Cervical back: Neck supple.   Lymphadenopathy:      Cervical: No cervical adenopathy.   Skin:     General: Skin is warm and dry.   Neurological:      Mental Status: She is alert and oriented to person, place, and time.       Personal " Diagnostic Review  Chest CT reviewed  Results for orders placed or performed during the hospital encounter of 05/26/25 (from the past 2160 hours)   CT Chest Lung Screening Low Dose    Narrative    Exam:  CT CHEST LUNG SCREENING LOW DOSE    REASON FOR STUDY: Tobacco smoking history of at least 20 pack years    COMPARISON: 05/22/2024, 05/29/2023, 02/24/2023    TECHNIQUE: Axial CT imaging was performed of the chest utilizing a low-dose protocol.    Computed tomography dose index (CTDI):  3.26 mGy  Dose-length product (DLP):  114.21 mGy-cm    All CT scans at [this location] are performed using dose modulation techniques as appropriate to a performed exam including the following: Automated exposure control; adjustment of the mA and/or kV according to patient size (this includes techniques or standardized protocols for targeted exams where dose is matched to indication / reason for exam; i.e. extremities or head); use of iterative reconstruction technique.    FINDINGS:    Spiculated pulmonary nodule within the right lower lobe now measures 9 x 12 mm as compared to 8 x 8 mm on 05/22/2024 and 4 x 10 mm on 11/30/2022.  Consider PET scan for further evaluation.  New pleural-based 7.2 x 6.3 mm pulmonary nodule posterior right upper lobe with minimal spiculation.  Finding also concerning for malignancy.     Few punctate pulmonary nodules within the left upper lobe with minimal associated groundglass opacity.  The largest measures 2 mm maximal dimension.  No significant change since prior examination.  Right lower lobe is clear.    Heart enlarged with severe calcification of the mitral valve.  Severe coronary artery calcification.  Aorta normal in caliber with severe atherosclerotic vascular calcification.    Visualized upper abdomen demonstrates no suspicious abnormality.  Severe atherosclerotic calcification the abdominal aorta, renal vasculature and mesenteric vasculature.  Large amount of stool noted within the visualized  "colon.        Impression    Lung-RADS Catagory:  4X - Suspicious - consultation advised - possible next steps Chest CT, tissue sampling and-or PET/CT.    Clinically significant non lung cancer finding:  None.    Prior Lung Cancer Modifier:  No history of prior lung cancer.          Finalized on: 5/26/2025 11:52 AM By:  Aquilino Box MD  [facility]# [id number]      2025-05-26 11:54:12.446     [facility]             6/18/2025     2:21 PM   Pulmonary Studies Review   SpO2 94 %   Height 5' 4" (1.626 m)   Weight 75.8 kg (167 lb 3.5 oz)   BMI (Calculated) 28.7   Predicted Distance 250   Predicted Distance Meters (Calculated) 391.33 meters       NM PET CT FDG Skull Base to Mid Thigh  Narrative: EXAMINATION:  NM PET CT FDG SKULL BASE TO MID THIGH    CLINICAL HISTORY:  Lung nodule, > 8mm;  Solitary pulmonary nodule    TECHNIQUE:  Segmented attenuation corrected 3-D PET imaging was obtained from the skull base through the mid thighs utilizing 11.73 mCi F-18-FDG.  Glucose level 73 mg/dL noncontrast CT imaging was performed for attenuation correction, diagnosis, and anatomical fusion with PET    COMPARISON:  None.    FINDINGS:  Head/neck: There is normal physiologic uptake noted within the visualized brain parenchyma. No FDG avid adenopathy within the neck.    Chest: The nodule within the right lung apex demonstrates an SUV max of 2.2 which is greater than the adjacent background lung parenchyma.  Opacity within the right lower lobe demonstrating an SUV max of 1.5.  This is similar to the adjacent background parenchyma..  No FDG avid mediastinal, hilar or axillary lymph nodes.Prominent vascular calcification seen involving the aorta and coronary arteries.    Abdomen/Pelvis: Normal physiologic uptake noted within the liver, spleen, urinary tract, and bowel.The adrenals are unremarkable in appearance.  No FDG avid lymph nodes seen within the abdomen or pelvis.  Prominent vascular calcification seen involving the aorta with ectasia of " "the infrarenal abdominal aorta.  There is diverticulosis of the sigmoid colon.    Skeletal: No FDG avid osseus lesions identified.  Impression: 1. Low level uptake noted within the nodule in the right lung apex with an SUV max of 2.2 that is greater than the adjacent background lung parenchyma.  Malignancy not excluded.  2. Remaining findings as discussed above.    Electronically signed by: Tobi Mcdaniel DO  Date:    06/18/2025  Time:    13:19      Office Spirometry Results:         6/18/2025     2:21 PM 5/30/2025    10:21 AM 5/28/2025     8:20 AM 5/26/2025    10:48 AM 5/26/2025    10:02 AM 2/11/2025     8:48 AM 11/25/2024     9:03 AM   Pulmonary Function Tests   SpO2 94 %  95 % 96 %   95 %   Ordering Provider          Performing nurse/tech/RT     RAGHU Baker, RRT     Diagnosis     --     Height 5' 4" (1.626 m) 5' 4" (1.626 m) 5' 4" (1.626 m) 5' 4" (1.626 m) 5' 4" (1.626 m) 5' 4" (1.626 m)    Weight 75.8 kg (167 lb 3.5 oz) 76.6 kg (168 lb 14 oz) 76.6 kg (168 lb 14 oz) 76.2 kg (168 lb) 76.2 kg (168 lb) 76.2 kg (167 lb 15.9 oz)    BMI (Calculated) 28.7 29 29 28.8 28.8 28.8    6MWT Status     completed without stopping     Patient Reported     Dyspnea;Lightheadedness;Leg/hip pain     Was O2 used?     No     6MW Distance walked (feet)     475 feet     Distance walked (meters)     144.78 meters     Did patient stop?     No     Type of assistive device(s) used?     no assistive devices     Oxygen Saturation     98 %     Supplemental Oxygen     Room Air     Heart Rate     66 bpm     Blood Pressure     117/56     Jose Armando Dyspnea Rating      nothing at all     Oxygen Saturation     98 %     Supplemental Oxygen     Room Air     Heart Rate     95 bpm     Blood Pressure     187/76     Jose Armando Dyspnea Rating      light     Recovery Time (seconds)     240 seconds     Oxygen Saturation     98 %     Supplemental Oxygen     Room Air     Heart Rate     66 bpm     Blood Pressure     157/67     Jose Armando Dyspnea Rating      very light     Is " "procedure ready for interpretation?     Yes     Oxygen Qualification?     No           6/18/2025     2:21 PM   Pulmonary Studies Review   SpO2 94 %   Height 5' 4" (1.626 m)   Weight 75.8 kg (167 lb 3.5 oz)   BMI (Calculated) 28.7   Predicted Distance 250   Predicted Distance Meters (Calculated) 391.33 meters           No results found for this or any previous visit (from the past 2 weeks).  [facility]-Pulmonary Function 3rdFl  Six Minute Walk      SUMMARY      Ordering Provider:    Interpreting Provider:   Performing nurse/tech/RT: RAGHU Baker RRT  Diagnosis:  (Simple Chronic Bronchitis, Exercise Hypoxemia, Panlobular Emphysema)  Height: 5' 4" (162.6 cm)  Weight: 76.2 kg (168 lb)  BMI (Calculated): 28.8                                                                                 Phase Oxygen Assessment Supplemental O2 Heart   Rate Blood Pressure Jose Armando Dyspnea Scale Rating   Resting 98 % Room Air 66 bpm 117/56 0   Exercise             Minute             1 98 % Room Air 81 bpm       2 97 % Room Air 83 bpm       3 97 % Room Air 82 bpm       4 97 % Room Air 86 bpm       5 97 % Room Air 91 bpm       6  98 % Room Air 95 bpm 187/76 2   Recovery             Minute             1 98 % Room Air 85 bpm       2 99 % Room Air 71 bpm       3 99 % Room Air 80 bpm       4 98 % Room Air 66 bpm 157/67 1      Six Minute Walk Summary  6MWT Status: completed without stopping  Patient Reported: Dyspnea, Lightheadedness, Leg/hip pain (Back Pain)         Interpretation:  Did the patient stop or pause?: No  Total Time Walked (Calculated): 360 seconds  Final Partial Lap Distance (feet): 75 feet  Total Distance Meters (Calculated): 144.78 meters  Predicted Distance Meters (Calculated): 390.52 meters  Percentage of Predicted (Calculated): 37.07  Peak VO2 (Calculated): 8.32  Mets: 2.38  Has The Patient Had a Previous Six Minute Walk Test?: Yes     Previous 6MWT Results  Has The Patient Had a Previous Six Minute Walk Test?: " Yes  Date of Previous Test: 12/01/22  Total Time Walked: 318 seconds  Total Distance (meters): 259.08  Predicted Distance (meters): 412.22 meters  Percentage of Predicted: 62.85  Percent Change (Calculated): 0.44        Interpretation:  Total distance walked in six minutes is severely reduced indicating a reduction in overall  functional capacity. The patient did not meet criteria for supplemental oxygen prescription.     Clinical correlation suggested.  [] Mild exercise-induced hypoxemia described as an arterial oxygen saturation of 93-95% (or 3-4% less than at rest),  [] Moderate exercise-induced hypoxemia as 89-93%  [] Severe exercise induced hypoxemia as < 89% O2 saturation.  Medicare Criteria for oxygen prescription comments:   When arterial oxygen saturation is at or below 88% during exercise on room air (severe exercise induced hypoxemia) then the patient falls under Medicare Group 1 criteria for supplemental oxygen prescription.  Details about Medicare Group Criteria coverage can be found at http://www.cms.[facility].gov/manuals/downloads/      Mick Jeffrey MD      Assessment:       Nodule of right lung  -  upper lobe  New right upper lobe nodule noted slightly PET positive.  Discussed CT-guided biopsy of this lesion.  Referral placed to Interventional Radiology for lung biopsy    Nodule of upper lobe of right lung  -     CT Biopsy Lung w/ guidance; Future; Expected date: 06/18/2025    Cigarette nicotine dependence with other nicotine-induced disorder  -     Discontinue: nicotine polacrilex (NICORETTE) 4 MG Gum; Take 1 each (4 mg total) by mouth as needed (cigarette withdrawl).  Dispense: 200 each; Refill: 5  -     nicotine polacrilex (NICORETTE) 4 MG Gum; Take 1 each (4 mg total) by mouth as needed (cigarette withdrawl). Do not use more than 24 pieces of gum per day.  Dispense: 200 each; Refill: 5    Nodule of right lung  -  upper lobe                Outpatient Encounter Medications as of 6/18/2025   Medication  "Sig Dispense Refill    albuterol (VENTOLIN HFA) 90 mcg/actuation inhaler Inhale 2 puffs into the lungs every 6 (six) hours as needed for Wheezing. Rescue 18 g 11    amLODIPine (NORVASC) 10 MG tablet Take 1 tablet (10 mg total) by mouth once daily. 90 tablet 3    blood sugar diagnostic Strp Check blood glucose levels two times a day 200 strip 2    carvediloL (COREG) 6.25 MG tablet Take 1 tablet (6.25 mg total) by mouth 2 (two) times daily with meals. 180 tablet 1    cilostazoL (PLETAL) 50 MG Tab Take 1 tablet (50 mg total) by mouth 2 (two) times daily. 60 tablet 11    evolocumab (REPATHA SURECLICK) 140 mg/mL PnIj Inject 1 mL (140 mg total) into the skin every 14 (fourteen) days. 4 mL 5    FLUoxetine 20 MG capsule Take 1 capsule (20 mg total) by mouth once daily. 90 capsule 3    fluticasone propionate (FLONASE) 50 mcg/actuation nasal spray 1 spray (50 mcg total) by Each Nostril route once daily. 16 g 2    gabapentin (NEURONTIN) 600 MG tablet Take 1 tablet (600 mg total) by mouth 3 (three) times daily. 270 tablet 1    HYDROcodone-acetaminophen (NORCO) 7.5-325 mg per tablet Take 1 tablet by mouth every 6 (six) hours as needed for Pain for up to 7 days. 20 tablet 0    insulin glargine U-300 conc (TOUJEO MAX U-300 SOLOSTAR) 300 unit/mL (3 mL) insulin pen Inject 60 Units into the skin once daily. 3 Pen 11    levocetirizine (XYZAL) 5 MG tablet Take 1 tablet (5 mg total) by mouth every evening. 30 tablet 11    losartan-hydrochlorothiazide 100-25 mg (HYZAAR) 100-25 mg per tablet Take 1 tablet by mouth once daily. 90 tablet 3    pen needle, diabetic (BD ULTRA-FINE CAROL PEN NEEDLE) 32 gauge x 5/32" Ndle 1 each by Misc.(Non-Drug; Combo Route) route once daily. 100 each 3    potassium chloride SA (K-DUR,KLOR-CON) 20 MEQ tablet Take 1 tablet (20 mEq total) by mouth once daily. 3 tablet 0    tiotropium-olodateroL (STIOLTO RESPIMAT) 2.5-2.5 mcg/actuation Mist Inhale 2 puffs into the lungs once daily - Controller 12 g 3    aspirin " (ECOTRIN) 81 MG EC tablet Take 1 tablet (81 mg total) by mouth once daily. 90 tablet 3    blood-glucose meter kit Use as instructed 1 each 0    nicotine polacrilex (NICORETTE) 4 MG Gum Take 1 each (4 mg total) by mouth as needed (cigarette withdrawl). Do not use more than 24 pieces of gum per day. 200 each 5    [DISCONTINUED] amLODIPine (NORVASC) 10 MG tablet Take 1 tablet (10 mg total) by mouth once daily. 90 tablet 3    [DISCONTINUED] carvediloL (COREG) 6.25 MG tablet Take 1 tablet (6.25 mg total) by mouth 2 (two) times daily with meals. 180 tablet 1    [DISCONTINUED] FLUoxetine 20 MG capsule Take 1 capsule (20 mg total) by mouth once daily. 90 capsule 1    [DISCONTINUED] gabapentin (NEURONTIN) 300 MG capsule Take 1 capsule (300 mg total) by mouth 3 (three) times daily. 90 capsule 11    [DISCONTINUED] losartan-hydrochlorothiazide 100-25 mg (HYZAAR) 100-25 mg per tablet TAKE 1 TABLET EVERY DAY 90 tablet 0    [DISCONTINUED] nicotine polacrilex (NICORETTE) 4 MG Gum Take 1 each (4 mg total) by mouth as needed (cigarette withdrawl). 200 each 5    [DISCONTINUED] potassium chloride SA (K-DUR,KLOR-CON) 20 MEQ tablet Take 1 tablet (20 mEq total) by mouth once daily. (Patient not taking: Reported on 5/28/2025) 3 tablet 0    [DISCONTINUED] rosuvastatin (CRESTOR) 20 MG tablet Take 1 tablet (20 mg total) by mouth once daily. 90 tablet 3    [DISCONTINUED] tiZANidine (ZANAFLEX) 4 MG tablet Take 1 tablet (4 mg total) by mouth nightly as needed (Back Pain). (Patient not taking: Reported on 5/28/2025) 30 tablet 1     Facility-Administered Encounter Medications as of 6/18/2025   Medication Dose Route Frequency Provider Last Rate Last Admin    [COMPLETED] fludeoxyglucose F-18 injection 11.73 millicurie  11.73 millicurie Intravenous ONCE PRMick Sparks MD   11.73 millicurie at 06/18/25 1116     Plan:       Requested Prescriptions     Signed Prescriptions Disp Refills    nicotine polacrilex (NICORETTE) 4 MG Gum 200 each 5      Sig: Take 1 each (4 mg total) by mouth as needed (cigarette withdrawl). Do not use more than 24 pieces of gum per day.     Problem List Items Addressed This Visit       Nodule of right lung  -  upper lobe    Current Assessment & Plan   New right upper lobe nodule noted slightly PET positive.  Discussed CT-guided biopsy of this lesion.  Referral placed to Interventional Radiology for lung biopsy          Other Visit Diagnoses         Nodule of upper lobe of right lung    -  Primary    Relevant Orders    CT Biopsy Lung w/ guidance      Cigarette nicotine dependence with other nicotine-induced disorder        Relevant Medications    nicotine polacrilex (NICORETTE) 4 MG Gum                     Follow up in about 19 days (around 7/7/2025) for Review progress, review biopsy report.    MEDICAL DECISION MAKING: Moderate to high complexity.  Overall, the multiple problems listed are of moderate to high severity that may impact quality of life and activities of daily living. Side effects of medications, treatment plan as well as options and alternatives reviewed and discussed with patient. There was counseling of patient concerning these issues.    Total time spent in counseling and coordination of care - 45  minutes of total time spent on the encounter, which includes face to face time and non-face to face time preparing to see the patient (eg, review of tests), Obtaining and/or reviewing separately obtained history, Documenting clinical information in the electronic or other health record, Independently interpreting results (not separately reported) and communicating results to the patient/family/caregiver, or Care coordination (not separately reported).    Time was used in discussion of prognosis, risks, benefits of treatment, instructions and compliance with regimen . Discussion with other physicians and/or health care providers - home health or for use of durable medical equipment (oxygen, nebulizers, CPAP, BiPAP)  occurred.

## 2025-06-18 NOTE — ASSESSMENT & PLAN NOTE
New right upper lobe nodule noted slightly PET positive.  Discussed CT-guided biopsy of this lesion.  Referral placed to Interventional Radiology for lung biopsy

## 2025-06-18 NOTE — PATIENT INSTRUCTIONS
STOP ASPIRIN AND PLETAL        [facility]  [address] [place] [address]  (off [address])    Lung Biopsy Appointment    The radiologist will review your CT scan to determine when the lung biopsy will be scheduled. If you do not hear from the radiology department in 48 hours then call [phone].     This test is done in the Radiology Department on the 1st floor of [facility]    Lung Biopsy is performed to diagnose or determine the level of lung disease that cant be determined by regular lab testing.    Prior to Lung Biopsy: (VERY IMPORTANT-PLEASE READ)  You must be fasting: nothing to eat or drink after midnight the night before the test.  You must have a responsible adult to drive you home after procedure and stay with you the rest of the day.  Plan to stay approximately 4 hours after procedure is completed.  If you are on  Plavix, Aspirin or NSAIDS must be stopped 7 days prior to the procedure. (YOU MUST LET YOUR PHYSICIAN KNOW)  If you are on Coumadin / Warfarin then will need to stop 5 days prior to procedure  The ordering physician may order lab work to be done within 7 days of the procedure, to determine your blood clotting factor.  Diabetic patients: If you take oral glucose pills, dont take it the morning of the biopsy.    If you take insulin, take your normal dose as usual.  Blood pressure medications are to be taken the morning of procedure with a small sip of water at least 2 hours before biopsy.  Based on your medical condition, your physician may request other specific preparation.     During the test you will lie on your back with your right hand behind your head. You will need to lie completely still while the best spot to insert the biopsy needle is determine. Ultrasound, MRI, or CT may also be used to locate a specific spot in the lung. Then, your skin will be cleaned and a local anesthetic (numbing) will be applied to the skin. You will  also be given a light sedation. A biopsy needle will be inserted and advanced to the surface of the lung, where a tissue sample is obtained. After the biopsy you will remain lying on your right side for 2 hours. You may experience some pain around the site or in the shoulder. This isnt uncommon. Then, you will change position to lying on your back for 2-3 hours. You will be monitored closely during this time in recovery. You will be able to drink 2 hours and eat 4 hours after the procedure. The biopsy site will be dressed. A small amount of blood on dressing is normal.    What to expect when home for Lung Biopsy:  Bed rest for the rest of the day and evening.  Blood thinning medications can be resumed when instructed by prescribing physician.  Avoid heavy lifting and strenuous exercise for the next 2-3 days following the procedure.  You can resume your normal diet.  You wont be able to travel by airplane for up to 5 days after biopsy.    Contact doctors office or go to ER if you experience:  Excessive bleeding saturated dressing or bleeding that will not stop by biopsy site  Fever or chills  Severe pain in the abdomen  Difficulty breathing    If your biopsy needs to be rescheduled, please contact the Radiology Department at [phone].

## 2025-06-19 ENCOUNTER — PATIENT MESSAGE (OUTPATIENT)
Dept: PULMONOLOGY | Facility: CLINIC | Age: 75
End: 2025-06-19
Payer: MEDICARE

## 2025-06-19 DIAGNOSIS — R91.1 NODULE OF RIGHT LUNG: Primary | ICD-10-CM

## 2025-06-19 DIAGNOSIS — R06.02 SHORTNESS OF BREATH: ICD-10-CM

## 2025-06-19 DIAGNOSIS — I25.83 CORONARY ATHEROSCLEROSIS DUE TO LIPID RICH PLAQUE: ICD-10-CM

## 2025-06-19 DIAGNOSIS — J43.1 PANLOBULAR EMPHYSEMA: ICD-10-CM

## 2025-06-20 ENCOUNTER — LAB VISIT (OUTPATIENT)
Dept: LAB | Facility: HOSPITAL | Age: 75
End: 2025-06-20
Attending: INTERNAL MEDICINE
Payer: MEDICARE

## 2025-06-20 DIAGNOSIS — R06.02 SHORTNESS OF BREATH: ICD-10-CM

## 2025-06-20 DIAGNOSIS — I25.83 CORONARY ATHEROSCLEROSIS DUE TO LIPID RICH PLAQUE: ICD-10-CM

## 2025-06-20 DIAGNOSIS — R91.1 NODULE OF RIGHT LUNG: ICD-10-CM

## 2025-06-20 DIAGNOSIS — J43.1 PANLOBULAR EMPHYSEMA: ICD-10-CM

## 2025-06-20 LAB
ABSOLUTE EOSINOPHIL (OHS): 0.31 K/UL
ABSOLUTE MONOCYTE (OHS): 0.68 K/UL (ref 0.3–1)
ABSOLUTE NEUTROPHIL COUNT (OHS): 4.94 K/UL (ref 1.8–7.7)
ALBUMIN SERPL BCP-MCNC: 4.1 G/DL (ref 3.5–5.2)
ALP SERPL-CCNC: 84 UNIT/L (ref 40–150)
ALT SERPL W/O P-5'-P-CCNC: 10 UNIT/L (ref 10–44)
ANION GAP (OHS): 11 MMOL/L (ref 8–16)
APTT PPP: 30.4 SECONDS (ref 21–32)
AST SERPL-CCNC: 15 UNIT/L (ref 11–45)
BASOPHILS # BLD AUTO: 0.08 K/UL
BASOPHILS NFR BLD AUTO: 1 %
BILIRUB SERPL-MCNC: 0.3 MG/DL (ref 0.1–1)
BUN SERPL-MCNC: 16 MG/DL (ref 8–23)
CALCIUM SERPL-MCNC: 9.1 MG/DL (ref 8.7–10.5)
CHLORIDE SERPL-SCNC: 96 MMOL/L (ref 95–110)
CO2 SERPL-SCNC: 26 MMOL/L (ref 23–29)
CREAT SERPL-MCNC: 1 MG/DL (ref 0.5–1.4)
ERYTHROCYTE [DISTWIDTH] IN BLOOD BY AUTOMATED COUNT: 13 % (ref 11.5–14.5)
GFR SERPLBLD CREATININE-BSD FMLA CKD-EPI: 58 ML/MIN/1.73/M2
GLUCOSE SERPL-MCNC: 59 MG/DL (ref 70–110)
HCT VFR BLD AUTO: 40.6 % (ref 37–48.5)
HGB BLD-MCNC: 13.5 GM/DL (ref 12–16)
IMM GRANULOCYTES # BLD AUTO: 0.03 K/UL (ref 0–0.04)
IMM GRANULOCYTES NFR BLD AUTO: 0.4 % (ref 0–0.5)
INR PPP: 1 (ref 0.8–1.2)
LYMPHOCYTES # BLD AUTO: 1.72 K/UL (ref 1–4.8)
MCH RBC QN AUTO: 30.8 PG (ref 27–31)
MCHC RBC AUTO-ENTMCNC: 33.3 G/DL (ref 32–36)
MCV RBC AUTO: 93 FL (ref 82–98)
NUCLEATED RBC (/100WBC) (OHS): 0 /100 WBC
PLATELET # BLD AUTO: 387 K/UL (ref 150–450)
PMV BLD AUTO: 9 FL (ref 9.2–12.9)
POTASSIUM SERPL-SCNC: 3.4 MMOL/L (ref 3.5–5.1)
PROT SERPL-MCNC: 7.5 GM/DL (ref 6–8.4)
PROTHROMBIN TIME: 11.4 SECONDS (ref 9–12.5)
RBC # BLD AUTO: 4.38 M/UL (ref 4–5.4)
RELATIVE EOSINOPHIL (OHS): 4 %
RELATIVE LYMPHOCYTE (OHS): 22.2 % (ref 18–48)
RELATIVE MONOCYTE (OHS): 8.8 % (ref 4–15)
RELATIVE NEUTROPHIL (OHS): 63.6 % (ref 38–73)
SODIUM SERPL-SCNC: 133 MMOL/L (ref 136–145)
WBC # BLD AUTO: 7.76 K/UL (ref 3.9–12.7)

## 2025-06-20 PROCEDURE — 80053 COMPREHEN METABOLIC PANEL: CPT | Mod: HCNC

## 2025-06-20 PROCEDURE — 36415 COLL VENOUS BLD VENIPUNCTURE: CPT | Mod: HCNC

## 2025-06-20 PROCEDURE — 85610 PROTHROMBIN TIME: CPT | Mod: HCNC

## 2025-06-20 PROCEDURE — 85730 THROMBOPLASTIN TIME PARTIAL: CPT | Mod: HCNC

## 2025-06-20 PROCEDURE — 85025 COMPLETE CBC W/AUTO DIFF WBC: CPT | Mod: HCNC

## 2025-06-24 ENCOUNTER — TELEPHONE (OUTPATIENT)
Dept: PULMONOLOGY | Facility: CLINIC | Age: 75
End: 2025-06-24
Payer: MEDICARE

## 2025-06-24 NOTE — TELEPHONE ENCOUNTER
Copied from CRM #[id number]. Topic: Appointments - Appointment Access  >> Jun 24, 2025  2:49 PM Rachel wrote:  Type:  Sooner Apoointment Request    Name of Caller:Rosana Aguiar   She ask if is possible to speak with one of the nurses in regards to get an appointment with   Would the patient rather a call back or a response via [facility]? Call back  Best Call Back Number:[phone]  Thanks!

## 2025-06-25 ENCOUNTER — TELEPHONE (OUTPATIENT)
Dept: RADIOLOGY | Facility: HOSPITAL | Age: 75
End: 2025-06-25
Payer: MEDICARE

## 2025-06-25 DIAGNOSIS — R91.1 NODULE OF RIGHT LUNG: Primary | ICD-10-CM

## 2025-06-25 DIAGNOSIS — Z78.0 MENOPAUSE: ICD-10-CM

## 2025-06-25 NOTE — TELEPHONE ENCOUNTER
Interventional Radiology  Scheduled 8:30AM FNA and 9:00 lung biopsy for 7/9/25 w/patient.  Instructed pt. to arrive for 7:30AM to the hospital ([address]/ [address]) off [address] in [place] and check in at Patient Registration located on the first floor.  She must have a ride (daughter) and NPO after midnight the night before.  Pt. took last doses of ASA and Plavix on 6/18/25.  She denies taking NSAIDs, fish oil, or GLP-1/GIP agonists.  Pt. can take morning medications the day of procedure with a small sip of water.  I gave patient our number [phone] and she verbalized understanding of all instructions.

## 2025-07-01 ENCOUNTER — TUMOR BOARD CONFERENCE (OUTPATIENT)
Dept: PULMONOLOGY | Facility: CLINIC | Age: 75
End: 2025-07-01
Payer: MEDICARE

## 2025-07-02 NOTE — PROGRESS NOTES
[facility] Pulmonary Nodule Review     Patient Name: Rosana Aguiar    MRN: [id number]    Date of Tumor Board: 07/01/25    Diagnosis:  Pulmonary Nodule    Referring Provider:  Mick Jeffrey MD    Present PCTP Providers: Jonatan Santana MD, Mick Jeffrey MD, Bro Lacey MD, Melchor Daniel MD, Delaney Barnes, NP, GREG Lopez, Elizabeth LeJeune, NP, GREG An    Smoking hx: Current pack a day, 63 pack years    Diagnostic Work Up:    Nodify ID:   Pre-test risk- 43%, CDT- No sig level, XL2- Indeterminate  Imaging:  PET 6/2025- Low level uptake right apex nodule with SUV max 2.2, RLL nodule without increased avidity above background  LDCT 5/2025- New pleural based 7mm RUL nodule, increase in size of RLL nodule, now 9x12mm, previously 8mm 5/2024 and then 10mm 11/2022       Board Recommendations:    CT guided biopsy RULpleural based nodule       The Multidisciplinary Tumor Board (MTB) is intended to assist the treatment team in developing a coordinated, comprehensive management plan for the patient. The deliberations of the MTB are not intended and should not be assumed to indicate any particular course of treatment with regard to the diagnosis, treatment, or management of the patient. Deliberations made or treatment options explored by the MTB are not a substitute for the professional judgment of the treating physician in diagnosing and treating the patient in accordance with the appropriate standard of care, applicable regulations, and facility policies. The MTB shall not be deemed under any circumstance to prescribe, order, or require certain medical care or medical or diagnostic services. The treating physician will remain solely responsible for making all medical, diagnostic, or care decisions concerning the patient.

## 2025-07-08 ENCOUNTER — TELEPHONE (OUTPATIENT)
Dept: RADIOLOGY | Facility: HOSPITAL | Age: 75
End: 2025-07-08
Payer: MEDICARE

## 2025-07-08 NOTE — TELEPHONE ENCOUNTER
Interventional Radiology  Called pt. and confirmed her appointment tomorrow, 7/9/25 at 8:30AM.  Pt. is to arrive by 7:15AM to the hospital ([address]/ [address]) off [address] in [place] and check in at Patient Registration located on the first floor.  She confirmed she will have a ride (daughter) and be NPO after midnight tonight.  Explained that she can take morning medications with a small sip of water.  Pt. took last doses of ASA and Plavix on 6/18/25 and she does not take other medication that needs to be stopped prior to this procedure.  She verbalized understanding of instructions.

## 2025-07-09 ENCOUNTER — HOSPITAL ENCOUNTER (OUTPATIENT)
Dept: RADIOLOGY | Facility: HOSPITAL | Age: 75
Discharge: HOME | End: 2025-07-09
Attending: INTERNAL MEDICINE
Payer: MEDICARE

## 2025-07-09 VITALS
DIASTOLIC BLOOD PRESSURE: 67 MMHG | OXYGEN SATURATION: 98 % | SYSTOLIC BLOOD PRESSURE: 166 MMHG | WEIGHT: 167 LBS | HEIGHT: 64 IN | BODY MASS INDEX: 28.51 KG/M2 | RESPIRATION RATE: 18 BRPM | HEART RATE: 60 BPM

## 2025-07-09 DIAGNOSIS — R91.1 NODULE OF UPPER LOBE OF RIGHT LUNG: ICD-10-CM

## 2025-07-09 PROCEDURE — 63600175 PHARM REV CODE 636 W HCPCS: Mod: HCNC | Performed by: RADIOLOGY

## 2025-07-09 RX ORDER — MIDAZOLAM HYDROCHLORIDE 1 MG/ML
INJECTION, SOLUTION INTRAMUSCULAR; INTRAVENOUS CODE/TRAUMA/SEDATION MEDICATION
Status: COMPLETED | OUTPATIENT
Start: 2025-07-09 | End: 2025-07-09

## 2025-07-09 RX ORDER — FENTANYL CITRATE 50 UG/ML
INJECTION, SOLUTION INTRAMUSCULAR; INTRAVENOUS CODE/TRAUMA/SEDATION MEDICATION
Status: COMPLETED | OUTPATIENT
Start: 2025-07-09 | End: 2025-07-09

## 2025-07-09 RX ADMIN — MIDAZOLAM HYDROCHLORIDE 0.5 MG: 1 INJECTION, SOLUTION INTRAMUSCULAR; INTRAVENOUS at 09:07

## 2025-07-09 RX ADMIN — FENTANYL CITRATE 25 MCG: 50 INJECTION, SOLUTION INTRAMUSCULAR; INTRAVENOUS at 09:07

## 2025-07-24 ENCOUNTER — TELEPHONE (OUTPATIENT)
Dept: INTERNAL MEDICINE | Facility: CLINIC | Age: 75
End: 2025-07-24
Payer: MEDICARE

## 2025-07-24 NOTE — TELEPHONE ENCOUNTER
Called and informed staff that patient will need an appt prior to being cleared for a procedure. Office will fax over clearance form and notify patient of appt necessity.

## 2025-07-24 NOTE — TELEPHONE ENCOUNTER
Copied from CRM #[id number]. Topic: General Inquiry - Status Check  >> Jul 24, 2025  8:18 AM Briseyda wrote:  Type:  Needs Medical Advice    Who Called: Jeremiah Lopes  Would the patient rather a call back or a response via [facility]? Call back  Best Call Back Number:  [phone] , fax [phone]  Additional Information: allen lopes still needing medical clearance for pt. Needing no later than 9:00 this morning. Also needing to know if they can come pick it up

## 2025-07-28 ENCOUNTER — HOSPITAL ENCOUNTER (OUTPATIENT)
Facility: HOSPITAL | Age: 75
Discharge: HOME | End: 2025-07-28
Attending: STUDENT IN AN ORGANIZED HEALTH CARE EDUCATION/TRAINING PROGRAM
Payer: MEDICARE

## 2025-07-28 ENCOUNTER — OFFICE VISIT (OUTPATIENT)
Dept: VASCULAR SURGERY | Facility: CLINIC | Age: 75
End: 2025-07-28
Payer: MEDICARE

## 2025-07-28 DIAGNOSIS — I89.0 LYMPHEDEMA: ICD-10-CM

## 2025-07-28 DIAGNOSIS — I65.23 BILATERAL CAROTID ARTERY STENOSIS: ICD-10-CM

## 2025-07-28 DIAGNOSIS — I73.9 PVD (PERIPHERAL VASCULAR DISEASE): ICD-10-CM

## 2025-07-28 DIAGNOSIS — I65.29 STENOSIS OF CAROTID ARTERY, UNSPECIFIED LATERALITY: ICD-10-CM

## 2025-07-28 DIAGNOSIS — I70.219 INTERMITTENT CLAUDICATION DUE TO ATHEROSCLEROSIS OF ARTERY OF EXTREMITY: Primary | ICD-10-CM

## 2025-07-28 LAB
LEFT ABI: 0.75
LEFT ARM BP: 154 MMHG
LEFT ARM SYSTOLIC BLOOD PRESSURE: 154 MMHG
LEFT CBA DIAS: 12 CM/S
LEFT CBA SYS: 258 CM/S
LEFT CCA DIST DIAS: 9.26 CM/S
LEFT CCA DIST SYS: 88.81 CM/S
LEFT CCA MID DIAS: 0 CM/S
LEFT CCA MID SYS: 65.93 CM/S
LEFT CCA PROX DIAS: 7.44 CM/S
LEFT CCA PROX SYS: 60.2 CM/S
LEFT DORSALIS PEDIS: 100 MMHG
LEFT ECA DIAS: 0 CM/S
LEFT ECA SYS: 266.6 CM/S
LEFT ICA DIST DIAS: 13.68 CM/S
LEFT ICA DIST SYS: 68.39 CM/S
LEFT ICA MID DIAS: 14.28 CM/S
LEFT ICA MID SYS: 82.52 CM/S
LEFT ICA PROX DIAS: 3.91 CM/S
LEFT ICA PROX SYS: 279.41 CM/S
LEFT ICA/CCA SYS: 3.15
LEFT POSTERIOR TIBIAL: 118 MMHG
LEFT TBI: 0.54
LEFT TOE PRESSURE: 84 MMHG
LEFT VERTEBRAL DIAS: 0 CM/S
LEFT VERTEBRAL SYS: 31.62 CM/S
OHS CV CAROTID RIGHT ICA EDV HIGHEST: 18.19
OHS CV CAROTID ULTRASOUND LEFT ICA/CCA RATIO: 3.15
OHS CV CAROTID ULTRASOUND RIGHT ICA/CCA RATIO: 1.5
OHS CV PV CAROTID LEFT HIGHEST CCA: 89
OHS CV PV CAROTID LEFT HIGHEST ICA: 279.41
OHS CV PV CAROTID RIGHT HIGHEST CCA: 109
OHS CV PV CAROTID RIGHT HIGHEST ICA: 133.7
OHS CV US CAROTID LEFT HIGHEST EDV: 14.28
RIGHT ABI: 0.91
RIGHT ARM BP: 157 MMHG
RIGHT ARM SYSTOLIC BLOOD PRESSURE: 157 MMHG
RIGHT CCA DIST DIAS: 0 CM/S
RIGHT CCA DIST SYS: 89.28 CM/S
RIGHT CCA MID DIAS: 0 CM/S
RIGHT CCA MID SYS: 79.36 CM/S
RIGHT CCA PROX DIAS: 16.57 CM/S
RIGHT CCA PROX SYS: 108.89 CM/S
RIGHT DORSALIS PEDIS: 143 MMHG
RIGHT ECA DIAS: 0 CM/S
RIGHT ECA SYS: 140.53 CM/S
RIGHT ICA DIST DIAS: 18.19 CM/S
RIGHT ICA DIST SYS: 88.92 CM/S
RIGHT ICA MID DIAS: 12.76 CM/S
RIGHT ICA MID SYS: 86.78 CM/S
RIGHT ICA PROX DIAS: 10.81 CM/S
RIGHT ICA PROX SYS: 133.7 CM/S
RIGHT ICA/CCA SYS: 1.5
RIGHT POSTERIOR TIBIAL: 121 MMHG
RIGHT PROX SUBCLAVIAN ARTERY: 259.21 CM/S
RIGHT TBI: 0.77
RIGHT TOE PRESSURE: 121 MMHG
RIGHT VERTEBRAL DIAS: 7.33 CM/S
RIGHT VERTEBRAL SYS: 64.48 CM/S

## 2025-07-28 PROCEDURE — 93880 EXTRACRANIAL BILAT STUDY: CPT | Mod: 26,HCNC,, | Performed by: STUDENT IN AN ORGANIZED HEALTH CARE EDUCATION/TRAINING PROGRAM

## 2025-07-28 PROCEDURE — 93922 UPR/L XTREMITY ART 2 LEVELS: CPT | Mod: HCNC

## 2025-07-28 PROCEDURE — 99999 PR PBB SHADOW E&M-EST. PATIENT-LVL III: CPT | Mod: PBBFAC,HCNC,, | Performed by: STUDENT IN AN ORGANIZED HEALTH CARE EDUCATION/TRAINING PROGRAM

## 2025-07-28 PROCEDURE — 99215 OFFICE O/P EST HI 40 MIN: CPT | Mod: HCNC,S$GLB,, | Performed by: STUDENT IN AN ORGANIZED HEALTH CARE EDUCATION/TRAINING PROGRAM

## 2025-07-28 PROCEDURE — 1160F RVW MEDS BY RX/DR IN RCRD: CPT | Mod: CPTII,HCNC,S$GLB, | Performed by: STUDENT IN AN ORGANIZED HEALTH CARE EDUCATION/TRAINING PROGRAM

## 2025-07-28 PROCEDURE — 93922 UPR/L XTREMITY ART 2 LEVELS: CPT | Mod: 26,HCNC,, | Performed by: STUDENT IN AN ORGANIZED HEALTH CARE EDUCATION/TRAINING PROGRAM

## 2025-07-28 PROCEDURE — 93880 EXTRACRANIAL BILAT STUDY: CPT | Mod: HCNC

## 2025-07-28 PROCEDURE — 1159F MED LIST DOCD IN RCRD: CPT | Mod: CPTII,HCNC,S$GLB, | Performed by: STUDENT IN AN ORGANIZED HEALTH CARE EDUCATION/TRAINING PROGRAM

## 2025-07-28 NOTE — ASSESSMENT & PLAN NOTE
Patient refuses to wear compression stockings as she cannot put them on. I have recommended she try low grade compression as it is better than nothing, once her swelling is a little bit improved she can transition to higher grade compression. Continue to elevate the leg as able and ambulate as much as possible.

## 2025-07-28 NOTE — PROGRESS NOTES
Clint Urena  [facility]    OFFICE NOTE    DATE OF VISIT: 2025  PATIENT NAME: Rosana Aguiar  : 1950  MRN: [id number]  PRIMARY CARE PHYSICIAN: Carito Kohler MD  CARDIOLOGIST:   REFERRING PROVIDER: No ref. provider found    CHIEF COMPLAINT   Chief Complaint   Patient presents with    Follow-up     6m zahra/cve       HISTORY OF PRESENT ILLNESS:  Rosana Aguiar is a 75 y.o. female who presents to clinic today     Is s/p right fem above knee popliteal bypass. Claudication symptoms are improved from baseline however she has severe neuropathic pain on the right leg. It is difficult for her to stand and walk. She has known myopathy and nerve conduction studies show L5 S1 radiculopathy bilaterally. She has been seen by neurology and pain management. Has not been evaluated by nsgy. She also has complaints of right leg swelling. Does not tolerate compression stockings. Keeps her leg elevated as much as possible.    ALLERGIES:  Review of patient's allergies indicates:   Allergen Reactions    Clindamycin Other (See Comments)    Crestor [rosuvastatin]      myopathy    Neomycin-bacitracin-polymyxin Itching    Nitrofurantoin monohyd/m-cryst Other (See Comments)       PAST MEDICAL HISTORY:  Past Medical History:   Diagnosis Date    Atherosclerotic PVD with intermittent claudication 10/19/2022    Diabetes mellitus, type 2     BS didn't check 2023    High cholesterol     Hypertension     Macular degeneration        PAST SURGICAL HISTORY:  Past Surgical History:   Procedure Laterality Date    ANGIOGRAPHY OF LOWER EXTREMITY N/A 2024    Procedure: Angiogram Extremity Unilateral/poss pta right leg intervention;  Surgeon: Clint Urena MD;  Location: [facility] CATH LAB;  Service: Peripheral Vascular;  Laterality: N/A;  left common femoral access    APPENDECTOMY      CATARACT EXTRACTION Bilateral     CREATION OF FEMOROPOPLITEAL ARTERIAL BYPASS USING GRAFT Right 3/14/2024    Procedure:  CREATION, BYPASS, ARTERIAL, FEMORAL TO POPLITEAL, USING GRAFT;  Surgeon: Clint Urena MD;  Location[facility] OR;  Service: Peripheral Vascular;  Laterality: Right;    cyst removal from breast (Left)      EPIDURAL STEROID INJECTION N/A 10/3/2024    Procedure: Lumbar L4/L5 IL TAISHA  Stop Pletal 2 days before procedure;  Surgeon: Kranthi Grubbs MD;  Location: [facility] PAIN MGT;  Service: Pain Management;  Laterality: N/A;    EYE SURGERY  Cataract    HYSTERECTOMY      INJECTION OF ANESTHETIC AGENT AROUND MEDIAL BRANCH NERVES INNERVATING LUMBAR FACET JOINT Bilateral 11/25/2024    Procedure: Bilateral L4/L5 and L5/S1 MBB;  Surgeon: Kranthi Grubbs MD;  Location: [facility] PAIN MGT;  Service: Pain Management;  Laterality: Bilateral;    TONSILLECTOMY      TUBAL LIGATION  1975       SOCIAL HISTORY:   Social History[1]    FAMILY HISTORY:  Family History   Problem Relation Name Age of Onset    Cancer Mother          female    Diabetes Mother      Macular degeneration Mother      Alcohol abuse Father      Cancer Daughter          colon    Diabetes Maternal Uncle      Heart disease Neg Hx      Stroke Neg Hx         REVIEW OF SYSTEMS:  ROS  10 pt ros otherwise negative    PHYSICAL EXAM:  There were no vitals filed for this visit.   Physical Exam      Vss  Gen mild distress  Incisions right leg healed well  There is moderate lymphedema right leg  Mild swelling noted on the left leg    VASCULAR LAB STUDIES:  Janet right leg 0.91 toe pressure 121mmhg  Left leg 0.75 toe pressure 84mmhg  Carotid ultrasound suggests 40-59% stenosis right ica  Left ica 60-79% stenosis with psv 279cm/s    ASSESSMENT AND PLAN:  Intermittent claudication due to atherosclerosis of artery of extremity  Right leg JANET normal. She has palpable pulse right foot. Has mild symptoms of claudication on left leg. Believe her pain in the right leg is largely neurogenic in nature. I will send referral to dr renteria for evaluation of bilateral radiculopathy. Will see her back  in office in 6 months.    Lymphedema  Patient refuses to wear compression stockings as she cannot put them on. I have recommended she try low grade compression as it is better than nothing, once her swelling is a little bit improved she can transition to higher grade compression. Continue to elevate the leg as able and ambulate as much as possible.      Carotid stenosis  She is asymptomatic. She is on aspirin and repatha for risk factor modification. Will need q6 month carotid scans. Has moderate to severe disease left carotid 60-79% stenosis but is asymptomatic. Will continue to monitor for now.      Rosana was seen today for follow-up.    Diagnoses and all orders for this visit:    Intermittent claudication due to atherosclerosis of artery of extremity    Lymphedema    Bilateral carotid artery stenosis        No follow-ups on file.        Clint Urena  [facility]  Vascular Surgery  [phone] (Clinic Number)           [1]   Social History  Tobacco Use    Smoking status: Every Day     Current packs/day: 1.00     Average packs/day: 1 pack/day for 63.6 years (63.6 ttl pk-yrs)     Types: Cigarettes     Start date: 1962     Passive exposure: Past    Smokeless tobacco: Never    Tobacco comments:     HOLD MIDNIGHT PRIOR TO SX   Substance Use Topics    Alcohol use: Not Currently     Comment: rarely; HOLD 72HRS    Drug use: Never

## 2025-07-28 NOTE — ASSESSMENT & PLAN NOTE
Right leg ROBINA normal. She has palpable pulse right foot. Has mild symptoms of claudication on left leg. Believe her pain in the right leg is largely neurogenic in nature. I will send referral to dr renteria for evaluation of bilateral radiculopathy. Will see her back in office in 6 months.

## 2025-07-28 NOTE — ASSESSMENT & PLAN NOTE
She is asymptomatic. She is on aspirin and repatha for risk factor modification. Will need q6 month carotid scans. Has moderate to severe disease left carotid 60-79% stenosis but is asymptomatic. Will continue to monitor for now.

## 2025-08-04 DIAGNOSIS — Z79.4 TYPE 2 DIABETES MELLITUS WITHOUT COMPLICATION, WITH LONG-TERM CURRENT USE OF INSULIN: ICD-10-CM

## 2025-08-04 DIAGNOSIS — E11.9 TYPE 2 DIABETES MELLITUS WITHOUT COMPLICATION, WITH LONG-TERM CURRENT USE OF INSULIN: ICD-10-CM

## 2025-08-04 NOTE — TELEPHONE ENCOUNTER
No care due was identified.  [facility] Embedded Care Due Messages. Reference number: [id number].   8/04/2025 3:25:18 PM CDT

## 2025-08-05 DIAGNOSIS — M25.552 LEFT HIP PAIN: ICD-10-CM

## 2025-08-05 DIAGNOSIS — I70.219 INTERMITTENT CLAUDICATION DUE TO ATHEROSCLEROSIS OF ARTERY OF EXTREMITY: Primary | ICD-10-CM

## 2025-08-05 RX ORDER — INSULIN GLARGINE 300 [IU]/ML
60 INJECTION, SOLUTION SUBCUTANEOUS DAILY
Qty: 6 PEN | Refills: 1 | Status: SHIPPED | OUTPATIENT
Start: 2025-08-05

## 2025-08-05 NOTE — TELEPHONE ENCOUNTER
Refill Decision Note   Rosana Aguiar  is requesting a refill authorization.  Brief Assessment and Rationale for Refill:  Approve     Medication Therapy Plan:         Comments:     Note composed:12:46 PM 08/05/2025

## 2025-08-20 ENCOUNTER — TELEPHONE (OUTPATIENT)
Dept: NEUROSURGERY | Facility: CLINIC | Age: 75
End: 2025-08-20
Payer: MEDICARE

## 2025-08-26 ENCOUNTER — HOSPITAL ENCOUNTER (INPATIENT)
Facility: HOSPITAL | Age: 75
LOS: 7 days | Discharge: NURSING, REHAB OR LONG-TERM CARE | DRG: 481 | End: 2025-09-02
Attending: EMERGENCY MEDICINE | Admitting: STUDENT IN AN ORGANIZED HEALTH CARE EDUCATION/TRAINING PROGRAM
Payer: MEDICARE

## 2025-08-26 DIAGNOSIS — Z72.0 TOBACCO ABUSE DISORDER: ICD-10-CM

## 2025-08-26 DIAGNOSIS — F33.9 DEPRESSION, RECURRENT: ICD-10-CM

## 2025-08-26 DIAGNOSIS — E87.1 HYPONATREMIA: ICD-10-CM

## 2025-08-26 DIAGNOSIS — S79.912A HIP INJURY, LEFT, INITIAL ENCOUNTER: ICD-10-CM

## 2025-08-26 DIAGNOSIS — S72.142A CLOSED DISPLACED INTERTROCHANTERIC FRACTURE OF LEFT FEMUR, INITIAL ENCOUNTER: Primary | ICD-10-CM

## 2025-08-26 DIAGNOSIS — S89.92XA LEG INJURY, LEFT, INITIAL ENCOUNTER: ICD-10-CM

## 2025-08-26 DIAGNOSIS — S72.142A CLOSED INTERTROCHANTERIC FRACTURE OF LEFT HIP, INITIAL ENCOUNTER: ICD-10-CM
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
## 2025-08-26 DIAGNOSIS — Z01.810 PREOP CARDIOVASCULAR EXAM: ICD-10-CM

## 2025-08-26 DIAGNOSIS — R07.9 CHEST PAIN: ICD-10-CM

## 2025-08-26 DIAGNOSIS — R33.9 URINARY RETENTION: ICD-10-CM
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
## 2025-08-26 DIAGNOSIS — E87.6 HYPOKALEMIA: ICD-10-CM

## 2025-08-26 PROBLEM — F17.200 TOBACCO DEPENDENCY: Status: ACTIVE | Noted: 2025-08-26

## 2025-08-26 LAB
ABSOLUTE EOSINOPHIL (OHS): 0.34 K/UL
ABSOLUTE MONOCYTE (OHS): 0.62 K/UL (ref 0.3–1)
ABSOLUTE NEUTROPHIL COUNT (OHS): 9.85 K/UL (ref 1.8–7.7)
ALBUMIN SERPL BCP-MCNC: 3.6 G/DL (ref 3.5–5.2)
ALP SERPL-CCNC: 79 UNIT/L (ref 40–150)
ALT SERPL W/O P-5'-P-CCNC: 10 UNIT/L (ref 10–44)
ANION GAP (OHS): 9 MMOL/L (ref 8–16)
APTT PPP: 27.9 SECONDS (ref 21–32)
AST SERPL-CCNC: 17 UNIT/L (ref 11–45)
BASOPHILS # BLD AUTO: 0.08 K/UL
BASOPHILS NFR BLD AUTO: 0.6 %
BILIRUB SERPL-MCNC: 0.3 MG/DL (ref 0.1–1)
BUN SERPL-MCNC: 13 MG/DL (ref 8–23)
CALCIUM SERPL-MCNC: 8.7 MG/DL (ref 8.7–10.5)
CHLORIDE SERPL-SCNC: 98 MMOL/L (ref 95–110)
CK SERPL-CCNC: 97 U/L (ref 20–180)
CO2 SERPL-SCNC: 22 MMOL/L (ref 23–29)
CREAT SERPL-MCNC: 0.9 MG/DL (ref 0.5–1.4)
ERYTHROCYTE [DISTWIDTH] IN BLOOD BY AUTOMATED COUNT: 12.9 % (ref 11.5–14.5)
GFR SERPLBLD CREATININE-BSD FMLA CKD-EPI: >60 ML/MIN/1.73/M2
GLUCOSE SERPL-MCNC: 93 MG/DL (ref 70–110)
HCT VFR BLD AUTO: 34.4 % (ref 37–48.5)
HGB BLD-MCNC: 12.1 GM/DL (ref 12–16)
IMM GRANULOCYTES # BLD AUTO: 0.09 K/UL (ref 0–0.04)
IMM GRANULOCYTES NFR BLD AUTO: 0.7 % (ref 0–0.5)
INDIRECT COOMBS: NORMAL
INR PPP: 0.9 (ref 0.8–1.2)
LYMPHOCYTES # BLD AUTO: 1.39 K/UL (ref 1–4.8)
MAGNESIUM SERPL-MCNC: 1.9 MG/DL (ref 1.6–2.6)
MCH RBC QN AUTO: 31.1 PG (ref 27–31)
MCHC RBC AUTO-ENTMCNC: 35.2 G/DL (ref 32–36)
MCV RBC AUTO: 88 FL (ref 82–98)
NUCLEATED RBC (/100WBC) (OHS): 0 /100 WBC
PLATELET # BLD AUTO: 316 K/UL (ref 150–450)
PMV BLD AUTO: 8.2 FL (ref 9.2–12.9)
POCT GLUCOSE: 136 MG/DL (ref 70–110)
POTASSIUM SERPL-SCNC: 3.2 MMOL/L (ref 3.5–5.1)
PROT SERPL-MCNC: 6.6 GM/DL (ref 6–8.4)
PROTHROMBIN TIME: 10.9 SECONDS (ref 9–12.5)
RBC # BLD AUTO: 3.89 M/UL (ref 4–5.4)
RELATIVE EOSINOPHIL (OHS): 2.7 %
RELATIVE LYMPHOCYTE (OHS): 11.2 % (ref 18–48)
RELATIVE MONOCYTE (OHS): 5 % (ref 4–15)
RELATIVE NEUTROPHIL (OHS): 79.8 % (ref 38–73)
RH BLD: NORMAL
SODIUM SERPL-SCNC: 129 MMOL/L (ref 136–145)
SPECIMEN OUTDATE: NORMAL
WBC # BLD AUTO: 12.37 K/UL (ref 3.9–12.7)

## 2025-08-26 PROCEDURE — 99291 CRITICAL CARE FIRST HOUR: CPT | Mod: HCNC

## 2025-08-26 PROCEDURE — 11000001 HC ACUTE MED/SURG PRIVATE ROOM: Mod: HCNC

## 2025-08-26 PROCEDURE — 63600175 PHARM REV CODE 636 W HCPCS: Mod: HCNC | Performed by: EMERGENCY MEDICINE

## 2025-08-26 PROCEDURE — 96376 TX/PRO/DX INJ SAME DRUG ADON: CPT | Mod: HCNC

## 2025-08-26 PROCEDURE — 83735 ASSAY OF MAGNESIUM: CPT | Mod: HCNC | Performed by: EMERGENCY MEDICINE

## 2025-08-26 PROCEDURE — 86901 BLOOD TYPING SEROLOGIC RH(D): CPT | Mod: HCNC | Performed by: EMERGENCY MEDICINE

## 2025-08-26 PROCEDURE — 96375 TX/PRO/DX INJ NEW DRUG ADDON: CPT | Mod: HCNC

## 2025-08-26 PROCEDURE — 63600175 PHARM REV CODE 636 W HCPCS: Mod: HCNC | Performed by: STUDENT IN AN ORGANIZED HEALTH CARE EDUCATION/TRAINING PROGRAM

## 2025-08-26 PROCEDURE — 93010 ELECTROCARDIOGRAM REPORT: CPT | Mod: HCNC,,, | Performed by: INTERNAL MEDICINE

## 2025-08-26 PROCEDURE — 80053 COMPREHEN METABOLIC PANEL: CPT | Mod: HCNC | Performed by: EMERGENCY MEDICINE

## 2025-08-26 PROCEDURE — 82550 ASSAY OF CK (CPK): CPT | Mod: HCNC | Performed by: EMERGENCY MEDICINE

## 2025-08-26 PROCEDURE — 93005 ELECTROCARDIOGRAM TRACING: CPT | Mod: HCNC

## 2025-08-26 PROCEDURE — 96374 THER/PROPH/DIAG INJ IV PUSH: CPT | Mod: HCNC

## 2025-08-26 PROCEDURE — 85730 THROMBOPLASTIN TIME PARTIAL: CPT | Mod: HCNC | Performed by: EMERGENCY MEDICINE

## 2025-08-26 PROCEDURE — 25000003 PHARM REV CODE 250: Mod: HCNC | Performed by: EMERGENCY MEDICINE

## 2025-08-26 PROCEDURE — 85025 COMPLETE CBC W/AUTO DIFF WBC: CPT | Mod: HCNC | Performed by: EMERGENCY MEDICINE

## 2025-08-26 PROCEDURE — 25000003 PHARM REV CODE 250: Mod: HCNC | Performed by: STUDENT IN AN ORGANIZED HEALTH CARE EDUCATION/TRAINING PROGRAM

## 2025-08-26 PROCEDURE — 85610 PROTHROMBIN TIME: CPT | Mod: HCNC | Performed by: EMERGENCY MEDICINE

## 2025-08-26 RX ORDER — DIAZEPAM 10 MG/2ML
5 INJECTION INTRAMUSCULAR
Status: COMPLETED | OUTPATIENT
Start: 2025-08-26 | End: 2025-08-26

## 2025-08-26 RX ORDER — FLUOXETINE 20 MG/1
20 CAPSULE ORAL DAILY
Status: DISCONTINUED | OUTPATIENT
Start: 2025-08-27 | End: 2025-09-02 | Stop reason: HOSPADM

## 2025-08-26 RX ORDER — IBUPROFEN 200 MG
24 TABLET ORAL
Status: DISCONTINUED | OUTPATIENT
Start: 2025-08-26 | End: 2025-09-02 | Stop reason: HOSPADM

## 2025-08-26 RX ORDER — GLUCAGON 1 MG
1 KIT INJECTION
Status: DISCONTINUED | OUTPATIENT
Start: 2025-08-26 | End: 2025-09-02 | Stop reason: HOSPADM

## 2025-08-26 RX ORDER — IPRATROPIUM BROMIDE AND ALBUTEROL SULFATE 2.5; .5 MG/3ML; MG/3ML
3 SOLUTION RESPIRATORY (INHALATION) EVERY 4 HOURS PRN
Status: DISCONTINUED | OUTPATIENT
Start: 2025-08-26 | End: 2025-08-31

## 2025-08-26 RX ORDER — PROCHLORPERAZINE EDISYLATE 5 MG/ML
2.5 INJECTION INTRAMUSCULAR; INTRAVENOUS EVERY 6 HOURS PRN
Status: DISCONTINUED | OUTPATIENT
Start: 2025-08-26 | End: 2025-09-02 | Stop reason: HOSPADM

## 2025-08-26 RX ORDER — ARFORMOTEROL TARTRATE 15 UG/2ML
15 SOLUTION RESPIRATORY (INHALATION) 2 TIMES DAILY
Status: DISCONTINUED | OUTPATIENT
Start: 2025-08-27 | End: 2025-09-02 | Stop reason: HOSPADM

## 2025-08-26 RX ORDER — TALC
6 POWDER (GRAM) TOPICAL NIGHTLY PRN
Status: DISCONTINUED | OUTPATIENT
Start: 2025-08-26 | End: 2025-09-02 | Stop reason: HOSPADM

## 2025-08-26 RX ORDER — SODIUM CHLORIDE 0.9 % (FLUSH) 0.9 %
10 SYRINGE (ML) INJECTION EVERY 12 HOURS PRN
Status: DISCONTINUED | OUTPATIENT
Start: 2025-08-26 | End: 2025-09-02 | Stop reason: HOSPADM

## 2025-08-26 RX ORDER — MORPHINE SULFATE 4 MG/ML
3 INJECTION, SOLUTION INTRAMUSCULAR; INTRAVENOUS EVERY 4 HOURS PRN
Status: DISCONTINUED | OUTPATIENT
Start: 2025-08-26 | End: 2025-08-29

## 2025-08-26 RX ORDER — GABAPENTIN 300 MG/1
600 CAPSULE ORAL 3 TIMES DAILY
Status: DISCONTINUED | OUTPATIENT
Start: 2025-08-26 | End: 2025-08-27

## 2025-08-26 RX ORDER — ACETAMINOPHEN 325 MG/1
650 TABLET ORAL EVERY 6 HOURS PRN
Status: DISCONTINUED | OUTPATIENT
Start: 2025-08-26 | End: 2025-09-02 | Stop reason: HOSPADM

## 2025-08-26 RX ORDER — IBUPROFEN 200 MG
16 TABLET ORAL
Status: DISCONTINUED | OUTPATIENT
Start: 2025-08-26 | End: 2025-09-02 | Stop reason: HOSPADM

## 2025-08-26 RX ORDER — IBUPROFEN 200 MG
1 TABLET ORAL DAILY
Status: DISCONTINUED | OUTPATIENT
Start: 2025-08-27 | End: 2025-09-02 | Stop reason: HOSPADM

## 2025-08-26 RX ORDER — NALOXONE HCL 0.4 MG/ML
0.02 VIAL (ML) INJECTION
Status: DISCONTINUED | OUTPATIENT
Start: 2025-08-26 | End: 2025-09-02 | Stop reason: HOSPADM

## 2025-08-26 RX ORDER — MORPHINE SULFATE 4 MG/ML
6 INJECTION, SOLUTION INTRAMUSCULAR; INTRAVENOUS
Refills: 0 | Status: COMPLETED | OUTPATIENT
Start: 2025-08-26 | End: 2025-08-26

## 2025-08-26 RX ORDER — ACETAMINOPHEN 500 MG
500 TABLET ORAL EVERY 6 HOURS PRN
COMMUNITY

## 2025-08-26 RX ORDER — ONDANSETRON HYDROCHLORIDE 2 MG/ML
4 INJECTION, SOLUTION INTRAVENOUS
Status: COMPLETED | OUTPATIENT
Start: 2025-08-26 | End: 2025-08-26

## 2025-08-26 RX ORDER — CARVEDILOL 6.25 MG/1
6.25 TABLET ORAL 2 TIMES DAILY WITH MEALS
Status: DISCONTINUED | OUTPATIENT
Start: 2025-08-27 | End: 2025-09-02 | Stop reason: HOSPADM

## 2025-08-26 RX ORDER — POLYETHYLENE GLYCOL 3350 17 G/17G
17 POWDER, FOR SOLUTION ORAL 2 TIMES DAILY PRN
Status: DISCONTINUED | OUTPATIENT
Start: 2025-08-26 | End: 2025-09-02 | Stop reason: HOSPADM

## 2025-08-26 RX ORDER — INSULIN ASPART 100 [IU]/ML
0-10 INJECTION, SOLUTION INTRAVENOUS; SUBCUTANEOUS EVERY 6 HOURS PRN
Status: DISCONTINUED | OUTPATIENT
Start: 2025-08-26 | End: 2025-09-02 | Stop reason: HOSPADM

## 2025-08-26 RX ORDER — ONDANSETRON HYDROCHLORIDE 2 MG/ML
4 INJECTION, SOLUTION INTRAVENOUS EVERY 6 HOURS PRN
Status: DISCONTINUED | OUTPATIENT
Start: 2025-08-26 | End: 2025-09-02 | Stop reason: HOSPADM

## 2025-08-26 RX ORDER — HYDROMORPHONE HYDROCHLORIDE 1 MG/ML
1 INJECTION, SOLUTION INTRAMUSCULAR; INTRAVENOUS; SUBCUTANEOUS EVERY 4 HOURS PRN
Status: DISCONTINUED | OUTPATIENT
Start: 2025-08-26 | End: 2025-08-28

## 2025-08-26 RX ORDER — BUDESONIDE 0.5 MG/2ML
0.5 INHALANT ORAL EVERY 12 HOURS
Status: DISCONTINUED | OUTPATIENT
Start: 2025-08-27 | End: 2025-09-02 | Stop reason: HOSPADM

## 2025-08-26 RX ORDER — HYDRALAZINE HYDROCHLORIDE 20 MG/ML
5 INJECTION INTRAMUSCULAR; INTRAVENOUS EVERY 6 HOURS PRN
Status: DISCONTINUED | OUTPATIENT
Start: 2025-08-26 | End: 2025-09-02 | Stop reason: HOSPADM

## 2025-08-26 RX ORDER — SODIUM CHLORIDE 9 MG/ML
1000 INJECTION, SOLUTION INTRAVENOUS
Status: COMPLETED | OUTPATIENT
Start: 2025-08-26 | End: 2025-08-26

## 2025-08-26 RX ADMIN — HYDROMORPHONE HYDROCHLORIDE 1 MG: 1 INJECTION, SOLUTION INTRAMUSCULAR; INTRAVENOUS; SUBCUTANEOUS at 11:08

## 2025-08-26 RX ADMIN — ONDANSETRON 4 MG: 2 INJECTION INTRAMUSCULAR; INTRAVENOUS at 05:08

## 2025-08-26 RX ADMIN — MORPHINE SULFATE 6 MG: 4 INJECTION INTRAVENOUS at 05:08

## 2025-08-26 RX ADMIN — GABAPENTIN 600 MG: 300 CAPSULE ORAL at 11:08

## 2025-08-26 RX ADMIN — DIAZEPAM 5 MG: 10 INJECTION, SOLUTION INTRAMUSCULAR; INTRAVENOUS at 05:08

## 2025-08-26 RX ADMIN — DIAZEPAM 5 MG: 10 INJECTION, SOLUTION INTRAMUSCULAR; INTRAVENOUS at 07:08

## 2025-08-26 RX ADMIN — MORPHINE SULFATE 6 MG: 4 INJECTION INTRAVENOUS at 07:08

## 2025-08-26 RX ADMIN — SODIUM CHLORIDE 1000 ML: 9 INJECTION, SOLUTION INTRAVENOUS at 08:08

## 2025-08-27 ENCOUNTER — ANESTHESIA (OUTPATIENT)
Dept: SURGERY | Facility: HOSPITAL | Age: 75
End: 2025-08-27
Payer: MEDICARE

## 2025-08-27 ENCOUNTER — ANESTHESIA EVENT (OUTPATIENT)
Dept: SURGERY | Facility: HOSPITAL | Age: 75
End: 2025-08-27
Payer: MEDICARE

## 2025-08-27 PROBLEM — N17.9 ACUTE KIDNEY INJURY SUPERIMPOSED ON STAGE 3A CHRONIC KIDNEY DISEASE: Status: ACTIVE | Noted: 2025-05-28

## 2025-08-27 LAB
ABSOLUTE EOSINOPHIL (OHS): 0.06 K/UL
ABSOLUTE MONOCYTE (OHS): 1.17 K/UL (ref 0.3–1)
ABSOLUTE NEUTROPHIL COUNT (OHS): 16.64 K/UL (ref 1.8–7.7)
ALBUMIN SERPL BCP-MCNC: 3.8 G/DL (ref 3.5–5.2)
ALP SERPL-CCNC: 82 UNIT/L (ref 40–150)
ALT SERPL W/O P-5'-P-CCNC: 11 UNIT/L (ref 10–44)
ANION GAP (OHS): 9 MMOL/L (ref 8–16)
AST SERPL-CCNC: 23 UNIT/L (ref 11–45)
BASOPHILS # BLD AUTO: 0.07 K/UL
BASOPHILS NFR BLD AUTO: 0.4 %
BILIRUB SERPL-MCNC: 0.4 MG/DL (ref 0.1–1)
BUN SERPL-MCNC: 19 MG/DL (ref 8–23)
CALCIUM SERPL-MCNC: 8.8 MG/DL (ref 8.7–10.5)
CHLORIDE SERPL-SCNC: 95 MMOL/L (ref 95–110)
CO2 SERPL-SCNC: 26 MMOL/L (ref 23–29)
CREAT SERPL-MCNC: 1.7 MG/DL (ref 0.5–1.4)
EAG (OHS): 120 MG/DL (ref 68–131)
ERYTHROCYTE [DISTWIDTH] IN BLOOD BY AUTOMATED COUNT: 12.9 % (ref 11.5–14.5)
GFR SERPLBLD CREATININE-BSD FMLA CKD-EPI: 31 ML/MIN/1.73/M2
GLUCOSE SERPL-MCNC: 128 MG/DL (ref 70–110)
HBA1C MFR BLD: 5.8 % (ref 4–5.6)
HCT VFR BLD AUTO: 36.8 % (ref 37–48.5)
HGB BLD-MCNC: 12.3 GM/DL (ref 12–16)
IMM GRANULOCYTES # BLD AUTO: 0.13 K/UL (ref 0–0.04)
IMM GRANULOCYTES NFR BLD AUTO: 0.7 % (ref 0–0.5)
LYMPHOCYTES # BLD AUTO: 1.07 K/UL (ref 1–4.8)
MAGNESIUM SERPL-MCNC: 1.9 MG/DL (ref 1.6–2.6)
MCH RBC QN AUTO: 31.1 PG (ref 27–31)
MCHC RBC AUTO-ENTMCNC: 33.4 G/DL (ref 32–36)
MCV RBC AUTO: 93 FL (ref 82–98)
NUCLEATED RBC (/100WBC) (OHS): 0 /100 WBC
OHS QRS DURATION: 84 MS
OHS QTC CALCULATION: 497 MS
PHOSPHATE SERPL-MCNC: 6.5 MG/DL (ref 2.7–4.5)
PLATELET # BLD AUTO: 359 K/UL (ref 150–450)
PMV BLD AUTO: 8.6 FL (ref 9.2–12.9)
POCT GLUCOSE: 159 MG/DL (ref 70–110)
POCT GLUCOSE: 164 MG/DL (ref 70–110)
POCT GLUCOSE: 173 MG/DL (ref 70–110)
POTASSIUM SERPL-SCNC: 4.2 MMOL/L (ref 3.5–5.1)
PROT SERPL-MCNC: 7 GM/DL (ref 6–8.4)
RBC # BLD AUTO: 3.96 M/UL (ref 4–5.4)
RELATIVE EOSINOPHIL (OHS): 0.3 %
RELATIVE LYMPHOCYTE (OHS): 5.6 % (ref 18–48)
RELATIVE MONOCYTE (OHS): 6.1 % (ref 4–15)
RELATIVE NEUTROPHIL (OHS): 86.9 % (ref 38–73)
SODIUM SERPL-SCNC: 130 MMOL/L (ref 136–145)
WBC # BLD AUTO: 19.14 K/UL (ref 3.9–12.7)

## 2025-08-27 PROCEDURE — 25000003 PHARM REV CODE 250: Mod: HCNC | Performed by: ANESTHESIOLOGY

## 2025-08-27 PROCEDURE — 25000242 PHARM REV CODE 250 ALT 637 W/ HCPCS: Mod: HCNC | Performed by: ORTHOPAEDIC SURGERY

## 2025-08-27 PROCEDURE — 25000003 PHARM REV CODE 250: Mod: HCNC | Performed by: ORTHOPAEDIC SURGERY

## 2025-08-27 PROCEDURE — 11000001 HC ACUTE MED/SURG PRIVATE ROOM: Mod: HCNC

## 2025-08-27 PROCEDURE — 36000711: Mod: HCNC | Performed by: ORTHOPAEDIC SURGERY

## 2025-08-27 PROCEDURE — 37000009 HC ANESTHESIA EA ADD 15 MINS: Mod: HCNC | Performed by: ORTHOPAEDIC SURGERY

## 2025-08-27 PROCEDURE — 27245 TREAT THIGH FRACTURE: CPT | Mod: HCNC,LT,, | Performed by: ORTHOPAEDIC SURGERY

## 2025-08-27 PROCEDURE — 85025 COMPLETE CBC W/AUTO DIFF WBC: CPT | Mod: HCNC | Performed by: STUDENT IN AN ORGANIZED HEALTH CARE EDUCATION/TRAINING PROGRAM

## 2025-08-27 PROCEDURE — 83036 HEMOGLOBIN GLYCOSYLATED A1C: CPT | Mod: HCNC | Performed by: STUDENT IN AN ORGANIZED HEALTH CARE EDUCATION/TRAINING PROGRAM

## 2025-08-27 PROCEDURE — 25000003 PHARM REV CODE 250: Mod: HCNC | Performed by: STUDENT IN AN ORGANIZED HEALTH CARE EDUCATION/TRAINING PROGRAM

## 2025-08-27 PROCEDURE — 36415 COLL VENOUS BLD VENIPUNCTURE: CPT | Mod: HCNC | Performed by: STUDENT IN AN ORGANIZED HEALTH CARE EDUCATION/TRAINING PROGRAM

## 2025-08-27 PROCEDURE — 27201423 OPTIME MED/SURG SUP & DEVICES STERILE SUPPLY: Mod: HCNC | Performed by: ORTHOPAEDIC SURGERY

## 2025-08-27 PROCEDURE — 27000221 HC OXYGEN, UP TO 24 HOURS: Mod: HCNC

## 2025-08-27 PROCEDURE — 63600175 PHARM REV CODE 636 W HCPCS: Mod: HCNC | Performed by: ANESTHESIOLOGY

## 2025-08-27 PROCEDURE — 63600175 PHARM REV CODE 636 W HCPCS: Mod: HCNC | Performed by: STUDENT IN AN ORGANIZED HEALTH CARE EDUCATION/TRAINING PROGRAM

## 2025-08-27 PROCEDURE — 0QS736Z REPOSITION LEFT UPPER FEMUR WITH INTRAMEDULLARY INTERNAL FIXATION DEVICE, PERCUTANEOUS APPROACH: ICD-10-PCS | Performed by: ORTHOPAEDIC SURGERY

## 2025-08-27 PROCEDURE — 71000033 HC RECOVERY, INTIAL HOUR: Mod: HCNC | Performed by: ORTHOPAEDIC SURGERY

## 2025-08-27 PROCEDURE — 83735 ASSAY OF MAGNESIUM: CPT | Mod: HCNC | Performed by: STUDENT IN AN ORGANIZED HEALTH CARE EDUCATION/TRAINING PROGRAM

## 2025-08-27 PROCEDURE — 99900035 HC TECH TIME PER 15 MIN (STAT): Mod: HCNC

## 2025-08-27 PROCEDURE — 25000242 PHARM REV CODE 250 ALT 637 W/ HCPCS: Mod: HCNC | Performed by: STUDENT IN AN ORGANIZED HEALTH CARE EDUCATION/TRAINING PROGRAM

## 2025-08-27 PROCEDURE — 99223 1ST HOSP IP/OBS HIGH 75: CPT | Mod: 57,HCNC,, | Performed by: PHYSICIAN ASSISTANT

## 2025-08-27 PROCEDURE — 80053 COMPREHEN METABOLIC PANEL: CPT | Mod: HCNC | Performed by: STUDENT IN AN ORGANIZED HEALTH CARE EDUCATION/TRAINING PROGRAM

## 2025-08-27 PROCEDURE — 94640 AIRWAY INHALATION TREATMENT: CPT | Mod: HCNC

## 2025-08-27 PROCEDURE — 63600175 PHARM REV CODE 636 W HCPCS: Mod: HCNC | Performed by: ORTHOPAEDIC SURGERY

## 2025-08-27 PROCEDURE — 63600175 PHARM REV CODE 636 W HCPCS: Mod: JZ,TB,HCNC | Performed by: ANESTHESIOLOGY

## 2025-08-27 PROCEDURE — 37000008 HC ANESTHESIA 1ST 15 MINUTES: Mod: HCNC | Performed by: ORTHOPAEDIC SURGERY

## 2025-08-27 PROCEDURE — 94761 N-INVAS EAR/PLS OXIMETRY MLT: CPT | Mod: HCNC

## 2025-08-27 PROCEDURE — 25000242 PHARM REV CODE 250 ALT 637 W/ HCPCS: Mod: HCNC | Performed by: ANESTHESIOLOGY

## 2025-08-27 PROCEDURE — C1713 ANCHOR/SCREW BN/BN,TIS/BN: HCPCS | Mod: HCNC | Performed by: ORTHOPAEDIC SURGERY

## 2025-08-27 PROCEDURE — 84100 ASSAY OF PHOSPHORUS: CPT | Mod: HCNC | Performed by: STUDENT IN AN ORGANIZED HEALTH CARE EDUCATION/TRAINING PROGRAM

## 2025-08-27 PROCEDURE — 36000710: Mod: HCNC | Performed by: ORTHOPAEDIC SURGERY

## 2025-08-27 RX ORDER — ONDANSETRON HYDROCHLORIDE 2 MG/ML
4 INJECTION, SOLUTION INTRAVENOUS EVERY 12 HOURS PRN
Status: DISCONTINUED | OUTPATIENT
Start: 2025-08-27 | End: 2025-09-02 | Stop reason: HOSPADM

## 2025-08-27 RX ORDER — GLUCAGON 1 MG
1 KIT INJECTION
Status: DISCONTINUED | OUTPATIENT
Start: 2025-08-27 | End: 2025-08-27 | Stop reason: HOSPADM

## 2025-08-27 RX ORDER — DEXAMETHASONE SODIUM PHOSPHATE 4 MG/ML
INJECTION, SOLUTION INTRA-ARTICULAR; INTRALESIONAL; INTRAMUSCULAR; INTRAVENOUS; SOFT TISSUE
Status: DISCONTINUED | OUTPATIENT
Start: 2025-08-27 | End: 2025-08-27

## 2025-08-27 RX ORDER — ROCURONIUM BROMIDE 10 MG/ML
INJECTION, SOLUTION INTRAVENOUS
Status: DISCONTINUED | OUTPATIENT
Start: 2025-08-27 | End: 2025-08-27

## 2025-08-27 RX ORDER — PROPOFOL 10 MG/ML
VIAL (ML) INTRAVENOUS
Status: DISCONTINUED | OUTPATIENT
Start: 2025-08-27 | End: 2025-08-27

## 2025-08-27 RX ORDER — CHLORHEXIDINE GLUCONATE ORAL RINSE 1.2 MG/ML
10 SOLUTION DENTAL 2 TIMES DAILY
Status: COMPLETED | OUTPATIENT
Start: 2025-08-27 | End: 2025-09-01

## 2025-08-27 RX ORDER — LIDOCAINE HYDROCHLORIDE 10 MG/ML
INJECTION, SOLUTION EPIDURAL; INFILTRATION; INTRACAUDAL; PERINEURAL
Status: DISCONTINUED | OUTPATIENT
Start: 2025-08-27 | End: 2025-08-27

## 2025-08-27 RX ORDER — HYDROMORPHONE HYDROCHLORIDE 2 MG/ML
0.2 INJECTION, SOLUTION INTRAMUSCULAR; INTRAVENOUS; SUBCUTANEOUS EVERY 5 MIN PRN
Status: DISCONTINUED | OUTPATIENT
Start: 2025-08-27 | End: 2025-08-27 | Stop reason: HOSPADM

## 2025-08-27 RX ORDER — ROPIVACAINE/EPI/CLONIDINE/KET 2.46-0.005
SYRINGE (ML) INJECTION
Status: DISCONTINUED | OUTPATIENT
Start: 2025-08-27 | End: 2025-08-27 | Stop reason: HOSPADM

## 2025-08-27 RX ORDER — KETOROLAC TROMETHAMINE 30 MG/ML
15 INJECTION, SOLUTION INTRAMUSCULAR; INTRAVENOUS EVERY 6 HOURS PRN
Status: DISCONTINUED | OUTPATIENT
Start: 2025-08-27 | End: 2025-08-27 | Stop reason: HOSPADM

## 2025-08-27 RX ORDER — ACETAMINOPHEN 10 MG/ML
1000 INJECTION, SOLUTION INTRAVENOUS ONCE
Status: COMPLETED | OUTPATIENT
Start: 2025-08-27 | End: 2025-08-27

## 2025-08-27 RX ORDER — CEFAZOLIN SODIUM 1 G/3ML
1 INJECTION, POWDER, FOR SOLUTION INTRAMUSCULAR; INTRAVENOUS
Status: DISCONTINUED | OUTPATIENT
Start: 2025-08-27 | End: 2025-08-27

## 2025-08-27 RX ORDER — SUCCINYLCHOLINE CHLORIDE 20 MG/ML
INJECTION INTRAMUSCULAR; INTRAVENOUS
Status: DISCONTINUED | OUTPATIENT
Start: 2025-08-27 | End: 2025-08-27

## 2025-08-27 RX ORDER — HYDROCODONE BITARTRATE AND ACETAMINOPHEN 5; 325 MG/1; MG/1
1 TABLET ORAL EVERY 4 HOURS PRN
Status: DISCONTINUED | OUTPATIENT
Start: 2025-08-27 | End: 2025-08-29

## 2025-08-27 RX ORDER — SODIUM CHLORIDE 0.9 % (FLUSH) 0.9 %
10 SYRINGE (ML) INJECTION
Status: DISCONTINUED | OUTPATIENT
Start: 2025-08-27 | End: 2025-09-02 | Stop reason: HOSPADM

## 2025-08-27 RX ORDER — ALBUTEROL SULFATE 90 UG/1
INHALANT RESPIRATORY (INHALATION)
Status: DISCONTINUED | OUTPATIENT
Start: 2025-08-27 | End: 2025-08-27

## 2025-08-27 RX ORDER — ONDANSETRON HYDROCHLORIDE 2 MG/ML
4 INJECTION, SOLUTION INTRAVENOUS DAILY PRN
Status: DISCONTINUED | OUTPATIENT
Start: 2025-08-27 | End: 2025-08-27 | Stop reason: HOSPADM

## 2025-08-27 RX ORDER — GABAPENTIN 300 MG/1
300 CAPSULE ORAL 2 TIMES DAILY
Status: DISCONTINUED | OUTPATIENT
Start: 2025-08-27 | End: 2025-09-02 | Stop reason: HOSPADM

## 2025-08-27 RX ORDER — DIPHENHYDRAMINE HYDROCHLORIDE 50 MG/ML
12.5 INJECTION, SOLUTION INTRAMUSCULAR; INTRAVENOUS
Status: DISCONTINUED | OUTPATIENT
Start: 2025-08-27 | End: 2025-08-27 | Stop reason: HOSPADM

## 2025-08-27 RX ORDER — CEFAZOLIN 2 G/1
2 INJECTION, POWDER, FOR SOLUTION INTRAMUSCULAR; INTRAVENOUS
Status: COMPLETED | OUTPATIENT
Start: 2025-08-27 | End: 2025-08-28

## 2025-08-27 RX ORDER — SODIUM CHLORIDE, SODIUM LACTATE, POTASSIUM CHLORIDE, CALCIUM CHLORIDE 600; 310; 30; 20 MG/100ML; MG/100ML; MG/100ML; MG/100ML
INJECTION, SOLUTION INTRAVENOUS CONTINUOUS
Status: ACTIVE | OUTPATIENT
Start: 2025-08-27 | End: 2025-08-28

## 2025-08-27 RX ORDER — PHENYLEPHRINE HYDROCHLORIDE 10 MG/ML
INJECTION INTRAVENOUS
Status: DISCONTINUED | OUTPATIENT
Start: 2025-08-27 | End: 2025-08-27

## 2025-08-27 RX ADMIN — PHENYLEPHRINE HYDROCHLORIDE 200 MCG: 10 INJECTION INTRAVENOUS at 12:08

## 2025-08-27 RX ADMIN — ROCURONIUM BROMIDE 30 MG: 10 SOLUTION INTRAVENOUS at 12:08

## 2025-08-27 RX ADMIN — BUDESONIDE INHALATION 0.5 MG: 0.5 SUSPENSION RESPIRATORY (INHALATION) at 08:08

## 2025-08-27 RX ADMIN — DEXAMETHASONE SODIUM PHOSPHATE 8 MG: 4 INJECTION, SOLUTION INTRA-ARTICULAR; INTRALESIONAL; INTRAMUSCULAR; INTRAVENOUS; SOFT TISSUE at 12:08

## 2025-08-27 RX ADMIN — ARFORMOTEROL TARTRATE 15 MCG: 15 SOLUTION RESPIRATORY (INHALATION) at 07:08

## 2025-08-27 RX ADMIN — ARFORMOTEROL TARTRATE 15 MCG: 15 SOLUTION RESPIRATORY (INHALATION) at 08:08

## 2025-08-27 RX ADMIN — PHENYLEPHRINE HYDROCHLORIDE 200 MCG: 10 INJECTION INTRAVENOUS at 01:08

## 2025-08-27 RX ADMIN — CARVEDILOL 6.25 MG: 6.25 TABLET, FILM COATED ORAL at 08:08

## 2025-08-27 RX ADMIN — NALOXONE HYDROCHLORIDE 0.1 MG: 0.4 INJECTION, SOLUTION INTRAMUSCULAR; INTRAVENOUS; SUBCUTANEOUS at 01:08

## 2025-08-27 RX ADMIN — BUDESONIDE INHALATION 0.5 MG: 0.5 SUSPENSION RESPIRATORY (INHALATION) at 07:08

## 2025-08-27 RX ADMIN — SUGAMMADEX 200 MG: 100 INJECTION, SOLUTION INTRAVENOUS at 01:08

## 2025-08-27 RX ADMIN — HYDROCODONE BITARTRATE AND ACETAMINOPHEN 1 TABLET: 5; 325 TABLET ORAL at 10:08

## 2025-08-27 RX ADMIN — SUCCINYLCHOLINE CHLORIDE 120 MG: 20 INJECTION, SOLUTION INTRAMUSCULAR; INTRAVENOUS; PARENTERAL at 12:08

## 2025-08-27 RX ADMIN — KETOROLAC TROMETHAMINE 15 MG: 30 INJECTION, SOLUTION INTRAMUSCULAR at 02:08

## 2025-08-27 RX ADMIN — ACETAMINOPHEN 650 MG: 325 TABLET ORAL at 08:08

## 2025-08-27 RX ADMIN — ALBUTEROL SULFATE 5 PUFF: 90 AEROSOL, METERED RESPIRATORY (INHALATION) at 12:08

## 2025-08-27 RX ADMIN — CHLORHEXIDINE GLUCONATE 0.12% ORAL RINSE 10 ML: 1.2 LIQUID ORAL at 08:08

## 2025-08-27 RX ADMIN — SODIUM CHLORIDE, POTASSIUM CHLORIDE, SODIUM LACTATE AND CALCIUM CHLORIDE: 600; 310; 30; 20 INJECTION, SOLUTION INTRAVENOUS at 11:08

## 2025-08-27 RX ADMIN — SODIUM CHLORIDE, POTASSIUM CHLORIDE, SODIUM LACTATE AND CALCIUM CHLORIDE: 600; 310; 30; 20 INJECTION, SOLUTION INTRAVENOUS at 06:08

## 2025-08-27 RX ADMIN — CEFAZOLIN 2 G: 2 INJECTION, POWDER, FOR SOLUTION INTRAMUSCULAR; INTRAVENOUS at 05:08

## 2025-08-27 RX ADMIN — HYDROMORPHONE HYDROCHLORIDE 1 MG: 1 INJECTION, SOLUTION INTRAMUSCULAR; INTRAVENOUS; SUBCUTANEOUS at 08:08

## 2025-08-27 RX ADMIN — PROPOFOL 80 MG: 10 INJECTION, EMULSION INTRAVENOUS at 12:08

## 2025-08-27 RX ADMIN — LIDOCAINE HYDROCHLORIDE 50 MG: 10 INJECTION, SOLUTION EPIDURAL; INFILTRATION; INTRACAUDAL; PERINEURAL at 12:08

## 2025-08-27 RX ADMIN — ACETAMINOPHEN 1000 MG: 10 INJECTION, SOLUTION INTRAVENOUS at 02:08

## 2025-08-27 RX ADMIN — GABAPENTIN 300 MG: 300 CAPSULE ORAL at 08:08

## 2025-08-27 RX ADMIN — ONDANSETRON 4 MG: 2 INJECTION INTRAMUSCULAR; INTRAVENOUS at 06:08

## 2025-08-28 LAB
ABSOLUTE EOSINOPHIL (OHS): 0 K/UL
ABSOLUTE MONOCYTE (OHS): 0.77 K/UL (ref 0.3–1)
ABSOLUTE NEUTROPHIL COUNT (OHS): 12.58 K/UL (ref 1.8–7.7)
ALBUMIN SERPL BCP-MCNC: 3 G/DL (ref 3.5–5.2)
ALP SERPL-CCNC: 63 UNIT/L (ref 40–150)
ALT SERPL W/O P-5'-P-CCNC: <8 UNIT/L (ref 10–44)
ANION GAP (OHS): 14 MMOL/L (ref 8–16)
AST SERPL-CCNC: 29 UNIT/L (ref 11–45)
BASOPHILS # BLD AUTO: 0.01 K/UL
BASOPHILS NFR BLD AUTO: 0.1 %
BILIRUB SERPL-MCNC: 0.2 MG/DL (ref 0.1–1)
BUN SERPL-MCNC: 32 MG/DL (ref 8–23)
CALCIUM SERPL-MCNC: 8.2 MG/DL (ref 8.7–10.5)
CHLORIDE SERPL-SCNC: 96 MMOL/L (ref 95–110)
CO2 SERPL-SCNC: 21 MMOL/L (ref 23–29)
CREAT SERPL-MCNC: 1.6 MG/DL (ref 0.5–1.4)
ERYTHROCYTE [DISTWIDTH] IN BLOOD BY AUTOMATED COUNT: 13.2 % (ref 11.5–14.5)
GFR SERPLBLD CREATININE-BSD FMLA CKD-EPI: 33 ML/MIN/1.73/M2
GLUCOSE SERPL-MCNC: 127 MG/DL (ref 70–110)
HCT VFR BLD AUTO: 27 % (ref 37–48.5)
HGB BLD-MCNC: 8.8 GM/DL (ref 12–16)
IMM GRANULOCYTES # BLD AUTO: 0.06 K/UL (ref 0–0.04)
IMM GRANULOCYTES NFR BLD AUTO: 0.4 % (ref 0–0.5)
LYMPHOCYTES # BLD AUTO: 0.74 K/UL (ref 1–4.8)
MAGNESIUM SERPL-MCNC: 1.9 MG/DL (ref 1.6–2.6)
MCH RBC QN AUTO: 30.9 PG (ref 27–31)
MCHC RBC AUTO-ENTMCNC: 32.6 G/DL (ref 32–36)
MCV RBC AUTO: 95 FL (ref 82–98)
NUCLEATED RBC (/100WBC) (OHS): 0 /100 WBC
PHOSPHATE SERPL-MCNC: 4.8 MG/DL (ref 2.7–4.5)
PLATELET # BLD AUTO: 263 K/UL (ref 150–450)
PMV BLD AUTO: 8.7 FL (ref 9.2–12.9)
POCT GLUCOSE: 133 MG/DL (ref 70–110)
POCT GLUCOSE: 138 MG/DL (ref 70–110)
POCT GLUCOSE: 151 MG/DL (ref 70–110)
POTASSIUM SERPL-SCNC: 4 MMOL/L (ref 3.5–5.1)
PROT SERPL-MCNC: 5.9 GM/DL (ref 6–8.4)
RBC # BLD AUTO: 2.85 M/UL (ref 4–5.4)
RELATIVE EOSINOPHIL (OHS): 0 %
RELATIVE LYMPHOCYTE (OHS): 5.2 % (ref 18–48)
RELATIVE MONOCYTE (OHS): 5.4 % (ref 4–15)
RELATIVE NEUTROPHIL (OHS): 88.9 % (ref 38–73)
SODIUM SERPL-SCNC: 131 MMOL/L (ref 136–145)
WBC # BLD AUTO: 14.16 K/UL (ref 3.9–12.7)

## 2025-08-28 PROCEDURE — 97162 PT EVAL MOD COMPLEX 30 MIN: CPT | Mod: HCNC

## 2025-08-28 PROCEDURE — 99024 POSTOP FOLLOW-UP VISIT: CPT | Mod: HCNC,,, | Performed by: PHYSICIAN ASSISTANT

## 2025-08-28 PROCEDURE — 36415 COLL VENOUS BLD VENIPUNCTURE: CPT | Mod: HCNC | Performed by: ORTHOPAEDIC SURGERY

## 2025-08-28 PROCEDURE — 94640 AIRWAY INHALATION TREATMENT: CPT | Mod: HCNC

## 2025-08-28 PROCEDURE — 84100 ASSAY OF PHOSPHORUS: CPT | Mod: HCNC | Performed by: ORTHOPAEDIC SURGERY

## 2025-08-28 PROCEDURE — 25000242 PHARM REV CODE 250 ALT 637 W/ HCPCS: Mod: HCNC | Performed by: ORTHOPAEDIC SURGERY

## 2025-08-28 PROCEDURE — 25000003 PHARM REV CODE 250: Mod: HCNC | Performed by: ORTHOPAEDIC SURGERY

## 2025-08-28 PROCEDURE — 97110 THERAPEUTIC EXERCISES: CPT | Mod: HCNC

## 2025-08-28 PROCEDURE — 82040 ASSAY OF SERUM ALBUMIN: CPT | Mod: HCNC | Performed by: ORTHOPAEDIC SURGERY

## 2025-08-28 PROCEDURE — 63600175 PHARM REV CODE 636 W HCPCS: Mod: HCNC | Performed by: ORTHOPAEDIC SURGERY

## 2025-08-28 PROCEDURE — 97166 OT EVAL MOD COMPLEX 45 MIN: CPT | Mod: HCNC

## 2025-08-28 PROCEDURE — 94761 N-INVAS EAR/PLS OXIMETRY MLT: CPT | Mod: HCNC

## 2025-08-28 PROCEDURE — 63600175 PHARM REV CODE 636 W HCPCS: Mod: HCNC | Performed by: STUDENT IN AN ORGANIZED HEALTH CARE EDUCATION/TRAINING PROGRAM

## 2025-08-28 PROCEDURE — 97530 THERAPEUTIC ACTIVITIES: CPT | Mod: HCNC

## 2025-08-28 PROCEDURE — S4991 NICOTINE PATCH NONLEGEND: HCPCS | Mod: HCNC | Performed by: ORTHOPAEDIC SURGERY

## 2025-08-28 PROCEDURE — 99900035 HC TECH TIME PER 15 MIN (STAT): Mod: HCNC

## 2025-08-28 PROCEDURE — 83735 ASSAY OF MAGNESIUM: CPT | Mod: HCNC | Performed by: ORTHOPAEDIC SURGERY

## 2025-08-28 PROCEDURE — 11000001 HC ACUTE MED/SURG PRIVATE ROOM: Mod: HCNC

## 2025-08-28 PROCEDURE — 63600175 PHARM REV CODE 636 W HCPCS: Mod: HCNC

## 2025-08-28 PROCEDURE — 25000003 PHARM REV CODE 250: Mod: HCNC | Performed by: STUDENT IN AN ORGANIZED HEALTH CARE EDUCATION/TRAINING PROGRAM

## 2025-08-28 PROCEDURE — 27000221 HC OXYGEN, UP TO 24 HOURS: Mod: HCNC

## 2025-08-28 PROCEDURE — 85025 COMPLETE CBC W/AUTO DIFF WBC: CPT | Mod: HCNC | Performed by: ORTHOPAEDIC SURGERY

## 2025-08-28 PROCEDURE — 51798 US URINE CAPACITY MEASURE: CPT | Mod: HCNC

## 2025-08-28 RX ORDER — CILOSTAZOL 50 MG/1
50 TABLET ORAL 2 TIMES DAILY
Status: DISCONTINUED | OUTPATIENT
Start: 2025-08-28 | End: 2025-09-02 | Stop reason: HOSPADM

## 2025-08-28 RX ORDER — ASPIRIN 81 MG/1
81 TABLET ORAL DAILY
Status: DISCONTINUED | OUTPATIENT
Start: 2025-08-28 | End: 2025-09-02 | Stop reason: HOSPADM

## 2025-08-28 RX ORDER — ENOXAPARIN SODIUM 100 MG/ML
30 INJECTION SUBCUTANEOUS EVERY 24 HOURS
Status: DISCONTINUED | OUTPATIENT
Start: 2025-08-28 | End: 2025-08-29

## 2025-08-28 RX ORDER — HYDROCODONE BITARTRATE AND ACETAMINOPHEN 10; 325 MG/1; MG/1
1 TABLET ORAL EVERY 6 HOURS PRN
Refills: 0 | Status: DISCONTINUED | OUTPATIENT
Start: 2025-08-28 | End: 2025-08-29

## 2025-08-28 RX ADMIN — ASPIRIN 81 MG: 81 TABLET, DELAYED RELEASE ORAL at 01:08

## 2025-08-28 RX ADMIN — GABAPENTIN 300 MG: 300 CAPSULE ORAL at 09:08

## 2025-08-28 RX ADMIN — ARFORMOTEROL TARTRATE 15 MCG: 15 SOLUTION RESPIRATORY (INHALATION) at 08:08

## 2025-08-28 RX ADMIN — BUDESONIDE INHALATION 0.5 MG: 0.5 SUSPENSION RESPIRATORY (INHALATION) at 09:08

## 2025-08-28 RX ADMIN — CILOSTAZOL 50 MG: 50 TABLET ORAL at 08:08

## 2025-08-28 RX ADMIN — NICOTINE 1 PATCH: 14 PATCH, EXTENDED RELEASE TRANSDERMAL at 09:08

## 2025-08-28 RX ADMIN — HYDROCODONE BITARTRATE AND ACETAMINOPHEN 1 TABLET: 5; 325 TABLET ORAL at 05:08

## 2025-08-28 RX ADMIN — ACETAMINOPHEN 650 MG: 325 TABLET ORAL at 09:08

## 2025-08-28 RX ADMIN — CARVEDILOL 6.25 MG: 6.25 TABLET, FILM COATED ORAL at 09:08

## 2025-08-28 RX ADMIN — CHLORHEXIDINE GLUCONATE 0.12% ORAL RINSE 10 ML: 1.2 LIQUID ORAL at 09:08

## 2025-08-28 RX ADMIN — INSULIN ASPART 2 UNITS: 100 INJECTION, SOLUTION INTRAVENOUS; SUBCUTANEOUS at 05:08

## 2025-08-28 RX ADMIN — SODIUM CHLORIDE, POTASSIUM CHLORIDE, SODIUM LACTATE AND CALCIUM CHLORIDE: 600; 310; 30; 20 INJECTION, SOLUTION INTRAVENOUS at 07:08

## 2025-08-28 RX ADMIN — ARFORMOTEROL TARTRATE 15 MCG: 15 SOLUTION RESPIRATORY (INHALATION) at 09:08

## 2025-08-28 RX ADMIN — BUDESONIDE INHALATION 0.5 MG: 0.5 SUSPENSION RESPIRATORY (INHALATION) at 08:08

## 2025-08-28 RX ADMIN — FLUOXETINE HYDROCHLORIDE 20 MG: 20 CAPSULE ORAL at 09:08

## 2025-08-28 RX ADMIN — ENOXAPARIN SODIUM 30 MG: 30 INJECTION SUBCUTANEOUS at 05:08

## 2025-08-28 RX ADMIN — INSULIN ASPART 1 UNITS: 100 INJECTION, SOLUTION INTRAVENOUS; SUBCUTANEOUS at 12:08

## 2025-08-28 RX ADMIN — CILOSTAZOL 50 MG: 50 TABLET ORAL at 01:08

## 2025-08-28 RX ADMIN — ACETAMINOPHEN 650 MG: 325 TABLET ORAL at 03:08

## 2025-08-28 RX ADMIN — CARVEDILOL 6.25 MG: 6.25 TABLET, FILM COATED ORAL at 05:08

## 2025-08-28 RX ADMIN — HYDROMORPHONE HYDROCHLORIDE 1 MG: 1 INJECTION, SOLUTION INTRAMUSCULAR; INTRAVENOUS; SUBCUTANEOUS at 09:08

## 2025-08-28 RX ADMIN — CEFAZOLIN 2 G: 2 INJECTION, POWDER, FOR SOLUTION INTRAMUSCULAR; INTRAVENOUS at 05:08

## 2025-08-29 PROBLEM — R33.9 URINARY RETENTION: Status: ACTIVE | Noted: 2025-08-29

## 2025-08-29 PROBLEM — L03.90 CELLULITIS: Status: ACTIVE | Noted: 2025-08-29

## 2025-08-29 PROBLEM — E87.6 HYPOKALEMIA: Status: ACTIVE | Noted: 2025-08-29

## 2025-08-29 LAB
ABSOLUTE EOSINOPHIL (OHS): 0.02 K/UL
ABSOLUTE MONOCYTE (OHS): 0.91 K/UL (ref 0.3–1)
ABSOLUTE NEUTROPHIL COUNT (OHS): 8.83 K/UL (ref 1.8–7.7)
ALBUMIN SERPL BCP-MCNC: 2.8 G/DL (ref 3.5–5.2)
ALP SERPL-CCNC: 52 UNIT/L (ref 40–150)
ALT SERPL W/O P-5'-P-CCNC: <8 UNIT/L (ref 10–44)
ANION GAP (OHS): 10 MMOL/L (ref 8–16)
AST SERPL-CCNC: 32 UNIT/L (ref 11–45)
BASOPHILS # BLD AUTO: 0.01 K/UL
BASOPHILS NFR BLD AUTO: 0.1 %
BILIRUB SERPL-MCNC: 0.3 MG/DL (ref 0.1–1)
BUN SERPL-MCNC: 33 MG/DL (ref 8–23)
CALCIUM SERPL-MCNC: 8.2 MG/DL (ref 8.7–10.5)
CHLORIDE SERPL-SCNC: 97 MMOL/L (ref 95–110)
CO2 SERPL-SCNC: 25 MMOL/L (ref 23–29)
CREAT SERPL-MCNC: 1 MG/DL (ref 0.5–1.4)
ERYTHROCYTE [DISTWIDTH] IN BLOOD BY AUTOMATED COUNT: 13.1 % (ref 11.5–14.5)
GFR SERPLBLD CREATININE-BSD FMLA CKD-EPI: 58 ML/MIN/1.73/M2
GLUCOSE SERPL-MCNC: 103 MG/DL (ref 70–110)
HCT VFR BLD AUTO: 22.4 % (ref 37–48.5)
HGB BLD-MCNC: 7.7 GM/DL (ref 12–16)
IMM GRANULOCYTES # BLD AUTO: 0.06 K/UL (ref 0–0.04)
IMM GRANULOCYTES NFR BLD AUTO: 0.5 % (ref 0–0.5)
LYMPHOCYTES # BLD AUTO: 1.22 K/UL (ref 1–4.8)
MAGNESIUM SERPL-MCNC: 2.1 MG/DL (ref 1.6–2.6)
MCH RBC QN AUTO: 30.9 PG (ref 27–31)
MCHC RBC AUTO-ENTMCNC: 34.4 G/DL (ref 32–36)
MCV RBC AUTO: 90 FL (ref 82–98)
NUCLEATED RBC (/100WBC) (OHS): 0 /100 WBC
PHOSPHATE SERPL-MCNC: 2.2 MG/DL (ref 2.7–4.5)
PLATELET # BLD AUTO: 226 K/UL (ref 150–450)
PMV BLD AUTO: 8.7 FL (ref 9.2–12.9)
POCT GLUCOSE: 106 MG/DL (ref 70–110)
POCT GLUCOSE: 152 MG/DL (ref 70–110)
POCT GLUCOSE: 167 MG/DL (ref 70–110)
POCT GLUCOSE: 186 MG/DL (ref 70–110)
POTASSIUM SERPL-SCNC: 3.4 MMOL/L (ref 3.5–5.1)
PROT SERPL-MCNC: 5.7 GM/DL (ref 6–8.4)
RBC # BLD AUTO: 2.49 M/UL (ref 4–5.4)
RELATIVE EOSINOPHIL (OHS): 0.2 %
RELATIVE LYMPHOCYTE (OHS): 11 % (ref 18–48)
RELATIVE MONOCYTE (OHS): 8.2 % (ref 4–15)
RELATIVE NEUTROPHIL (OHS): 80 % (ref 38–73)
SODIUM SERPL-SCNC: 132 MMOL/L (ref 136–145)
WBC # BLD AUTO: 11.05 K/UL (ref 3.9–12.7)

## 2025-08-29 PROCEDURE — 25000242 PHARM REV CODE 250 ALT 637 W/ HCPCS: Mod: HCNC | Performed by: ORTHOPAEDIC SURGERY

## 2025-08-29 PROCEDURE — 94640 AIRWAY INHALATION TREATMENT: CPT | Mod: HCNC

## 2025-08-29 PROCEDURE — 25000003 PHARM REV CODE 250: Mod: HCNC | Performed by: FAMILY MEDICINE

## 2025-08-29 PROCEDURE — 27000221 HC OXYGEN, UP TO 24 HOURS: Mod: HCNC

## 2025-08-29 PROCEDURE — 84100 ASSAY OF PHOSPHORUS: CPT | Mod: HCNC | Performed by: ORTHOPAEDIC SURGERY

## 2025-08-29 PROCEDURE — 25000003 PHARM REV CODE 250: Mod: HCNC | Performed by: STUDENT IN AN ORGANIZED HEALTH CARE EDUCATION/TRAINING PROGRAM

## 2025-08-29 PROCEDURE — 36415 COLL VENOUS BLD VENIPUNCTURE: CPT | Mod: HCNC | Performed by: ORTHOPAEDIC SURGERY

## 2025-08-29 PROCEDURE — 99900035 HC TECH TIME PER 15 MIN (STAT): Mod: HCNC

## 2025-08-29 PROCEDURE — 80053 COMPREHEN METABOLIC PANEL: CPT | Mod: HCNC | Performed by: ORTHOPAEDIC SURGERY

## 2025-08-29 PROCEDURE — 97530 THERAPEUTIC ACTIVITIES: CPT | Mod: HCNC

## 2025-08-29 PROCEDURE — 25000003 PHARM REV CODE 250: Mod: HCNC | Performed by: ORTHOPAEDIC SURGERY

## 2025-08-29 PROCEDURE — 51701 INSERT BLADDER CATHETER: CPT | Mod: HCNC

## 2025-08-29 PROCEDURE — 51798 US URINE CAPACITY MEASURE: CPT | Mod: HCNC

## 2025-08-29 PROCEDURE — 94761 N-INVAS EAR/PLS OXIMETRY MLT: CPT | Mod: HCNC

## 2025-08-29 PROCEDURE — 99024 POSTOP FOLLOW-UP VISIT: CPT | Mod: HCNC,,, | Performed by: PHYSICIAN ASSISTANT

## 2025-08-29 PROCEDURE — 85025 COMPLETE CBC W/AUTO DIFF WBC: CPT | Mod: HCNC | Performed by: ORTHOPAEDIC SURGERY

## 2025-08-29 PROCEDURE — 83735 ASSAY OF MAGNESIUM: CPT | Mod: HCNC | Performed by: ORTHOPAEDIC SURGERY

## 2025-08-29 PROCEDURE — S4991 NICOTINE PATCH NONLEGEND: HCPCS | Mod: HCNC | Performed by: ORTHOPAEDIC SURGERY

## 2025-08-29 PROCEDURE — 63600175 PHARM REV CODE 636 W HCPCS: Mod: HCNC | Performed by: STUDENT IN AN ORGANIZED HEALTH CARE EDUCATION/TRAINING PROGRAM

## 2025-08-29 PROCEDURE — 11000001 HC ACUTE MED/SURG PRIVATE ROOM: Mod: HCNC

## 2025-08-29 PROCEDURE — 63600175 PHARM REV CODE 636 W HCPCS: Mod: HCNC | Performed by: FAMILY MEDICINE

## 2025-08-29 RX ORDER — METRONIDAZOLE 10 MG/G
GEL TOPICAL 2 TIMES DAILY
Status: DISCONTINUED | OUTPATIENT
Start: 2025-08-29 | End: 2025-09-02 | Stop reason: HOSPADM

## 2025-08-29 RX ORDER — OXYCODONE HYDROCHLORIDE 5 MG/1
5 TABLET ORAL EVERY 6 HOURS PRN
Refills: 0 | Status: DISCONTINUED | OUTPATIENT
Start: 2025-08-29 | End: 2025-08-31

## 2025-08-29 RX ORDER — HYDROCODONE BITARTRATE AND ACETAMINOPHEN 5; 325 MG/1; MG/1
1 TABLET ORAL EVERY 4 HOURS PRN
Refills: 0 | Status: DISCONTINUED | OUTPATIENT
Start: 2025-08-29 | End: 2025-08-31

## 2025-08-29 RX ORDER — MORPHINE SULFATE 2 MG/ML
2 INJECTION, SOLUTION INTRAMUSCULAR; INTRAVENOUS EVERY 4 HOURS PRN
Status: DISCONTINUED | OUTPATIENT
Start: 2025-08-29 | End: 2025-09-02 | Stop reason: HOSPADM

## 2025-08-29 RX ORDER — ENOXAPARIN SODIUM 100 MG/ML
40 INJECTION SUBCUTANEOUS EVERY 24 HOURS
Status: DISCONTINUED | OUTPATIENT
Start: 2025-08-29 | End: 2025-09-02 | Stop reason: HOSPADM

## 2025-08-29 RX ORDER — DOXYLAMINE SUCCINATE 25 MG
TABLET ORAL 2 TIMES DAILY
Status: DISCONTINUED | OUTPATIENT
Start: 2025-08-29 | End: 2025-09-02 | Stop reason: HOSPADM

## 2025-08-29 RX ADMIN — BUDESONIDE INHALATION 0.5 MG: 0.5 SUSPENSION RESPIRATORY (INHALATION) at 08:08

## 2025-08-29 RX ADMIN — HYDROCODONE BITARTRATE AND ACETAMINOPHEN 1 TABLET: 5; 325 TABLET ORAL at 04:08

## 2025-08-29 RX ADMIN — CHLORHEXIDINE GLUCONATE 0.12% ORAL RINSE 10 ML: 1.2 LIQUID ORAL at 10:08

## 2025-08-29 RX ADMIN — INSULIN ASPART 1 UNITS: 100 INJECTION, SOLUTION INTRAVENOUS; SUBCUTANEOUS at 12:08

## 2025-08-29 RX ADMIN — ENOXAPARIN SODIUM 40 MG: 40 INJECTION SUBCUTANEOUS at 06:08

## 2025-08-29 RX ADMIN — ARFORMOTEROL TARTRATE 15 MCG: 15 SOLUTION RESPIRATORY (INHALATION) at 08:08

## 2025-08-29 RX ADMIN — INSULIN ASPART 2 UNITS: 100 INJECTION, SOLUTION INTRAVENOUS; SUBCUTANEOUS at 06:08

## 2025-08-29 RX ADMIN — ASPIRIN 81 MG: 81 TABLET, DELAYED RELEASE ORAL at 10:08

## 2025-08-29 RX ADMIN — CARVEDILOL 6.25 MG: 6.25 TABLET, FILM COATED ORAL at 10:08

## 2025-08-29 RX ADMIN — SODIUM CHLORIDE, POTASSIUM CHLORIDE, SODIUM LACTATE AND CALCIUM CHLORIDE 1000 ML: 600; 310; 30; 20 INJECTION, SOLUTION INTRAVENOUS at 12:08

## 2025-08-29 RX ADMIN — NICOTINE 1 PATCH: 14 PATCH, EXTENDED RELEASE TRANSDERMAL at 10:08

## 2025-08-29 RX ADMIN — GABAPENTIN 300 MG: 300 CAPSULE ORAL at 08:08

## 2025-08-29 RX ADMIN — INSULIN ASPART 2 UNITS: 100 INJECTION, SOLUTION INTRAVENOUS; SUBCUTANEOUS at 12:08

## 2025-08-29 RX ADMIN — POTASSIUM BICARBONATE 25 MEQ: 978 TABLET, EFFERVESCENT ORAL at 12:08

## 2025-08-29 RX ADMIN — HYDROCODONE BITARTRATE AND ACETAMINOPHEN 1 TABLET: 5; 325 TABLET ORAL at 08:08

## 2025-08-29 RX ADMIN — METRONIDAZOLE: 10 GEL TOPICAL at 08:08

## 2025-08-29 RX ADMIN — CILOSTAZOL 50 MG: 50 TABLET ORAL at 10:08

## 2025-08-29 RX ADMIN — HYDROCODONE BITARTRATE AND ACETAMINOPHEN 1 TABLET: 10; 325 TABLET ORAL at 10:08

## 2025-08-29 RX ADMIN — MICONAZOLE NITRATE: 20 CREAM TOPICAL at 04:08

## 2025-08-29 RX ADMIN — FLUOXETINE HYDROCHLORIDE 20 MG: 20 CAPSULE ORAL at 10:08

## 2025-08-29 RX ADMIN — CILOSTAZOL 50 MG: 50 TABLET ORAL at 08:08

## 2025-08-29 RX ADMIN — HYDROCODONE BITARTRATE AND ACETAMINOPHEN 1 TABLET: 10; 325 TABLET ORAL at 04:08

## 2025-08-29 RX ADMIN — METRONIDAZOLE: 10 GEL TOPICAL at 04:08

## 2025-08-29 RX ADMIN — GABAPENTIN 300 MG: 300 CAPSULE ORAL at 10:08

## 2025-08-29 RX ADMIN — IPRATROPIUM BROMIDE AND ALBUTEROL SULFATE 3 ML: 2.5; .5 SOLUTION RESPIRATORY (INHALATION) at 01:08

## 2025-08-29 RX ADMIN — CARVEDILOL 6.25 MG: 6.25 TABLET, FILM COATED ORAL at 04:08

## 2025-08-29 RX ADMIN — POTASSIUM BICARBONATE 25 MEQ: 978 TABLET, EFFERVESCENT ORAL at 08:08

## 2025-08-29 RX ADMIN — CHLORHEXIDINE GLUCONATE 0.12% ORAL RINSE 10 ML: 1.2 LIQUID ORAL at 08:08

## 2025-08-30 LAB
ABO + RH BLD: NORMAL
ABSOLUTE EOSINOPHIL (OHS): 0.05 K/UL
ABSOLUTE MONOCYTE (OHS): 0.75 K/UL (ref 0.3–1)
ABSOLUTE NEUTROPHIL COUNT (OHS): 7.73 K/UL (ref 1.8–7.7)
ALBUMIN SERPL BCP-MCNC: 2.7 G/DL (ref 3.5–5.2)
ALP SERPL-CCNC: 66 UNIT/L (ref 40–150)
ALT SERPL W/O P-5'-P-CCNC: <8 UNIT/L (ref 10–44)
ANION GAP (OHS): 9 MMOL/L (ref 8–16)
AST SERPL-CCNC: 37 UNIT/L (ref 11–45)
BASOPHILS # BLD AUTO: 0.05 K/UL
BASOPHILS NFR BLD AUTO: 0.5 %
BILIRUB SERPL-MCNC: 0.5 MG/DL (ref 0.1–1)
BLD PROD TYP BPU: NORMAL
BLOOD UNIT EXPIRATION DATE: NORMAL
BLOOD UNIT TYPE CODE: 6200
BUN SERPL-MCNC: 27 MG/DL (ref 8–23)
CALCIUM SERPL-MCNC: 8.4 MG/DL (ref 8.7–10.5)
CHLORIDE SERPL-SCNC: 98 MMOL/L (ref 95–110)
CO2 SERPL-SCNC: 27 MMOL/L (ref 23–29)
CREAT SERPL-MCNC: 0.8 MG/DL (ref 0.5–1.4)
CROSSMATCH INTERPRETATION: NORMAL
DISPENSE STATUS: NORMAL
ERYTHROCYTE [DISTWIDTH] IN BLOOD BY AUTOMATED COUNT: 13 % (ref 11.5–14.5)
GFR SERPLBLD CREATININE-BSD FMLA CKD-EPI: >60 ML/MIN/1.73/M2
GLUCOSE SERPL-MCNC: 131 MG/DL (ref 70–110)
HCT VFR BLD AUTO: 22.1 % (ref 37–48.5)
HGB BLD-MCNC: 7.6 GM/DL (ref 12–16)
IMM GRANULOCYTES # BLD AUTO: 0.08 K/UL (ref 0–0.04)
IMM GRANULOCYTES NFR BLD AUTO: 0.8 % (ref 0–0.5)
INDIRECT COOMBS: NORMAL
LYMPHOCYTES # BLD AUTO: 1.13 K/UL (ref 1–4.8)
MAGNESIUM SERPL-MCNC: 2 MG/DL (ref 1.6–2.6)
MCH RBC QN AUTO: 31.1 PG (ref 27–31)
MCHC RBC AUTO-ENTMCNC: 34.4 G/DL (ref 32–36)
MCV RBC AUTO: 91 FL (ref 82–98)
NUCLEATED RBC (/100WBC) (OHS): 0 /100 WBC
PHOSPHATE SERPL-MCNC: 2.2 MG/DL (ref 2.7–4.5)
PLATELET # BLD AUTO: 279 K/UL (ref 150–450)
PMV BLD AUTO: 8.8 FL (ref 9.2–12.9)
POCT GLUCOSE: 130 MG/DL (ref 70–110)
POCT GLUCOSE: 138 MG/DL (ref 70–110)
POCT GLUCOSE: 145 MG/DL (ref 70–110)
POCT GLUCOSE: 167 MG/DL (ref 70–110)
POCT GLUCOSE: 174 MG/DL (ref 70–110)
POTASSIUM SERPL-SCNC: 3.6 MMOL/L (ref 3.5–5.1)
PROT SERPL-MCNC: 5.9 GM/DL (ref 6–8.4)
RBC # BLD AUTO: 2.44 M/UL (ref 4–5.4)
RELATIVE EOSINOPHIL (OHS): 0.5 %
RELATIVE LYMPHOCYTE (OHS): 11.5 % (ref 18–48)
RELATIVE MONOCYTE (OHS): 7.7 % (ref 4–15)
RELATIVE NEUTROPHIL (OHS): 79 % (ref 38–73)
RH BLD: NORMAL
SODIUM SERPL-SCNC: 134 MMOL/L (ref 136–145)
SPECIMEN OUTDATE: NORMAL
UNIT NUMBER: NORMAL
WBC # BLD AUTO: 9.79 K/UL (ref 3.9–12.7)

## 2025-08-30 PROCEDURE — 36430 TRANSFUSION BLD/BLD COMPNT: CPT | Mod: HCNC | Performed by: NURSE PRACTITIONER

## 2025-08-30 PROCEDURE — 36415 COLL VENOUS BLD VENIPUNCTURE: CPT | Mod: HCNC | Performed by: NURSE PRACTITIONER

## 2025-08-30 PROCEDURE — 80053 COMPREHEN METABOLIC PANEL: CPT | Mod: HCNC | Performed by: ORTHOPAEDIC SURGERY

## 2025-08-30 PROCEDURE — 99024 POSTOP FOLLOW-UP VISIT: CPT | Mod: HCNC,,, | Performed by: PHYSICIAN ASSISTANT

## 2025-08-30 PROCEDURE — 84100 ASSAY OF PHOSPHORUS: CPT | Mod: HCNC | Performed by: ORTHOPAEDIC SURGERY

## 2025-08-30 PROCEDURE — 25000003 PHARM REV CODE 250: Mod: HCNC | Performed by: ORTHOPAEDIC SURGERY

## 2025-08-30 PROCEDURE — 27000221 HC OXYGEN, UP TO 24 HOURS: Mod: HCNC

## 2025-08-30 PROCEDURE — 36415 COLL VENOUS BLD VENIPUNCTURE: CPT | Mod: HCNC | Performed by: ORTHOPAEDIC SURGERY

## 2025-08-30 PROCEDURE — 97530 THERAPEUTIC ACTIVITIES: CPT | Mod: HCNC,CQ

## 2025-08-30 PROCEDURE — 51798 US URINE CAPACITY MEASURE: CPT | Mod: HCNC

## 2025-08-30 PROCEDURE — 25000242 PHARM REV CODE 250 ALT 637 W/ HCPCS: Mod: HCNC | Performed by: ORTHOPAEDIC SURGERY

## 2025-08-30 PROCEDURE — 94761 N-INVAS EAR/PLS OXIMETRY MLT: CPT | Mod: HCNC

## 2025-08-30 PROCEDURE — 11000001 HC ACUTE MED/SURG PRIVATE ROOM: Mod: HCNC

## 2025-08-30 PROCEDURE — 83735 ASSAY OF MAGNESIUM: CPT | Mod: HCNC | Performed by: ORTHOPAEDIC SURGERY

## 2025-08-30 PROCEDURE — 99900035 HC TECH TIME PER 15 MIN (STAT): Mod: HCNC

## 2025-08-30 PROCEDURE — 63600175 PHARM REV CODE 636 W HCPCS: Mod: HCNC | Performed by: FAMILY MEDICINE

## 2025-08-30 PROCEDURE — 86920 COMPATIBILITY TEST SPIN: CPT | Mod: HCNC | Performed by: NURSE PRACTITIONER

## 2025-08-30 PROCEDURE — 86900 BLOOD TYPING SEROLOGIC ABO: CPT | Mod: HCNC | Performed by: NURSE PRACTITIONER

## 2025-08-30 PROCEDURE — 94640 AIRWAY INHALATION TREATMENT: CPT | Mod: HCNC

## 2025-08-30 PROCEDURE — P9016 RBC LEUKOCYTES REDUCED: HCPCS | Mod: HCNC | Performed by: NURSE PRACTITIONER

## 2025-08-30 PROCEDURE — 25000003 PHARM REV CODE 250: Mod: HCNC | Performed by: FAMILY MEDICINE

## 2025-08-30 PROCEDURE — S4991 NICOTINE PATCH NONLEGEND: HCPCS | Mod: HCNC | Performed by: ORTHOPAEDIC SURGERY

## 2025-08-30 PROCEDURE — 25000003 PHARM REV CODE 250: Mod: HCNC | Performed by: STUDENT IN AN ORGANIZED HEALTH CARE EDUCATION/TRAINING PROGRAM

## 2025-08-30 PROCEDURE — 85025 COMPLETE CBC W/AUTO DIFF WBC: CPT | Mod: HCNC | Performed by: ORTHOPAEDIC SURGERY

## 2025-08-30 RX ORDER — HYDROCODONE BITARTRATE AND ACETAMINOPHEN 500; 5 MG/1; MG/1
TABLET ORAL
Status: DISCONTINUED | OUTPATIENT
Start: 2025-08-30 | End: 2025-09-02 | Stop reason: HOSPADM

## 2025-08-30 RX ADMIN — INSULIN ASPART 2 UNITS: 100 INJECTION, SOLUTION INTRAVENOUS; SUBCUTANEOUS at 05:08

## 2025-08-30 RX ADMIN — HYDROCODONE BITARTRATE AND ACETAMINOPHEN 1 TABLET: 5; 325 TABLET ORAL at 03:08

## 2025-08-30 RX ADMIN — BUDESONIDE INHALATION 0.5 MG: 0.5 SUSPENSION RESPIRATORY (INHALATION) at 07:08

## 2025-08-30 RX ADMIN — FLUOXETINE HYDROCHLORIDE 20 MG: 20 CAPSULE ORAL at 09:08

## 2025-08-30 RX ADMIN — CARVEDILOL 6.25 MG: 6.25 TABLET, FILM COATED ORAL at 07:08

## 2025-08-30 RX ADMIN — INSULIN ASPART 2 UNITS: 100 INJECTION, SOLUTION INTRAVENOUS; SUBCUTANEOUS at 04:08

## 2025-08-30 RX ADMIN — GABAPENTIN 300 MG: 300 CAPSULE ORAL at 08:08

## 2025-08-30 RX ADMIN — CHLORHEXIDINE GLUCONATE 0.12% ORAL RINSE 10 ML: 1.2 LIQUID ORAL at 08:08

## 2025-08-30 RX ADMIN — HYDROCODONE BITARTRATE AND ACETAMINOPHEN 1 TABLET: 5; 325 TABLET ORAL at 10:08

## 2025-08-30 RX ADMIN — GABAPENTIN 300 MG: 300 CAPSULE ORAL at 09:08

## 2025-08-30 RX ADMIN — CILOSTAZOL 50 MG: 50 TABLET ORAL at 09:08

## 2025-08-30 RX ADMIN — ARFORMOTEROL TARTRATE 15 MCG: 15 SOLUTION RESPIRATORY (INHALATION) at 08:08

## 2025-08-30 RX ADMIN — HYDROCODONE BITARTRATE AND ACETAMINOPHEN 1 TABLET: 5; 325 TABLET ORAL at 07:08

## 2025-08-30 RX ADMIN — MICONAZOLE NITRATE: 20 CREAM TOPICAL at 04:08

## 2025-08-30 RX ADMIN — CARVEDILOL 6.25 MG: 6.25 TABLET, FILM COATED ORAL at 04:08

## 2025-08-30 RX ADMIN — CILOSTAZOL 50 MG: 50 TABLET ORAL at 08:08

## 2025-08-30 RX ADMIN — HYDROCODONE BITARTRATE AND ACETAMINOPHEN 1 TABLET: 5; 325 TABLET ORAL at 04:08

## 2025-08-30 RX ADMIN — ENOXAPARIN SODIUM 40 MG: 40 INJECTION SUBCUTANEOUS at 04:08

## 2025-08-30 RX ADMIN — METRONIDAZOLE: 10 GEL TOPICAL at 08:08

## 2025-08-30 RX ADMIN — BUDESONIDE INHALATION 0.5 MG: 0.5 SUSPENSION RESPIRATORY (INHALATION) at 08:08

## 2025-08-30 RX ADMIN — ARFORMOTEROL TARTRATE 15 MCG: 15 SOLUTION RESPIRATORY (INHALATION) at 07:08

## 2025-08-30 RX ADMIN — NICOTINE 1 PATCH: 14 PATCH, EXTENDED RELEASE TRANSDERMAL at 09:08

## 2025-08-30 RX ADMIN — POTASSIUM BICARBONATE 25 MEQ: 978 TABLET, EFFERVESCENT ORAL at 08:08

## 2025-08-30 RX ADMIN — CHLORHEXIDINE GLUCONATE 0.12% ORAL RINSE 10 ML: 1.2 LIQUID ORAL at 09:08

## 2025-08-30 RX ADMIN — ASPIRIN 81 MG: 81 TABLET, DELAYED RELEASE ORAL at 09:08

## 2025-08-30 RX ADMIN — POTASSIUM BICARBONATE 25 MEQ: 978 TABLET, EFFERVESCENT ORAL at 09:08

## 2025-08-31 PROBLEM — D62 ACUTE BLOOD LOSS ANEMIA: Status: ACTIVE | Noted: 2025-08-31

## 2025-08-31 LAB
ABSOLUTE EOSINOPHIL (OHS): 0.25 K/UL
ABSOLUTE MONOCYTE (OHS): 0.86 K/UL (ref 0.3–1)
ABSOLUTE NEUTROPHIL COUNT (OHS): 4.51 K/UL (ref 1.8–7.7)
ALBUMIN SERPL BCP-MCNC: 2.8 G/DL (ref 3.5–5.2)
ALP SERPL-CCNC: 61 UNIT/L (ref 40–150)
ALT SERPL W/O P-5'-P-CCNC: <8 UNIT/L (ref 10–44)
ANION GAP (OHS): 9 MMOL/L (ref 8–16)
AST SERPL-CCNC: 20 UNIT/L (ref 11–45)
BASOPHILS # BLD AUTO: 0.05 K/UL
BASOPHILS NFR BLD AUTO: 0.7 %
BILIRUB SERPL-MCNC: 0.7 MG/DL (ref 0.1–1)
BUN SERPL-MCNC: 20 MG/DL (ref 8–23)
CALCIUM SERPL-MCNC: 8.5 MG/DL (ref 8.7–10.5)
CHLORIDE SERPL-SCNC: 99 MMOL/L (ref 95–110)
CO2 SERPL-SCNC: 26 MMOL/L (ref 23–29)
CREAT SERPL-MCNC: 0.8 MG/DL (ref 0.5–1.4)
ERYTHROCYTE [DISTWIDTH] IN BLOOD BY AUTOMATED COUNT: 12.9 % (ref 11.5–14.5)
GFR SERPLBLD CREATININE-BSD FMLA CKD-EPI: >60 ML/MIN/1.73/M2
GLUCOSE SERPL-MCNC: 117 MG/DL (ref 70–110)
HCT VFR BLD AUTO: 26.7 % (ref 37–48.5)
HGB BLD-MCNC: 9 GM/DL (ref 12–16)
IMM GRANULOCYTES # BLD AUTO: 0.09 K/UL (ref 0–0.04)
IMM GRANULOCYTES NFR BLD AUTO: 1.3 % (ref 0–0.5)
LYMPHOCYTES # BLD AUTO: 1.32 K/UL (ref 1–4.8)
MAGNESIUM SERPL-MCNC: 2 MG/DL (ref 1.6–2.6)
MCH RBC QN AUTO: 31.7 PG (ref 27–31)
MCHC RBC AUTO-ENTMCNC: 33.7 G/DL (ref 32–36)
MCV RBC AUTO: 94 FL (ref 82–98)
NUCLEATED RBC (/100WBC) (OHS): 0 /100 WBC
PHOSPHATE SERPL-MCNC: 2.5 MG/DL (ref 2.7–4.5)
PLATELET # BLD AUTO: 277 K/UL (ref 150–450)
PMV BLD AUTO: 9.1 FL (ref 9.2–12.9)
POCT GLUCOSE: 120 MG/DL (ref 70–110)
POCT GLUCOSE: 125 MG/DL (ref 70–110)
POCT GLUCOSE: 126 MG/DL (ref 70–110)
POCT GLUCOSE: 129 MG/DL (ref 70–110)
POTASSIUM SERPL-SCNC: 4 MMOL/L (ref 3.5–5.1)
PROT SERPL-MCNC: 6.2 GM/DL (ref 6–8.4)
RBC # BLD AUTO: 2.84 M/UL (ref 4–5.4)
RELATIVE EOSINOPHIL (OHS): 3.5 %
RELATIVE LYMPHOCYTE (OHS): 18.6 % (ref 18–48)
RELATIVE MONOCYTE (OHS): 12.1 % (ref 4–15)
RELATIVE NEUTROPHIL (OHS): 63.8 % (ref 38–73)
SODIUM SERPL-SCNC: 134 MMOL/L (ref 136–145)
WBC # BLD AUTO: 7.08 K/UL (ref 3.9–12.7)

## 2025-08-31 PROCEDURE — 25000003 PHARM REV CODE 250: Mod: HCNC | Performed by: STUDENT IN AN ORGANIZED HEALTH CARE EDUCATION/TRAINING PROGRAM

## 2025-08-31 PROCEDURE — 11000001 HC ACUTE MED/SURG PRIVATE ROOM: Mod: HCNC

## 2025-08-31 PROCEDURE — 85025 COMPLETE CBC W/AUTO DIFF WBC: CPT | Mod: HCNC | Performed by: ORTHOPAEDIC SURGERY

## 2025-08-31 PROCEDURE — 36415 COLL VENOUS BLD VENIPUNCTURE: CPT | Mod: HCNC | Performed by: ORTHOPAEDIC SURGERY

## 2025-08-31 PROCEDURE — 63600175 PHARM REV CODE 636 W HCPCS: Mod: HCNC | Performed by: FAMILY MEDICINE

## 2025-08-31 PROCEDURE — 84450 TRANSFERASE (AST) (SGOT): CPT | Mod: HCNC | Performed by: ORTHOPAEDIC SURGERY

## 2025-08-31 PROCEDURE — 27000221 HC OXYGEN, UP TO 24 HOURS: Mod: HCNC

## 2025-08-31 PROCEDURE — 94761 N-INVAS EAR/PLS OXIMETRY MLT: CPT | Mod: HCNC

## 2025-08-31 PROCEDURE — 25000242 PHARM REV CODE 250 ALT 637 W/ HCPCS: Mod: HCNC | Performed by: FAMILY MEDICINE

## 2025-08-31 PROCEDURE — 25000003 PHARM REV CODE 250: Mod: HCNC | Performed by: ORTHOPAEDIC SURGERY

## 2025-08-31 PROCEDURE — 99900035 HC TECH TIME PER 15 MIN (STAT): Mod: HCNC

## 2025-08-31 PROCEDURE — 25000242 PHARM REV CODE 250 ALT 637 W/ HCPCS: Mod: HCNC | Performed by: ORTHOPAEDIC SURGERY

## 2025-08-31 PROCEDURE — S4991 NICOTINE PATCH NONLEGEND: HCPCS | Mod: HCNC | Performed by: ORTHOPAEDIC SURGERY

## 2025-08-31 PROCEDURE — 83735 ASSAY OF MAGNESIUM: CPT | Mod: HCNC | Performed by: ORTHOPAEDIC SURGERY

## 2025-08-31 PROCEDURE — 84100 ASSAY OF PHOSPHORUS: CPT | Mod: HCNC | Performed by: ORTHOPAEDIC SURGERY

## 2025-08-31 PROCEDURE — 25000003 PHARM REV CODE 250: Mod: HCNC | Performed by: FAMILY MEDICINE

## 2025-08-31 PROCEDURE — 30233N1 TRANSFUSION OF NONAUTOLOGOUS RED BLOOD CELLS INTO PERIPHERAL VEIN, PERCUTANEOUS APPROACH: ICD-10-PCS | Performed by: FAMILY MEDICINE

## 2025-08-31 PROCEDURE — 94640 AIRWAY INHALATION TREATMENT: CPT | Mod: HCNC

## 2025-08-31 RX ORDER — IPRATROPIUM BROMIDE AND ALBUTEROL SULFATE 2.5; .5 MG/3ML; MG/3ML
3 SOLUTION RESPIRATORY (INHALATION)
Status: DISCONTINUED | OUTPATIENT
Start: 2025-08-31 | End: 2025-09-02 | Stop reason: HOSPADM

## 2025-08-31 RX ORDER — OXYCODONE AND ACETAMINOPHEN 5; 325 MG/1; MG/1
1 TABLET ORAL EVERY 4 HOURS PRN
Refills: 0 | Status: DISCONTINUED | OUTPATIENT
Start: 2025-08-31 | End: 2025-09-02 | Stop reason: HOSPADM

## 2025-08-31 RX ADMIN — CILOSTAZOL 50 MG: 50 TABLET ORAL at 09:08

## 2025-08-31 RX ADMIN — IPRATROPIUM BROMIDE AND ALBUTEROL SULFATE 3 ML: 2.5; .5 SOLUTION RESPIRATORY (INHALATION) at 08:08

## 2025-08-31 RX ADMIN — CARVEDILOL 6.25 MG: 6.25 TABLET, FILM COATED ORAL at 09:08

## 2025-08-31 RX ADMIN — METRONIDAZOLE: 10 GEL TOPICAL at 12:08

## 2025-08-31 RX ADMIN — BUDESONIDE INHALATION 0.5 MG: 0.5 SUSPENSION RESPIRATORY (INHALATION) at 08:08

## 2025-08-31 RX ADMIN — GABAPENTIN 300 MG: 300 CAPSULE ORAL at 09:08

## 2025-08-31 RX ADMIN — BUDESONIDE INHALATION 0.5 MG: 0.5 SUSPENSION RESPIRATORY (INHALATION) at 07:08

## 2025-08-31 RX ADMIN — POTASSIUM BICARBONATE 25 MEQ: 978 TABLET, EFFERVESCENT ORAL at 09:08

## 2025-08-31 RX ADMIN — CHLORHEXIDINE GLUCONATE 0.12% ORAL RINSE 10 ML: 1.2 LIQUID ORAL at 09:08

## 2025-08-31 RX ADMIN — CARVEDILOL 6.25 MG: 6.25 TABLET, FILM COATED ORAL at 04:08

## 2025-08-31 RX ADMIN — OXYCODONE HYDROCHLORIDE AND ACETAMINOPHEN 1 TABLET: 5; 325 TABLET ORAL at 04:08

## 2025-08-31 RX ADMIN — ASPIRIN 81 MG: 81 TABLET, DELAYED RELEASE ORAL at 09:08

## 2025-08-31 RX ADMIN — HYDROCODONE BITARTRATE AND ACETAMINOPHEN 1 TABLET: 5; 325 TABLET ORAL at 12:08

## 2025-08-31 RX ADMIN — MICONAZOLE NITRATE: 20 CREAM TOPICAL at 09:08

## 2025-08-31 RX ADMIN — OXYCODONE HYDROCHLORIDE AND ACETAMINOPHEN 1 TABLET: 5; 325 TABLET ORAL at 09:08

## 2025-08-31 RX ADMIN — MICONAZOLE NITRATE: 20 CREAM TOPICAL at 04:08

## 2025-08-31 RX ADMIN — ARFORMOTEROL TARTRATE 15 MCG: 15 SOLUTION RESPIRATORY (INHALATION) at 07:08

## 2025-08-31 RX ADMIN — METRONIDAZOLE: 10 GEL TOPICAL at 09:08

## 2025-08-31 RX ADMIN — FLUOXETINE HYDROCHLORIDE 20 MG: 20 CAPSULE ORAL at 09:08

## 2025-08-31 RX ADMIN — ARFORMOTEROL TARTRATE 15 MCG: 15 SOLUTION RESPIRATORY (INHALATION) at 08:08

## 2025-08-31 RX ADMIN — NICOTINE 1 PATCH: 14 PATCH, EXTENDED RELEASE TRANSDERMAL at 09:08

## 2025-08-31 RX ADMIN — ENOXAPARIN SODIUM 40 MG: 40 INJECTION SUBCUTANEOUS at 04:08

## 2025-09-01 LAB
ABSOLUTE EOSINOPHIL (OHS): 0.41 K/UL
ABSOLUTE MONOCYTE (OHS): 0.99 K/UL (ref 0.3–1)
ABSOLUTE NEUTROPHIL COUNT (OHS): 3.6 K/UL (ref 1.8–7.7)
ALBUMIN SERPL BCP-MCNC: 2.7 G/DL (ref 3.5–5.2)
ALP SERPL-CCNC: 58 UNIT/L (ref 40–150)
ALT SERPL W/O P-5'-P-CCNC: <8 UNIT/L (ref 10–44)
ANION GAP (OHS): 7 MMOL/L (ref 8–16)
AST SERPL-CCNC: 18 UNIT/L (ref 11–45)
BASOPHILS # BLD AUTO: 0.06 K/UL
BASOPHILS NFR BLD AUTO: 0.9 %
BILIRUB SERPL-MCNC: 0.6 MG/DL (ref 0.1–1)
BUN SERPL-MCNC: 23 MG/DL (ref 8–23)
CALCIUM SERPL-MCNC: 8.7 MG/DL (ref 8.7–10.5)
CHLORIDE SERPL-SCNC: 99 MMOL/L (ref 95–110)
CO2 SERPL-SCNC: 27 MMOL/L (ref 23–29)
CREAT SERPL-MCNC: 0.8 MG/DL (ref 0.5–1.4)
ERYTHROCYTE [DISTWIDTH] IN BLOOD BY AUTOMATED COUNT: 13 % (ref 11.5–14.5)
GFR SERPLBLD CREATININE-BSD FMLA CKD-EPI: >60 ML/MIN/1.73/M2
GLUCOSE SERPL-MCNC: 126 MG/DL (ref 70–110)
HCT VFR BLD AUTO: 26.8 % (ref 37–48.5)
HGB BLD-MCNC: 8.9 GM/DL (ref 12–16)
IMM GRANULOCYTES # BLD AUTO: 0.12 K/UL (ref 0–0.04)
IMM GRANULOCYTES NFR BLD AUTO: 1.8 % (ref 0–0.5)
LYMPHOCYTES # BLD AUTO: 1.55 K/UL (ref 1–4.8)
MAGNESIUM SERPL-MCNC: 2 MG/DL (ref 1.6–2.6)
MCH RBC QN AUTO: 31.4 PG (ref 27–31)
MCHC RBC AUTO-ENTMCNC: 33.2 G/DL (ref 32–36)
MCV RBC AUTO: 95 FL (ref 82–98)
NUCLEATED RBC (/100WBC) (OHS): 0 /100 WBC
PHOSPHATE SERPL-MCNC: 3.3 MG/DL (ref 2.7–4.5)
PLATELET # BLD AUTO: 306 K/UL (ref 150–450)
PMV BLD AUTO: 8.7 FL (ref 9.2–12.9)
POCT GLUCOSE: 133 MG/DL (ref 70–110)
POCT GLUCOSE: 134 MG/DL (ref 70–110)
POCT GLUCOSE: 140 MG/DL (ref 70–110)
POCT GLUCOSE: 158 MG/DL (ref 70–110)
POTASSIUM SERPL-SCNC: 4.4 MMOL/L (ref 3.5–5.1)
PROT SERPL-MCNC: 6.1 GM/DL (ref 6–8.4)
RBC # BLD AUTO: 2.83 M/UL (ref 4–5.4)
RELATIVE EOSINOPHIL (OHS): 6.1 %
RELATIVE LYMPHOCYTE (OHS): 23 % (ref 18–48)
RELATIVE MONOCYTE (OHS): 14.7 % (ref 4–15)
RELATIVE NEUTROPHIL (OHS): 53.5 % (ref 38–73)
SODIUM SERPL-SCNC: 133 MMOL/L (ref 136–145)
WBC # BLD AUTO: 6.73 K/UL (ref 3.9–12.7)

## 2025-09-01 PROCEDURE — 25000003 PHARM REV CODE 250: Mod: HCNC | Performed by: STUDENT IN AN ORGANIZED HEALTH CARE EDUCATION/TRAINING PROGRAM

## 2025-09-01 PROCEDURE — 97530 THERAPEUTIC ACTIVITIES: CPT | Mod: HCNC

## 2025-09-01 PROCEDURE — 97116 GAIT TRAINING THERAPY: CPT | Mod: HCNC

## 2025-09-01 PROCEDURE — 83735 ASSAY OF MAGNESIUM: CPT | Mod: HCNC | Performed by: ORTHOPAEDIC SURGERY

## 2025-09-01 PROCEDURE — 84100 ASSAY OF PHOSPHORUS: CPT | Mod: HCNC | Performed by: ORTHOPAEDIC SURGERY

## 2025-09-01 PROCEDURE — 99900035 HC TECH TIME PER 15 MIN (STAT): Mod: HCNC

## 2025-09-01 PROCEDURE — 25000003 PHARM REV CODE 250: Mod: HCNC | Performed by: ORTHOPAEDIC SURGERY

## 2025-09-01 PROCEDURE — 27000221 HC OXYGEN, UP TO 24 HOURS: Mod: HCNC

## 2025-09-01 PROCEDURE — 94640 AIRWAY INHALATION TREATMENT: CPT | Mod: HCNC

## 2025-09-01 PROCEDURE — 63600175 PHARM REV CODE 636 W HCPCS: Mod: HCNC | Performed by: FAMILY MEDICINE

## 2025-09-01 PROCEDURE — 51702 INSERT TEMP BLADDER CATH: CPT | Mod: HCNC

## 2025-09-01 PROCEDURE — 25000242 PHARM REV CODE 250 ALT 637 W/ HCPCS: Mod: HCNC | Performed by: ORTHOPAEDIC SURGERY

## 2025-09-01 PROCEDURE — 11000001 HC ACUTE MED/SURG PRIVATE ROOM: Mod: HCNC

## 2025-09-01 PROCEDURE — 85025 COMPLETE CBC W/AUTO DIFF WBC: CPT | Mod: HCNC | Performed by: ORTHOPAEDIC SURGERY

## 2025-09-01 PROCEDURE — 51701 INSERT BLADDER CATHETER: CPT | Mod: HCNC

## 2025-09-01 PROCEDURE — 80053 COMPREHEN METABOLIC PANEL: CPT | Mod: HCNC | Performed by: ORTHOPAEDIC SURGERY

## 2025-09-01 PROCEDURE — 94761 N-INVAS EAR/PLS OXIMETRY MLT: CPT | Mod: HCNC

## 2025-09-01 PROCEDURE — 36415 COLL VENOUS BLD VENIPUNCTURE: CPT | Mod: HCNC | Performed by: ORTHOPAEDIC SURGERY

## 2025-09-01 PROCEDURE — S4991 NICOTINE PATCH NONLEGEND: HCPCS | Mod: HCNC | Performed by: ORTHOPAEDIC SURGERY

## 2025-09-01 PROCEDURE — 25000242 PHARM REV CODE 250 ALT 637 W/ HCPCS: Mod: HCNC | Performed by: FAMILY MEDICINE

## 2025-09-01 PROCEDURE — 51798 US URINE CAPACITY MEASURE: CPT | Mod: HCNC

## 2025-09-01 RX ADMIN — BUDESONIDE INHALATION 0.5 MG: 0.5 SUSPENSION RESPIRATORY (INHALATION) at 07:09

## 2025-09-01 RX ADMIN — GABAPENTIN 300 MG: 300 CAPSULE ORAL at 09:09

## 2025-09-01 RX ADMIN — ARFORMOTEROL TARTRATE 15 MCG: 15 SOLUTION RESPIRATORY (INHALATION) at 07:09

## 2025-09-01 RX ADMIN — ASPIRIN 81 MG: 81 TABLET, DELAYED RELEASE ORAL at 08:09

## 2025-09-01 RX ADMIN — OXYCODONE HYDROCHLORIDE AND ACETAMINOPHEN 1 TABLET: 5; 325 TABLET ORAL at 07:09

## 2025-09-01 RX ADMIN — METRONIDAZOLE: 10 GEL TOPICAL at 09:09

## 2025-09-01 RX ADMIN — IPRATROPIUM BROMIDE AND ALBUTEROL SULFATE 3 ML: 2.5; .5 SOLUTION RESPIRATORY (INHALATION) at 02:09

## 2025-09-01 RX ADMIN — CARVEDILOL 6.25 MG: 6.25 TABLET, FILM COATED ORAL at 08:09

## 2025-09-01 RX ADMIN — OXYCODONE HYDROCHLORIDE AND ACETAMINOPHEN 1 TABLET: 5; 325 TABLET ORAL at 03:09

## 2025-09-01 RX ADMIN — OXYCODONE HYDROCHLORIDE AND ACETAMINOPHEN 1 TABLET: 5; 325 TABLET ORAL at 11:09

## 2025-09-01 RX ADMIN — CARVEDILOL 6.25 MG: 6.25 TABLET, FILM COATED ORAL at 05:09

## 2025-09-01 RX ADMIN — FLUOXETINE HYDROCHLORIDE 20 MG: 20 CAPSULE ORAL at 08:09

## 2025-09-01 RX ADMIN — CILOSTAZOL 50 MG: 50 TABLET ORAL at 08:09

## 2025-09-01 RX ADMIN — CILOSTAZOL 50 MG: 50 TABLET ORAL at 09:09

## 2025-09-01 RX ADMIN — GABAPENTIN 300 MG: 300 CAPSULE ORAL at 08:09

## 2025-09-01 RX ADMIN — METRONIDAZOLE: 10 GEL TOPICAL at 01:09

## 2025-09-01 RX ADMIN — IPRATROPIUM BROMIDE AND ALBUTEROL SULFATE 3 ML: 2.5; .5 SOLUTION RESPIRATORY (INHALATION) at 07:09

## 2025-09-01 RX ADMIN — CHLORHEXIDINE GLUCONATE 0.12% ORAL RINSE 10 ML: 1.2 LIQUID ORAL at 08:09

## 2025-09-01 RX ADMIN — MICONAZOLE NITRATE: 20 CREAM TOPICAL at 08:09

## 2025-09-01 RX ADMIN — MICONAZOLE NITRATE: 20 CREAM TOPICAL at 05:09

## 2025-09-01 RX ADMIN — OXYCODONE HYDROCHLORIDE AND ACETAMINOPHEN 1 TABLET: 5; 325 TABLET ORAL at 01:09

## 2025-09-01 RX ADMIN — ENOXAPARIN SODIUM 40 MG: 40 INJECTION SUBCUTANEOUS at 05:09

## 2025-09-01 RX ADMIN — NICOTINE 1 PATCH: 14 PATCH, EXTENDED RELEASE TRANSDERMAL at 08:09

## 2025-09-02 VITALS
OXYGEN SATURATION: 91 % | WEIGHT: 162.38 LBS | BODY MASS INDEX: 27.72 KG/M2 | DIASTOLIC BLOOD PRESSURE: 68 MMHG | HEART RATE: 78 BPM | RESPIRATION RATE: 20 BRPM | HEIGHT: 64 IN | TEMPERATURE: 99 F | SYSTOLIC BLOOD PRESSURE: 152 MMHG

## 2025-09-02 DIAGNOSIS — M79.605 LEFT LEG PAIN: Primary | ICD-10-CM

## 2025-09-02 LAB
POCT GLUCOSE: 116 MG/DL (ref 70–110)
POCT GLUCOSE: 144 MG/DL (ref 70–110)
POCT GLUCOSE: 159 MG/DL (ref 70–110)

## 2025-09-02 PROCEDURE — 25000003 PHARM REV CODE 250: Mod: HCNC | Performed by: ORTHOPAEDIC SURGERY

## 2025-09-02 PROCEDURE — 25000242 PHARM REV CODE 250 ALT 637 W/ HCPCS: Mod: HCNC | Performed by: FAMILY MEDICINE

## 2025-09-02 PROCEDURE — 25000003 PHARM REV CODE 250: Mod: HCNC

## 2025-09-02 PROCEDURE — 25000003 PHARM REV CODE 250: Mod: HCNC | Performed by: STUDENT IN AN ORGANIZED HEALTH CARE EDUCATION/TRAINING PROGRAM

## 2025-09-02 PROCEDURE — 25000242 PHARM REV CODE 250 ALT 637 W/ HCPCS: Mod: HCNC | Performed by: ORTHOPAEDIC SURGERY

## 2025-09-02 PROCEDURE — 94640 AIRWAY INHALATION TREATMENT: CPT | Mod: HCNC

## 2025-09-02 PROCEDURE — S4991 NICOTINE PATCH NONLEGEND: HCPCS | Mod: HCNC | Performed by: ORTHOPAEDIC SURGERY

## 2025-09-02 RX ORDER — INSULIN ASPART 100 [IU]/ML
0-10 INJECTION, SOLUTION INTRAVENOUS; SUBCUTANEOUS EVERY 6 HOURS PRN
Start: 2025-09-02 | End: 2026-09-02

## 2025-09-02 RX ORDER — TAMSULOSIN HYDROCHLORIDE 0.4 MG/1
0.4 CAPSULE ORAL DAILY
Status: DISCONTINUED | OUTPATIENT
Start: 2025-09-02 | End: 2025-09-02 | Stop reason: HOSPADM

## 2025-09-02 RX ORDER — IBUPROFEN 200 MG
1 TABLET ORAL DAILY
Start: 2025-09-02 | End: 2025-10-02

## 2025-09-02 RX ORDER — TAMSULOSIN HYDROCHLORIDE 0.4 MG/1
0.4 CAPSULE ORAL DAILY
Start: 2025-09-03 | End: 2025-10-03

## 2025-09-02 RX ORDER — OXYCODONE AND ACETAMINOPHEN 5; 325 MG/1; MG/1
1 TABLET ORAL EVERY 6 HOURS PRN
Qty: 4 TABLET | Refills: 0 | Status: SHIPPED | OUTPATIENT
Start: 2025-09-02

## 2025-09-02 RX ORDER — ENOXAPARIN SODIUM 100 MG/ML
40 INJECTION SUBCUTANEOUS DAILY
Start: 2025-09-02 | End: 2025-10-02

## 2025-09-02 RX ADMIN — MICONAZOLE NITRATE: 20 CREAM TOPICAL at 08:09

## 2025-09-02 RX ADMIN — IPRATROPIUM BROMIDE AND ALBUTEROL SULFATE 3 ML: 2.5; .5 SOLUTION RESPIRATORY (INHALATION) at 07:09

## 2025-09-02 RX ADMIN — GABAPENTIN 300 MG: 300 CAPSULE ORAL at 08:09

## 2025-09-02 RX ADMIN — TAMSULOSIN HYDROCHLORIDE 0.4 MG: 0.4 CAPSULE ORAL at 08:09

## 2025-09-02 RX ADMIN — CILOSTAZOL 50 MG: 50 TABLET ORAL at 08:09

## 2025-09-02 RX ADMIN — ARFORMOTEROL TARTRATE 15 MCG: 15 SOLUTION RESPIRATORY (INHALATION) at 07:09

## 2025-09-02 RX ADMIN — FLUOXETINE HYDROCHLORIDE 20 MG: 20 CAPSULE ORAL at 08:09

## 2025-09-02 RX ADMIN — BUDESONIDE INHALATION 0.5 MG: 0.5 SUSPENSION RESPIRATORY (INHALATION) at 07:09

## 2025-09-02 RX ADMIN — NICOTINE 1 PATCH: 14 PATCH, EXTENDED RELEASE TRANSDERMAL at 08:09

## 2025-09-02 RX ADMIN — ASPIRIN 81 MG: 81 TABLET, DELAYED RELEASE ORAL at 08:09

## 2025-09-02 RX ADMIN — OXYCODONE HYDROCHLORIDE AND ACETAMINOPHEN 1 TABLET: 5; 325 TABLET ORAL at 08:09

## 2025-09-02 RX ADMIN — CARVEDILOL 6.25 MG: 6.25 TABLET, FILM COATED ORAL at 08:09

## (undated) DEVICE — SUT PROLENE BL MONO 5.0

## (undated) DEVICE — DRAPE C-ARMOR EQUIPMENT COVER

## (undated) DEVICE — SPONGE COTTON TRAY 4X4IN

## (undated) DEVICE — YANKAUER FLEX NO VENT REG CAP

## (undated) DEVICE — SUT PROLENE 6-0 C-1 30IN BL

## (undated) DEVICE — KIT PREVENA INCISION MGMT 13CM

## (undated) DEVICE — DRAPE THREE-QTR REINF 53X77IN

## (undated) DEVICE — DRAPE INCISE IOBAN 2 23X33IN

## (undated) DEVICE — SYR 1CC TB SG 27GX1/2

## (undated) DEVICE — SPONGE LAP 18X18 PREWASHED

## (undated) DEVICE — SUT SILK 2-0 STRANDS 30IN

## (undated) DEVICE — TAPE SILK 3IN

## (undated) DEVICE — SUT VICRYL 2-0 CT-2 VCP269H

## (undated) DEVICE — DRAPE U SPLIT SHEET 54X76IN

## (undated) DEVICE — SUT 3-0 MONOCRYL PLUS PS-2

## (undated) DEVICE — SUT PROLENE 5/0 RB-1 36 IN

## (undated) DEVICE — GOWN POLY REINF BRTH SLV XL

## (undated) DEVICE — DRESSING TRANS 4X4 TEGADERM

## (undated) DEVICE — HEMOSTAT SURGICEL 4X8IN

## (undated) DEVICE — DEVICE MYNX GRIP 6/7F

## (undated) DEVICE — CATH ANGIO SOFT VU 5FR 65CM

## (undated) DEVICE — CATH NAVICROSS .035X135CM STR

## (undated) DEVICE — SHEATH DESTINATION 6FR 45CM

## (undated) DEVICE — IMPLANTABLE DEVICE
Type: IMPLANTABLE DEVICE | Site: FEMUR | Status: NON-FUNCTIONAL
Removed: 2025-08-27

## (undated) DEVICE — SUT 2/0 30IN SILK BLK BRAI

## (undated) DEVICE — SET EXTENSION STERILE 30IN

## (undated) DEVICE — DRAPE MOBILE C-ARM

## (undated) DEVICE — PACK HEART CATH BR

## (undated) DEVICE — SUT SILK 3-0 STRANDS 30IN

## (undated) DEVICE — SOL NACL 0.9% INJ 500ML BG

## (undated) DEVICE — SYR ONLY LUER LOCK 20CC

## (undated) DEVICE — Device

## (undated) DEVICE — CATH IMPULSE 6FR MULTI-PAK

## (undated) DEVICE — CONTRAST VISIPAQUE 50ML

## (undated) DEVICE — SUT PROLENE 5-0 C-1 8717H

## (undated) DEVICE — PACK BASIC SETUP SC BR

## (undated) DEVICE — KIT PT CARE HANA PROFX SSXT

## (undated) DEVICE — TAPE SURGICAL MICROFOAM 3IN

## (undated) DEVICE — LOOP VESSEL MAXI RED

## (undated) DEVICE — CLIP LIGATING TITANIUM SMALL

## (undated) DEVICE — BOOT SUTURE AID

## (undated) DEVICE — MANIFOLD 4 PORT

## (undated) DEVICE — CATH IV 20GAX1.25 JELCO

## (undated) DEVICE — SHEATH INTRODUCER 5FR 10CM

## (undated) DEVICE — ELECTRODE REM PLYHSV RETURN 9

## (undated) DEVICE — COVER PROXIMA MAYO STAND

## (undated) DEVICE — SUT VICRYL 2-0 27 CT-1

## (undated) DEVICE — SUT VICRYL 3-0 27 CT-1

## (undated) DEVICE — DRAPE THYROID SOFT STERILE

## (undated) DEVICE — TOWEL OR DISP STRL BLUE 4/PK

## (undated) DEVICE — GLOVE SURGICAL LATEX SZ 6.5

## (undated) DEVICE — SYR IRRIGATION BULB STER 60ML

## (undated) DEVICE — DRAPE INSTR MAGNETIC 10X16IN

## (undated) DEVICE — PAD CURAD NONADH 3X4IN

## (undated) DEVICE — APPLICATOR CHLORAPREP ORN 26ML

## (undated) DEVICE — GAUZE SPONGE PEANUT STRL

## (undated) DEVICE — COVER LIGHT HANDLE 80/CA

## (undated) DEVICE — DRAPE STERI INSTRUMENT 1018

## (undated) DEVICE — SYR LUER LOCK STERILE 10ML

## (undated) DEVICE — CLIP LIGACLIP XTRA TITANIUM

## (undated) DEVICE — CATH IMPRESS 5F 65CM MOD HOOK

## (undated) DEVICE — DEV-O-LOOPS MINI BLUE

## (undated) DEVICE — DRAPE IOBAN 6635

## (undated) DEVICE — PAD ABDOMINAL STERILE 8X10IN

## (undated) DEVICE — SUT MCRYL PLUS 4-0 PS2 27IN

## (undated) DEVICE — GUIDEWIRE STF .035X260CM ANG

## (undated) DEVICE — SHEATH INTRODUCER 6FR 11CM

## (undated) DEVICE — SUT VICRYL 1 OB 36 CTX

## (undated) DEVICE — GLOVE SURGICAL LATEX SZ 7

## (undated) DEVICE — GLOVE SURGEONS ULTRA TOUCH 6.5

## (undated) DEVICE — DRAPE BAG ISOLATION 20 X 20